# Patient Record
Sex: FEMALE | Race: WHITE | NOT HISPANIC OR LATINO | Employment: OTHER | URBAN - METROPOLITAN AREA
[De-identification: names, ages, dates, MRNs, and addresses within clinical notes are randomized per-mention and may not be internally consistent; named-entity substitution may affect disease eponyms.]

---

## 2017-01-18 ENCOUNTER — OFFICE VISIT (OUTPATIENT)
Dept: FAMILY MEDICINE | Facility: CLINIC | Age: 57
End: 2017-01-18
Payer: COMMERCIAL

## 2017-01-18 VITALS
WEIGHT: 158.6 LBS | HEIGHT: 64 IN | HEART RATE: 78 BPM | BODY MASS INDEX: 27.08 KG/M2 | TEMPERATURE: 97.9 F | DIASTOLIC BLOOD PRESSURE: 69 MMHG | SYSTOLIC BLOOD PRESSURE: 121 MMHG

## 2017-01-18 DIAGNOSIS — M65.4 RADIAL STYLOID TENOSYNOVITIS: Primary | ICD-10-CM

## 2017-01-18 PROCEDURE — 99213 OFFICE O/P EST LOW 20 MIN: CPT | Performed by: FAMILY MEDICINE

## 2017-01-18 ASSESSMENT — PAIN SCALES - GENERAL: PAINLEVEL: MILD PAIN (2)

## 2017-01-18 NOTE — PROGRESS NOTES
SUBJECTIVE:                                                    Lydia Pierson is a 56 year old female who presents to clinic today for the following health issues:      Problem:   Chief Complaint   Patient presents with     Wrist Pain     left wrist pain x2-3 months.  Also has noticed a cyst in the left wrist 1 year ago.       Location: left wrist  Quality: burning/aching pain with sharp pain with movement or lifiting  Severity: moderate  Duration: several months  Timing: intermitent  Context:  Modifying factors: worse with lifting or movement  Associated signs and symptoms: no Numbness or tingling.    ADDITIONAL HPI: 56 year old female here for above issue.     ROS: 10 point review of systems negative except as per HPI.    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     ALL at age 16,  no evid of recurrence     Malignant neoplasm of kidney, except pelvis 2001     left kidney removed 1/01        ACTIVE MEDICAL PROBLEMS:  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)     Malignant neoplasm of kidney excluding renal pelvis (H)     External hemorrhoids with other complication     Sensorineural hearing loss, asymmetrical     Ovarian cyst     Health Care Home     Hyperlipidemia LDL goal <160     Advanced directives, counseling/discussion        FAMILY HISTORY:  Family History   Problem Relation Age of Onset     C.A.D. Father      bypass     DIABETES Father      Lipids Father      Allergies Sister      allergic rhinitis     Neurologic Disorder Sister      seizure disorder     Respiratory Sister      asthma     Allergies Son      Breast Cancer No family hx of      Cancer - colorectal No family hx of        SOCIAL HISTORY:  Social History     Social History     Marital Status:      Spouse  "Name: N/A     Number of Children: N/A     Years of Education: N/A     Occupational History     Not on file.     Social History Main Topics     Smoking status: Never Smoker      Smokeless tobacco: Never Used     Alcohol Use: No     Drug Use: No     Sexual Activity:     Partners: Male     Birth Control/ Protection: Surgical      Comment: tubal     Other Topics Concern     Parent/Sibling W/ Cabg, Mi Or Angioplasty Before 65f 55m? No     Social History Narrative       MEDICATIONS:  Current Outpatient Prescriptions   Medication Sig Dispense Refill     diclofenac (VOLTAREN) 1 % GEL topical gel 2 grams to hands four times daily using enclosed dosing card. 100 g 3     acetaminophen (TYLENOL) 325 MG tablet Take 1 tablet by mouth every 6 hours as needed.       ibuprofen (ADVIL,MOTRIN) 600 MG tablet Take 1 tablet by mouth every 6 hours as needed for pain (For mild pain and temperature greater than 102F). 30 tablet 0     Acetaminophen-Aspirin Buffered (EXCEDRIN BACK & BODY) 250-250 MG TABS 1 tablet PRN for pain       LYSINE OR AS NEEDED       VITAMIN D 400 UNIT OR TABS 1 daily in winter       WOMENS DAILY MULTIVITAMIN OR TABS 1 TABLET DAILY       CALCIUM 600 600 MG OR TABS 1 daily       amoxicillin-clavulanate (AUGMENTIN) 875-125 MG per tablet Take 1 tablet by mouth 2 times daily 20 tablet 0     cyclobenzaprine (FLEXERIL) 10 MG tablet Take 1 tablet (10 mg) by mouth nightly as needed for muscle spasms 30 tablet 1     traZODone (DESYREL) 50 MG tablet Take 1-2 tablets ( mg) by mouth nightly as needed for sleep 60 tablet 1       ALLERGIES:     Allergies   Allergen Reactions     Seasonal Allergies        Problem list, Medication list, Allergies, and Medical/Social/Surgical histories reviewed in [facility] and updated as appropriate.    OBJECTIVE:                                                    VITALS: /69 mmHg  Pulse 78  Temp(Src) 97.9  F (36.6  C) (Tympanic)  Ht 5' 4\" (1.626 m)  Wt 158 lb 9.6 oz (71.94 kg)  BMI 27.21 " kg/m2  LMP 12/05/2010  Breastfeeding? No Body mass index is 27.21 kg/(m^2).  GENERAL: Pleasant, well appearing female.  MUSCULOSKELETAL:  Tender over radial wrist. Positive Finkelstein's. Otherwise unremarkable exam.    ASSESSMENT/PLAN:                                                    1. Radial styloid tenosynovitis  Placed in thumb spika splint. Try ice and voltaren gel. If not improving then consult ortho for further management.  - diclofenac (VOLTAREN) 1 % GEL topical gel; 2 grams to hands four times daily using enclosed dosing card.  Dispense: 100 g; Refill: 3  - ORTHO  REFERRAL

## 2017-01-18 NOTE — PATIENT INSTRUCTIONS
Thank you for choosing [facility].  You may be receiving a survey in the mail from [facility] regarding your visit today.  Please take a few minutes to complete and return the survey to let us know how we are doing.      Our Clinic hours are:  Mondays    7:20 am - 7 pm  Tues -  Fri  7:20 am - 5 pm    Clinic Phone: [phone]    The clinic lab opens at 7:30 am Mon - Fri and appointments are required.    [facility]. [phone]  Monday-Thursday 8 am - 7pm  Tues/Wed/Fri 8 am - 5:30 pm

## 2017-01-18 NOTE — MR AVS SNAPSHOT
After Visit Summary   1/18/2017    Lydia Pierson    MRN: [id number]           Patient Information     Date Of Birth          1960        Visit Information        Provider Department      1/18/2017 3:00 PM Benja Cordova MD [facility]        Today's Diagnoses     Radial styloid tenosynovitis    -  1       Care Instructions          Thank you for choosing [facility].  You may be receiving a survey in the mail from [facility] regarding your visit today.  Please take a few minutes to complete and return the survey to let us know how we are doing.      Our Clinic hours are:  Mondays    7:20 am - 7 pm  Tues -  Fri  7:20 am - 5 pm    Clinic Phone: [phone]    The clinic lab opens at 7:30 am Mon - Fri and appointments are required.    [facility]  Ph. [phone]  Monday-Thursday 8 am - 7pm  Tues/Wed/Fri 8 am - 5:30 pm               Follow-ups after your visit        Additional Services     ORTHO  REFERRAL       [facility] is referring you to the Orthopedic  Services at [facility].       The  Representative will assist you in the coordination of your Orthopedic and Musculoskeletal Care as prescribed by your physician.    The  Representative will call you within 1 business day to help schedule your appointment, or you may contact the  Representative at:    All areas ~ [phone]     Type of Referral : Non Surgical       Timeframe requested: Routine    Coverage of these services is subject to the terms and limitations of your health insurance plan.  Please call member services at your health plan with any benefit or coverage questions.      If X-rays, CT or MRI's have been performed, please contact the facility where they were done to arrange for , prior to your scheduled appointment.  Please bring this referral request to your appointment and  "present it to your specialist.                  Who to contact     If you have questions or need follow up information about today's clinic visit or your schedule please contact [facility] directly at [phone].  Normal or non-critical lab and imaging results will be communicated to you by MyChart, letter or phone within 4 business days after the clinic has received the results. If you do not hear from us within 7 days, please contact the clinic through MyChart or phone. If you have a critical or abnormal lab result, we will notify you by phone as soon as possible.  Submit refill requests through [employer] or call your pharmacy and they will forward the refill request to us. Please allow 3 business days for your refill to be completed.          Additional Information About Your Visit        charming [facility] Information     [employer] lets you send messages to your doctor, view your test results, renew your prescriptions, schedule appointments and more. To sign up, go to [email or web address] . Click on \"Log in\" on the left side of the screen, which will take you to the Welcome page. Then click on \"Sign up Now\" on the right side of the page.     You will be asked to enter the access code listed below, as well as some personal information. Please follow the directions to create your username and password.     Your access code is: [email or web address]  Expires: 2017  3:44 PM     Your access code will  in 90 days. If you need help or a new code, please call your [facility] clinic or [phone].        Care EveryWhere ID     This is your Care EveryWhere ID. This could be used by other organizations to access your [facility] medical records  [id number]        Your Vitals Were     Pulse Temperature Height BMI (Body Mass Index) Last Period Breastfeeding?    78 97.9  F (36.6  C) (Tympanic) 5' 4\" (1.626 m) 27.21 kg/m2 2010 No       Blood Pressure from Last 3 Encounters:   17 121/69   16 123/75 "   10/06/16 125/84    Weight from Last 3 Encounters:   01/18/17 158 lb 9.6 oz (71.94 kg)   12/13/16 162 lb (73.483 kg)   10/06/16 166 lb (75.297 kg)              We Performed the Following     ORTHO  REFERRAL          Today's Medication Changes          These changes are accurate as of: 1/18/17  3:44 PM.  If you have any questions, ask your nurse or doctor.               Start taking these medicines.        Dose/Directions    diclofenac 1 % Gel topical gel   Commonly known as:  VOLTAREN   Used for:  Radial styloid tenosynovitis   Started by:  Benja Cordova MD        2 grams to hands four times daily using enclosed dosing card.   Quantity:  100 g   Refills:  3            Where to get your medicines      These medications were sent to [facility] [address]  [id number] [place] [address]     Phone:  [phone]    - diclofenac 1 % Gel topical gel             Primary Care Provider Office Phone # Fax #    Benja Cordova [phone]       [facility] [address] [facility] [address]        Thank you!     Thank you for choosing [facility]  for your care. Our goal is always to provide you with excellent care. Hearing back from our patients is one way we can continue to improve our services. Please take a few minutes to complete the written survey that you may receive in the mail after your visit with us. Thank you!             Your Updated Medication List - Protect others around you: Learn how to safely use, store and throw away your medicines at www.disposemymeds.org.          This list is accurate as of: 1/18/17  3:44 PM.  Always use your most recent med list.                   Brand Name Dispense Instructions for use    amoxicillin-clavulanate 875-125 MG per tablet    AUGMENTIN    20 tablet    Take 1 tablet by mouth 2 times daily       calcium carbonate 600  MG tablet   Generic drug:  calcium carbonate      1 daily       cyclobenzaprine 10 MG tablet    FLEXERIL    30 tablet    Take 1 tablet (10 mg) by mouth nightly as needed for muscle spasms       diclofenac 1 % Gel topical gel    VOLTAREN    100 g    2 grams to hands four times daily using enclosed dosing card.       EXCEDRIN BACK & BODY 250-250 MG Tabs   Generic drug:  Acetaminophen-Aspirin Buffered      1 tablet PRN for pain       ibuprofen 600 MG tablet    ADVIL/MOTRIN    30 tablet    Take 1 tablet by mouth every 6 hours as needed for pain (For mild pain and temperature greater than 102F).       LYSINE PO      AS NEEDED       traZODone 50 MG tablet    DESYREL    60 tablet    Take 1-2 tablets ( mg) by mouth nightly as needed for sleep       TYLENOL 325 MG tablet   Generic drug:  acetaminophen      Take 1 tablet by mouth every 6 hours as needed.       vitamin D 400 UNITS tablet      1 daily in winter       WOMENS DAILY MULTIVITAMIN Tabs      1 TABLET DAILY

## 2017-01-18 NOTE — NURSING NOTE
"Chief Complaint   Patient presents with     Wrist Pain     left wrist pain x2-3 months.  Also has noticed a cyst in the left wrist 1 year ago.       Initial /69 mmHg  Pulse 78  Temp(Src) 97.9  F (36.6  C) (Tympanic)  Ht 5' 4\" (1.626 m)  Wt 158 lb 9.6 oz (71.94 kg)  BMI 27.21 kg/m2  LMP 12/05/2010  Breastfeeding? No Estimated body mass index is 27.21 kg/(m^2) as calculated from the following:    Height as of this encounter: 5' 4\" (1.626 m).    Weight as of this encounter: 158 lb 9.6 oz (71.94 kg).  BP completed using cuff size: regular    "

## 2017-01-27 ENCOUNTER — OFFICE VISIT (OUTPATIENT)
Dept: ORTHOPEDICS | Facility: CLINIC | Age: 57
End: 2017-01-27
Payer: COMMERCIAL

## 2017-01-27 VITALS
SYSTOLIC BLOOD PRESSURE: 126 MMHG | HEART RATE: 69 BPM | WEIGHT: 158 LBS | BODY MASS INDEX: 27.11 KG/M2 | DIASTOLIC BLOOD PRESSURE: 71 MMHG

## 2017-01-27 DIAGNOSIS — R22.32 MASS OF WRIST, LEFT: ICD-10-CM

## 2017-01-27 DIAGNOSIS — M25.532 LEFT WRIST PAIN: ICD-10-CM

## 2017-01-27 DIAGNOSIS — M65.4 RADIAL STYLOID TENOSYNOVITIS: Primary | ICD-10-CM

## 2017-01-27 PROCEDURE — 99213 OFFICE O/P EST LOW 20 MIN: CPT | Performed by: PHYSICIAN ASSISTANT

## 2017-01-27 NOTE — PROGRESS NOTES
Sports Medicine Clinic Visit     Prior Visit Date 3/31/16    PCP: Benja Cordova Rhonda Pierson is a 56 year old female who is seen in f/u up for left wrist pain. Since last visit on 3/31/16 with Dr. Banerjee patient has noted she had good relief from the injection at that time until November. She was referred back to the clinic by Dr. Cordova as her mass has enlarged and is now more painful than before.  She notices a mobile mass that is over her area of tenosynovitis.  She is unsure if the tenosynovitis is causing pain or if it is the mass.  She has noticed the mass wax and wane in size and pain.  She denies paresthesias or changes in vascular flow.        Social History:         Review of Systems  Musculoskeletal: as above  Remainder of review of systems is negative including constitutional, CV, pulmonary, GI, Skin and Neurologic except as noted in HPI or medical history.    Family history, medical history and surgical history have all been discussed with patient and appended to medical chart below.    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     ALL at age 16,  no evid of recurrence     Malignant neoplasm of kidney, except pelvis 2001     left kidney removed 1/01     Past Surgical History   Procedure Laterality Date     Surgical history of -   8/1/1995     Vein stripping     Tubal ligation  1994     Hemorrhoidectomy  2006     Tonsillectomy & adenoidectomy  1962     Surgical history of -   1975     wisdom teeth extraction      Nephrectomy rt/lt  2001     left partial nephrectomy- kidney ca     Salpingo oophorectomy,r/l/jus  2010     Rt salpingo oophorectomy      Arthroscopy knee with medial meniscectomy  7/7/2011     Procedure:ARTHROSCOPY KNEE WITH MEDIAL MENISCECTOMY; choice anes; Surgeon:MANUEL FLEMING; Location:[place] [facility]     Family History    Problem Relation Age of Onset     C.A.D. Father      bypass     DIABETES Father      Lipids Father      Allergies Sister      allergic rhinitis     Neurologic Disorder Sister      seizure disorder     Respiratory Sister      asthma     Allergies Son      Breast Cancer No family hx of      Cancer - colorectal No family hx of      Objective  /71 mmHg  Pulse 69  Wt 158 lb (71.668 kg)  LMP 12/05/2010  Constitutional:well-developed, well-nourished, and in no distress.   Cardiovascular: Intact distal pulses.    Neurological: alert. Gait normal  Skin: Skin is warm and dry.   Psychiatric: Mood and affect normal.   Respiratory: unlabored, speaks in full sentences  Hematologic/Lymphatic/Immunologic: neg lymphadenopathy, neg lymphedema    Exam:  Inspection:        mobile, transilluminating mass over radial syloid    Tender:       Radial styloid, 1st dorsal compartment    Non Tender:       Remainder of the Wrist and Hand     ROM:       Full and symmetric active and passive range of motion of the forearm, wrist and digits         Pain with radial deviation and wrist extension    Strength:       5/5 strength in the muscles of the hand, wrist and forearm     Special Tests:        positive (+) Finkelstein's maneuver left    Neurovascular:       2+ radial pulses bilaterally with brisk capillary refill and      normal sensation to light touch in the radial, median and ulnar nerve distributions      Assessment:  1. Radial styloid tenosynovitis    2. Mass of wrist, left    3. Left wrist pain        Plan:  Discussed the assessment with the patient.  I have recommended options including repeat injection.  She is hesitant to proceed with this at this time as she is now more concerned with the mass that has developed that is incredibly painful when she hits it or uses wrist.  I also recommended an evaluation with a hand surgeon as there is the possibility that this mass can be excised.  However, imaging will be determined by  surgeon's team along with treatment options at that visit.      Regina Salinas PA-C  [facility]  [facility]      Disclaimer: This note consists of symbols derived from keyboarding, dictation and/or voice recognition software. As a result, there may be errors in the script that have gone undetected. Please consider this when interpreting information found in this chart.

## 2017-01-27 NOTE — MR AVS SNAPSHOT
After Visit Summary   1/27/2017    Lydia Pierson    MRN: [id number]           Patient Information     Date Of Birth          1960        Visit Information        Provider Department      1/27/2017 11:40 AM Regina Salinas PA-C [facility]        Today's Diagnoses     Ganglion cyst of wrist    -  1       Care Instructions    [facility]  will contact you in the next 48 hours to help schedule the appointment with a hand surgeon. If you do not hear from them please call [phone] for [place] and [facility].          Follow-ups after your visit        Additional Services     ORTHO  REFERRAL       [facility] is referring you to the Orthopedic  Services at [facility].       The  Representative will assist you in the coordination of your Orthopedic and Musculoskeletal Care as prescribed by your physician.    The  Representative will call you within 1 business day to help schedule your appointment, or you may contact the  Representative at:    All areas ~ [phone]     Type of Referral : Surgical / Specialist       Timeframe requested: Routine    Coverage of these services is subject to the terms and limitations of your health insurance plan.  Please call member services at your health plan with any benefit or coverage questions.      If X-rays, CT or MRI's have been performed, please contact the facility where they were done to arrange for , prior to your scheduled appointment.  Please bring this referral request to your appointment and present it to your specialist.                  Who to contact     If you have questions or need follow up information about today's clinic visit or your schedule please contact [facility] directly at [phone].  Normal or non-critical lab and imaging results will be communicated  "to you by MyChart, letter or phone within 4 business days after the clinic has received the results. If you do not hear from us within 7 days, please contact the clinic through [employer] or phone. If you have a critical or abnormal lab result, we will notify you by phone as soon as possible.  Submit refill requests through [employer] or call your pharmacy and they will forward the refill request to us. Please allow 3 business days for your refill to be completed.          Additional Information About Your Visit        [employer] Information     [employer] lets you send messages to your doctor, view your test results, renew your prescriptions, schedule appointments and more. To sign up, go to [email or web address] . Click on \"Log in\" on the left side of the screen, which will take you to the Welcome page. Then click on \"Sign up Now\" on the right side of the page.     You will be asked to enter the access code listed below, as well as some personal information. Please follow the directions to create your username and password.     Your access code is: [email or web address]  Expires: 2017  3:44 PM     Your access code will  in 90 days. If you need help or a new code, please call your [facility] clinic or [phone].        Care EveryWhere ID     This is your Care EveryWhere ID. This could be used by other organizations to access your [facility] medical records  [id number]        Your Vitals Were     Pulse Last Period                2010           Blood Pressure from Last 3 Encounters:   17 126/71   17 121/69   16 123/75    Weight from Last 3 Encounters:   17 158 lb (71.668 kg)   17 158 lb 9.6 oz (71.94 kg)   16 162 lb (73.483 kg)              We Performed the Following     ORTHO  REFERRAL        Primary Care Provider Office Phone # Fax #    Benja Cordova [phone]       [facility] [address] [place] [address]   "      Thank you!     Thank you for choosing [facility]  for your care. Our goal is always to provide you with excellent care. Hearing back from our patients is one way we can continue to improve our services. Please take a few minutes to complete the written survey that you may receive in the mail after your visit with us. Thank you!             Your Updated Medication List - Protect others around you: Learn how to safely use, store and throw away your medicines at www.disposemymeds.org.          This list is accurate as of: 1/27/17 11:47 AM.  Always use your most recent med list.                   Brand Name Dispense Instructions for use    amoxicillin-clavulanate 875-125 MG per tablet    AUGMENTIN    20 tablet    Take 1 tablet by mouth 2 times daily       calcium carbonate 600 MG tablet   Generic drug:  calcium carbonate      1 daily       cyclobenzaprine 10 MG tablet    FLEXERIL    30 tablet    Take 1 tablet (10 mg) by mouth nightly as needed for muscle spasms       diclofenac 1 % Gel topical gel    VOLTAREN    100 g    2 grams to hands four times daily using enclosed dosing card.       EXCEDRIN BACK & BODY 250-250 MG Tabs   Generic drug:  Acetaminophen-Aspirin Buffered      1 tablet PRN for pain       ibuprofen 600 MG tablet    ADVIL/MOTRIN    30 tablet    Take 1 tablet by mouth every 6 hours as needed for pain (For mild pain and temperature greater than 102F).       LYSINE PO      AS NEEDED       traZODone 50 MG tablet    DESYREL    60 tablet    Take 1-2 tablets ( mg) by mouth nightly as needed for sleep       TYLENOL 325 MG tablet   Generic drug:  acetaminophen      Take 1 tablet by mouth every 6 hours as needed.       vitamin D 400 UNITS tablet      1 daily in winter       WOMENS DAILY MULTIVITAMIN Tabs      1 TABLET DAILY

## 2017-01-27 NOTE — PATIENT INSTRUCTIONS
Orthopedic  will contact you in the next 48 hours to help schedule the appointment with a hand surgeon. If you do not hear from them please call [phone] for [place] and [facility].

## 2017-03-02 ENCOUNTER — OFFICE VISIT (OUTPATIENT)
Dept: ORTHOPEDICS | Facility: CLINIC | Age: 57
End: 2017-03-02
Payer: COMMERCIAL

## 2017-03-02 VITALS
WEIGHT: 158 LBS | BODY MASS INDEX: 26.98 KG/M2 | SYSTOLIC BLOOD PRESSURE: 122 MMHG | HEIGHT: 64 IN | DIASTOLIC BLOOD PRESSURE: 78 MMHG

## 2017-03-02 DIAGNOSIS — M65.4 RADIAL STYLOID TENOSYNOVITIS: Primary | ICD-10-CM

## 2017-03-02 DIAGNOSIS — M25.532 LEFT WRIST PAIN: ICD-10-CM

## 2017-03-02 PROCEDURE — 76942 ECHO GUIDE FOR BIOPSY: CPT | Mod: LT | Performed by: FAMILY MEDICINE

## 2017-03-02 PROCEDURE — 20550 NJX 1 TENDON SHEATH/LIGAMENT: CPT | Mod: LT | Performed by: FAMILY MEDICINE

## 2017-03-02 RX ORDER — TRIAMCINOLONE ACETONIDE 40 MG/ML
40 INJECTION, SUSPENSION INTRA-ARTICULAR; INTRAMUSCULAR ONCE
Qty: 1 ML | Refills: 0 | OUTPATIENT
Start: 2017-03-02 | End: 2017-03-02

## 2017-03-02 NOTE — PROGRESS NOTES
Lydia Pierson  :  1960  DOS: 2017  MRN: [id number]    Sports Medicine Clinic Procedure    Procedure: DeQuervain's Tendon Sheath Injection    Clinical History: Lydia Pierson is a 56 year old female who is seen in f/u up for left wrist pain. Since last visit on 3/31/16 with Dr. Banerjee patient has noted she had good relief from the injection at that time until November. She was referred back to the clinic by Dr. Cordova as her mass has enlarged and is now more painful than before. She notices a mobile mass that is over her area of tenosynovitis. She is unsure if the tenosynovitis is causing pain or if it is the mass. She has noticed the mass wax and wane in size and pain. She denies paresthesias or changes in vascular flow.  She as consulted with hand surgeon - Dr Julien, recommendation hand therapy, custom splint, could repeat steroid injection as directed.    Diagnosis: (M65.4) Radial styloid tenosynovitis  (primary encounter diagnosis)  (M25.532) Left wrist pain  Referring Physician: Klaudia Julien MD @ [facility], Regina Salinas PA-C    Consent given, and signed on chart.  Sterile prepping.    Ultrasound identification of the 1st dorsal compartment tendons in both long and short axis.    The probe was placed in long axis view to the left side 1st dorsal compartment tendons.  A 1.5 inch 25 gauge needle was placed under ultrasound guidance between the extensor tendon sheath and the abductor pollicis longus/flexor pollicis brevis tendons.     A mixture of 1ml 1% lidocaine and 1 ml kenalog (40mg/ml) was injected without difficulty on the long axis view.    Good hemostasis and no complications.  Ultrasound documentation of needle placement and injection.    Impression:  Dequervain's tenosynovitis  Successful Left US guided fist dorsal compartment steroid injection into tendon sheath     Plan:  Follow up with Dr Julien as directed.  Expectations and goals of CSI reviewed  Often 2-3 days for steroid  effect, and can take up to two weeks for maximum effect  We discussed modified progressive pain-free activity as tolerated  Do not overuse in first two weeks if feeling better due to concern for vulnerability while steroid is working  Supportive care reviewed  All questions were answered today  Contact us with additional questions or concerns  Signs and sx of concern reviewed      Favian Banerjee DO, CAQ  Primary Care Sports Medicine  [facility]

## 2017-03-02 NOTE — MR AVS SNAPSHOT
"              After Visit Summary   3/2/2017    Lydia Pierson    MRN: [id number]           Patient Information     Date Of Birth          1960        Visit Information        Provider Department      3/2/2017 8:40 AM Favian Banerjee,  [facility]        Today's Diagnoses     Radial styloid tenosynovitis    -  1    Left wrist pain           Follow-ups after your visit        Future tests that were ordered for you today     Open Future Orders        Priority Expected Expires Ordered    US Injection of Tendon Sheath Routine  3/3/2018 3/2/2017            Who to contact     If you have questions or need follow up information about today's clinic visit or your schedule please contact [facility] directly at [phone].  Normal or non-critical lab and imaging results will be communicated to you by MyChart, letter or phone within 4 business days after the clinic has received the results. If you do not hear from us within 7 days, please contact the clinic through MyChart or phone. If you have a critical or abnormal lab result, we will notify you by phone as soon as possible.  Submit refill requests through [employer] or call your pharmacy and they will forward the refill request to us. Please allow 3 business days for your refill to be completed.          Additional Information About Your Visit        MyChart Information     [employer] lets you send messages to your doctor, view your test results, renew your prescriptions, schedule appointments and more. To sign up, go to [email or web address] . Click on \"Log in\" on the left side of the screen, which will take you to the Welcome page. Then click on \"Sign up Now\" on the right side of the page.     You will be asked to enter the access code listed below, as well as some personal information. Please follow the directions to create your username and password.     Your access code is: " "[email or web address]  Expires: 2017  3:44 PM     Your access code will  in 90 days. If you need help or a new code, please call your [place] clinic or [phone].        Care EveryWhere ID     This is your Care EveryWhere ID. This could be used by other organizations to access your [place] medical records  [id number]        Your Vitals Were     Height Last Period BMI (Body Mass Index)             5' 4\" (1.626 m) 2010 27.12 kg/m2          Blood Pressure from Last 3 Encounters:   17 122/78   17 126/71   17 121/69    Weight from Last 3 Encounters:   17 158 lb (71.7 kg)   17 158 lb (71.7 kg)   17 158 lb 9.6 oz (71.9 kg)              We Performed the Following     TRIAMCINOLONE ACET INJ NOS          Today's Medication Changes          These changes are accurate as of: 3/2/17  9:17 AM.  If you have any questions, ask your nurse or doctor.               Start taking these medicines.        Dose/Directions    triamcinolone acetonide 40 MG/ML injection   Commonly known as:  KENALOG-40   Used for:  Left wrist pain, Radial styloid tenosynovitis   Started by:  Favian Banerjee DO        Dose:  40 mg   1 mL (40 mg) by INTRA-ARTICULAR route once for 1 dose   Quantity:  1 mL   Refills:  0            Where to get your medicines      Some of these will need a paper prescription and others can be bought over the counter.  Ask your nurse if you have questions.     You don't need a prescription for these medications     triamcinolone acetonide 40 MG/ML injection                Primary Care Provider Office Phone # Fax #    Benja Cordova [phone]       [address] [address]        Thank you!     Thank you for choosing [facility]  for your care. Our goal is always to provide you with excellent care. Hearing back from our patients is one way we can continue to improve " our services. Please take a few minutes to complete the written survey that you may receive in the mail after your visit with us. Thank you!             Your Updated Medication List - Protect others around you: Learn how to safely use, store and throw away your medicines at www.disposemymeds.org.          This list is accurate as of: 3/2/17  9:17 AM.  Always use your most recent med list.                   Brand Name Dispense Instructions for use    amoxicillin-clavulanate 875-125 MG per tablet    AUGMENTIN    20 tablet    Take 1 tablet by mouth 2 times daily       calcium carbonate 600 MG tablet   Generic drug:  calcium carbonate      1 daily       cyclobenzaprine 10 MG tablet    FLEXERIL    30 tablet    Take 1 tablet (10 mg) by mouth nightly as needed for muscle spasms       diclofenac 1 % Gel topical gel    VOLTAREN    100 g    2 grams to hands four times daily using enclosed dosing card.       EXCEDRIN BACK & BODY 250-250 MG Tabs   Generic drug:  Acetaminophen-Aspirin Buffered      1 tablet PRN for pain       ibuprofen 600 MG tablet    ADVIL/MOTRIN    30 tablet    Take 1 tablet by mouth every 6 hours as needed for pain (For mild pain and temperature greater than 102F).       LYSINE PO      AS NEEDED       traZODone 50 MG tablet    DESYREL    60 tablet    Take 1-2 tablets ( mg) by mouth nightly as needed for sleep       triamcinolone acetonide 40 MG/ML injection    KENALOG-40    1 mL    1 mL (40 mg) by INTRA-ARTICULAR route once for 1 dose       TYLENOL 325 MG tablet   Generic drug:  acetaminophen      Take 1 tablet by mouth every 6 hours as needed.       vitamin D 400 UNITS tablet      1 daily in winter       WOMENS DAILY MULTIVITAMIN Tabs      1 TABLET DAILY

## 2017-03-02 NOTE — NURSING NOTE
"Chief Complaint   Patient presents with     Musculoskeletal Problem     f/u left wrist pain - US injection       Initial /78  Ht 5' 4\" (1.626 m)  Wt 158 lb (71.7 kg)  LMP 12/05/2010  BMI 27.12 kg/m2 Estimated body mass index is 27.12 kg/(m^2) as calculated from the following:    Height as of this encounter: 5' 4\" (1.626 m).    Weight as of this encounter: 158 lb (71.7 kg).  Medication Reconciliation: complete     Emery Richards, ATC  "

## 2017-03-13 ENCOUNTER — OFFICE VISIT (OUTPATIENT)
Dept: FAMILY MEDICINE | Facility: CLINIC | Age: 57
End: 2017-03-13
Payer: COMMERCIAL

## 2017-03-13 ENCOUNTER — HOSPITAL ENCOUNTER (OUTPATIENT)
Dept: ULTRASOUND IMAGING | Facility: CLINIC | Age: 57
Discharge: HOME | End: 2017-03-13
Attending: FAMILY MEDICINE | Admitting: FAMILY MEDICINE
Payer: COMMERCIAL

## 2017-03-13 VITALS
SYSTOLIC BLOOD PRESSURE: 132 MMHG | HEART RATE: 76 BPM | BODY MASS INDEX: 26.98 KG/M2 | WEIGHT: 158 LBS | DIASTOLIC BLOOD PRESSURE: 69 MMHG | HEIGHT: 64 IN | TEMPERATURE: 96.9 F

## 2017-03-13 DIAGNOSIS — N89.8 VAGINAL ODOR: ICD-10-CM

## 2017-03-13 DIAGNOSIS — R30.9 PAIN WITH URINATION: Primary | ICD-10-CM

## 2017-03-13 DIAGNOSIS — R10.32 ABDOMINAL PAIN, LEFT LOWER QUADRANT: ICD-10-CM

## 2017-03-13 LAB
ALBUMIN UR-MCNC: NEGATIVE MG/DL
APPEARANCE UR: CLEAR
BILIRUB UR QL STRIP: NEGATIVE
COLOR UR AUTO: YELLOW
GLUCOSE UR STRIP-MCNC: NEGATIVE MG/DL
HGB UR QL STRIP: NEGATIVE
KETONES UR STRIP-MCNC: NEGATIVE MG/DL
LEUKOCYTE ESTERASE UR QL STRIP: NEGATIVE
MICRO REPORT STATUS: NORMAL
NITRATE UR QL: NEGATIVE
PH UR STRIP: 6 PH (ref 5–7)
SP GR UR STRIP: 1.01 (ref 1–1.03)
SPECIMEN SOURCE: NORMAL
URN SPEC COLLECT METH UR: NORMAL
UROBILINOGEN UR STRIP-ACNC: 0.2 EU/DL (ref 0.2–1)
WET PREP SPEC: NORMAL

## 2017-03-13 PROCEDURE — 81003 URINALYSIS AUTO W/O SCOPE: CPT | Performed by: FAMILY MEDICINE

## 2017-03-13 PROCEDURE — 87210 SMEAR WET MOUNT SALINE/INK: CPT | Performed by: FAMILY MEDICINE

## 2017-03-13 PROCEDURE — 76830 TRANSVAGINAL US NON-OB: CPT

## 2017-03-13 PROCEDURE — 99214 OFFICE O/P EST MOD 30 MIN: CPT | Performed by: FAMILY MEDICINE

## 2017-03-13 NOTE — PATIENT INSTRUCTIONS
Thank you for choosing [facility].  You may be receiving a survey in the mail from Karla Daniels regarding your visit today.  Please take a few minutes to complete and return the survey to let us know how we are doing.      If you have questions or concerns, please contact us via [employer] or you can contact your care team at [phone].    Our Clinic hours are:  Monday 6:40 am  to 7:00 pm  Tuesday -Friday 6:40 am to 5:00 pm    The [place] outpatient lab hours are:  Monday - Friday 6:10 am to 4:45 pm  Saturdays 7:00 am to 11:00 am  Appointments are required, call [phone]    If you have clinical questions after hours or would like to schedule an appointment,  call the clinic at [phone].

## 2017-03-13 NOTE — MR AVS SNAPSHOT
After Visit Summary   3/13/2017    Lydia Pierson    MRN: [id number]           Patient Information     Date Of Birth          1960        Visit Information        Provider Department      3/13/2017 10:40 AM Alana Pimentel MD [facility]        Today's Diagnoses     Pain with urination    -  1    Vaginal odor        Abdominal pain, left lower quadrant          Care Instructions          Thank you for choosing [facility].  You may be receiving a survey in the mail from [facility] regarding your visit today.  Please take a few minutes to complete and return the survey to let us know how we are doing.      If you have questions or concerns, please contact us via [employer] or you can contact your care team at [phone].    Our Clinic hours are:  Monday 6:40 am  to 7:00 pm  Tuesday -Friday 6:40 am to 5:00 pm    The [place] outpatient lab hours are:  Monday - Friday 6:10 am to 4:45 pm  Saturdays 7:00 am to 11:00 am  Appointments are required, call [phone]    If you have clinical questions after hours or would like to schedule an appointment,  call the clinic at [phone].        Follow-ups after your visit        Future tests that were ordered for you today     Open Future Orders        Priority Expected Expires Ordered    US Pelvic Complete with Transvaginal Routine 6/11/2017 3/13/2018 3/13/2017            Who to contact     If you have questions or need follow up information about today's clinic visit or your schedule please contact [facility] directly at [phone].  Normal or non-critical lab and imaging results will be communicated to you by MyChart, letter or phone within 4 business days after the clinic has received the results. If you do not hear from us within 7 days, please contact the clinic through [employer] or phone. If you have a critical or abnormal lab result, we will notify you by phone as soon as possible.  Submit refill  "requests through [employer] or call your pharmacy and they will forward the refill request to us. Please allow 3 business days for your refill to be completed.          Additional Information About Your Visit        [facility] Information     [employer] lets you send messages to your doctor, view your test results, renew your prescriptions, schedule appointments and more. To sign up, go to [email or web address] . Click on \"Log in\" on the left side of the screen, which will take you to the Welcome page. Then click on \"Sign up Now\" on the right side of the page.     You will be asked to enter the access code listed below, as well as some personal information. Please follow the directions to create your username and password.     Your access code is: [email or web address]  Expires: 2017  4:44 PM     Your access code will  in 90 days. If you need help or a new code, please call your [place] clinic or [phone].        Care EveryWhere ID     This is your Care EveryWhere ID. This could be used by other organizations to access your [place] medical records  [id number]        Your Vitals Were     Pulse Temperature Height Last Period BMI (Body Mass Index)       76 96.9  F (36.1  C) (Tympanic) 5' 4\" (1.626 m) 2010 27.12 kg/m2        Blood Pressure from Last 3 Encounters:   17 132/69   17 122/78   17 126/71    Weight from Last 3 Encounters:   17 158 lb (71.7 kg)   17 158 lb (71.7 kg)   17 158 lb (71.7 kg)              We Performed the Following     UA reflex to Microscopic and Culture     Wet prep        Primary Care Provider Office Phone # Fax #    Benja Cordova [phone]       [facility] [place] [address] [facility] [address]        Thank you!     Thank you for choosing [facility]  for your care. Our goal is always to provide you with excellent care. Hearing back from our patients is one way we can continue to improve " our services. Please take a few minutes to complete the written survey that you may receive in the mail after your visit with us. Thank you!             Your Updated Medication List - Protect others around you: Learn how to safely use, store and throw away your medicines at www.disposemymeds.org.          This list is accurate as of: 3/13/17 11:24 AM.  Always use your most recent med list.                   Brand Name Dispense Instructions for use    calcium carbonate 600 MG tablet   Generic drug:  calcium carbonate      1 daily       cyclobenzaprine 10 MG tablet    FLEXERIL    30 tablet    Take 1 tablet (10 mg) by mouth nightly as needed for muscle spasms       diclofenac 1 % Gel topical gel    VOLTAREN    100 g    2 grams to hands four times daily using enclosed dosing card.       EXCEDRIN BACK & BODY 250-250 MG Tabs   Generic drug:  Acetaminophen-Aspirin Buffered      1 tablet PRN for pain       ibuprofen 600 MG tablet    ADVIL/MOTRIN    30 tablet    Take 1 tablet by mouth every 6 hours as needed for pain (For mild pain and temperature greater than 102F).       LYSINE PO      AS NEEDED       traZODone 50 MG tablet    DESYREL    60 tablet    Take 1-2 tablets ( mg) by mouth nightly as needed for sleep       TYLENOL 325 MG tablet   Generic drug:  acetaminophen      Take 1 tablet by mouth every 6 hours as needed.       vitamin D 400 UNITS tablet      1 daily in winter       WOMENS DAILY MULTIVITAMIN Tabs      1 TABLET DAILY

## 2017-03-13 NOTE — NURSING NOTE
"Chief Complaint   Patient presents with     UTI       Initial /69 (BP Location: Left arm, Cuff Size: Adult Regular)  Pulse 76  Temp 96.9  F (36.1  C) (Tympanic)  Ht 5' 4\" (1.626 m)  Wt 158 lb (71.7 kg)  LMP 12/05/2010  BMI 27.12 kg/m2 Estimated body mass index is 27.12 kg/(m^2) as calculated from the following:    Height as of this encounter: 5' 4\" (1.626 m).    Weight as of this encounter: 158 lb (71.7 kg).  Medication Reconciliation: complete  "

## 2017-03-13 NOTE — PROGRESS NOTES
SUBJECTIVE:                                                    Lydia Pierson is a 57 year old female who presents to clinic today for the following health issues:      URINARY TRACT SYMPTOMS     Onset: 3-11-17    Description:   Painful urination (Dysuria): no   Blood in urine (Hematuria): no   Delay in urine (Hesitency): YES- little    Intensity: moderate    Progression of Symptoms:  worsening    Accompanying Signs & Symptoms:  Fever/chills: YES  Flank pain YES  Nausea and vomiting: YES  Any vaginal symptoms: vaginal odor pressure   Abdominal/Pelvic Pain: YES   History:   History of frequent UTI's: no   History of kidney stones: no   Sexually Active: YES  Possibility of pregnancy: No    Precipitating factors:   None          Therapies Tried and outcome: none         History as above, Here for urinary symptoms ,started on Saturday . She reports more of pressure than burning. She reports some fevers and chills.She has had a left nephrectomy from renal carcinoma. She also reports that the ovary on the left was taken out years ago.    Problem list and histories reviewed & adjusted, as indicated.  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)     Malignant neoplasm of kidney excluding renal pelvis (H)     External hemorrhoids with other complication     Sensorineural hearing loss, asymmetrical     Ovarian cyst     Health Care Home     Hyperlipidemia LDL goal <160     Advanced directives, counseling/discussion     Past Surgical History   Procedure Laterality Date     Surgical history of -   8/1/1995     Vein stripping     Tubal ligation  1994     Hemorrhoidectomy  2006     Tonsillectomy & adenoidectomy  1962     Surgical history of -   1975     wisdom teeth extraction      Nephrectomy rt/lt  2001     left partial nephrectomy- kidney ca     Salpingo oophorectomy,r/l/jus  2010     Rt salpingo oophorectomy       Arthroscopy knee with medial meniscectomy  7/7/2011     Procedure:ARTHROSCOPY KNEE WITH MEDIAL MENISCECTOMY; choice anes; Surgeon:MANUEL FLEMING; Location:[place] OR       Social History   Substance Use Topics     Smoking status: Never Smoker     Smokeless tobacco: Never Used     Alcohol use No     Family History   Problem Relation Age of Onset     C.A.D. Father      bypass     DIABETES Father      Lipids Father      Allergies Sister      allergic rhinitis     Neurologic Disorder Sister      seizure disorder     Respiratory Sister      asthma     Allergies Son      Breast Cancer No family hx of      Cancer - colorectal No family hx of          Current Outpatient Prescriptions   Medication Sig Dispense Refill     VITAMIN D 400 UNIT OR TABS 1 daily in winter       CALCIUM 600 600 MG OR TABS 1 daily       diclofenac (VOLTAREN) 1 % GEL topical gel 2 grams to hands four times daily using enclosed dosing card. 100 g 3     cyclobenzaprine (FLEXERIL) 10 MG tablet Take 1 tablet (10 mg) by mouth nightly as needed for muscle spasms 30 tablet 1     traZODone (DESYREL) 50 MG tablet Take 1-2 tablets ( mg) by mouth nightly as needed for sleep 60 tablet 1     acetaminophen (TYLENOL) 325 MG tablet Take 1 tablet by mouth every 6 hours as needed.       ibuprofen (ADVIL,MOTRIN) 600 MG tablet Take 1 tablet by mouth every 6 hours as needed for pain (For mild pain and temperature greater than 102F). 30 tablet 0     Acetaminophen-Aspirin Buffered (EXCEDRIN BACK & BODY) 250-250 MG TABS 1 tablet PRN for pain       LYSINE OR AS NEEDED       WOMENS DAILY MULTIVITAMIN OR TABS 1 TABLET DAILY         Reviewed and updated as needed this visit by clinical staff  Tobacco  Allergies  Med Hx  Surg Hx  Fam Hx  Soc Hx      Reviewed and updated as needed this visit by Provider         ROS:  Constitutional, HEENT, cardiovascular, pulmonary, gi and gu systems are negative, except as otherwise noted.    OBJECTIVE:                                     "                /69 (BP Location: Left arm, Cuff Size: Adult Regular)  Pulse 76  Temp 96.9  F (36.1  C) (Tympanic)  Ht 5' 4\" (1.626 m)  Wt 158 lb (71.7 kg)  LMP 12/05/2010  BMI 27.12 kg/m2  Body mass index is 27.12 kg/(m^2).  GENERAL: healthy, alert and no distress  NECK: no adenopathy, no asymmetry, masses, or scars and thyroid normal to palpation  RESP: lungs clear to auscultation - no rales, rhonchi or wheezes  CV: regular rate and rhythm, normal S1 S2, no S3 or S4, no murmur, click or rub, no peripheral edema and peripheral pulses strong  - Cervix normal ,Left adnexae tenderness,no fullness.  ABDOMEN: soft, nontender, no hepatosplenomegaly, no masses and bowel sounds normal  MS: no gross musculoskeletal defects noted, no edema  BACK: no CVA tenderness, no paralumbar tenderness    Diagnostic Test Results:  Results for orders placed or performed in visit on 03/13/17   UA reflex to Microscopic and Culture   Result Value Ref Range    Color Urine Yellow     Appearance Urine Clear     Glucose Urine Negative NEG mg/dL    Bilirubin Urine Negative NEG    Ketones Urine Negative NEG mg/dL    Specific Gravity Urine 1.015 1.003 - 1.035    Blood Urine Negative NEG    pH Urine 6.0 5.0 - 7.0 pH    Protein Albumin Urine Negative NEG mg/dL    Urobilinogen Urine 0.2 0.2 - 1.0 EU/dL    Nitrite Urine Negative NEG    Leukocyte Esterase Urine Negative NEG    Source Midstream Urine    Wet prep   Result Value Ref Range    Specimen Description Vagina     Wet Prep       No yeast seen  No clue cells seen  No Trichomonas seen      Micro Report Status FINAL 03/13/2017         ASSESSMENT/PLAN:                                                        1. Pain with urination  UA does not indicate any infection.will order ultrasound to further investigate.  - UA reflex to Microscopic and Culture    2. Vaginal odor  Wet prep was negative  - Wet prep    3. Abdominal pain, left lower quadrant  .Patient will be notified of results  - US Pelvic " Complete with Transvaginal; Future    FUTURE APPOINTMENTS:       - Follow-up visit as needed.    Alana Pimentel MD  [facility]

## 2017-03-15 NOTE — PROGRESS NOTES
Please inform patient that ultrasound showed no abnormalities. it did mention that the veins in the left adnexa were a bit more prominent. If she is still having severe pain we could consider additional imaging .  Thank you.     Alana Pimentel M.D.

## 2017-03-16 ENCOUNTER — TELEPHONE (OUTPATIENT)
Dept: NURSING | Facility: CLINIC | Age: 57
End: 2017-03-16

## 2017-03-16 NOTE — TELEPHONE ENCOUNTER
Patient called today for ultrasound results. I read what the notes said from the MD. She has no questions at this time.  Velma Cunningham RN-[facility]

## 2017-04-28 ENCOUNTER — OFFICE VISIT (OUTPATIENT)
Dept: FAMILY MEDICINE | Facility: CLINIC | Age: 57
End: 2017-04-28
Payer: COMMERCIAL

## 2017-04-28 VITALS
TEMPERATURE: 98.5 F | OXYGEN SATURATION: 96 % | DIASTOLIC BLOOD PRESSURE: 83 MMHG | HEART RATE: 77 BPM | WEIGHT: 155.6 LBS | SYSTOLIC BLOOD PRESSURE: 138 MMHG | HEIGHT: 64 IN | BODY MASS INDEX: 26.56 KG/M2

## 2017-04-28 DIAGNOSIS — J06.9 UPPER RESPIRATORY TRACT INFECTION, UNSPECIFIED TYPE: ICD-10-CM

## 2017-04-28 DIAGNOSIS — C91.01 ACUTE LYMPHOCYTIC LEUKEMIA IN REMISSION (H): Primary | ICD-10-CM

## 2017-04-28 DIAGNOSIS — R19.09 LEFT GROIN MASS: ICD-10-CM

## 2017-04-28 LAB
BASOPHILS # BLD AUTO: 0 10E9/L (ref 0–0.2)
BASOPHILS NFR BLD AUTO: 0.5 %
DIFFERENTIAL METHOD BLD: NORMAL
EOSINOPHIL # BLD AUTO: 0.4 10E9/L (ref 0–0.7)
EOSINOPHIL NFR BLD AUTO: 5.4 %
ERYTHROCYTE [DISTWIDTH] IN BLOOD BY AUTOMATED COUNT: 11.9 % (ref 10–15)
HCT VFR BLD AUTO: 37.5 % (ref 35–47)
HGB BLD-MCNC: 12.9 G/DL (ref 11.7–15.7)
LYMPHOCYTES # BLD AUTO: 2.4 10E9/L (ref 0.8–5.3)
LYMPHOCYTES NFR BLD AUTO: 36.3 %
MCH RBC QN AUTO: 30.1 PG (ref 26.5–33)
MCHC RBC AUTO-ENTMCNC: 34.4 G/DL (ref 31.5–36.5)
MCV RBC AUTO: 88 FL (ref 78–100)
MONOCYTES # BLD AUTO: 0.6 10E9/L (ref 0–1.3)
MONOCYTES NFR BLD AUTO: 8.9 %
NEUTROPHILS # BLD AUTO: 3.2 10E9/L (ref 1.6–8.3)
NEUTROPHILS NFR BLD AUTO: 48.9 %
PLATELET # BLD AUTO: 207 10E9/L (ref 150–450)
RBC # BLD AUTO: 4.28 10E12/L (ref 3.8–5.2)
WBC # BLD AUTO: 6.6 10E9/L (ref 4–11)

## 2017-04-28 PROCEDURE — 99214 OFFICE O/P EST MOD 30 MIN: CPT | Performed by: FAMILY MEDICINE

## 2017-04-28 PROCEDURE — 36415 COLL VENOUS BLD VENIPUNCTURE: CPT | Performed by: FAMILY MEDICINE

## 2017-04-28 PROCEDURE — 85025 COMPLETE CBC W/AUTO DIFF WBC: CPT | Performed by: FAMILY MEDICINE

## 2017-04-28 ASSESSMENT — PAIN SCALES - GENERAL: PAINLEVEL: MODERATE PAIN (4)

## 2017-04-28 NOTE — PROGRESS NOTES
SUBJECTIVE:                                                    Lydia Pierson is a 57 year old female who presents to clinic today for the following health issues:    Chief Complaint   Patient presents with     Mass     lump in left side of groin area.     URI     cough x 5 days       ENT Symptoms             Symptoms: cc Present Absent Comment   Fever/Chills   x    Fatigue   x    Muscle Aches   x    Eye Irritation   x    Sneezing   x    Nasal Deny/Drg  x  Green nasal drainage   Sinus Pressure/Pain   x    Loss of smell   x    Dental pain   x    Sore Throat   x    Swollen Glands   x    Ear Pain/Fullness   x    Cough  x     Wheeze   x    Chest Pain   x    Shortness of breath  x     Rash   x    Other  x x      Symptom duration:  5 days   Symptom severity:     Treatments tried:  advil cold and sinus   Contacts: Co workers       ROS: 10 point review of systems negative except as per HPI.    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 Father      bypass     DIABETES Father      Lipids Father      Allergies Sister      allergic rhinitis     Neurologic Disorder Sister      seizure disorder     Respiratory Sister      asthma     Allergies Son      Breast  "Cancer No family hx of      Cancer - colorectal No family hx of        SOCIAL HISTORY:  Social History     Social History     Marital status:      Spouse name: N/A     Number of children: N/A     Years of education: N/A     Occupational History     Not on file.     Social History Main Topics     Smoking status: Never Smoker     Smokeless tobacco: Never Used     Alcohol use No     Drug use: No     Sexual activity: Yes     Partners: Male     Birth control/ protection: Surgical      Comment: tubal     Other Topics Concern     Parent/Sibling W/ Cabg, Mi Or Angioplasty Before 65f 55m? No     Social History Narrative       MEDICATIONS:  Current Outpatient Prescriptions   Medication Sig Dispense Refill     diclofenac (VOLTAREN) 1 % GEL topical gel 2 grams to hands four times daily using enclosed dosing card. 100 g 3     cyclobenzaprine (FLEXERIL) 10 MG tablet Take 1 tablet (10 mg) by mouth nightly as needed for muscle spasms 30 tablet 1     traZODone (DESYREL) 50 MG tablet Take 1-2 tablets ( mg) by mouth nightly as needed for sleep 60 tablet 1     acetaminophen (TYLENOL) 325 MG tablet Take 1 tablet by mouth every 6 hours as needed.       ibuprofen (ADVIL,MOTRIN) 600 MG tablet Take 1 tablet by mouth every 6 hours as needed for pain (For mild pain and temperature greater than 102F). 30 tablet 0     Acetaminophen-Aspirin Buffered (EXCEDRIN BACK & BODY) 250-250 MG TABS 1 tablet PRN for pain       LYSINE OR AS NEEDED       VITAMIN D 400 UNIT OR TABS 1 daily in winter       WOMENS DAILY MULTIVITAMIN OR TABS 1 TABLET DAILY       CALCIUM 600 600 MG OR TABS 1 daily         ALLERGIES:     Allergies   Allergen Reactions     Seasonal Allergies          OBJECTIVE:                                                    VITALS: /83 (BP Location: Right arm, Patient Position: Chair, Cuff Size: Adult Regular)  Pulse 77  Temp 98.5  F (36.9  C) (Tympanic)  Ht 5' 4\" (1.626 m)  Wt 155 lb 9.6 oz (70.6 kg)  LMP 12/05/2010  " SpO2 96%  BMI 26.71 kg/m2  GENERAL: Pleasant, well appearing female.  HEENT: PERRL, EOMI, oropharynx normal, TMs normal, Nares normal.  NECK: supple, no thyromegaly or thyroid masses, No anterior cervical lymphadenopathy.  CV: RRR, no murmurs, rubs or gallops.  LUNGS: Scattered rhonchi throughout, normal effort.  SKIN: warm and dry without obvious rashes.   EXTREMITIES: She has an approximately  1 cm palpable density left inguinal area. Rubbery, non-tender.    ASSESSMENT/PLAN:                                                    1. 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  - CBC with platelets differential    2. Left groin mass  Given history of leukemia will proceed with imaging. Further work-up and management as dictated by results.   - US Extremity Non Vascular Left; Future    3. Upper respiratory tract infection, unspecified type  Likely viral in etiology. Discussed OTC symptomatic cares.  Follow-up if not improving or if worsening.       Follow-up: If not improving or if worsening

## 2017-04-28 NOTE — PATIENT INSTRUCTIONS
Thank you for choosing [facility].  You may be receiving a survey in the mail from [facility] regarding your visit today.  Please take a few minutes to complete and return the survey to let us know how we are doing.      Our Clinic hours are:  Mondays    7:20 am - 7 pm  Tues -  Fri  7:20 am - 5 pm    Clinic Phone: [phone]    The clinic lab opens at 7:30 am Mon - Fri and appointments are required.    [facility]. [phone]  Monday-Thursday 8 am - 7pm  Tues/Wed/Fri 8 am - 5:30 pm

## 2017-04-28 NOTE — MR AVS SNAPSHOT
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  You may be receiving a survey in the mail from [facility] your visit today.  Please take a few minutes to complete and return the survey to let us know how we are doing.      Our Clinic hours are:  Mondays    7:20 am - 7 pm  Tues -  Fri  7:20 am - 5 pm    Clinic Phone: [phone]    The clinic lab opens at 7:30 am Mon - Fri and appointments are required.    [facility]  Ph. [phone]  Monday-Thursday 8 am - 7pm  Tues/Wed/Fri 8 am - 5:30 pm               Follow-ups after your visit        Future tests that were ordered for you today     Open Future Orders        Priority Expected Expires Ordered    US Extremity Non Vascular Left Routine  4/28/2018 4/28/2017            Who to contact     If you have questions or need follow up information about today's clinic visit or your schedule please contact [facility] directly at [phone].  Normal or non-critical lab and imaging results will be communicated to you by MyChart, letter or phone within 4 business days after the clinic has received the results. If you do not hear from us within 7 days, please contact the clinic through MyChart or phone. If you have a critical or abnormal lab result, we will notify you by phone as soon as possible.  Submit refill requests through [employer] or call your pharmacy and they will forward the refill request to us. Please allow 3 business days for your refill to be completed.          Additional Information About Your Visit       "  MyChart Information     [employer] lets you send messages to your doctor, view your test results, renew your prescriptions, schedule appointments and more. To sign up, go to [email or web address] . Click on \"Log in\" on the left side of the screen, which will take you to the Welcome page. Then click on \"Sign up Now\" on the right side of the page.     You will be asked to enter the access code listed below, as well as some personal information. Please follow the directions to create your username and password.     Your access code is: [email or web address]  Expires: 2017  3:52 PM     Your access code will  in 90 days. If you need help or a new code, please call your [place] clinic or [phone].        Care EveryWhere ID     This is your Care EveryWhere ID. This could be used by other organizations to access your [place] medical records  [id number]        Your Vitals Were     Pulse Temperature Height Last Period Pulse Oximetry BMI (Body Mass Index)    77 98.5  F (36.9  C) (Tympanic) 5' 4\" (1.626 m) 2010 96% 26.71 kg/m2       Blood Pressure from Last 3 Encounters:   17 138/83   17 132/69   17 122/78    Weight from Last 3 Encounters:   17 155 lb 9.6 oz (70.6 kg)   17 158 lb (71.7 kg)   17 158 lb (71.7 kg)              We Performed the Following     CBC with platelets differential        Primary Care Provider Office Phone # Fax #    Benja Cordova [phone]       [facility] [id number] [place] [address]        Thank you!     Thank you for choosing [facility]  for your care. Our goal is always to provide you with excellent care. Hearing back from our patients is one way we can continue to improve our services. Please take a few minutes to complete the written survey that you may receive in the mail after your visit with us. Thank you!             Your Updated Medication List - Protect others " around you: Learn how to safely use, store and throw away your medicines at www.disposemymeds.org.          This list is accurate as of: 4/28/17  3:52 PM.  Always use your most recent med list.                   Brand Name Dispense Instructions for use    calcium carbonate 600 MG tablet   Generic drug:  calcium carbonate      1 daily       cyclobenzaprine 10 MG tablet    FLEXERIL    30 tablet    Take 1 tablet (10 mg) by mouth nightly as needed for muscle spasms       diclofenac 1 % Gel topical gel    VOLTAREN    100 g    2 grams to hands four times daily using enclosed dosing card.       EXCEDRIN BACK & BODY 250-250 MG Tabs   Generic drug:  Acetaminophen-Aspirin Buffered      1 tablet PRN for pain       ibuprofen 600 MG tablet    ADVIL/MOTRIN    30 tablet    Take 1 tablet by mouth every 6 hours as needed for pain (For mild pain and temperature greater than 102F).       LYSINE PO      AS NEEDED       traZODone 50 MG tablet    DESYREL    60 tablet    Take 1-2 tablets ( mg) by mouth nightly as needed for sleep       TYLENOL 325 MG tablet   Generic drug:  acetaminophen      Take 1 tablet by mouth every 6 hours as needed.       vitamin D 400 UNITS tablet      1 daily in winter       WOMENS DAILY MULTIVITAMIN Tabs      1 TABLET DAILY

## 2017-04-28 NOTE — NURSING NOTE
"Chief Complaint   Patient presents with     Mass     lump in left side of groin area.     URI     cough x 5 days       Initial /83 (BP Location: Right arm, Patient Position: Chair, Cuff Size: Adult Regular)  Pulse 77  Temp 98.5  F (36.9  C) (Tympanic)  Ht 5' 4\" (1.626 m)  Wt 155 lb 9.6 oz (70.6 kg)  LMP 12/05/2010  SpO2 96%  BMI 26.71 kg/m2 Estimated body mass index is 26.71 kg/(m^2) as calculated from the following:    Height as of this encounter: 5' 4\" (1.626 m).    Weight as of this encounter: 155 lb 9.6 oz (70.6 kg).  Medication Reconciliation: complete    "

## 2017-05-05 ENCOUNTER — HOSPITAL ENCOUNTER (OUTPATIENT)
Dept: ULTRASOUND IMAGING | Facility: CLINIC | Age: 57
Discharge: HOME | End: 2017-05-05
Attending: FAMILY MEDICINE | Admitting: FAMILY MEDICINE
Payer: COMMERCIAL

## 2017-05-05 DIAGNOSIS — R19.09 LEFT GROIN MASS: ICD-10-CM

## 2017-05-05 PROCEDURE — 76882 US LMTD JT/FCL EVL NVASC XTR: CPT | Mod: LT

## 2017-05-11 RX ORDER — IOPAMIDOL 755 MG/ML
76 INJECTION, SOLUTION INTRAVASCULAR ONCE
Status: COMPLETED | OUTPATIENT
Start: 2017-05-12 | End: 2017-05-12

## 2017-05-12 ENCOUNTER — HOSPITAL ENCOUNTER (OUTPATIENT)
Dept: CT IMAGING | Facility: CLINIC | Age: 57
Discharge: HOME | End: 2017-05-12
Attending: FAMILY MEDICINE | Admitting: FAMILY MEDICINE
Payer: COMMERCIAL

## 2017-05-12 ENCOUNTER — TELEPHONE (OUTPATIENT)
Dept: FAMILY MEDICINE | Facility: CLINIC | Age: 57
End: 2017-05-12

## 2017-05-12 DIAGNOSIS — R19.09 LEFT GROIN MASS: ICD-10-CM

## 2017-05-12 LAB
CREAT BLD-MCNC: 0.5 MG/DL (ref 0.52–1.04)
GFR SERPL CREATININE-BSD FRML MDRD: >90 ML/MIN/1.7M2

## 2017-05-12 PROCEDURE — 25500064 ZZH RX 255 OP 636: Performed by: FAMILY MEDICINE

## 2017-05-12 PROCEDURE — 25000125 ZZHC RX 250: Performed by: FAMILY MEDICINE

## 2017-05-12 PROCEDURE — 74177 CT ABD & PELVIS W/CONTRAST: CPT

## 2017-05-12 PROCEDURE — 82565 ASSAY OF CREATININE: CPT

## 2017-05-12 RX ADMIN — SODIUM CHLORIDE 59 ML: 9 INJECTION, SOLUTION INTRAVENOUS at 08:12

## 2017-05-12 RX ADMIN — IOPAMIDOL 76 ML: 755 INJECTION, SOLUTION INTRAVENOUS at 08:12

## 2017-05-12 NOTE — TELEPHONE ENCOUNTER
Reason for Call:  Request for results:    Name of test or procedure: ct abd pelvis    Date of test of procedure: 05/12/17    Location of the test or procedure: [place]    OK to leave the result message on voice mail or with a family member? YES    Phone number Patient can be reached at:  Cell number on file:    Telephone Information:   Mobile [phone]         Additional comments: pt calling to see if the results of her ct were back. They are final, just not read by physician.    Call taken on 5/12/2017 at 3:17 PM by Cristina Caballero

## 2017-05-15 NOTE — TELEPHONE ENCOUNTER
CT results look stable and without any new or concerning findings when compared to previous scan.    IMPRESSION:  1. No adenopathy or evidence of recurrent neoplasm.  2. A modest fat-containing left groin hernia slightly more prominent  compared to the prior studies.    You may follow up with Dr. Cordova for further recommendations.  RYAN Pina

## 2017-05-15 NOTE — TELEPHONE ENCOUNTER
Pt calling for results again today - She does NOT want to wait until Dr. CUONG Cordova is back in the office on Weds.  Routed to Provider of the Day - Genie Hickman NP  Please call patient and advise.

## 2017-05-17 ENCOUNTER — OFFICE VISIT (OUTPATIENT)
Dept: FAMILY MEDICINE | Facility: CLINIC | Age: 57
End: 2017-05-17
Payer: COMMERCIAL

## 2017-05-17 VITALS
SYSTOLIC BLOOD PRESSURE: 135 MMHG | DIASTOLIC BLOOD PRESSURE: 83 MMHG | BODY MASS INDEX: 26.87 KG/M2 | HEART RATE: 71 BPM | TEMPERATURE: 98.6 F | WEIGHT: 157.4 LBS | HEIGHT: 64 IN

## 2017-05-17 DIAGNOSIS — K40.90 LEFT INGUINAL HERNIA: Primary | ICD-10-CM

## 2017-05-17 DIAGNOSIS — C64.2 MALIGNANT NEOPLASM OF KIDNEY EXCLUDING RENAL PELVIS, LEFT (H): ICD-10-CM

## 2017-05-17 PROCEDURE — 99214 OFFICE O/P EST MOD 30 MIN: CPT | Performed by: FAMILY MEDICINE

## 2017-05-17 NOTE — NURSING NOTE
"Chief Complaint   Patient presents with     Results     go over CT results from 5/12/17       Initial /83 (BP Location: Right arm, Cuff Size: Adult Regular)  Pulse 71  Temp 98.6  F (37  C) (Tympanic)  Ht 5' 4\" (1.626 m)  Wt 157 lb 6.4 oz (71.4 kg)  LMP 12/05/2010  Breastfeeding? No  BMI 27.02 kg/m2 Estimated body mass index is 27.02 kg/(m^2) as calculated from the following:    Height as of this encounter: 5' 4\" (1.626 m).    Weight as of this encounter: 157 lb 6.4 oz (71.4 kg).  Medication Reconciliation: complete    "

## 2017-05-17 NOTE — MR AVS SNAPSHOT
After Visit Summary   5/17/2017    Lydia Pierson    MRN: [id number]           Patient Information     Date Of Birth          1960        Visit Information        Provider Department      5/17/2017 3:20 PM Benja Cordova MD [facility]        Today's Diagnoses     Left inguinal hernia    -  1      Care Instructions          Thank you for choosing [facility].  You may be receiving a survey in the mail from [facility] regarding your visit today.  Please take a few minutes to complete and return the survey to let us know how we are doing.      Our Clinic hours are:  Mondays    7:20 am - 7 pm  Tues -  Fri  7:20 am - 5 pm    Clinic Phone: [phone]    The clinic lab opens at 7:30 am Mon - Fri and appointments are required.    [facility]  Ph. [phone]  Monday-Thursday 8 am - 7pm  Tues/Wed/Fri 8 am - 5:30 pm               Follow-ups after your visit        Additional Services     GENERAL SURG ADULT REFERRAL       Your provider has referred you to: [facility]: [facility] [phone]   [email or web address]    Please be aware that coverage of these services is subject to the terms and limitations of your health insurance plan.  Call member services at your health plan with any benefit or coverage questions.      Please bring the following with you to your appointment:    (1) Any X-Rays, CTs or MRIs which have been performed.  Contact the facility where they were done to arrange for  prior to your scheduled appointment.   (2) List of current medications   (3) This referral request   (4) Any documents/labs given to you for this referral                  Who to contact     If you have questions or need follow up information about today's clinic visit or your schedule please contact [facility] directly at [phone].  Normal or non-critical lab and imaging results will be  "communicated to you by MyChart, letter or phone within 4 business days after the clinic has received the results. If you do not hear from us within 7 days, please contact the clinic through [facility] or phone. If you have a critical or abnormal lab result, we will notify you by phone as soon as possible.  Submit refill requests through [employer] or call your pharmacy and they will forward the refill request to us. Please allow 3 business days for your refill to be completed.          Additional Information About Your Visit        [employer] Information     [employer] lets you send messages to your doctor, view your test results, renew your prescriptions, schedule appointments and more. To sign up, go to [email or web address] . Click on \"Log in\" on the left side of the screen, which will take you to the Welcome page. Then click on \"Sign up Now\" on the right side of the page.     You will be asked to enter the access code listed below, as well as some personal information. Please follow the directions to create your username and password.     Your access code is: [email or web address]  Expires: 2017  3:52 PM     Your access code will  in 90 days. If you need help or a new code, please call your [facility] clinic or [phone].        Care EveryWhere ID     This is your Care EveryWhere ID. This could be used by other organizations to access your [facility] medical records  [id number]        Your Vitals Were     Pulse Temperature Height Last Period Breastfeeding? BMI (Body Mass Index)    71 98.6  F (37  C) (Tympanic) 5' 4\" (1.626 m) 2010 No 27.02 kg/m2       Blood Pressure from Last 3 Encounters:   17 135/83   17 138/83   17 132/69    Weight from Last 3 Encounters:   17 157 lb 6.4 oz (71.4 kg)   17 155 lb 9.6 oz (70.6 kg)   17 158 lb (71.7 kg)              We Performed the Following     GENERAL SURG ADULT REFERRAL        Primary Care Provider Office Phone # Fax #    Benja Hill " MD Art [phone]       [facility] [address]  [facility] [address]        Thank you!     Thank you for choosing [facility]  for your care. Our goal is always to provide you with excellent care. Hearing back from our patients is one way we can continue to improve our services. Please take a few minutes to complete the written survey that you may receive in the mail after your visit with us. Thank you!             Your Updated Medication List - Protect others around you: Learn how to safely use, store and throw away your medicines at www.disposemymeds.org.          This list is accurate as of: 5/17/17  4:24 PM.  Always use your most recent med list.                   Brand Name Dispense Instructions for use    calcium carbonate 600 MG tablet   Generic drug:  calcium carbonate      1 daily       cyclobenzaprine 10 MG tablet    FLEXERIL    30 tablet    Take 1 tablet (10 mg) by mouth nightly as needed for muscle spasms       diclofenac 1 % Gel topical gel    VOLTAREN    100 g    2 grams to hands four times daily using enclosed dosing card.       EXCEDRIN BACK & BODY 250-250 MG Tabs   Generic drug:  Acetaminophen-Aspirin Buffered      1 tablet PRN for pain       ibuprofen 600 MG tablet    ADVIL/MOTRIN    30 tablet    Take 1 tablet by mouth every 6 hours as needed for pain (For mild pain and temperature greater than 102F).       LYSINE PO      AS NEEDED       traZODone 50 MG tablet    DESYREL    60 tablet    Take 1-2 tablets ( mg) by mouth nightly as needed for sleep       TYLENOL 325 MG tablet   Generic drug:  acetaminophen      Take 1 tablet by mouth every 6 hours as needed.       vitamin D 400 UNITS tablet      1 daily in winter       WOMENS DAILY MULTIVITAMIN Tabs      1 TABLET DAILY

## 2017-05-17 NOTE — PATIENT INSTRUCTIONS
Thank you for choosing [facility].  You may be receiving a survey in the mail from [facility] regarding your visit today.  Please take a few minutes to complete and return the survey to let us know how we are doing.      Our Clinic hours are:  Mondays    7:20 am - 7 pm  Tues -  Fri  7:20 am - 5 pm    Clinic Phone: [phone]    The clinic lab opens at 7:30 am Mon - Fri and appointments are required.    [facility]. [phone]  Monday-Thursday 8 am - 7pm  Tues/Wed/Fri 8 am - 5:30 pm

## 2017-05-17 NOTE — PROGRESS NOTES
1. NO - Do you have a history of heart attack, stroke, stent, bypass or surgery on an artery in the head, neck, heart or legs?  2. YES - Do you ever have any pain or discomfort in your chest? Past hx of chest pressue.  3. NO - Do you have a history of  Heart Failure?  4. NO - Are you troubled by shortness of breath when: walking on the level, up a slight hill or at night?  5. NO - Do you currently have a cold, bronchitis or other respiratory infection?  6. NO - Do you have a cough, shortness of breath or wheezing?  7. NO - Do you sometimes get pains in the calves of your legs when you walk?  8. YES - Do you or anyone in your family have previous history of blood clots? Father  9. NO - Do you or does anyone in your family have a serious bleeding problem such as prolonged bleeding following surgeries or cuts?  10. YES - Have you ever had problems with anemia or been told to take iron pills? With pregnancy  11. NO - Have you had any abnormal blood loss such as black, tarry or bloody stools, or abnormal vaginal bleeding?  12. YES - Have you ever had a blood transfusion? When she had leukemia 1976  13. NO - Have you or any of your relatives ever had problems with anesthesia?   14. NO - Do you have sleep apnea, excessive snoring or daytime drowsiness? Excessive snoring, no apnea spells.  15. NO - Do you have any prosthetic heart valves?  16. NO - Do you have prosthetic joints?  17. NO - Is there any chance that you may be pregnant?    [facility]  [address] [address] [address]  [phone]  Dept: [phone]    PRE-OP EVALUATION:  Today's date: 2017    Lydia Mohamudsimónflavio (: 1960) presents for pre-operative evaluation..  She requires evaluation and anesthesia risk assessment prior to undergoing surgery/procedure for treatment of left inguinal hernia .  Proposed procedure: hernia repiar    Date of Surgery/ Procedure: TBD  Time of Surgery/ Procedure: TBD  Hospital/Surgical  Facility: TBD  Fax number for surgical facility:   Primary Physician: Benja Cordova  Type of Anesthesia Anticipated: to be determined    Patient has a Health Care Directive or Living Will:  NO      HPI:                                                      Brief HPI related to upcoming procedure: She's here today to discuss her CT results. Has left lingual pain and mass. Has a history of leukemia left RCC so was worried about malignancy. She had a firm, palpable mass left groin so got imaging which ultimately showed a hernia. This has been painful for her. No erythema, warmth or fevers. Given pain and my inability to reduce the hernia discussed likely need for surgery. Therefore completed pre-op today as well so that this is done and she can proceed with surgery PRN.      See problem list for active medical problems.  Problems all longstanding and stable, except as noted/documented.  See ROS for pertinent symptoms related to these conditions.                                                                                                  .  HYPERLIPIDEMIA - Patient has a long history of significant Hyperlipidemia requiring medication for treatment with recent good control.                                                         .    MEDICAL HISTORY:                                                      Patient Active Problem List    Diagnosis Date Noted     Advanced directives, counseling/discussion 02/12/2016     Priority: Medium     Patient does not have an Advance/Health Care Directive (HCD), declines information/referral.    Gracie Seo  February 12, 2016         Hyperlipidemia LDL goal <160 10/01/2015     Priority: Medium     Health Care Home 09/02/2011     Priority: Medium     X  09/02/2011 Unable to Contact  Rosemarie Lorenzo RN CCM    DX V65.8 REPLACED WITH [id number] [facility] (04/08/2013)       Ovarian cyst 01/11/2010     Priority: Medium     Sensorineural hearing loss, asymmetrical  11/02/2007     Priority: Medium     External hemorrhoids with other complication 07/19/2006     Priority: Medium     Allergic rhinitis 02/21/2005     Priority: Medium     Rhinitis  Problem list name updated by automated process. 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) 02/21/2005     Priority: Medium     ALL at age 16,  no evid of recurrence           Malignant neoplasm of kidney excluding renal pelvis (H) 01/01/2001     Priority: Medium     left kidney removed 1/01 February 21, 2005 no evid of recurrence on CT chest and abdomen            Past Medical History:   Diagnosis Date     Acute lymphoid leukemia, without mention of having achieved remission(204.00) 1976    ALL at age 16,  no evid of recurrence     Allergic rhinitis, cause unspecified     Rhinitis     Malignant neoplasm of kidney, except pelvis 2001    left kidney removed 1/01     Past Surgical History:   Procedure Laterality Date     ARTHROSCOPY KNEE WITH MEDIAL MENISCECTOMY  7/7/2011    Procedure:ARTHROSCOPY KNEE WITH MEDIAL MENISCECTOMY; choice anes; Surgeon:MANUEL FLEMING; Location:[place] [facility]     HEMORRHOIDECTOMY  2006     NEPHRECTOMY RT/LT  2001    left partial nephrectomy- kidney ca     SALPINGO OOPHORECTOMY,R/L/KRISHNA  2010    Rt salpingo oophorectomy      SURGICAL HISTORY OF -   8/1/1995    Vein stripping     SURGICAL HISTORY OF -   1975    wisdom teeth extraction      TONSILLECTOMY & ADENOIDECTOMY  1962     TUBAL LIGATION  1994     Current Outpatient Prescriptions   Medication Sig Dispense Refill     diclofenac (VOLTAREN) 1 % GEL topical gel 2 grams to hands four times daily using enclosed dosing card. 100 g 3     cyclobenzaprine (FLEXERIL) 10 MG tablet Take 1 tablet (10 mg) by mouth nightly as needed for muscle spasms 30 tablet 1     acetaminophen (TYLENOL) 325 MG tablet Take 1 tablet by mouth every 6 hours as needed.       ibuprofen (ADVIL,MOTRIN) 600 MG tablet Take 1 tablet by mouth every 6 hours as needed for pain (For  "mild pain and temperature greater than 102F). 30 tablet 0     Acetaminophen-Aspirin Buffered (EXCEDRIN BACK & BODY) 250-250 MG TABS 1 tablet PRN for pain       CALCIUM 600 600 MG OR TABS 1 daily       traZODone (DESYREL) 50 MG tablet Take 1-2 tablets ( mg) by mouth nightly as needed for sleep 60 tablet 1     LYSINE OR AS NEEDED       VITAMIN D 400 UNIT OR TABS 1 daily in winter       WOMENS DAILY MULTIVITAMIN OR TABS 1 TABLET DAILY       OTC products: None, except as noted above    Allergies   Allergen Reactions     Seasonal Allergies       Latex Allergy: NO    Social History   Substance Use Topics     Smoking status: Never Smoker     Smokeless tobacco: Never Used     Alcohol use No     History   Drug Use No       REVIEW OF SYSTEMS:                                                    Constitutional, neuro, ENT, endocrine, pulmonary, cardiac, gastrointestinal, genitourinary, musculoskeletal, integument and psychiatric systems are negative, except as otherwise noted.    EXAM:                                                    /83 (BP Location: Right arm, Cuff Size: Adult Regular)  Pulse 71  Temp 98.6  F (37  C) (Tympanic)  Ht 5' 4\" (1.626 m)  Wt 157 lb 6.4 oz (71.4 kg)  LMP 12/05/2010  Breastfeeding? No  BMI 27.02 kg/m2    GENERAL APPEARANCE: healthy, alert and no distress     EYES: EOMI, PERRL     HENT: ear canals and TM's normal and nose and mouth without ulcers or lesions     NECK: no adenopathy, no asymmetry, masses, or scars and thyroid normal to palpation     RESP: lungs clear to auscultation - no rales, rhonchi or wheezes     CV: regular rates and rhythm, normal S1 S2, no S3 or S4 and no murmur, click or rub     ABDOMEN:  soft, nontender, no HSM or masses and bowel sounds normal. left inguinal mass. Fairly firm. Not reducible. Mildly tender no erythema or warmth     MS: extremities normal- no gross deformities noted, no evidence of inflammation in joints, FROM in all extremities.     SKIN: no " suspicious lesions or rashes     NEURO: Normal strength and tone, sensory exam grossly normal, mentation intact and speech normal     PSYCH: mentation appears normal. and affect normal/bright     LYMPHATICS: No axillary, cervical, or supraclavicular nodes    DIAGNOSTICS:                                                    EKG: Not indicated due to non-vascular surgery and low risk of event (age <65 and without cardiac risk factors)    Recent Labs   Lab Test  04/28/17   1531  02/12/16   0824  09/28/15   0545   HGB  12.9  13.5  13.9   PLT  207  214  203   NA   --   141  143   POTASSIUM   --   4.0  3.8   CR   --   0.59  0.58        IMPRESSION:                                                    Reason for surgery/procedure: inguinal hernia  Diagnosis/reason for consult: Preoperative H&P     The proposed surgical procedure is considered INTERMEDIATE risk.    REVISED CARDIAC RISK INDEX  The patient has the following serious cardiovascular risks for perioperative complications such as (MI, PE, VFib and 3  AV Block):  No serious cardiac risks  INTERPRETATION: 0 risks: Class I (very low risk - 0.4% complication rate)    The patient has the following additional risks for perioperative complications:  No identified additional risks      ICD-10-CM    1. Left inguinal hernia K40.90 GENERAL SURG ADULT REFERRAL     Given inability to reduce hernia in clinic and tenderness discussed the risks of entrapment and gangrene. Advised emergent follow-up if pain worsens, erythema or warmth at the site or fevers develop. Advised ASAP follow-up with surgery for evaluation. Pre-op completed today in anticipation of likely impending surgery.    RECOMMENDATIONS:                                                          --Patient is to take all scheduled medications on the day of surgery EXCEPT for modifications listed below.    APPROVAL GIVEN to proceed with proposed procedure, without further diagnostic evaluation       Signed Electronically by:  Benja Cordova MD    Copy of this evaluation report is provided to requesting physician.    [facility] Preop Guidelines

## 2017-06-01 ENCOUNTER — OFFICE VISIT (OUTPATIENT)
Dept: SURGERY | Facility: CLINIC | Age: 57
End: 2017-06-01
Payer: COMMERCIAL

## 2017-06-01 VITALS
TEMPERATURE: 97.9 F | DIASTOLIC BLOOD PRESSURE: 63 MMHG | HEIGHT: 64 IN | SYSTOLIC BLOOD PRESSURE: 122 MMHG | BODY MASS INDEX: 26.8 KG/M2 | WEIGHT: 157 LBS | HEART RATE: 80 BPM

## 2017-06-01 DIAGNOSIS — K40.90 LEFT INGUINAL HERNIA: Primary | ICD-10-CM

## 2017-06-01 PROCEDURE — 99204 OFFICE O/P NEW MOD 45 MIN: CPT | Performed by: SURGERY

## 2017-06-01 NOTE — PROGRESS NOTES
Consulted by Benja Cordova to see this patient with left groin pain and a mass.  She is not very symptomatic presently, but does have pain with lifting.  She is a  and lifts over 20 lbs consistently.  She denies bowel obstructive symptoms.    She has had a history of leukemia as a teenager and a renal cell cancer 15 years ago and was worried when she found the groin mass.  A CT scan showed this to be a left inguinal hernia.    Past Medical History:   Diagnosis Date     Acute lymphoid leukemia, without mention of having achieved remission(204.00) 1976    ALL at age 16,  no evid of recurrence     Allergic rhinitis, cause unspecified     Rhinitis     Malignant neoplasm of kidney, except pelvis 2001    left kidney removed 1/01     Past Surgical History:   Procedure Laterality Date     ARTHROSCOPY KNEE WITH MEDIAL MENISCECTOMY  7/7/2011    Procedure:ARTHROSCOPY KNEE WITH MEDIAL MENISCECTOMY; choice anes; Surgeon:MANUEL FLEMING; Location:[place] OR     HEMORRHOIDECTOMY  2006     NEPHRECTOMY RT/LT  2001    left partial nephrectomy- kidney ca     SALPINGO OOPHORECTOMY,R/L/KRISHNA  2010    Rt salpingo oophorectomy      SURGICAL HISTORY OF -   8/1/1995    Vein stripping     SURGICAL HISTORY OF -   1975    wisdom teeth extraction      TONSILLECTOMY & ADENOIDECTOMY  1962     TUBAL LIGATION  1994     Family History   Problem Relation Age of Onset     C.A.D. Father      bypass     DIABETES Father      Lipids Father      Allergies Sister      allergic rhinitis     Neurologic Disorder Sister      seizure disorder     Respiratory Sister      asthma     Allergies Son      Breast Cancer No family hx of      Cancer - colorectal No family hx of      Social History     Social History     Marital status:      Spouse name: N/A     Number of children: N/A     Years of education: N/A     Occupational History     Not on file.     Social History Main Topics     Smoking status: Never Smoker     Smokeless tobacco: Never  Used     Alcohol use No     Drug use: No     Sexual activity: Yes     Partners: Male     Birth control/ protection: Surgical      Comment: tubal     Other Topics Concern     Parent/Sibling W/ Cabg, Mi Or Angioplasty Before 65f 55m? No     Social History Narrative     Current Outpatient Prescriptions   Medication     diclofenac (VOLTAREN) 1 % GEL topical gel     cyclobenzaprine (FLEXERIL) 10 MG tablet     traZODone (DESYREL) 50 MG tablet     acetaminophen (TYLENOL) 325 MG tablet     ibuprofen (ADVIL,MOTRIN) 600 MG tablet     Acetaminophen-Aspirin Buffered (EXCEDRIN BACK & BODY) 250-250 MG TABS     LYSINE OR     VITAMIN D 400 UNIT OR TABS     WOMENS DAILY MULTIVITAMIN OR TABS     CALCIUM 600 600 MG OR TABS     No current facility-administered medications for this visit.       ROS: 10 point ROS neg other than the symptoms noted above in the HPI.    Physical Exam   Constitutional: She is oriented to person, place, and time and well-developed, well-nourished, and in no distress. No distress.   HENT:   Head: Normocephalic and atraumatic.   Eyes: EOM are normal.   Neck: Normal range of motion. No tracheal deviation present.   Cardiovascular: Normal rate, regular rhythm and normal heart sounds.    Pulmonary/Chest: Effort normal and breath sounds normal. No respiratory distress.   Abdominal: Soft.   Left inguinal hernia palpable, right side normal.   Musculoskeletal: Normal range of motion.   Neurological: She is alert and oriented to person, place, and time. Gait normal.   Skin: Skin is warm and dry. She is not diaphoretic.   Psychiatric: Mood, memory, affect and judgment normal.       CT scan was reviewed by me.  The left inguinal hernia was seen.    A/P:  Symptomatic left inguinal hernia.  I discussed repairing this with her under anesthesia.  Risks and benefits were explained, including but not limited to bleeding, infection, and anesthesia. She seems to understand and consented to proceed with the procedure. Surgery  will be scheduled in August which is her preference.    Augusto Sorensen MD, FACS

## 2017-06-01 NOTE — NURSING NOTE
"Initial /63 (BP Location: Right arm, Patient Position: Chair, Cuff Size: Adult Regular)  Pulse 80  Temp 97.9  F (36.6  C) (Oral)  Ht 1.626 m (5' 4\")  Wt 71.2 kg (157 lb)  LMP 12/05/2010  BMI 26.95 kg/m2 Estimated body mass index is 26.95 kg/(m^2) as calculated from the following:    Height as of this encounter: 1.626 m (5' 4\").    Weight as of this encounter: 71.2 kg (157 lb). .      Kaity Benson MA    "

## 2017-06-01 NOTE — MR AVS SNAPSHOT
"              After Visit Summary   6/1/2017    Lydia Pierson    MRN: [id number]           Patient Information     Date Of Birth          1960        Visit Information        Provider Department      6/1/2017 3:30 PM Mehdi Sorensen MD [facility]        Today's Diagnoses     Left inguinal hernia    -  1      Care Instructions    Per Dr. Sorensen's instructions          Follow-ups after your visit        Your next 10 appointments already scheduled     Aug 17, 2017   Procedure with Mehdi Sorensen [facility]   [facility]    [address] [address] [phone]           The medical center is located at [address]. (between [address] in [place], [place] north of [place]).              Who to contact     If you have questions or need follow up information about today's clinic visit or your schedule please contact [facility] directly at [phone].  Normal or non-critical lab and imaging results will be communicated to you by SOLOh[employer], letter or phone within 4 business days after the clinic has received the results. If you do not hear from us within 7 days, please contact the clinic through Photodigmt or phone. If you have a critical or abnormal lab result, we will notify you by phone as soon as possible.  Submit refill requests through TinyBytes or call your pharmacy and they will forward the refill request to us. Please allow 3 business days for your refill to be completed.          Additional Information About Your Visit        [employer] Information     TinyBytes lets you send messages to your doctor, view your test results, renew your prescriptions, schedule appointments and more. To sign up, go to [email or web address] . Click on \"Log in\" on the left side of the screen, which will take you to the Welcome page. Then click on \"Sign up Now\" on the right side of the page.     You will be asked to enter the access " "code listed below, as well as some personal information. Please follow the directions to create your username and password.     Your access code is: [email or web address]  Expires: 2017  3:52 PM     Your access code will  in 90 days. If you need help or a new code, please call your [facility] clinic or [phone].        Care EveryWhere ID     This is your Care EveryWhere ID. This could be used by other organizations to access your [facility] medical records  [id number]        Your Vitals Were     Pulse Temperature Height Last Period BMI (Body Mass Index)       80 97.9  F (36.6  C) (Oral) 1.626 m (5' 4\") 2010 26.95 kg/m2        Blood Pressure from Last 3 Encounters:   17 122/63   17 135/83   17 138/83    Weight from Last 3 Encounters:   17 71.2 kg (157 lb)   17 71.4 kg (157 lb 6.4 oz)   17 70.6 kg (155 lb 9.6 oz)              Today, you had the following     No orders found for display       Primary Care Provider Office Phone # Fax #    Jose [place] Liz Cordova [phone]       [facility] [id number] [place] [address]        Thank you!     Thank you for choosing [facility]  for your care. Our goal is always to provide you with excellent care. Hearing back from our patients is one way we can continue to improve our services. Please take a few minutes to complete the written survey that you may receive in the mail after your visit with us. Thank you!             Your Updated Medication List - Protect others around you: Learn how to safely use, store and throw away your medicines at www.disposemymeds.org.          This list is accurate as of: 17  4:19 PM.  Always use your most recent med list.                   Brand Name Dispense Instructions for use    calcium carbonate 600 MG tablet   Generic drug:  calcium carbonate      1 daily       cyclobenzaprine 10 MG tablet    FLEXERIL    30 tablet    Take 1 tablet " (10 mg) by mouth nightly as needed for muscle spasms       diclofenac 1 % Gel topical gel    VOLTAREN    100 g    2 grams to hands four times daily using enclosed dosing card.       EXCEDRIN BACK & BODY 250-250 MG Tabs   Generic drug:  Acetaminophen-Aspirin Buffered      1 tablet PRN for pain       ibuprofen 600 MG tablet    ADVIL/MOTRIN    30 tablet    Take 1 tablet by mouth every 6 hours as needed for pain (For mild pain and temperature greater than 102F).       LYSINE PO      AS NEEDED       traZODone 50 MG tablet    DESYREL    60 tablet    Take 1-2 tablets ( mg) by mouth nightly as needed for sleep       TYLENOL 325 MG tablet   Generic drug:  acetaminophen      Take 1 tablet by mouth every 6 hours as needed.       vitamin D 400 UNITS tablet      1 daily in winter       WOMENS DAILY MULTIVITAMIN Tabs      1 TABLET DAILY

## 2017-06-01 NOTE — LETTER
SURGERYPLANNING/SCHEDULING WORKSHEET                              [facility]  [address] [address] [phone]                          Lydia Pierson                :  1960  MRN:  [id number]  Phone: [phone] (home)     Same Day Surgery   Surgeon: Mehdi Sorensen MD  Diagnosis:   Left inguinal hernia  Allergies:  Seasonal allergies   A preoperative evaluation by the primary care provider has been requested to summarize and modify, where possible, medical risks to the proposed surgery.  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  Date of Procedure:___17_____    Time: ___7:30am__________       Special Equipment: None  Informed Consent Obtained and Signed:  NO  ====================================================  Instructions to Same Day Surgery Staff  AZAR Stockings Knee High  Preop Antibiotic:  Ancef 2 gm IV  pre-op within one hour prior to incision For > 80 kg  Preop Pain Meds:  None  Preop Orders:  Routine Standing Orders.  ====================================================  Instructions to the patient:  Preop physical exam scheduled (within 30 days or 7 days prior) with:  Dr. __she will schedule_________  Clinic:  ____________________                                          Date______________Time_________________________  Come to the hospital at: ___[facility] will call with arrival time______________  HOME PREPARATION:   Shower with Hibiclens the night before or the morning of surgery, gently cleaning skin from neck to feet  Bathe and brush teeth the morning of surgery.  Take medications with a sip of water the morning of surgery:   Check with  if taking insulin.  May have  a light meal, toast and clear liquids, up to 8 hrs before surgery  May have clear liquids (liquids one can read through) up to 4 hrs before surgery  NOTHING after 4 hrs before surgery  Stop aspirin 7-10 days before surgery  Stop NSAIDS (Ibuproven, Naproxen, etc) 5 days before surgery  Stop Plavix 7-10 days before surgery  Need to arrange for jose j Sorensen MD    6/1/2017  This form was electronically signed at chart closure                                                                        Chart Copy

## 2017-07-24 ENCOUNTER — OFFICE VISIT (OUTPATIENT)
Dept: FAMILY MEDICINE | Facility: CLINIC | Age: 57
End: 2017-07-24
Payer: COMMERCIAL

## 2017-07-24 VITALS
DIASTOLIC BLOOD PRESSURE: 60 MMHG | WEIGHT: 158.8 LBS | HEIGHT: 64 IN | BODY MASS INDEX: 27.11 KG/M2 | HEART RATE: 68 BPM | SYSTOLIC BLOOD PRESSURE: 116 MMHG

## 2017-07-24 DIAGNOSIS — J30.9 CHRONIC ALLERGIC RHINITIS, UNSPECIFIED SEASONALITY, UNSPECIFIED TRIGGER: ICD-10-CM

## 2017-07-24 DIAGNOSIS — Z01.818 PREOP GENERAL PHYSICAL EXAM: Primary | ICD-10-CM

## 2017-07-24 DIAGNOSIS — K40.90 LEFT INGUINAL HERNIA: ICD-10-CM

## 2017-07-24 PROCEDURE — 99214 OFFICE O/P EST MOD 30 MIN: CPT | Performed by: FAMILY MEDICINE

## 2017-07-24 NOTE — NURSING NOTE
"Chief Complaint   Patient presents with     Pre-Op Exam     surgery 8/17/2017 with Dr. West left inguinal hernia       Initial /60 (BP Location: Right arm, Patient Position: Chair, Cuff Size: Adult Regular)  Pulse 68  Ht 5' 4\" (1.626 m)  Wt 158 lb 12.8 oz (72 kg)  LMP 12/05/2010  BMI 27.26 kg/m2 Estimated body mass index is 27.26 kg/(m^2) as calculated from the following:    Height as of this encounter: 5' 4\" (1.626 m).    Weight as of this encounter: 158 lb 12.8 oz (72 kg).  Medication Reconciliation: complete         "

## 2017-07-24 NOTE — MR AVS SNAPSHOT
After Visit Summary   7/24/2017    Lydia Pierson    MRN: [id number]           Patient Information     Date Of Birth          1960        Visit Information        Provider Department      7/24/2017 1:40 PM Nikko, KINA Garduno MD Formerly named [facility]        Today's Diagnoses     Preop general physical exam    -  1      Care Instructions      Before Your Surgery      Call your surgeon if there is any change in your health. This includes signs of a cold or flu (such as a sore throat, runny nose, cough, rash or fever).    Do not smoke, drink alcohol or take over the counter medicine (unless your surgeon or primary care doctor tells you to) for the 24 hours before and after surgery.    If you take prescribed drugs: Follow your doctor s orders about which medicines to take and which to stop until after surgery.    Eating and drinking prior to surgery: follow the instructions from your surgeon    Take a shower or bath the night before surgery. Use the soap your surgeon gave you to gently clean your skin. If you do not have soap from your surgeon, use your regular soap. Do not shave or scrub the surgery site.  Wear clean pajamas and have clean sheets on your bed.           Follow-ups after your visit        Your next 10 appointments already scheduled     Aug 17, 2017   Procedure with Mehdi Sorensen MD   [facility] (--)    [address] [address] [phone]           The medical center is located at [address]. (between [address] in [place], [place] north of [place]).              Who to contact     If you have questions or need follow up information about today's clinic visit or your schedule please contact [facility] directly at [phone].  Normal or non-critical lab and imaging results will be communicated to you by MyChart, letter or phone within 4 business days after the clinic has received the  "results. If you do not hear from us within 7 days, please contact the clinic through [employer] or phone. If you have a critical or abnormal lab result, we will notify you by phone as soon as possible.  Submit refill requests through [employer] or call your pharmacy and they will forward the refill request to us. Please allow 3 business days for your refill to be completed.          Additional Information About Your Visit        Eventaphar[employer]rogen Gene Test Information     [employer] lets you send messages to your doctor, view your test results, renew your prescriptions, schedule appointments and more. To sign up, go to [email or web address] . Click on \"Log in\" on the left side of the screen, which will take you to the Welcome page. Then click on \"Sign up Now\" on the right side of the page.     You will be asked to enter the access code listed below, as well as some personal information. Please follow the directions to create your username and password.     Your access code is: [email or web address]  Expires: 2017  3:52 PM     Your access code will  in 90 days. If you need help or a new code, please call your [facility] clinic or [phone].        Care EveryWhere ID     This is your Care EveryWhere ID. This could be used by other organizations to access your [facility] medical records  [id number]        Your Vitals Were     Pulse Height Last Period BMI (Body Mass Index)          68 5' 4\" (1.626 m) 2010 27.26 kg/m2         Blood Pressure from Last 3 Encounters:   17 116/60   17 122/63   17 135/83    Weight from Last 3 Encounters:   17 158 lb 12.8 oz (72 kg)   17 157 lb (71.2 kg)   17 157 lb 6.4 oz (71.4 kg)              Today, you had the following     No orders found for display       Primary Care Provider Office Phone # Fax #    Benja Cordova [phone]       [facility] [address] [address] [facility] [address]        Equal Access to Services     " [facility]: Hadii aad ku olayinka Sotigistali, waaxda luqadaha, qaybta kaalmada adejason, alek jeanettein hayaarika northkinjal nixon roseanne . So [facility] [phone].    ATENCIÓN: Si beatrice hartman, tiene a olmedo disposición servicios gratuitos de asistencia lingüística. Llame al [phone].    We comply with applicable federal civil rights laws and [place] laws. We do not discriminate on the basis of race, color, national origin, age, disability sex, sexual orientation or gender identity.            Thank you!     Thank you for choosing [facility]  for your care. Our goal is always to provide you with excellent care. Hearing back from our patients is one way we can continue to improve our services. Please take a few minutes to complete the written survey that you may receive in the mail after your visit with us. Thank you!             Your Updated Medication List - Protect others around you: Learn how to safely use, store and throw away your medicines at www.disposemymeds.org.          This list is accurate as of: 7/24/17  2:05 PM.  Always use your most recent med list.                   Brand Name Dispense Instructions for use Diagnosis    calcium carbonate 600 MG tablet   Generic drug:  calcium carbonate      1 daily    OTC -PATIENT CHOICE       cyclobenzaprine 10 MG tablet    FLEXERIL    30 tablet    Take 1 tablet (10 mg) by mouth nightly as needed for muscle spasms    SI (sacroiliac) joint dysfunction       diclofenac 1 % Gel topical gel    VOLTAREN    100 g    2 grams to hands four times daily using enclosed dosing card.    Radial styloid tenosynovitis       EXCEDRIN BACK & BODY 250-250 MG Tabs   Generic drug:  Acetaminophen-Aspirin Buffered      1 tablet PRN for pain        ibuprofen 600 MG tablet    ADVIL/MOTRIN    30 tablet    Take 1 tablet by mouth every 6 hours as needed for pain (For mild pain and temperature greater than 102F).    Tear of meniscus of left knee       LYSINE PO      AS NEEDED         traZODone 50 MG tablet    DESYREL    60 tablet    Take 1-2 tablets ( mg) by mouth nightly as needed for sleep    Insomnia, unspecified type       TYLENOL 325 MG tablet   Generic drug:  acetaminophen      Take 1 tablet by mouth every 6 hours as needed.        vitamin D 400 UNITS tablet      1 daily in winter        WOMENS DAILY MULTIVITAMIN Tabs      1 TABLET DAILY    OTC -PATIENT CHOICE

## 2017-07-24 NOTE — LETTER
[facility]  [address]  [facility] [address]  [phone]        July 24, 2017    Regarding:  Lydia Pierson  [address] [facility] [address] [facility] [address]              To Whom It May Concern;  Lydia is scheduled for surgery 8/17/17. She should not lift over 25 pounds until after the surgery, and then the surgeon will clarify lifting restrictions.          Sincerely,        KINA El MD

## 2017-08-15 ENCOUNTER — APPOINTMENT (OUTPATIENT)
Dept: GENERAL RADIOLOGY | Facility: CLINIC | Age: 57
End: 2017-08-15
Attending: PHYSICIAN ASSISTANT
Payer: COMMERCIAL

## 2017-08-15 ENCOUNTER — HOSPITAL ENCOUNTER (EMERGENCY)
Facility: CLINIC | Age: 57
Discharge: HOME | End: 2017-08-15
Attending: PHYSICIAN ASSISTANT | Admitting: PHYSICIAN ASSISTANT
Payer: COMMERCIAL

## 2017-08-15 VITALS
DIASTOLIC BLOOD PRESSURE: 89 MMHG | RESPIRATION RATE: 16 BRPM | SYSTOLIC BLOOD PRESSURE: 147 MMHG | TEMPERATURE: 98 F | OXYGEN SATURATION: 99 %

## 2017-08-15 DIAGNOSIS — S69.92XA HAND INJURY, LEFT, INITIAL ENCOUNTER: ICD-10-CM

## 2017-08-15 DIAGNOSIS — S62.92XA FRACTURE OF LEFT HAND, CLOSED, INITIAL ENCOUNTER: ICD-10-CM

## 2017-08-15 DIAGNOSIS — M79.642 PAIN OF LEFT HAND: ICD-10-CM

## 2017-08-15 DIAGNOSIS — S62.337A CLOSED DISPLACED FRACTURE OF NECK OF FIFTH METACARPAL BONE OF LEFT HAND, INITIAL ENCOUNTER: ICD-10-CM
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
## 2017-08-15 PROCEDURE — 99214 OFFICE O/P EST MOD 30 MIN: CPT | Mod: 25 | Performed by: PHYSICIAN ASSISTANT

## 2017-08-15 PROCEDURE — 73130 X-RAY EXAM OF HAND: CPT | Mod: LT

## 2017-08-15 PROCEDURE — 26600 TREAT METACARPAL FRACTURE: CPT | Performed by: PHYSICIAN ASSISTANT

## 2017-08-15 PROCEDURE — 26600 TREAT METACARPAL FRACTURE: CPT | Mod: Z6 | Performed by: PHYSICIAN ASSISTANT

## 2017-08-15 NOTE — ED AVS SNAPSHOT
[facility]    [address] [address]    Phone:  [phone]    Fax:  [phone]                                       Lydia Pierson   MRN: [id number]    Department:  [facility]   Date of Visit:  8/15/2017           After Visit Summary Signature Page     I have received my discharge instructions, and my questions have been answered. I have discussed any challenges I see with this plan with the nurse or doctor.    ..........................................................................................................................................  Patient/Patient Representative Signature      ..........................................................................................................................................  Patient Representative Print Name and Relationship to Patient    ..................................................               ................................................  Date                                            Time    ..........................................................................................................................................  Reviewed by Signature/Title    ...................................................              ..............................................  Date                                                            Time

## 2017-08-15 NOTE — DISCHARGE INSTRUCTIONS
Please call [facility] and make an appointment to see a hand specialist in [place] for later this week or early next week.  There number is [phone]  Closed Hand Fracture (Adult)  You have a fracture, or broken bone, in your hand. This may be a small crack or chip in the bone. Or it may be a major break with the broken parts pushed out of place. A closed fracture means that the broken bone has not gone through the skin. A hand fracture is treated with a splint or cast. It usually takes 4 to 6 weeks to heal. Severe injuries may require surgery.    Home care    Keep your arm elevated to reduce pain and swelling. When sitting or lying down, elevate your arm above the level of your heart. You can do this by placing your arm on a pillow that rests on your chest or on a pillow at your side. This is most important during the first 48 hours after injury.    Apply an ice pack over the injured area for no more than 15 to 20 minutes. Do this every 1 to 2 hours for the first 24 to 48 hours. Continue with ice packs as needed to ease pain and swelling. To make an ice pack, put ice cubes in a plastic bag that seals at the top. Wrap the bag in a clean, thin towel or cloth. Never put ice or an ice pack directly on the skin. You can place the ice pack inside the sling and directly over the cast or splint. As the ice melts, be careful that the cast or splint doesn t get wet.    Keep the cast or splint completely dry at all times. Bathe with your cast or splint out of the water, protected with 2 large plastic bags. Place 1 bag outside the other. Tape each bag with duct tape at the top end. If a fiberglass cast or splint gets wet, dry it with a hair dryer on a cool setting.    You may use over-the-counter pain medicine to control pain, unless another pain medicine was prescribed. If you have chronic liver or kidney disease or ever had a stomach ulcer or GI bleeding, talk with your provider beforeusing these  medicines.  Follow-up care  Follow up with your healthcare provider within 1 week, or as advised. This is to be sure the bone is healing properly. If you were given a splint, it may be changed to a cast at your follow-up visit.  If X-rays were taken, you will be told of any new findings that may affect your care.  When to seek medical advice  Call your healthcare provider right away if any of these occur:    The plaster cast or splint becomes wet or soft    The fiberglass cast or splint stays wet for more than 24 hours    The cast has a bad smell    The plaster cast or splint becomes loose    There is increased tightness or pain under the cast or splint    The fingers on your injured hand become swollen, cold, blue, numb, or tingly  Date Last Reviewed: 12/3/2015    3897-9000 [employer]. [address] [place] [address]. All rights reserved. This information is not intended as a substitute for professional medical care. Always follow your healthcare professional's instructions.

## 2017-08-15 NOTE — ED AVS SNAPSHOT
[facility]    [address] [facility] [facility] [address]    Phone:  [phone]    Fax:  [phone]                                       Lydia Pierson   MRN: [id number]    Department:  [facility]   Date of Visit:  8/15/2017           Patient Information     Date Of Birth          1960        Your diagnoses for this visit were:     Hand injury, left, initial encounter     Pain of left hand     Fracture of left hand, closed, initial encounter        You were seen by Chato Cruz PA-C.        Discharge Instructions           Please call [facility] and make an appointment to see a hand specialist in [place] for later this week or early next week.  There number is [phone]  Closed Hand Fracture (Adult)  You have a fracture, or broken bone, in your hand. This may be a small crack or chip in the bone. Or it may be a major break with the broken parts pushed out of place. A closed fracture means that the broken bone has not gone through the skin. A hand fracture is treated with a splint or cast. It usually takes 4 to 6 weeks to heal. Severe injuries may require surgery.    Home care    Keep your arm elevated to reduce pain and swelling. When sitting or lying down, elevate your arm above the level of your heart. You can do this by placing your arm on a pillow that rests on your chest or on a pillow at your side. This is most important during the first 48 hours after injury.    Apply an ice pack over the injured area for no more than 15 to 20 minutes. Do this every 1 to 2 hours for the first 24 to 48 hours. Continue with ice packs as needed to ease pain and swelling. To make an ice pack, put ice cubes in a plastic bag that seals at the top. Wrap the bag in a clean, thin towel or cloth. Never put ice or an ice pack directly on the skin. You can place the ice pack inside the sling and directly over the cast or splint. As the ice melts, be careful  that the cast or splint doesn t get wet.    Keep the cast or splint completely dry at all times. Bathe with your cast or splint out of the water, protected with 2 large plastic bags. Place 1 bag outside the other. Tape each bag with duct tape at the top end. If a fiberglass cast or splint gets wet, dry it with a hair dryer on a cool setting.    You may use over-the-counter pain medicine to control pain, unless another pain medicine was prescribed. If you have chronic liver or kidney disease or ever had a stomach ulcer or GI bleeding, talk with your provider beforeusing these medicines.  Follow-up care  Follow up with your healthcare provider within 1 week, or as advised. This is to be sure the bone is healing properly. If you were given a splint, it may be changed to a cast at your follow-up visit.  If X-rays were taken, you will be told of any new findings that may affect your care.  When to seek medical advice  Call your healthcare provider right away if any of these occur:    The plaster cast or splint becomes wet or soft    The fiberglass cast or splint stays wet for more than 24 hours    The cast has a bad smell    The plaster cast or splint becomes loose    There is increased tightness or pain under the cast or splint    The fingers on your injured hand become swollen, cold, blue, numb, or tingly  Date Last Reviewed: 12/3/2015    4814-5980 [employer]. [address] [place] [address]. All rights reserved. This information is not intended as a substitute for professional medical care. Always follow your healthcare professional's instructions.          24 Hour Appointment Hotline       To make an appointment at any [facility] clinic, call [phone] ([phone]). If you don't have a family doctor or clinic, we will help you find one. [facility] clinics are conveniently located to serve the needs of you and your family.          ED Discharge Orders     ORTHO  REFERRAL        [facility] is referring you to the Orthopedic  Services at [facility].       The  Representative will assist you in the coordination of your Orthopedic and Musculoskeletal Care as prescribed by your physician.    The  Representative will call you within 1 business day to help schedule your appointment, or you may contact the  Representative at:    All areas ~ [phone]     Type of Referral : Non Surgical       Timeframe requested: 3 - 5 days    Coverage of these services is subject to the terms and limitations of your health insurance plan.  Please call member services at your health plan with any benefit or coverage questions.      If X-rays, CT or MRI's have been performed, please contact the facility where they were done to arrange for , prior to your scheduled appointment.  Please bring this referral request to your appointment and present it to your specialist.                     Review of your medicines      Our records show that you are taking the medicines listed below. If these are incorrect, please call your family doctor or clinic.        Dose / Directions Last dose taken    calcium carbonate 600 MG tablet   Generic drug:  calcium carbonate        1 daily   Refills:  0        ibuprofen 600 MG tablet   Commonly known as:  ADVIL/MOTRIN   Dose:  600 mg   Quantity:  30 tablet        Take 1 tablet by mouth every 6 hours as needed for pain (For mild pain and temperature greater than 102F).   Refills:  0        LYSINE PO        AS NEEDED   Refills:  0        TYLENOL 325 MG tablet   Dose:  1 tablet   Generic drug:  acetaminophen        Take 1 tablet by mouth every 6 hours as needed.   Refills:  0        vitamin D 400 UNITS tablet        1 daily in winter   Refills:  0        WOMENS DAILY MULTIVITAMIN Tabs        1 TABLET DAILY   Refills:  0                Procedures and tests performed during your visit     Hand XR, G/E 3  "views, left      Orders Needing Specimen Collection     None      Pending Results     No orders found from 8/13/2017 to 8/16/2017.            Pending Culture Results     No orders found from 8/13/2017 to 8/16/2017.            Pending Results Instructions     If you had any lab results that were not finalized at the time of your Discharge, you can call the ED Lab Result RN at [phone]. You will be contacted by this team for any positive Lab results or changes in treatment. The nurses are available 7 days a week from 10A to 6:30P.  You can leave a message 24 hours per day and they will return your call.        Test Results From Your Hospital Stay        8/15/2017  3:53 PM      Narrative     XR HAND LT G/E 3 VW  8/15/2017 3:39 PM    HISTORY:  Little finger metacarpal pain after fall 2 hours ago.,  Unspecified injury of left wrist, hand and finger(s), initial  encounter, Pain in left hand    COMPARISON:  None.        Impression     IMPRESSION:  There is a transverse fracture through the neck of the  fifth metacarpal with mild dorsal and ulnar angulation of the distal  fracture fragment. Joint space narrowing and mild degenerative changes  at several IP joints.     LONI NICHOLE MD                Thank you for choosing [facility]       Thank you for choosing [facility] for your care. Our goal is always to provide you with excellent care. Hearing back from our patients is one way we can continue to improve our services. Please take a few minutes to complete the written survey that you may receive in the mail after you visit with us. Thank you!        [employer] Information     [employer] lets you send messages to your doctor, view your test results, renew your prescriptions, schedule appointments and more. To sign up, go to [email or web address] . Click on \"Log in\" on the left side of the screen, which will take you to the Welcome page. Then click on \"Sign up Now\" on the right side of the page.     You will be asked to " enter the access code listed below, as well as some personal information. Please follow the directions to create your username and password.     Your access code is: [email or web address]  Expires: 2017  4:33 PM     Your access code will  in 90 days. If you need help or a new code, please call your [facility] clinic or [phone].        Care EveryWhere ID     This is your Care EveryWhere ID. This could be used by other organizations to access your [facility] medical records  [id number]        Equal Access to Services     [facility]: Hadfred Reveles, adarsh carrasco, manuel hermanalhiram holloway, alek cronin . So [facility] [phone].    ATENCIÓN: Si habla español, tiene a olmedo disposición servicios gratuitos de asistencia lingüística. Llame al [phone].    We comply with applicable federal civil rights laws and [place] laws. We do not discriminate on the basis of race, color, national origin, age, disability sex, sexual orientation or gender identity.            After Visit Summary       This is your record. Keep this with you and show to your community pharmacist(s) and doctor(s) at your next visit.

## 2017-08-15 NOTE — ED PROVIDER NOTES
SUBJECTIVE:  Lydia Pierson is a 57 year old female who sustained a left hand injury 2 hours ago. Mechanism of injury: Patient fell and landed on her L hand while walking her dog. Immediate symptoms: immediate pain, immediate swelling. Symptoms have been unchanged since that time. Prior history of related problems: no prior problems with this area in the past.  No numbness or tingling in the L hand.     OBJECTIVE:     Vital signs were reviewed and noted by me.  /89  Temp 98  F (36.7  C)  Resp 16  LMP 12/05/2010  SpO2 99%  Appearance: in no apparent distress, well developed and well nourished, non-toxic and cooperative.  Hand and wrist exam: soft tissue tenderness and swelling at the MCP joint of the pinky finger as well as the mtacarpal area, reduced range of motion of pinky finger.  Fingers are neurologically intact.  Cap refill less than 2 seconds.  X-ray per radiology read: There is a transverse fracture through the neck of the fifth metacarpal with mild dorsal and ulnar angulation of the distal  fracture fragment. Joint space narrowing and mild degenerative changes at several IP joints.       ASSESSMENT:     (S69.92XA) Hand injury, left, initial encounter  Plan: Hand XR, G/E 3 views, left    (M79.642) Pain of left hand  Plan: Hand XR, G/E 3 views, left      PLAN:     Patient placed in ulnar gutter splint made from orthoglass per [facility].  Digits were neurologically intact post splint placement.  Cap refill less than 2 seconds.  Patient placed in sling provided here in clinic for comfort.  I did speak with [facility], and they feel that she can be splinted and follow up with hand specialist for this in their clinic.  Patient advised to elevate and ice the hand.  ibuprofen or tylenol for pain.  Patient verbalized understanding and agreed with this plan.     Chato Cruz  8/15/2017, 4:46 PM           Chato Cruz PA-C  08/15/17 0252

## 2017-08-16 ENCOUNTER — TELEPHONE (OUTPATIENT)
Dept: ORTHOPEDICS | Facility: CLINIC | Age: 57
End: 2017-08-16

## 2017-08-16 ENCOUNTER — ANESTHESIA EVENT (OUTPATIENT)
Dept: SURGERY | Facility: CLINIC | Age: 57
End: 2017-08-16
Payer: COMMERCIAL

## 2017-08-16 NOTE — TELEPHONE ENCOUNTER
Patient was seen in the ER for hand fracture and per chart review, [facility] ([facility]) physicians were consulted.  Note states patient is to follow up with hand specialist at [facility], but is schedule with Dr. Jaime on 8/19/17 due to a non surgical referral placed.    LVM with patient to have her call back.  Want to make sure she is aware she is not scheduled with a surgeon.  If she would like to see a surgeon, she would need to schedule with [facility].  Pallavi Munoz MS ATC

## 2017-08-16 NOTE — ANESTHESIA PREPROCEDURE EVALUATION
Anesthesia Evaluation     . Pt has had prior anesthetic. Type: MAC and General           ROS/MED HX    ENT/Pulmonary:     (+)allergic rhinitis, , . .    Neurologic:       Cardiovascular:     (+) Dyslipidemia, ----. : . . . :. . Previous cardiac testing date:results:date: results:ECG reviewed date:9/28/15 results:Sinus Rhythm   -RSR(V1) -nondiagnostic.     PROBABLY NORMAL   date: results:          METS/Exercise Tolerance:     Hematologic:         Musculoskeletal:   (+) fracture upper extremity, , -       GI/Hepatic:         Renal/Genitourinary:     (+) chronic renal disease,       Endo:  - neg endo ROS       Psychiatric:  - neg psychiatric ROS       Infectious Disease:  - neg infectious disease ROS       Malignancy:   (+) Malignancy History of Lymphoma/Leukemia  Lymph CA Remission status post,         Other:                     Physical Exam  Normal systems: cardiovascular and pulmonary    Airway   Mallampati: II  TM distance: >3 FB  Neck ROM: full    Dental     Cardiovascular       Pulmonary                     Anesthesia Plan      History & Physical Review  History and physical reviewed and following examination; no interval change.    ASA Status:  2 .    NPO Status:  > 8 hours    Plan for MAC with Intravenous and Propofol induction. Maintenance will be Balanced.  Reason for MAC:  Deep or markedly invasive procedure (G8)  PONV prophylaxis:  Ondansetron (or other 5HT-3) and Dexamethasone or Solumedrol       Postoperative Care  Postoperative pain management:  IV analgesics and Oral pain medications.      Consents  Anesthetic plan, risks, benefits and alternatives discussed with:  Patient..                          .

## 2017-08-17 ENCOUNTER — ANESTHESIA (OUTPATIENT)
Dept: SURGERY | Facility: CLINIC | Age: 57
End: 2017-08-17
Payer: COMMERCIAL

## 2017-08-17 ENCOUNTER — HOSPITAL ENCOUNTER (OUTPATIENT)
Facility: CLINIC | Age: 57
Discharge: HOME | End: 2017-08-17
Attending: SURGERY | Admitting: SURGERY
Payer: COMMERCIAL

## 2017-08-17 VITALS
OXYGEN SATURATION: 98 % | DIASTOLIC BLOOD PRESSURE: 82 MMHG | BODY MASS INDEX: 26.8 KG/M2 | TEMPERATURE: 98 F | HEIGHT: 64 IN | HEART RATE: 70 BPM | SYSTOLIC BLOOD PRESSURE: 125 MMHG | WEIGHT: 157 LBS | RESPIRATION RATE: 16 BRPM

## 2017-08-17 DIAGNOSIS — G89.18 POST-OP PAIN: Primary | ICD-10-CM

## 2017-08-17 PROCEDURE — 25000132 ZZH RX MED GY IP 250 OP 250 PS 637: Performed by: SURGERY

## 2017-08-17 PROCEDURE — 36000050 ZZH SURGERY LEVEL 2 1ST 30 MIN: Performed by: SURGERY

## 2017-08-17 PROCEDURE — 36000052 ZZH SURGERY LEVEL 2 EA 15 ADDTL MIN: Performed by: SURGERY

## 2017-08-17 PROCEDURE — 25000125 ZZHC RX 250: Performed by: NURSE ANESTHETIST, CERTIFIED REGISTERED

## 2017-08-17 PROCEDURE — 49505 PRP I/HERN INIT REDUC >5 YR: CPT | Mod: RT | Performed by: SURGERY

## 2017-08-17 PROCEDURE — 40000305 ZZH STATISTIC PRE PROC ASSESS I: Performed by: SURGERY

## 2017-08-17 PROCEDURE — 27210794 ZZH OR GENERAL SUPPLY STERILE: Performed by: SURGERY

## 2017-08-17 PROCEDURE — 27110028 ZZH OR GENERAL SUPPLY NON-STERILE: Performed by: SURGERY

## 2017-08-17 PROCEDURE — 25000128 H RX IP 250 OP 636: Performed by: SURGERY

## 2017-08-17 PROCEDURE — 37000009 ZZH ANESTHESIA TECHNICAL FEE, EACH ADDTL 15 MIN: Performed by: SURGERY

## 2017-08-17 PROCEDURE — 25000128 H RX IP 250 OP 636: Performed by: NURSE ANESTHETIST, CERTIFIED REGISTERED

## 2017-08-17 PROCEDURE — 25000125 ZZHC RX 250: Performed by: SURGERY

## 2017-08-17 PROCEDURE — 71000027 ZZH RECOVERY PHASE 2 EACH 15 MINS: Performed by: SURGERY

## 2017-08-17 PROCEDURE — 37000008 ZZH ANESTHESIA TECHNICAL FEE, 1ST 30 MIN: Performed by: SURGERY

## 2017-08-17 RX ORDER — LIDOCAINE HYDROCHLORIDE 10 MG/ML
INJECTION, SOLUTION INFILTRATION; PERINEURAL PRN
Status: DISCONTINUED | OUTPATIENT
Start: 2017-08-17 | End: 2017-08-17

## 2017-08-17 RX ORDER — CEFAZOLIN SODIUM 1 G/3ML
1 INJECTION, POWDER, FOR SOLUTION INTRAMUSCULAR; INTRAVENOUS SEE ADMIN INSTRUCTIONS
Status: DISCONTINUED | OUTPATIENT
Start: 2017-08-17 | End: 2017-08-17 | Stop reason: HOSPADM

## 2017-08-17 RX ORDER — ONDANSETRON 2 MG/ML
INJECTION INTRAMUSCULAR; INTRAVENOUS PRN
Status: DISCONTINUED | OUTPATIENT
Start: 2017-08-17 | End: 2017-08-17

## 2017-08-17 RX ORDER — BUPIVACAINE HYDROCHLORIDE AND EPINEPHRINE 2.5; 5 MG/ML; UG/ML
INJECTION, SOLUTION INFILTRATION; PERINEURAL PRN
Status: DISCONTINUED | OUTPATIENT
Start: 2017-08-17 | End: 2017-08-17 | Stop reason: HOSPADM

## 2017-08-17 RX ORDER — SODIUM CHLORIDE, SODIUM LACTATE, POTASSIUM CHLORIDE, CALCIUM CHLORIDE 600; 310; 30; 20 MG/100ML; MG/100ML; MG/100ML; MG/100ML
INJECTION, SOLUTION INTRAVENOUS CONTINUOUS
Status: DISCONTINUED | OUTPATIENT
Start: 2017-08-17 | End: 2017-08-17 | Stop reason: HOSPADM

## 2017-08-17 RX ORDER — NALOXONE HYDROCHLORIDE 0.4 MG/ML
.1-.4 INJECTION, SOLUTION INTRAMUSCULAR; INTRAVENOUS; SUBCUTANEOUS
Status: DISCONTINUED | OUTPATIENT
Start: 2017-08-17 | End: 2017-08-17 | Stop reason: HOSPADM

## 2017-08-17 RX ORDER — FENTANYL CITRATE 50 UG/ML
25-50 INJECTION, SOLUTION INTRAMUSCULAR; INTRAVENOUS
Status: CANCELLED | OUTPATIENT
Start: 2017-08-17

## 2017-08-17 RX ORDER — ONDANSETRON 2 MG/ML
4 INJECTION INTRAMUSCULAR; INTRAVENOUS EVERY 30 MIN PRN
Status: DISCONTINUED | OUTPATIENT
Start: 2017-08-17 | End: 2017-08-17 | Stop reason: HOSPADM

## 2017-08-17 RX ORDER — PROPOFOL 10 MG/ML
INJECTION, EMULSION INTRAVENOUS CONTINUOUS PRN
Status: DISCONTINUED | OUTPATIENT
Start: 2017-08-17 | End: 2017-08-17

## 2017-08-17 RX ORDER — HYDROCODONE BITARTRATE AND ACETAMINOPHEN 5; 325 MG/1; MG/1
1-2 TABLET ORAL EVERY 4 HOURS PRN
Qty: 30 TABLET | Refills: 0 | Status: SHIPPED | OUTPATIENT
Start: 2017-08-17 | End: 2017-12-16

## 2017-08-17 RX ORDER — FENTANYL CITRATE 50 UG/ML
25-50 INJECTION, SOLUTION INTRAMUSCULAR; INTRAVENOUS
Status: DISCONTINUED | OUTPATIENT
Start: 2017-08-17 | End: 2017-08-17 | Stop reason: HOSPADM

## 2017-08-17 RX ORDER — KETOROLAC TROMETHAMINE 30 MG/ML
INJECTION, SOLUTION INTRAMUSCULAR; INTRAVENOUS PRN
Status: DISCONTINUED | OUTPATIENT
Start: 2017-08-17 | End: 2017-08-17

## 2017-08-17 RX ORDER — ONDANSETRON 4 MG/1
4 TABLET, ORALLY DISINTEGRATING ORAL EVERY 30 MIN PRN
Status: DISCONTINUED | OUTPATIENT
Start: 2017-08-17 | End: 2017-08-17 | Stop reason: HOSPADM

## 2017-08-17 RX ORDER — ALBUTEROL SULFATE 0.83 MG/ML
2.5 SOLUTION RESPIRATORY (INHALATION) EVERY 4 HOURS PRN
Status: CANCELLED | OUTPATIENT
Start: 2017-08-17

## 2017-08-17 RX ORDER — HYDROMORPHONE HYDROCHLORIDE 1 MG/ML
.3-.5 INJECTION, SOLUTION INTRAMUSCULAR; INTRAVENOUS; SUBCUTANEOUS EVERY 10 MIN PRN
Status: DISCONTINUED | OUTPATIENT
Start: 2017-08-17 | End: 2017-08-17 | Stop reason: HOSPADM

## 2017-08-17 RX ORDER — MEPERIDINE HYDROCHLORIDE 25 MG/ML
12.5 INJECTION INTRAMUSCULAR; INTRAVENOUS; SUBCUTANEOUS
Status: DISCONTINUED | OUTPATIENT
Start: 2017-08-17 | End: 2017-08-17 | Stop reason: HOSPADM

## 2017-08-17 RX ORDER — LIDOCAINE 40 MG/G
CREAM TOPICAL
Status: DISCONTINUED | OUTPATIENT
Start: 2017-08-17 | End: 2017-08-17 | Stop reason: HOSPADM

## 2017-08-17 RX ORDER — KETOROLAC TROMETHAMINE 30 MG/ML
30 INJECTION, SOLUTION INTRAMUSCULAR; INTRAVENOUS EVERY 6 HOURS PRN
Status: DISCONTINUED | OUTPATIENT
Start: 2017-08-17 | End: 2017-08-17 | Stop reason: HOSPADM

## 2017-08-17 RX ORDER — CEFAZOLIN SODIUM 2 G/100ML
2 INJECTION, SOLUTION INTRAVENOUS
Status: COMPLETED | OUTPATIENT
Start: 2017-08-17 | End: 2017-08-17

## 2017-08-17 RX ORDER — HYDRALAZINE HYDROCHLORIDE 20 MG/ML
2.5-5 INJECTION INTRAMUSCULAR; INTRAVENOUS EVERY 10 MIN PRN
Status: CANCELLED | OUTPATIENT
Start: 2017-08-17

## 2017-08-17 RX ORDER — DEXAMETHASONE SODIUM PHOSPHATE 4 MG/ML
4 INJECTION, SOLUTION INTRA-ARTICULAR; INTRALESIONAL; INTRAMUSCULAR; INTRAVENOUS; SOFT TISSUE EVERY 10 MIN PRN
Status: DISCONTINUED | OUTPATIENT
Start: 2017-08-17 | End: 2017-08-17 | Stop reason: HOSPADM

## 2017-08-17 RX ORDER — FENTANYL CITRATE 50 UG/ML
INJECTION, SOLUTION INTRAMUSCULAR; INTRAVENOUS PRN
Status: DISCONTINUED | OUTPATIENT
Start: 2017-08-17 | End: 2017-08-17

## 2017-08-17 RX ORDER — HYDROCODONE BITARTRATE AND ACETAMINOPHEN 5; 325 MG/1; MG/1
1-2 TABLET ORAL
Status: COMPLETED | OUTPATIENT
Start: 2017-08-17 | End: 2017-08-17

## 2017-08-17 RX ADMIN — ONDANSETRON 4 MG: 2 INJECTION INTRAMUSCULAR; INTRAVENOUS at 07:35

## 2017-08-17 RX ADMIN — CEFAZOLIN SODIUM 2 G: 2 INJECTION, SOLUTION INTRAVENOUS at 07:30

## 2017-08-17 RX ADMIN — SODIUM CHLORIDE, POTASSIUM CHLORIDE, SODIUM LACTATE AND CALCIUM CHLORIDE: 600; 310; 30; 20 INJECTION, SOLUTION INTRAVENOUS at 07:11

## 2017-08-17 RX ADMIN — MIDAZOLAM HYDROCHLORIDE 2 MG: 1 INJECTION, SOLUTION INTRAMUSCULAR; INTRAVENOUS at 07:32

## 2017-08-17 RX ADMIN — MIDAZOLAM HYDROCHLORIDE 2 MG: 1 INJECTION, SOLUTION INTRAMUSCULAR; INTRAVENOUS at 07:30

## 2017-08-17 RX ADMIN — FENTANYL CITRATE 50 MCG: 50 INJECTION, SOLUTION INTRAMUSCULAR; INTRAVENOUS at 07:45

## 2017-08-17 RX ADMIN — MIDAZOLAM HYDROCHLORIDE 1 MG: 1 INJECTION, SOLUTION INTRAMUSCULAR; INTRAVENOUS at 07:35

## 2017-08-17 RX ADMIN — LIDOCAINE HYDROCHLORIDE 1 ML: 10 INJECTION, SOLUTION EPIDURAL; INFILTRATION; INTRACAUDAL; PERINEURAL at 07:11

## 2017-08-17 RX ADMIN — PROPOFOL 100 MCG/KG/MIN: 10 INJECTION, EMULSION INTRAVENOUS at 07:35

## 2017-08-17 RX ADMIN — LIDOCAINE HYDROCHLORIDE 50 MG: 10 INJECTION, SOLUTION INFILTRATION; PERINEURAL at 07:35

## 2017-08-17 RX ADMIN — KETOROLAC TROMETHAMINE 30 MG: 30 INJECTION, SOLUTION INTRAMUSCULAR at 07:43

## 2017-08-17 RX ADMIN — HYDROCODONE BITARTRATE AND ACETAMINOPHEN 1 TABLET: 5; 325 TABLET ORAL at 09:31

## 2017-08-17 RX ADMIN — FENTANYL CITRATE 50 MCG: 50 INJECTION, SOLUTION INTRAMUSCULAR; INTRAVENOUS at 07:49

## 2017-08-17 NOTE — IP AVS SNAPSHOT
[facility] PreOP/Phase II    [address] [address]    Phone:  [phone]    Fax:  [phone]                                       After Visit Summary   8/17/2017    Lydia Pierson    MRN: [id number]           After Visit Summary Signature Page     I have received my discharge instructions, and my questions have been answered. I have discussed any challenges I see with this plan with the nurse or doctor.    ..........................................................................................................................................  Patient/Patient Representative Signature      ..........................................................................................................................................  Patient Representative Print Name and Relationship to Patient    ..................................................               ................................................  Date                                            Time    ..........................................................................................................................................  Reviewed by Signature/Title    ...................................................              ..............................................  Date                                                            Time

## 2017-08-17 NOTE — DISCHARGE INSTRUCTIONS
Same Day Surgery Discharge Instructions  Special Precautions After Surgery - Adult    1. It is not unusual to feel lightheaded or faint, up to 24 hours after surgery or while taking pain medication.  If you have these symptoms; sit for a few minutes before standing and have someone assist you when getting up.  2. You should rest and relax for the next 24 hours and must have someone stay with you for at least 24 hours after your discharge.  3. DO NOT DRIVE any vehicle or operate mechanical equipment for 24 hours following the end of your surgery.  DO NOT DRIVE while taking narcotic pain medications that have been prescribed by your physician.  If you had a limb operated on, you must be able to use it fully to drive.  4. DO NOT drink alcoholic beverages for 24 hours following surgery or while taking prescription pain medication.  5. Drink clear liquids (apple juice, ginger ale, broth, 7-Up, etc.).  Progress to your regular diet as you feel able.  6. Any questions call your physician and do not make important decisions for 24 hours.  7.     ACTIVITY  ? Increase activity as tolerated. If you are tired please rest.       INCISIONAL CARE  Follow physicians instructions.       Call for an appointment to return to the clinic       Medications:  ? Follow the instructions on the bottle. Your last pain medication was at ____________.       Additional discharge instructions: Watch for signs of infection, any redness that is spreading, any foul drainage, any fever greater than 101.0.        __________________________________________________________________________________________________________________________________  IMPORTANT NUMBERS:    [facility] [place]  [phone], [phone]  Pharmacy:  [phone]  Same Day Surgery:  [phone], Monday - Friday until 8:30 p.m.  Urgent Care:  [phone]  Emergency Room:  [phone]      [facility]:  [phone]                                                                              [facility]:  [phone] option 1  [facility]:  [phone]     [facility]:  [phone]   [facility]:  [phone]   Home Medical Equipment: [phone]  [facility]:  [phone]    Breakthrough Bleeding    How/Why does it occur?   There are many causes of breakthrough bleeding onto your dressing. The two most common causes are increased activity and increased NSAID use.     How is it treated?   The treatment for breakthrough dressing bleeding depends on the cause. For simple problems such as a saturated dressing, you may need to reinforce the dressing with more gauze and tape and put slight pressure on the site.  Call your healthcare provider if something else is causing bleeding.        Nausea and Vomiting  What are nausea and vomiting?   Nausea is the queasy feeling you usually have before you vomit. Vomiting is the forceful emptying (throwing up) of the stomach's contents through the mouth.   What causes nausea and vomiting?   Nausea and vomiting are symptoms that may occur with many conditions, such as:   Anesthesia medications   side effect of narcotic medicines  exposure to unpleasant odors or sights   stress and anxiety     How is it treated?   At first you should rest your stomach for a few hours by eating nothing solid and sipping only clear liquids. A little later you can eat soft bland foods that are easy to digest.   It is important to drink small amounts (1 to 4 ounces) often so that you do not become dehydrated. Gradually drink larger amounts of the clear fluids. If you vomit, wait an hour, then start over with a smaller amount of fluid.   Eat slowly and avoid foods that are acidic, spicy, fatty, or fibrous (such as meats, coarse grains, and raw vegetables). Also avoid extremely hot or cold food. In addition, avoid dairy products if you have diarrhea. You may start eating your normal diet again  in 3 days or so, when all signs of illness have passed.   Rest as much as possible. Sit or lie down with your head propped up. Do not lie flat for at least 2 hours after eating. Nausea and vomiting usually last only a short period of time. If you have cramping or pain in your belly you can try putting a heating pad set at low or a covered hot water bottle on your belly. Never set a heating pad on high because you could get burned.   If you have been vomiting for more than a day or have had diarrhea for over 3 days, you may need to have an exam by your provider, including a check for dehydration. If you are very dehydrated, you may need to be given fluids intravenously (IV). In children and older adults dehydration can quickly become life threatening.   When should I call my healthcare provider?   Talk with your provider if you are unable to keep fluids down for more than 12 hours or if you have any of the following symptoms with nausea and vomiting:   severe headache or neck ache, or stiff neck   severe abdominal pain   diarrhea and vomiting that last more than 24 hours   blood in the vomited material that may look red, brown, or black, or like coffee grounds   bloody diarrhea   very forceful vomiting   signs of dehydration such as dry mouth, excessive thirst, little or no urination, severe weakness, dizziness, or lightheadedness.   If you have nausea and pain in the jaw, arm, shoulder, chest, or back; sweating; shortness of breath; or lightheadedness; call 911 for emergency care.     Post-operative Infection  What is a wound infection?    watch for signs of infection. Signs that the wound/incision is infected include:   Pus or cloudy fluid draining from the incision.   A pimple or yellow crust forming on the incision   The scab is increasing in size.   Increasing redness occurs around the incisions  A red streak is spreading from the wound toward the heart.   The incision has become extremely tender and/or hot    The lymph node draining that area of skin may become large and tender.   You may develop a fever over 100?F (37.8?C).     What is the cause?   Most skin infections follow breaks in the skin (for example, surgery,  from cuts, puncture wounds, animal bites, splinters, thorns, or burns). Bacteria (especially staphylococcus or streptococcus) then invade the wound and cause the infection.    Deeper wounds (like surgical incisions) are much more likely to become infected than superficial wounds (for example, scrapes).     What is the treatment?   Call your doctor's clinic if you feel you have the beginnings of an infection   Antibiotics You will probably need antibiotics prescribed by your healthcare provider. This medicine will kill the germs that are causing the wound infection. Try not to forget any of the doses.  Even if you feel better in a few days, take the antibiotic until it is completely gone to keep the infection from flaring up again.    Fever and pain relief Take acetaminophen or ibuprofen if you develop a fever over 102?F (39?C)    How can I help prevent infections?   Wash around all new incisions vigorously with soap and water for 5 to 10 minutes to remove dirt and bacteria.      When should I call my child's healthcare provider?   Call IMMEDIATELY if:   The redness keeps spreading.   The wound becomes extremely painful.   Call during office hours if:   The fever is not gone 48 hours after you start taking an antibiotic.   The wound infection does not look better 3 days after your start taking an antibiotic.   The wound isn't completely healed within 10 days.   You have other questions or concerns.     Post-operative Infection  What is a wound infection?    watch for signs of infection. Signs that the wound/incision is infected include:   Pus or cloudy fluid draining from the incision.   A pimple or yellow crust forming on the incision   The scab is increasing in size.   Increasing redness occurs around  the incisions  A red streak is spreading from the wound toward the heart.   The incision has become extremely tender and/or hot   The lymph node draining that area of skin may become large and tender.   You may develop a fever over 100?F (37.8?C).     What is the cause?   Most skin infections follow breaks in the skin (for example, surgery,  from cuts, puncture wounds, animal bites, splinters, thorns, or burns). Bacteria (especially staphylococcus or streptococcus) then invade the wound and cause the infection.    Deeper wounds (like surgical incisions) are much more likely to become infected than superficial wounds (for example, scrapes).     What is the treatment?   Call your doctor's clinic if you feel you have the beginnings of an infection   Antibiotics You will probably need antibiotics prescribed by your healthcare provider. This medicine will kill the germs that are causing the wound infection. Try not to forget any of the doses.  Even if you feel better in a few days, take the antibiotic until it is completely gone to keep the infection from flaring up again.    Fever and pain relief Take acetaminophen or ibuprofen if you develop a fever over 102?F (39?C)    How can I help prevent infections?   Wash around all new incisions vigorously with soap and water for 5 to 10 minutes to remove dirt and bacteria.      When should I call my child's healthcare provider?   Call IMMEDIATELY if:   The redness keeps spreading.   The wound becomes extremely painful.   Call during office hours if:   The fever is not gone 48 hours after you start taking an antibiotic.   The wound infection does not look better 3 days after your start taking an antibiotic.   The wound isn't completely healed within 10 days.   You have other questions or concerns.                         Anesthesia   Same Day Surgery Discharge Instructions  Special Precautions After Surgery - Adult    8. It is not unusual to feel lightheaded or faint, up to 24  hours after surgery or while taking pain medication.  If you have these symptoms; sit for a few minutes before standing and have someone assist you when getting up.  9. You should rest and relax for the next 24 hours and must have someone stay with you for at least 24 hours after your discharge.  10. DO NOT DRIVE any vehicle or operate mechanical equipment for 24 hours following the end of your surgery.  DO NOT DRIVE while taking narcotic pain medications that have been prescribed by your physician.  If you had a limb operated on, you must be able to use it fully to drive.  11. DO NOT drink alcoholic beverages for 24 hours following surgery or while taking prescription pain medication.  12. Drink clear liquids (apple juice, ginger ale, broth, 7-Up, etc.).  Progress to your regular diet as you feel able.  13. Any questions call your physician and do not make important decisions for 24 hours.  If you feel you have an anesthesia emergency call [phone] and ask to speak to the anesthetist on call     __________________________________________________________________________________________________________________________________  IMPORTANT NUMBERS:    [facility] [place]:  [phone], [phone]  Pharmacy:  [phone]  Same Day Surgery:  [phone], Monday - Friday until 8:30 p.m.  Urgent Care:  [phone]  [facility]:  [phone]                                                                             [facility]:  [phone] option 1  [facility]:  [phone]         [facility]:  [phone] call with any questions/concerns and for follow up care   Emergency Room:  [phone]  Call with AFTER HOURS with any questions/concerns

## 2017-08-17 NOTE — LETTER
[facility] PREOP/PHASE II  [address] [place] [address]  Phone: [phone]  Fax: [phone]      August 17, 2017      RE: Lydia Pierson  [address] [address]        To whom it may concern:    Lydia Pisanoashuflavio is under my professional care. The employee is UNABLE to return to work until September 18 th.  She had surgery today and will need to be off to recover.        Sincerely,    Augusto Sorensen MD

## 2017-08-17 NOTE — IP AVS SNAPSHOT
MRN:[id number]                      After Visit Summary   8/17/2017    Lydia Pierson    MRN: [id number]           Thank you!     Thank you for choosing [facility] for your care. Our goal is always to provide you with excellent care. Hearing back from our patients is one way we can continue to improve our services. Please take a few minutes to complete the written survey that you may receive in the mail after you visit with us. Thank you!        Patient Information     Date Of Birth          1960        About your hospital stay     You were admitted on:  August 17, 2017 You last received care in the:  [facility] PreOP/Phase II    You were discharged on:  August 17, 2017       Who to Call     For medical emergencies, please call 911.  For non-urgent questions about your medical care, please call your primary care provider or clinic, [phone]  For questions related to your surgery, please call your surgery clinic        Attending Provider     Provider Specialty    Mehdi Sorensen MD General Surgery       Primary Care Provider Office Phone # Fax #    Benja Liz Cordova [phone]      After Care Instructions     Diet Instructions       Resume pre-procedure diet            Discharge Instructions       Patient to follow up with appointment in 1-2 weeks            No Alcohol       For 24 hours post procedure            No driving or operating machinery        until the day after procedure            No lifting        No lifting over 20 lbs and no strenuous physical activity for 4 weeks            Shower       No shower for 24 hours post procedure. May shower Postoperative Day (POD)  1            Weight bearing status - As tolerated                 Your next 10 appointments already scheduled     Aug 19, 2017  9:40 AM CDT   New Visit with Favian Jaime DO   [facility] ([facility])    [address]  [facility]  [place] [id number] [phone]              Further instructions from your care team                           Same Day Surgery Discharge Instructions  Special Precautions After Surgery - Adult    1. It is not unusual to feel lightheaded or faint, up to 24 hours after surgery or while taking pain medication.  If you have these symptoms; sit for a few minutes before standing and have someone assist you when getting up.  2. You should rest and relax for the next 24 hours and must have someone stay with you for at least 24 hours after your discharge.  3. DO NOT DRIVE any vehicle or operate mechanical equipment for 24 hours following the end of your surgery.  DO NOT DRIVE while taking narcotic pain medications that have been prescribed by your physician.  If you had a limb operated on, you must be able to use it fully to drive.  4. DO NOT drink alcoholic beverages for 24 hours following surgery or while taking prescription pain medication.  5. Drink clear liquids (apple juice, ginger ale, broth, 7-Up, etc.).  Progress to your regular diet as you feel able.  6. Any questions call your physician and do not make important decisions for 24 hours.  7.     ACTIVITY  ? Increase activity as tolerated. If you are tired please rest.       INCISIONAL CARE  Follow physicians instructions.       Call for an appointment to return to the clinic       Medications:  ? Follow the instructions on the bottle. Your last pain medication was at ____________.       Additional discharge instructions: Watch for signs of infection, any redness that is spreading, any foul drainage, any fever greater than 101.0.        __________________________________________________________________________________________________________________________________  IMPORTANT NUMBERS:    [facility] Main Number:  [phone], [phone]  Pharmacy:  [phone]  Same Day Surgery:  [phone], Monday - Friday until 8:30 p.m.  Urgent Care:   [phone]  Emergency Room:  [phone]      [facility]:  [phone]                                                                             [facility]:  [phone] option 1  [facility]:  [phone]     [facility]:  [phone]   [facility] Specialty Clinic:  [phone]   Home Medical Equipment: [phone]  [facility]:  [phone]    Breakthrough Bleeding    How/Why does it occur?   There are many causes of breakthrough bleeding onto your dressing. The two most common causes are increased activity and increased NSAID use.     How is it treated?   The treatment for breakthrough dressing bleeding depends on the cause. For simple problems such as a saturated dressing, you may need to reinforce the dressing with more gauze and tape and put slight pressure on the site.  Call your healthcare provider if something else is causing bleeding.        Nausea and Vomiting  What are nausea and vomiting?   Nausea is the queasy feeling you usually have before you vomit. Vomiting is the forceful emptying (throwing up) of the stomach's contents through the mouth.   What causes nausea and vomiting?   Nausea and vomiting are symptoms that may occur with many conditions, such as:   Anesthesia medications   side effect of narcotic medicines  exposure to unpleasant odors or sights   stress and anxiety     How is it treated?   At first you should rest your stomach for a few hours by eating nothing solid and sipping only clear liquids. A little later you can eat soft bland foods that are easy to digest.   It is important to drink small amounts (1 to 4 ounces) often so that you do not become dehydrated. Gradually drink larger amounts of the clear fluids. If you vomit, wait an hour, then start over with a smaller amount of fluid.   Eat slowly and avoid foods that are acidic, spicy, fatty, or fibrous (such as meats, coarse grains, and raw vegetables). Also avoid  extremely hot or cold food. In addition, avoid dairy products if you have diarrhea. You may start eating your normal diet again in 3 days or so, when all signs of illness have passed.   Rest as much as possible. Sit or lie down with your head propped up. Do not lie flat for at least 2 hours after eating. Nausea and vomiting usually last only a short period of time. If you have cramping or pain in your belly you can try putting a heating pad set at low or a covered hot water bottle on your belly. Never set a heating pad on high because you could get burned.   If you have been vomiting for more than a day or have had diarrhea for over 3 days, you may need to have an exam by your provider, including a check for dehydration. If you are very dehydrated, you may need to be given fluids intravenously (IV). In children and older adults dehydration can quickly become life threatening.   When should I call my healthcare provider?   Talk with your provider if you are unable to keep fluids down for more than 12 hours or if you have any of the following symptoms with nausea and vomiting:   severe headache or neck ache, or stiff neck   severe abdominal pain   diarrhea and vomiting that last more than 24 hours   blood in the vomited material that may look red, brown, or black, or like coffee grounds   bloody diarrhea   very forceful vomiting   signs of dehydration such as dry mouth, excessive thirst, little or no urination, severe weakness, dizziness, or lightheadedness.   If you have nausea and pain in the jaw, arm, shoulder, chest, or back; sweating; shortness of breath; or lightheadedness; call 911 for emergency care.     Post-operative Infection  What is a wound infection?    watch for signs of infection. Signs that the wound/incision is infected include:   Pus or cloudy fluid draining from the incision.   A pimple or yellow crust forming on the incision   The scab is increasing in size.   Increasing redness occurs around the  incisions  A red streak is spreading from the wound toward the heart.   The incision has become extremely tender and/or hot   The lymph node draining that area of skin may become large and tender.   You may develop a fever over 100?F (37.8?C).     What is the cause?   Most skin infections follow breaks in the skin (for example, surgery,  from cuts, puncture wounds, animal bites, splinters, thorns, or burns). Bacteria (especially staphylococcus or streptococcus) then invade the wound and cause the infection.    Deeper wounds (like surgical incisions) are much more likely to become infected than superficial wounds (for example, scrapes).     What is the treatment?   Call your doctor's clinic if you feel you have the beginnings of an infection   Antibiotics You will probably need antibiotics prescribed by your healthcare provider. This medicine will kill the germs that are causing the wound infection. Try not to forget any of the doses.  Even if you feel better in a few days, take the antibiotic until it is completely gone to keep the infection from flaring up again.    Fever and pain relief Take acetaminophen or ibuprofen if you develop a fever over 102?F (39?C)    How can I help prevent infections?   Wash around all new incisions vigorously with soap and water for 5 to 10 minutes to remove dirt and bacteria.      When should I call my child's healthcare provider?   Call IMMEDIATELY if:   The redness keeps spreading.   The wound becomes extremely painful.   Call during office hours if:   The fever is not gone 48 hours after you start taking an antibiotic.   The wound infection does not look better 3 days after your start taking an antibiotic.   The wound isn't completely healed within 10 days.   You have other questions or concerns.     Post-operative Infection  What is a wound infection?    watch for signs of infection. Signs that the wound/incision is infected include:   Pus or cloudy fluid draining from the  incision.   A pimple or yellow crust forming on the incision   The scab is increasing in size.   Increasing redness occurs around the incisions  A red streak is spreading from the wound toward the heart.   The incision has become extremely tender and/or hot   The lymph node draining that area of skin may become large and tender.   You may develop a fever over 100?F (37.8?C).     What is the cause?   Most skin infections follow breaks in the skin (for example, surgery,  from cuts, puncture wounds, animal bites, splinters, thorns, or burns). Bacteria (especially staphylococcus or streptococcus) then invade the wound and cause the infection.    Deeper wounds (like surgical incisions) are much more likely to become infected than superficial wounds (for example, scrapes).     What is the treatment?   Call your doctor's clinic if you feel you have the beginnings of an infection   Antibiotics You will probably need antibiotics prescribed by your healthcare provider. This medicine will kill the germs that are causing the wound infection. Try not to forget any of the doses.  Even if you feel better in a few days, take the antibiotic until it is completely gone to keep the infection from flaring up again.    Fever and pain relief Take acetaminophen or ibuprofen if you develop a fever over 102?F (39?C)    How can I help prevent infections?   Wash around all new incisions vigorously with soap and water for 5 to 10 minutes to remove dirt and bacteria.      When should I call my child's healthcare provider?   Call IMMEDIATELY if:   The redness keeps spreading.   The wound becomes extremely painful.   Call during office hours if:   The fever is not gone 48 hours after you start taking an antibiotic.   The wound infection does not look better 3 days after your start taking an antibiotic.   The wound isn't completely healed within 10 days.   You have other questions or concerns.                         Anesthesia   Same Day Surgery  Discharge Instructions  Special Precautions After Surgery - Adult    8. It is not unusual to feel lightheaded or faint, up to 24 hours after surgery or while taking pain medication.  If you have these symptoms; sit for a few minutes before standing and have someone assist you when getting up.  9. You should rest and relax for the next 24 hours and must have someone stay with you for at least 24 hours after your discharge.  10. DO NOT DRIVE any vehicle or operate mechanical equipment for 24 hours following the end of your surgery.  DO NOT DRIVE while taking narcotic pain medications that have been prescribed by your physician.  If you had a limb operated on, you must be able to use it fully to drive.  11. DO NOT drink alcoholic beverages for 24 hours following surgery or while taking prescription pain medication.  12. Drink clear liquids (apple juice, ginger ale, broth, 7-Up, etc.).  Progress to your regular diet as you feel able.  13. Any questions call your physician and do not make important decisions for 24 hours.  If you feel you have an anesthesia emergency call [phone] and ask to speak to the anesthetist on call     __________________________________________________________________________________________________________________________________  IMPORTANT NUMBERS:    [facility] Main Number:  [phone], [phone]  Pharmacy:  [phone]  Same Day Surgery:  [phone], Monday - Friday until 8:30 p.m.  Urgent Care:  [phone]  [facility]:  [phone]                                                                             [facility]:  [phone] option 1  [facility]:  [phone]         Surgery Specialty Clinic:  [phone] call with any questions/concerns and for follow up care   Emergency Room:  [phone]  Call with AFTER HOURS with any questions/concerns        Pending Results     No orders found from 8/15/2017 to 8/18/2017.           "  Admission Information     Date & Time Provider Department Dept. Phone    2017 Mehdi Sorensen MD [facility] PreOP/Phase -[phone]      Your Vitals Were     Blood Pressure Pulse Temperature Respirations Height Weight    122/67 66 98  F (36.7  C) (Oral) 16 1.626 m (5' 4\") 71.2 kg (157 lb)    Last Period Pulse Oximetry BMI (Body Mass Index)             2010 100% 26.95 kg/m2         [facility] Information     [facility] lets you send messages to your doctor, view your test results, renew your prescriptions, schedule appointments and more. To sign up, go to [email or web address] . Click on \"Log in\" on the left side of the screen, which will take you to the Welcome page. Then click on \"Sign up Now\" on the right side of the page.     You will be asked to enter the access code listed below, as well as some personal information. Please follow the directions to create your username and password.     Your access code is: [email or web address]  Expires: 2017  4:33 PM     Your access code will  in 90 days. If you need help or a new code, please call your [place] clinic or [phone].        Care EveryWhere ID     This is your Care EveryWhere ID. This could be used by other organizations to access your [place] medical records  [id number]        Equal Access to Services     [facility] : Hadii juan luis flanagan hadasho Sotigistali, waaxda luqadaha, qaybta kaalmada adeegyada, alek cronin . So [facility] [phone].    ATENCIÓN: Si habla español, tiene a olmedo disposición servicios gratuitos de asistencia lingüística. Llame al [phone].    We comply with applicable federal civil rights laws and [place] laws. We do not discriminate on the basis of race, color, national origin, age, disability sex, sexual orientation or gender identity.               Review of your medicines      START taking        Dose / Directions    HYDROcodone-acetaminophen 5-325 MG per tablet   Commonly known " as:  NORCO   Used for:  Post-op pain        Dose:  1-2 tablet   Take 1-2 tablets by mouth every 4 hours as needed for other (Moderate to Severe Pain)   Quantity:  30 tablet   Refills:  0         CONTINUE these medicines which have NOT CHANGED        Dose / Directions    calcium carbonate 600 MG tablet   Used for:  OTC -PATIENT CHOICE   Generic drug:  calcium carbonate        1 daily   Refills:  0       ibuprofen 600 MG tablet   Commonly known as:  ADVIL/MOTRIN   Used for:  Tear of meniscus of left knee        Dose:  600 mg   Take 1 tablet by mouth every 6 hours as needed for pain (For mild pain and temperature greater than 102F).   Quantity:  30 tablet   Refills:  0       LYSINE PO        AS NEEDED   Refills:  0       TYLENOL 325 MG tablet   Generic drug:  acetaminophen        Dose:  1 tablet   Take 1 tablet by mouth every 6 hours as needed.   Refills:  0       vitamin D 400 UNITS tablet        1 daily in winter   Refills:  0       WOMENS DAILY MULTIVITAMIN Tabs   Used for:  OTC -PATIENT CHOICE        1 TABLET DAILY   Refills:  0            Where to get your medicines      Some of these will need a paper prescription and others can be bought over the counter. Ask your nurse if you have questions.     Bring a paper prescription for each of these medications     HYDROcodone-acetaminophen 5-325 MG per tablet                Protect others around you: Learn how to safely use, store and throw away your medicines at www.disposemymeds.org.             Medication List: This is a list of all your medications and when to take them. Check marks below indicate your daily home schedule. Keep this list as a reference.      Medications           Morning Afternoon Evening Bedtime As Needed    calcium carbonate 600 MG tablet   1 daily   Generic drug:  calcium carbonate                                HYDROcodone-acetaminophen 5-325 MG per tablet   Commonly known as:  NORCO   Take 1-2 tablets by mouth every 4 hours as needed for other  (Moderate to Severe Pain)                                ibuprofen 600 MG tablet   Commonly known as:  ADVIL/MOTRIN   Take 1 tablet by mouth every 6 hours as needed for pain (For mild pain and temperature greater than 102F).                                LYSINE PO   AS NEEDED                                TYLENOL 325 MG tablet   Take 1 tablet by mouth every 6 hours as needed.   Generic drug:  acetaminophen                                vitamin D 400 UNITS tablet   1 daily in winter                                WOMENS DAILY MULTIVITAMIN Tabs   1 TABLET DAILY

## 2017-08-17 NOTE — ANESTHESIA CARE TRANSFER NOTE
Patient: Lydia Pierson    Procedure(s):  Left Inguinal Hernia Repair - Wound Class: I-Clean    Diagnosis: left inguinal hernia  Diagnosis Additional Information: No value filed.    Anesthesia Type:   MAC     Note:  Airway :Room Air  Patient transferred to:Phase II        Vitals: (Last set prior to Anesthesia Care Transfer)    CRNA VITALS  8/17/2017 0747 - 8/17/2017 0819      8/17/2017             Pulse: 84    SpO2: 98 %                Electronically Signed By: JOAQUIN Jose CRNA  August 17, 2017  8:19 AM

## 2017-08-17 NOTE — ANESTHESIA POSTPROCEDURE EVALUATION
Patient: Lydia Pierson    Procedure(s):  Left Inguinal Hernia Repair - Wound Class: I-Clean    Diagnosis:left inguinal hernia  Diagnosis Additional Information: No value filed.    Anesthesia Type:  MAC    Note:  Anesthesia Post Evaluation    Patient location during evaluation: Bedside  Patient participation: Able to fully participate in evaluation  Level of consciousness: awake  Pain management: adequate  Airway patency: patent  Cardiovascular status: acceptable  Respiratory status: acceptable  Hydration status: stable  PONV: none     Anesthetic complications: None          Last vitals:  Vitals:    08/17/17 0636 08/17/17 0821 08/17/17 0847   BP: 135/80 106/62 122/67   Pulse:  60 66   Resp: 18 16 16   Temp: 36.7  C (98  F)     SpO2: 98% 96% 100%         Electronically Signed By: JOAQUIN Jose CRNA  August 17, 2017  9:02 AM

## 2017-08-17 NOTE — H&P (VIEW-ONLY)
[facility]  [address] [facility] [address]  [phone]  Dept: [phone]    PRE-OP EVALUATION:  Today's date: 2017    Lydia Pierson (: 1960) presents for pre-operative evaluation assessment as requested by Dr. Sorensen.  She requires evaluation and anesthesia risk assessment prior to undergoing surgery/procedure for treatment of inguinal hernia .  Proposed procedure:        Left Inguinal Hernia Repair           Date of Surgery/ Procedure: 2017  Time of Surgery/ Procedure: 7:30 am  Hospital/Surgical Facility: [facility]  Fax number for surgical facility: [phone]  Primary Physician: Benja Cordova  Type of Anesthesia Anticipated: general    Patient has a Health Care Directive or Living Will:  NO    1. NO - Do you have a history of heart attack, stroke, stent, bypass or surgery on an artery in the head, neck, heart or legs?  2. NO - Do you ever have any pain or discomfort in your chest?  3. NO - Do you have a history of  Heart Failure?  4. NO - Are you troubled by shortness of breath when: walking on the level, up a slight hill or at night?  5. NO - Do you currently have a cold, bronchitis or other respiratory infection?  6. NO - Do you have a cough, shortness of breath or wheezing?  7. NO - Do you sometimes get pains in the calves of your legs when you walk?  8. NO - Do you or anyone in your family have previous history of blood clots?  9. NO - Do you or does anyone in your family have a serious bleeding problem such as prolonged bleeding following surgeries or cuts?  10.YES - Have you ever had problems with anemia or been told to take iron pills? With leukemia treatment  11. NO - Have you had any abnormal blood loss such as black, tarry or bloody stools, or abnormal vaginal bleeding?  12. YES - Have you ever had a blood transfusion? With the treatment  13. NO - Have you or any of your relatives ever had problems with anesthesia?  14. YES - Do you have  sleep apnea, excessive snoring or daytime drowsiness? Snoring and daytime drowsiness  15. NO - Do you have any prosthetic heart valves?  16. NO - Do you have prosthetic joints?  17. NO - Is there any chance that you may be pregnant?        HPI:                                                      Brief HPI related to upcoming procedure: Several weeks ago she developed some swelling associated with pain in the left lower abdomen and groin region. After evaluation it was determined this was an inguinal hernia. She has been seen by general surgery who recommends the above procedure      See problem list for active medical problems.  Problems all longstanding and stable, except as noted/documented.  See ROS for pertinent symptoms related to these conditions.                                                                                                  .    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     left kidney removed 1/01 February 21, 2005 no evid of recurrence on CT chest and abdomen           Advanced directives, counseling/discussion 02/12/2016     Priority: Medium     Patient does not have an Advance/Health Care Directive (HCD), declines information/referral.    Gracie Seo  February 12, 2016         Hyperlipidemia LDL goal <160 10/01/2015     Priority: Medium     Health Care Home 09/02/2011     Priority: Medium     X  09/02/2011 Unable to Contact  Rosemarie Lorenzo RN [facility]    DX V65.8 REPLACED WITH [id number] HEALTH CARE HOME (04/08/2013)       Ovarian cyst 01/11/2010     Priority: Medium     Sensorineural hearing loss, asymmetrical 11/02/2007      Priority: Medium     External hemorrhoids with other complication 07/19/2006     Priority: Medium     Allergic rhinitis 02/21/2005     Priority: Medium     Rhinitis          Past Medical History:   Diagnosis Date     Ac lym leuk wo achv rmsn 1976    ALL at age 16,  no evid of recurrence     Allergic rhinitis, cause unspecified     Rhinitis     Malignant neoplasm of kidney, except pelvis 2001    left kidney removed 1/01     Past Surgical History:   Procedure Laterality Date     ARTHROSCOPY KNEE WITH MEDIAL MENISCECTOMY  7/7/2011    Procedure:ARTHROSCOPY KNEE WITH MEDIAL MENISCECTOMY; choice anes; Surgeon:MANUEL FLEMING; Location:[place] OR     HEMORRHOIDECTOMY  2006     NEPHRECTOMY RT/LT  2001    left partial nephrectomy- kidney ca     SALPINGO OOPHORECTOMY,R/L/KRISHNA  2010    Rt salpingo oophorectomy      SURGICAL HISTORY OF -   8/1/1995    Vein stripping     SURGICAL HISTORY OF -   1975    wisdom teeth extraction      TONSILLECTOMY & ADENOIDECTOMY  1962     TUBAL LIGATION  1994     Current Outpatient Prescriptions   Medication Sig Dispense Refill     acetaminophen (TYLENOL) 325 MG tablet Take 1 tablet by mouth every 6 hours as needed.       ibuprofen (ADVIL,MOTRIN) 600 MG tablet Take 1 tablet by mouth every 6 hours as needed for pain (For mild pain and temperature greater than 102F). 30 tablet 0     LYSINE OR AS NEEDED       VITAMIN D 400 UNIT OR TABS 1 daily in winter       WOMENS DAILY MULTIVITAMIN OR TABS 1 TABLET DAILY       CALCIUM 600 600 MG OR TABS 1 daily       OTC products: None, except as noted above    Allergies   Allergen Reactions     Seasonal Allergies       Latex Allergy: NO    Social History   Substance Use Topics     Smoking status: Never Smoker     Smokeless tobacco: Never Used     Alcohol use No     History   Drug Use No       REVIEW OF SYSTEMS:                                                    C: NEGATIVE for fever, chills, change in weight  EYES: NEGATIVE for vision changes or irritation  E/M:  "NEGATIVE for ear, mouth and throat problems  R: NEGATIVE for significant cough or SOB  CV: NEGATIVE for chest pain, palpitations or peripheral edema  GI: NEGATIVE for nausea, heartburn, or change in bowel habits; positive for some discomfort in the region of the hernia  : negative for, dysuria, frequency, and hematuria    EXAM:                                                    /60 (BP Location: Right arm, Patient Position: Chair, Cuff Size: Adult Regular)  Pulse 68  Ht 5' 4\" (1.626 m)  Wt 158 lb 12.8 oz (72 kg)  LMP 12/05/2010  BMI 27.26 kg/m2    GENERAL APPEARANCE: healthy, alert and no distress     EYES: EOMI, PERRL     HENT: ear canals and TM's normal and nose and mouth without ulcers or lesions     NECK: no adenopathy, no asymmetry, masses, or scars and thyroid normal to palpation     RESP: lungs clear to auscultation - no rales, rhonchi or wheezes     CV: regular rates and rhythm, normal S1 S2, no S3 or S4 and no murmur, click or rub     ABDOMEN: no organomegaly and tender left lower quadrant and inguinal area, no guarding rigidity or rebound and no mass     MS: extremities normal- no gross deformities noted, no evidence of inflammation in joints, FROM in all extremities.     SKIN: no suspicious lesions or rashes     NEURO: Normal strength and tone, sensory exam grossly normal, mentation intact and speech normal     PSYCH: mentation appears normal. and affect normal/bright    DIAGNOSTICS:                                                    EKG: Not indicated due to non-vascular surgery and low risk of event (age <65 and without cardiac risk factors). She had an EKG in 9/15 in the ED which was unremarkable    Recent Labs   Lab Test  04/28/17   1531  02/12/16   0824  09/28/15   0545   HGB  12.9  13.5  13.9   PLT  207  214  203   NA   --   141  143   POTASSIUM   --   4.0  3.8   CR   --   0.59  0.58        IMPRESSION:                                                    Reason for surgery/procedure: " Symptomatic left inguinal hernia  Diagnosis/reason for consult: Evaluate for anesthesia risk     The proposed surgical procedure is considered LOW risk.    REVISED CARDIAC RISK INDEX  The patient has the following serious cardiovascular risks for perioperative complications such as (MI, PE, VFib and 3  AV Block):  No serious cardiac risks  INTERPRETATION: 0 risks: Class I (very low risk - 0.4% complication rate)    The patient has the following additional risks for perioperative complications:  No identified additional risks      ICD-10-CM    1. Preop general physical exam Z01.818        RECOMMENDATIONS:                                                          --Patient is to take all scheduled medications on the day of surgery EXCEPT for modifications listed below.    Anticoagulant or Antiplatelet Medication Use  NSAIDS: Ibuprofen (Motrin):         Stop 4 days prior to surgery          APPROVAL GIVEN to proceed with proposed procedure, without further diagnostic evaluation       Signed Electronically by: KINA El MD    Copy of this evaluation report is provided to requesting physician.    [facility] Preop Guidelines

## 2017-08-21 ENCOUNTER — RADIANT APPOINTMENT (OUTPATIENT)
Dept: GENERAL RADIOLOGY | Facility: CLINIC | Age: 57
End: 2017-08-21
Attending: ORTHOPAEDIC SURGERY
Payer: COMMERCIAL

## 2017-08-21 ENCOUNTER — OFFICE VISIT (OUTPATIENT)
Dept: ORTHOPEDICS | Facility: CLINIC | Age: 57
End: 2017-08-21
Payer: COMMERCIAL

## 2017-08-21 VITALS — RESPIRATION RATE: 12 BRPM | HEIGHT: 64 IN | WEIGHT: 156 LBS | BODY MASS INDEX: 26.63 KG/M2

## 2017-08-21 DIAGNOSIS — S62.367A CLOSED NONDISPLACED FRACTURE OF NECK OF FIFTH METACARPAL BONE OF LEFT HAND, INITIAL ENCOUNTER: Primary | ICD-10-CM

## 2017-08-21 DIAGNOSIS — S62.307A CLOSED DISPLACED FRACTURE OF FIFTH METACARPAL BONE OF LEFT HAND, UNSPECIFIED PORTION OF METACARPAL, INITIAL ENCOUNTER: ICD-10-CM

## 2017-08-21 PROCEDURE — 73130 X-RAY EXAM OF HAND: CPT | Mod: LT

## 2017-08-21 PROCEDURE — 26600 TREAT METACARPAL FRACTURE: CPT | Mod: F4 | Performed by: ORTHOPAEDIC SURGERY

## 2017-08-21 NOTE — NURSING NOTE
"Chief Complaint   Patient presents with     Consult     Referred by [facility] in [place] for a left 5th metacarpal fracture. Date of injury 8/15/17.        Initial Resp 12  Ht 1.626 m (5' 4\")  Wt 70.8 kg (156 lb)  LMP 12/05/2010  BMI 26.78 kg/m2 Estimated body mass index is 26.78 kg/(m^2) as calculated from the following:    Height as of this encounter: 1.626 m (5' 4\").    Weight as of this encounter: 70.8 kg (156 lb).  Medication Reconciliation: complete     Heath Marquez PA-C  Supervising physician: Carlin Shipley MD  Dept. of Orthopedics  [facility]          "

## 2017-08-21 NOTE — MR AVS SNAPSHOT
After Visit Summary   2017    Lydia Pierson    MRN: [id number]           Patient Information     Date Of Birth          1960        Visit Information        Provider Department      2017 9:00 AM Carlin Shipley MD [facility]        Today's Diagnoses     Closed displaced fracture of fifth metacarpal bone of left hand, unspecified portion of metacarpal, initial encounter    -  1      Care Instructions    Cast care instructions given to patient today includin.  Keep cast clean and dry:  When showering/bathing use plastic bag or other impervious barrier to keep cast dry.  Moisture from sweat is normal.  The more clean and dry you keep your cast the less it will itch and the less it will smell.  If the cast does get soaked, you can call to request a cast change, but realize it may be 1-2 days before the doctor's office can do a cast change.  You can also use a hair dryer on the low setting, but this will take several hours to dry completely.  If itching occurs, do not stick anything down the cast, as this may result in skin breakdown and infection.  You may use a can of compressed air (with no additives) to relieve itching.    2.  Elevate extremity / affected area to reduce swelling:  The cast will not expand and contract the same way the splint did, therefore it will be especially important to elevate the injured area above heart level to reduce swelling, especially during the next 24-48 hours.  If swelling continues and produces tingling and numbness that is not relieved by elevation, schedule an appointment with Dr. Carlin Shipley's office, ER, or Urgent care to get cast adjusted.    3.  Make it comfortable:  Since the fiberglass on your cast may fray during application, feel free to make minor modifications to your cast to reduce the amount of irritation to your skin.  This is especially important around the thumb area of the cast.  It may be helpful to  "use a nail file or small shop file to smooth sharp areas on the cast.  You may also find it helpful to pad the base of the thumb with a band-aid or piece of tape.  You may not cut large sections off of your cast.   ** For additional questions call [phone]  **  ** PATIENT VERBALIZED UNDERSTANDING OF ABOVE INSTRUCTIONS  **    Return to clinic 3-5 weeks.          Follow-ups after your visit        Who to contact     If you have questions or need follow up information about today's clinic visit or your schedule please contact [facility] directly at [phone].  Normal or non-critical lab and imaging results will be communicated to you by [facility] EDIhart, letter or phone within 4 business days after the clinic has received the results. If you do not hear from us within 7 days, please contact the clinic through PacketFrontt or phone. If you have a critical or abnormal lab result, we will notify you by phone as soon as possible.  Submit refill requests through [facility] or call your pharmacy and they will forward the refill request to us. Please allow 3 business days for your refill to be completed.          Additional Information About Your Visit        [facility] EDIhar[facility]ktalog[facility] Information     [facility]er[facility] lets you send messages to your doctor, view your test results, renew your prescriptions, schedule appointments and more. To sign up, go to [email or web address] . Click on \"Log in\" on the left side of the screen, which will take you to the Welcome page. Then click on \"Sign up Now\" on the right side of the page.     You will be asked to enter the access code listed below, as well as some personal information. Please follow the directions to create your username and password.     Your access code is: [email or web address]  Expires: 2017  4:33 PM     Your access code will  in 90 days. If you need help or a new code, please call your [facility] or [phone].        Care EveryWhere ID     This is your Care EveryWhere ID. This " "could be used by other organizations to access your [place] medical records  [id number]        Your Vitals Were     Respirations Height Last Period BMI (Body Mass Index)          12 1.626 m (5' 4\") 12/05/2010 26.78 kg/m2         Blood Pressure from Last 3 Encounters:   08/17/17 (P) 133/73   08/15/17 147/89   07/24/17 116/60    Weight from Last 3 Encounters:   08/21/17 70.8 kg (156 lb)   08/17/17 71.2 kg (157 lb)   07/24/17 72 kg (158 lb 12.8 oz)               Primary Care Provider Office Phone # Fax #    Benja Liz Cordova [phone] [address] [facility] [address]        Equal Access to Services     BREANNE MANCIA : Hadii juan luis flanagan hadasho Soomaali, waaxda luqadaha, qaybta kaalmada adeegyada, alek cronin . So [facility] [phone].    ATENCIÓN: Si habla español, tiene a olmedo disposición servicios gratuitos de asistencia lingüística. Josef al [phone].    We comply with applicable federal civil rights laws and [place] laws. We do not discriminate on the basis of race, color, national origin, age, disability sex, sexual orientation or gender identity.            Thank you!     Thank you for choosing [facility] [place]  for your care. Our goal is always to provide you with excellent care. Hearing back from our patients is one way we can continue to improve our services. Please take a few minutes to complete the written survey that you may receive in the mail after your visit with us. Thank you!             Your Updated Medication List - Protect others around you: Learn how to safely use, store and throw away your medicines at www.disposemymeds.org.          This list is accurate as of: 8/21/17 10:32 AM.  Always use your most recent med list.                   Brand Name Dispense Instructions for use Diagnosis    calcium carbonate 600 MG tablet   Generic drug:  calcium carbonate      1 daily    OTC -PATIENT CHOICE       HYDROcodone-acetaminophen " 5-325 MG per tablet    NORCO    30 tablet    Take 1-2 tablets by mouth every 4 hours as needed for other (Moderate to Severe Pain)    Post-op pain       ibuprofen 600 MG tablet    ADVIL/MOTRIN    30 tablet    Take 1 tablet by mouth every 6 hours as needed for pain (For mild pain and temperature greater than 102F).    Tear of meniscus of left knee       LYSINE PO      AS NEEDED        TYLENOL 325 MG tablet   Generic drug:  acetaminophen      Take 1 tablet by mouth every 6 hours as needed.        vitamin D 400 UNITS tablet      1 daily in winter        WOMENS DAILY MULTIVITAMIN Tabs      1 TABLET DAILY    OTC -PATIENT CHOICE

## 2017-08-21 NOTE — LETTER
WORKABILITY    [facility], Dr. Carlin Shipley M.D.  [facility]        8/21/2017      RE: Lydia Pierson    [address] [address]        To whom it may concern:     Lydia Pierson is under my care for   1. Closed displaced fracture of fifth metacarpal bone of left hand, unspecified portion of metacarpal, initial encounter        Work related injury: Yes Date of injury: 8/15/17      Return to work date: 9/22/17     [employer] (Date): unknown    Next appointment: 1 month          Carlin Shipley M.D.

## 2017-08-21 NOTE — LETTER
WORKABILITY    [facility], Dr. Carlin Shipley M.D.  [facility]        8/21/2017      RE: Lydia Pierson    [address] [place] [address]        To whom it may concern:     Lydia Pierson is under my care for   1. Closed displaced fracture of fifth metacarpal bone of left hand, unspecified portion of metacarpal, initial encounter      Date of injury: 8/15/17     Return to work date: 9/22/17     [employer] (Date): unknown    Next appointment: 1 month          Carlin Shipley M.D.              F

## 2017-08-21 NOTE — PATIENT INSTRUCTIONS
Cast care instructions given to patient today includin.  Keep cast clean and dry:  When showering/bathing use plastic bag or other impervious barrier to keep cast dry.  Moisture from sweat is normal.  The more clean and dry you keep your cast the less it will itch and the less it will smell.  If the cast does get soaked, you can call to request a cast change, but realize it may be 1-2 days before the doctor's office can do a cast change.  You can also use a hair dryer on the low setting, but this will take several hours to dry completely.  If itching occurs, do not stick anything down the cast, as this may result in skin breakdown and infection.  You may use a can of compressed air (with no additives) to relieve itching.    2.  Elevate extremity / affected area to reduce swelling:  The cast will not expand and contract the same way the splint did, therefore it will be especially important to elevate the injured area above heart level to reduce swelling, especially during the next 24-48 hours.  If swelling continues and produces tingling and numbness that is not relieved by elevation, schedule an appointment with Dr. Carlin Shipley's office, ER, or Urgent care to get cast adjusted.    3.  Make it comfortable:  Since the fiberglass on your cast may fray during application, feel free to make minor modifications to your cast to reduce the amount of irritation to your skin.  This is especially important around the thumb area of the cast.  It may be helpful to use a nail file or small shop file to smooth sharp areas on the cast.  You may also find it helpful to pad the base of the thumb with a band-aid or piece of tape.  You may not cut large sections off of your cast.   ** For additional questions call [phone]  **  ** PATIENT VERBALIZED UNDERSTANDING OF ABOVE INSTRUCTIONS  **    Return to clinic 3-5 weeks.

## 2017-08-21 NOTE — PROGRESS NOTES
SUBJECTIVE:  Lydia Pierson is a 57 year old female who sustained a left hand injury 8/15/17 . Mechanism of injury: She fell on the sidewalk. Immediate symptoms: immediate pain, immediate swelling. She was seen in emergency room with a ulnar gutter splint placed.  Symptoms have been improving since that time. Prior history of related problems: no prior problems with this area in the past.  She has a daughter's wedding coming up this weekend.    Past Medical History:   Diagnosis Date     Ac lym leuk wo achv rmsn 1976    ALL at age 16,  no evid of recurrence     Allergic rhinitis, cause unspecified     Rhinitis     Malignant neoplasm of kidney, except pelvis 2001    left kidney removed 1/01     Motion sickness        Past Surgical History:   Procedure Laterality Date     ARTHROSCOPY KNEE WITH MEDIAL MENISCECTOMY  7/7/2011    Procedure:ARTHROSCOPY KNEE WITH MEDIAL MENISCECTOMY; choice anes; Surgeon:MANUEL FLEMING; Location:[place]     HEMORRHOIDECTOMY  2006     HERNIORRHAPHY INGUINAL Left 8/17/2017    Procedure: HERNIORRHAPHY INGUINAL;  Left Inguinal Hernia Repair;  Surgeon: Mehdi Sorensen MD;  Location: [facility]     NEPHRECTOMY RT/LT  2001    left partial nephrectomy- kidney ca     SALPINGO OOPHORECTOMY,R/L/KRISHNA  2010    Rt salpingo oophorectomy      SURGICAL HISTORY OF -   8/1/1995    Vein stripping     SURGICAL HISTORY OF -   1975    wisdom teeth extraction      TONSILLECTOMY & ADENOIDECTOMY  1962     TUBAL LIGATION  1994       Family History   Problem Relation Age of Onset     C.A.D. Father      bypass     DIABETES Father      Lipids Father      Allergies Sister      allergic rhinitis     Neurologic Disorder Sister      seizure disorder     Respiratory Sister      asthma     Allergies Son      Breast Cancer No family hx of      Cancer - colorectal No family hx of        Social History     Social History     Marital status:      Spouse name: N/A     Number of children: N/A     Years of education:  N/A     Occupational History     Not on file.     Social History Main Topics     Smoking status: Never Smoker     Smokeless tobacco: Never Used     Alcohol use No     Drug use: No     Sexual activity: Yes     Partners: Male     Birth control/ protection: Surgical      Comment: tubal     Other Topics Concern     Parent/Sibling W/ Cabg, Mi Or Angioplasty Before 65f 55m? No     Social History Narrative       Current Outpatient Prescriptions   Medication Sig Dispense Refill     HYDROcodone-acetaminophen (NORCO) 5-325 MG per tablet Take 1-2 tablets by mouth every 4 hours as needed for other (Moderate to Severe Pain) 30 tablet 0     acetaminophen (TYLENOL) 325 MG tablet Take 1 tablet by mouth every 6 hours as needed.       ibuprofen (ADVIL,MOTRIN) 600 MG tablet Take 1 tablet by mouth every 6 hours as needed for pain (For mild pain and temperature greater than 102F). 30 tablet 0     LYSINE OR AS NEEDED       VITAMIN D 400 UNIT OR TABS 1 daily in winter       WOMENS DAILY MULTIVITAMIN OR TABS 1 TABLET DAILY       CALCIUM 600 600 MG OR TABS 1 daily         Allergies   Allergen Reactions     Seasonal Allergies        REVIEW OF SYSTEMS:  CONSTITUTIONAL:  NEGATIVE for fever, chills, change in weight, not feeling tired  SKIN:  NEGATIVE for worrisome rashes, no skin lumps, no skin ulcers and no non-healing wounds  EYES:  NEGATIVE for vision changes or irritation.  ENT/MOUTH:  NEGATIVE.  No hearing loss, no hoarseness, no difficulty swallowing.  RESP:  NEGATIVE. No cough or shortness of breath.  BREAST:  NEGATIVE for masses, tenderness or discharge  CV:  NEGATIVE for chest pain, palpitations or peripheral edema  GI:  NEGATIVE for nausea, abdominal pain, heartburn, or change in bowel habits  :  Negative. No dysuria, no hematuria  MUSCULOSKELETAL:  See HPI above  NEURO:  NEGATIVE . No headaches, no dizziness,  no numbness  ENDOCRINE:  NEGATIVE for temperature intolerance, skin/hair changes  HEME/ALLERGY/IMMUNE:  NEGATIVE for  "bleeding problems  PSYCHIATRIC:  NEGATIVE. no anxiety, no depression.      Exam:  Vitals: Resp 12  Ht 1.626 m (5' 4\")  Wt 70.8 kg (156 lb)  LMP 12/05/2010  BMI 26.78 kg/m2  BMI= Body mass index is 26.78 kg/(m^2).  Constitutional:  healthy, alert and no distress  Neuro: Alert and Oriented x 3, Gait normal. Sensation grossly WNL.  Hand exam: soft tissue tenderness and swelling at the left fifth metacarpal neck, reduced range of motion of left fourth and fifth fingers.,  No deformity of fifth metacarpal.    X-ray: fracture of left fifth metacarpal with slight dorsal impaction.    ASSESSMENT:  hand fracture left fifth metacarpal in good position    PLAN:  Short arm cast placed in neutral position with fourth and fifth fingers incorporated.  The clinic 3-5 weeks with cast off and x-ray of the left hand      Carlin Shipley M.D.  Department of Orthopaedic Surgery  [facility]  "

## 2017-08-21 NOTE — LETTER
8/21/2017         RE: Lydia Pierson  [address] [facility] [address] [facility] [address]        Dear Colleague,    Thank you for referring your patient, Lydia Pierson, to the [facility]. Please see a copy of my visit note below.    SUBJECTIVE:  Lydia Pierson is a 57 year old female who sustained a left hand injury 8/15/17 . Mechanism of injury: She fell on the sidewalk. Immediate symptoms: immediate pain, immediate swelling. She was seen in emergency room with a ulnar gutter splint placed.  Symptoms have been improving since that time. Prior history of related problems: no prior problems with this area in the past.  She has a daughter's wedding coming up this weekend.    Past Medical History:   Diagnosis Date     Ac lym leuk wo achv rmsn 1976    ALL at age 16,  no evid of recurrence     Allergic rhinitis, cause unspecified     Rhinitis     Malignant neoplasm of kidney, except pelvis 2001    left kidney removed 1/01     Motion sickness        Past Surgical History:   Procedure Laterality Date     ARTHROSCOPY KNEE WITH MEDIAL MENISCECTOMY  7/7/2011    Procedure:ARTHROSCOPY KNEE WITH MEDIAL MENISCECTOMY; choice anes; Surgeon:MANUEL FLEMING; Location:[place]     HEMORRHOIDECTOMY  2006     HERNIORRHAPHY INGUINAL Left 8/17/2017    Procedure: HERNIORRHAPHY INGUINAL;  Left Inguinal Hernia Repair;  Surgeon: Mehdi Sorensen MD;  Location: [facility] [place]     NEPHRECTOMY RT/LT  2001    left partial nephrectomy- kidney ca     SALPINGO OOPHORECTOMY,R/L/KRISHNA  2010    Rt salpingo oophorectomy      SURGICAL HISTORY OF -   8/1/1995    Vein stripping     SURGICAL HISTORY OF -   1975    wisdom teeth extraction      TONSILLECTOMY & ADENOIDECTOMY  1962     TUBAL LIGATION  1994       Family History   Problem Relation Age of Onset     C.A.D. Father      bypass     DIABETES Father      Lipids Father      Allergies Sister      allergic rhinitis     Neurologic Disorder Sister      seizure disorder     Respiratory  Sister      asthma     Allergies Son      Breast Cancer No family hx of      Cancer - colorectal No family hx of        Social History     Social History     Marital status:      Spouse name: N/A     Number of children: N/A     Years of education: N/A     Occupational History     Not on file.     Social History Main Topics     Smoking status: Never Smoker     Smokeless tobacco: Never Used     Alcohol use No     Drug use: No     Sexual activity: Yes     Partners: Male     Birth control/ protection: Surgical      Comment: tubal     Other Topics Concern     Parent/Sibling W/ Cabg, Mi Or Angioplasty Before 65f 55m? No     Social History Narrative       Current Outpatient Prescriptions   Medication Sig Dispense Refill     HYDROcodone-acetaminophen (NORCO) 5-325 MG per tablet Take 1-2 tablets by mouth every 4 hours as needed for other (Moderate to Severe Pain) 30 tablet 0     acetaminophen (TYLENOL) 325 MG tablet Take 1 tablet by mouth every 6 hours as needed.       ibuprofen (ADVIL,MOTRIN) 600 MG tablet Take 1 tablet by mouth every 6 hours as needed for pain (For mild pain and temperature greater than 102F). 30 tablet 0     LYSINE OR AS NEEDED       VITAMIN D 400 UNIT OR TABS 1 daily in winter       WOMENS DAILY MULTIVITAMIN OR TABS 1 TABLET DAILY       CALCIUM 600 600 MG OR TABS 1 daily         Allergies   Allergen Reactions     Seasonal Allergies        REVIEW OF SYSTEMS:  CONSTITUTIONAL:  NEGATIVE for fever, chills, change in weight, not feeling tired  SKIN:  NEGATIVE for worrisome rashes, no skin lumps, no skin ulcers and no non-healing wounds  EYES:  NEGATIVE for vision changes or irritation.  ENT/MOUTH:  NEGATIVE.  No hearing loss, no hoarseness, no difficulty swallowing.  RESP:  NEGATIVE. No cough or shortness of breath.  BREAST:  NEGATIVE for masses, tenderness or discharge  CV:  NEGATIVE for chest pain, palpitations or peripheral edema  GI:  NEGATIVE for nausea, abdominal pain, heartburn, or change in  "bowel habits  :  Negative. No dysuria, no hematuria  MUSCULOSKELETAL:  See HPI above  NEURO:  NEGATIVE . No headaches, no dizziness,  no numbness  ENDOCRINE:  NEGATIVE for temperature intolerance, skin/hair changes  HEME/ALLERGY/IMMUNE:  NEGATIVE for bleeding problems  PSYCHIATRIC:  NEGATIVE. no anxiety, no depression.      Exam:  Vitals: Resp 12  Ht 1.626 m (5' 4\")  Wt 70.8 kg (156 lb)  LMP 12/05/2010  BMI 26.78 kg/m2  BMI= Body mass index is 26.78 kg/(m^2).  Constitutional:  healthy, alert and no distress  Neuro: Alert and Oriented x 3, Gait normal. Sensation grossly WNL.  Hand exam: soft tissue tenderness and swelling at the left fifth metacarpal neck, reduced range of motion of left fourth and fifth fingers.,  No deformity of fifth metacarpal.    X-ray: fracture of left fifth metacarpal with slight dorsal impaction.    ASSESSMENT:  hand fracture left fifth metacarpal in good position    PLAN:  Short arm cast placed in neutral position with fourth and fifth fingers incorporated.  The clinic 3-5 weeks with cast off and x-ray of the left hand      Carlin Shipley M.D.  Department of Orthopaedic Surgery  [facility]    Again, thank you for allowing me to participate in the care of your patient.        Sincerely,        Carlin Shipley MD    "

## 2017-08-24 NOTE — OP NOTE
DATE OF PROCEDURE:  2017      PREOPERATIVE DIAGNOSIS:  Right inguinal hernia.      POSTOPERATIVE DIAGNOSIS:  Right inguinal hernia.      PROCEDURE:  Right inguinal hernia repair.      ANESTHESIA:  General.      SURGEON:  Mehdi Fall MD      INDICATIONS FOR SURGERY:  Lydia Pierson is a 57-year-old woman who has a symptomatic right inguinal hernia.  I discussed repairing this with her in conjunction with her OB/GYN procedure, risks and benefits were explained.  She seemed to understand and was agreeable to proceed.        DESCRIPTION OF PROCEDURE:  The patient was under general anesthesia and after her Gynecology procedure, the patient was already prepped and draped.  Marcaine 0.25% was used to infiltrate over the right inguinal area and right ilioinguinal nerve block was performed.  An incision was made in the groin and carried down to the external oblique fascia.  The external oblique fascia was opened.  The hernia sac and round ligament was removed from the pubic bone and mobilized free from the surrounding tissue and reduced intraabdominally through the internal ring.  The floor was closed using pop-off #1 Ethibond stitch approximating the inguinal ligament laterally and transversalis muscle and fascia medially.  The external oblique was then closed with a running 2-0 Vicryl stitch.  The subcutaneous tissue was infiltrated 0.25% Marcaine with epinephrine and closed with interrupted 3-0 Vicryl stitch, and the skin was closed with 4-0 subcuticular Monocryl stitch.  The patient tolerated the procedure well and she went to PACU in stable condition.  Estimated blood loss was minimal.         MEHDI FLAL MD             D: 2017 12:36   T: 2017 14:55   MT: #[id number]      Name:     LYDIA PIERSON   MRN:      [id number]        Account:        [id number]   :      1960           Procedure Date: 2017      Document: [id number]

## 2017-08-29 NOTE — OP NOTE
DATE OF PROCEDURE:  08/17/2017.      PREOPERATIVE DIAGNOSIS:  Left inguinal hernia.        POSTOPERATIVE DIAGNOSIS:  Left inguinal hernia.      PROCEDURE:  Left inguinal hernia repair.      ANESTHESIA:  Local, IV sedation.      SURGEON:  Mehdi Fall MD      INDICATIONS FOR SURGERY:  Lydia Pierson is a 57-year-old woman with a symptomatic left inguinal hernia.  I discussed repairing this with her under anesthesia.  Risks and benefits were explained.  She seemed to understand and was agreeable to proceed.      DESCRIPTION OF PROCEDURE:  Under local IV sedation, the patient's left groin was prepped and draped in the usual manner.  Marcaine 0.25% with epinephrine was used to infiltrate over the incision site and an ilioinguinal nerve block was performed.  An incision was made in the groin and carried down to the external oblique fascia with electrocautery.  The external oblique fascia was infiltrated with 0.25% Marcaine with epinephrine and opened with Metzenbaums.  Upon opening this fascia, she had fatty content in her floor.  This fatty content was mobilized free from the pubic bone and mobilized free circumferentially on the floor and was reduced intra-abdominally through the internal ring.  The floor was then reapproximated using #1 pop-off Ethibond stitch, stitching the transversalis muscle and fascia medially to the inguinal ligament laterally.  This was closed completely down to the internal ring.  The external oblique was then closed with a running 2-0 Vicryl stitch.  The subcutaneous tissue was infiltrated with 0.25% Marcaine and closed with interrupted 3-0 Vicryl stitch and the skin was closed with 4-0 subcuticular Monocryl stitch.  The patient tolerated the procedure well.  She went to PACU in stable condition.  Estimated blood loss was minimal.         MEHDI FALL MD             D: 08/29/2017 08:38   T: 08/29/2017 09:07   MT: TS      Name:     LYDIA PIERSON   MRN:      [id number]         Account:        [id number]   :      1960           Procedure Date: 2017      Document: [id number]       cc: Mehdi Sorensen MD

## 2017-09-07 ENCOUNTER — OFFICE VISIT (OUTPATIENT)
Dept: SURGERY | Facility: CLINIC | Age: 57
End: 2017-09-07
Payer: COMMERCIAL

## 2017-09-07 VITALS
HEIGHT: 64 IN | DIASTOLIC BLOOD PRESSURE: 68 MMHG | TEMPERATURE: 98.1 F | WEIGHT: 156 LBS | HEART RATE: 82 BPM | BODY MASS INDEX: 26.63 KG/M2 | SYSTOLIC BLOOD PRESSURE: 120 MMHG

## 2017-09-07 DIAGNOSIS — Z09 SURGERY FOLLOW-UP EXAMINATION: Primary | ICD-10-CM

## 2017-09-07 PROCEDURE — 99024 POSTOP FOLLOW-UP VISIT: CPT | Performed by: SURGERY

## 2017-09-07 NOTE — MR AVS SNAPSHOT
"              After Visit Summary   9/7/2017    Lydia Pierson    MRN: [id number]           Patient Information     Date Of Birth          1960        Visit Information        Provider Department      9/7/2017 2:15 PM Mehdi Sorensen MD [facility]        Today's Diagnoses     Surgery follow-up examination    -  1      Care Instructions    Per Dr. Sorensen's instructions          Follow-ups after your visit        Your next 10 appointments already scheduled     Sep 21, 2017  4:00 PM CDT   Return Visit with Carlin Shipley MD   [facility]    [address] [place] [id number] [phone]              Who to contact     If you have questions or need follow up information about today's clinic visit or your schedule please contact [facility] directly at [phone].  Normal or non-critical lab and imaging results will be communicated to you by MyChart, letter or phone within 4 business days after the clinic has received the results. If you do not hear from us within 7 days, please contact the clinic through MyChart or phone. If you have a critical or abnormal lab result, we will notify you by phone as soon as possible.  Submit refill requests through [employer] or call your pharmacy and they will forward the refill request to us. Please allow 3 business days for your refill to be completed.          Additional Information About Your Visit        MyChart Information     [employer] lets you send messages to your doctor, view your test results, renew your prescriptions, schedule appointments and more. To sign up, go to [email or web address] . Click on \"Log in\" on the left side of the screen, which will take you to the Welcome page. Then click on \"Sign up Now\" on the right side of the page.     You will be asked to enter the access code listed below, as well as some personal information. Please follow the directions to create " "your username and password.     Your access code is: [email or web address]  Expires: 2017  4:33 PM     Your access code will  in 90 days. If you need help or a new code, please call your [place] clinic or [phone].        Care EveryWhere ID     This is your Care EveryWhere ID. This could be used by other organizations to access your [place] medical records  [id number]        Your Vitals Were     Pulse Temperature Height Last Period BMI (Body Mass Index)       82 98.1  F (36.7  C) (Oral) 1.626 m (5' 4\") 2010 26.78 kg/m2        Blood Pressure from Last 3 Encounters:   17 120/68   17 (P) 133/73   08/15/17 147/89    Weight from Last 3 Encounters:   17 70.8 kg (156 lb)   17 70.8 kg (156 lb)   17 71.2 kg (157 lb)              Today, you had the following     No orders found for display       Primary Care Provider Office Phone # Fax #    Benja Liz [place] [phone] [address] [facility] [address]        Equal Access to Services     BREANNE MANCIA AH: Hadii aad ku hadasho Soomaali, waaxda luqadaha, qaybta kaalmada adeegyada, waxay jeanettein hayjyothi valencia. So [facility] [phone].    ATENCIÓN: Si habla español, tiene a olmedo disposición servicios gratuitos de asistencia lingüística. Lldarlene al [phone].    We comply with applicable federal civil rights laws and [place] laws. We do not discriminate on the basis of race, color, national origin, age, disability sex, sexual orientation or gender identity.            Thank you!     Thank you for choosing [facility]  for your care. Our goal is always to provide you with excellent care. Hearing back from our patients is one way we can continue to improve our services. Please take a few minutes to complete the written survey that you may receive in the mail after your visit with us. Thank you!             Your Updated Medication List - Protect others around you: Learn how to " safely use, store and throw away your medicines at www.disposemymeds.org.          This list is accurate as of: 9/7/17  4:42 PM.  Always use your most recent med list.                   Brand Name Dispense Instructions for use Diagnosis    calcium carbonate 600 MG tablet   Generic drug:  calcium carbonate      1 daily    OTC -PATIENT CHOICE       HYDROcodone-acetaminophen 5-325 MG per tablet    NORCO    30 tablet    Take 1-2 tablets by mouth every 4 hours as needed for other (Moderate to Severe Pain)    Post-op pain       ibuprofen 600 MG tablet    ADVIL/MOTRIN    30 tablet    Take 1 tablet by mouth every 6 hours as needed for pain (For mild pain and temperature greater than 102F).    Tear of meniscus of left knee       LYSINE PO      AS NEEDED        TYLENOL 325 MG tablet   Generic drug:  acetaminophen      Take 1 tablet by mouth every 6 hours as needed.        vitamin D 400 UNITS tablet      1 daily in winter        WOMENS DAILY MULTIVITAMIN Tabs      1 TABLET DAILY    OTC -PATIENT CHOICE

## 2017-09-07 NOTE — LETTER
[facility]  [address] [address]  Phone: [phone]      September 7, 2017      RE: Lydia Pierson  [address] [facility] [address]        To whom it may concern:    Lydia Pierson is under my professional care. The employee is UNABLE to return to work until September 25 th.    When the patient returns to work, the following restrictions apply until October 23 rd:  A) Bend: Occasionally (1-3 hours)  B) Squat: Occasionally (1-3 hours)  C) Walk/Stand: Occasionally (1-3 hours)  D) Reach Above Shoulders: Occasionally (1-3 hours)  E) Lift, carry, push, and pull no more than:  11-20 lbs.No working or lifting restrictions on or about October 23 rd.      Sincerely,        Augusto Sorensen MD

## 2017-09-07 NOTE — NURSING NOTE
"Initial /68 (BP Location: Right arm, Patient Position: Chair, Cuff Size: Adult Regular)  Pulse 82  Temp 98.1  F (36.7  C) (Oral)  Ht 1.626 m (5' 4\")  Wt 70.8 kg (156 lb)  LMP 12/05/2010  BMI 26.78 kg/m2 Estimated body mass index is 26.78 kg/(m^2) as calculated from the following:    Height as of this encounter: 1.626 m (5' 4\").    Weight as of this encounter: 70.8 kg (156 lb). .    Kaity Benson MA    "

## 2017-09-07 NOTE — PROGRESS NOTES
Pt returns to see me for a post op visit.  She had a left inguinal hernia repaired.  She is doing well.  She has some incisional pain but is only taking over the counter medications.  She is eating and having BM's.    On examination:  Her incision is healing nicely, she has resolving swelling.  There is no evidence of recurrence.    A/P:  Well post op visit.  I gave her a release to return to work at 5 weeks from surgery with light duty for another 4 weeks after that to return to heavy lifting.  She is to return on a PRN basis.    Augusto Sorensen MD, FACS

## 2017-09-21 ENCOUNTER — RADIANT APPOINTMENT (OUTPATIENT)
Dept: GENERAL RADIOLOGY | Facility: CLINIC | Age: 57
End: 2017-09-21
Attending: ORTHOPAEDIC SURGERY
Payer: COMMERCIAL

## 2017-09-21 ENCOUNTER — OFFICE VISIT (OUTPATIENT)
Dept: ORTHOPEDICS | Facility: CLINIC | Age: 57
End: 2017-09-21
Payer: COMMERCIAL

## 2017-09-21 VITALS — WEIGHT: 155 LBS | TEMPERATURE: 98 F | RESPIRATION RATE: 18 BRPM | HEIGHT: 64 IN | BODY MASS INDEX: 26.46 KG/M2

## 2017-09-21 DIAGNOSIS — S62.367D CLOSED NONDISPLACED FRACTURE OF NECK OF FIFTH METACARPAL BONE OF LEFT HAND WITH ROUTINE HEALING, SUBSEQUENT ENCOUNTER: Primary | ICD-10-CM

## 2017-09-21 DIAGNOSIS — S62.367D CLOSED NONDISPLACED FRACTURE OF NECK OF FIFTH METACARPAL BONE OF LEFT HAND WITH ROUTINE HEALING, SUBSEQUENT ENCOUNTER: ICD-10-CM

## 2017-09-21 PROCEDURE — 73130 X-RAY EXAM OF HAND: CPT | Mod: LT

## 2017-09-21 PROCEDURE — 99207 ZZC FRACTURE CARE IN GLOBAL PERIOD: CPT | Performed by: ORTHOPAEDIC SURGERY

## 2017-09-21 NOTE — NURSING NOTE
"Chief Complaint   Patient presents with     RECHECK     Left 5th  fx on 8/15/17.       Initial Temp 98  F (36.7  C)  Resp 18  Ht 1.626 m (5' 4\")  Wt 70.3 kg (155 lb)  LMP 12/05/2010  BMI 26.61 kg/m2 Estimated body mass index is 26.61 kg/(m^2) as calculated from the following:    Height as of this encounter: 1.626 m (5' 4\").    Weight as of this encounter: 70.3 kg (155 lb).  Medication Reconciliation: complete   Kate Chance MA      "

## 2017-09-21 NOTE — MR AVS SNAPSHOT
"              After Visit Summary   9/21/2017    Lydia Pierson    MRN: [id number]           Patient Information     Date Of Birth          1960        Visit Information        Provider Department      9/21/2017 4:00 PM Carlin Shipley MD [facility]        Today's Diagnoses     Closed nondisplaced fracture of neck of fifth metacarpal bone of left hand with routine healing, subsequent encounter    -  1      Care Instructions    Work on range of motion to fingers, hand.  Return to clinic 4 weeks.  Return to work as tolerated - likely ready on Monday.          Follow-ups after your visit        Who to contact     If you have questions or need follow up information about today's clinic visit or your schedule please contact [facility] directly at [phone].  Normal or non-critical lab and imaging results will be communicated to you by MyChart, letter or phone within 4 business days after the clinic has received the results. If you do not hear from us within 7 days, please contact the clinic through MyChart or phone. If you have a critical or abnormal lab result, we will notify you by phone as soon as possible.  Submit refill requests through [employer] or call your pharmacy and they will forward the refill request to us. Please allow 3 business days for your refill to be completed.          Additional Information About Your Visit        MyChart Information     [employer] lets you send messages to your doctor, view your test results, renew your prescriptions, schedule appointments and more. To sign up, go to [email or web address] . Click on \"Log in\" on the left side of the screen, which will take you to the Welcome page. Then click on \"Sign up Now\" on the right side of the page.     You will be asked to enter the access code listed below, as well as some personal information. Please follow the directions to create your username and password.     Your access code is: " "[email or web address]  Expires: 2017  4:33 PM     Your access code will  in 90 days. If you need help or a new code, please call your [place] clinic or [phone].        Care EveryWhere ID     This is your Care EveryWhere ID. This could be used by other organizations to access your [facility] medical records  [id number]        Your Vitals Were     Temperature Respirations Height Last Period BMI (Body Mass Index)       98  F (36.7  C) 18 1.626 m (5' 4\") 2010 26.61 kg/m2        Blood Pressure from Last 3 Encounters:   17 120/68   17 (P) 133/73   08/15/17 147/89    Weight from Last 3 Encounters:   17 70.3 kg (155 lb)   17 70.8 kg (156 lb)   17 70.8 kg (156 lb)               Primary Care Provider Office Phone # Fax #    Benja Liz Cordova [phone] [address] [facility] [address]        Equal Access to Services     [facility]: Hadii aad ku hadasho Soomaali, waaxda luqadaha, qaybta kaalmada adeegyada, alek cronin . So [facility] [phone].    ATENCIÓN: Si habla español, tiene a olmedo disposición servicios gratuitos de asistencia lingüística. Llame al [phone].    We comply with applicable federal civil rights laws and [place] laws. We do not discriminate on the basis of race, color, national origin, age, disability sex, sexual orientation or gender identity.            Thank you!     Thank you for choosing [facility]  for your care. Our goal is always to provide you with excellent care. Hearing back from our patients is one way we can continue to improve our services. Please take a few minutes to complete the written survey that you may receive in the mail after your visit with us. Thank you!             Your Updated Medication List - Protect others around you: Learn how to safely use, store and throw away your medicines at www.disposemymeds.org.          This list is accurate as of: 17  " 4:39 PM.  Always use your most recent med list.                   Brand Name Dispense Instructions for use Diagnosis    calcium carbonate 600 MG tablet   Generic drug:  calcium carbonate      1 daily    OTC -PATIENT CHOICE       HYDROcodone-acetaminophen 5-325 MG per tablet    NORCO    30 tablet    Take 1-2 tablets by mouth every 4 hours as needed for other (Moderate to Severe Pain)    Post-op pain       ibuprofen 600 MG tablet    ADVIL/MOTRIN    30 tablet    Take 1 tablet by mouth every 6 hours as needed for pain (For mild pain and temperature greater than 102F).    Tear of meniscus of left knee       LYSINE PO      AS NEEDED        TYLENOL 325 MG tablet   Generic drug:  acetaminophen      Take 1 tablet by mouth every 6 hours as needed.        vitamin D 400 UNITS tablet      1 daily in winter        WOMENS DAILY MULTIVITAMIN Tabs      1 TABLET DAILY    OTC -PATIENT CHOICE

## 2017-09-21 NOTE — LETTER
9/21/2017         RE: Lydia Pierson  [address] [facility] [address]        Dear Colleague,    Thank you for referring your patient, Lydia Pierson, to the [facility]. Please see a copy of my visit note below.    Patient arrived with an ulnar gutter cast on left arm. Cast was removed with cast saw and skin cleaned with rubbing alcohol.    OBEY McmanusC  Supervising physician: Carlin Shipley MD  Dept. of Orthopedics  [facility]            Follow up left 5th metacarpal fracture from 8/15/17.  Cast removed today.  No tenderness at fracture.  Hand is stiff.    Xray shows good position of fracture.  Good healing.    Assessment: left 5th metacarpal fracture healing well.  Plan:  Work on range of motion.  Resume work Monday as tolerated if she has adequate range of motion..  Return to clinic 4 weeks for check of range of motion.    Again, thank you for allowing me to participate in the care of your patient.        Sincerely,        Carlin Shipley MD

## 2017-09-21 NOTE — PROGRESS NOTES
Patient arrived with an ulnar gutter cast on left arm. Cast was removed with cast saw and skin cleaned with rubbing alcohol.    OBEY McmanusC  Supervising physician: Carlin Shipley MD  Dept. of Orthopedics  [facility]

## 2017-09-21 NOTE — PATIENT INSTRUCTIONS
Work on range of motion to fingers, hand.  Return to clinic 4 weeks.  Return to work as tolerated - likely ready on Monday.

## 2017-09-21 NOTE — PROGRESS NOTES
Follow up left 5th metacarpal fracture from 8/15/17.  Cast removed today.  No tenderness at fracture.  Hand is stiff.    Xray shows good position of fracture.  Good healing.    Assessment: left 5th metacarpal fracture healing well.  Plan:  Work on range of motion.  Resume work Monday as tolerated if she has adequate range of motion..  Return to clinic 4 weeks for check of range of motion.

## 2017-12-16 ENCOUNTER — HOSPITAL ENCOUNTER (EMERGENCY)
Facility: CLINIC | Age: 57
Discharge: HOME | End: 2017-12-16
Attending: EMERGENCY MEDICINE | Admitting: EMERGENCY MEDICINE
Payer: COMMERCIAL

## 2017-12-16 ENCOUNTER — APPOINTMENT (OUTPATIENT)
Dept: CT IMAGING | Facility: CLINIC | Age: 57
End: 2017-12-16
Attending: EMERGENCY MEDICINE
Payer: COMMERCIAL

## 2017-12-16 VITALS
RESPIRATION RATE: 16 BRPM | BODY MASS INDEX: 27.46 KG/M2 | OXYGEN SATURATION: 94 % | SYSTOLIC BLOOD PRESSURE: 133 MMHG | DIASTOLIC BLOOD PRESSURE: 81 MMHG | HEART RATE: 68 BPM | WEIGHT: 160 LBS | TEMPERATURE: 97.6 F

## 2017-12-16 DIAGNOSIS — K40.91 UNILATERAL RECURRENT INGUINAL HERNIA WITHOUT OBSTRUCTION OR GANGRENE: ICD-10-CM

## 2017-12-16 LAB
ALBUMIN SERPL-MCNC: 3.9 G/DL (ref 3.4–5)
ALBUMIN UR-MCNC: NEGATIVE MG/DL
ALP SERPL-CCNC: 69 U/L (ref 40–150)
ALT SERPL W P-5'-P-CCNC: 21 U/L (ref 0–50)
ANION GAP SERPL CALCULATED.3IONS-SCNC: 10 MMOL/L (ref 3–14)
APPEARANCE UR: CLEAR
AST SERPL W P-5'-P-CCNC: 15 U/L (ref 0–45)
BASOPHILS # BLD AUTO: 0 10E9/L (ref 0–0.2)
BASOPHILS NFR BLD AUTO: 0.5 %
BILIRUB SERPL-MCNC: 0.6 MG/DL (ref 0.2–1.3)
BILIRUB UR QL STRIP: NEGATIVE
BUN SERPL-MCNC: 15 MG/DL (ref 7–30)
CALCIUM SERPL-MCNC: 8.3 MG/DL (ref 8.5–10.1)
CHLORIDE SERPL-SCNC: 107 MMOL/L (ref 94–109)
CO2 SERPL-SCNC: 25 MMOL/L (ref 20–32)
COLOR UR AUTO: YELLOW
CREAT SERPL-MCNC: 0.52 MG/DL (ref 0.52–1.04)
DIFFERENTIAL METHOD BLD: NORMAL
EOSINOPHIL # BLD AUTO: 0.3 10E9/L (ref 0–0.7)
EOSINOPHIL NFR BLD AUTO: 4.8 %
ERYTHROCYTE [DISTWIDTH] IN BLOOD BY AUTOMATED COUNT: 11.4 % (ref 10–15)
GFR SERPL CREATININE-BSD FRML MDRD: >90 ML/MIN/1.7M2
GLUCOSE SERPL-MCNC: 68 MG/DL (ref 70–99)
GLUCOSE UR STRIP-MCNC: NEGATIVE MG/DL
HCT VFR BLD AUTO: 42 % (ref 35–47)
HGB BLD-MCNC: 14.2 G/DL (ref 11.7–15.7)
HGB UR QL STRIP: NEGATIVE
IMM GRANULOCYTES # BLD: 0 10E9/L (ref 0–0.4)
IMM GRANULOCYTES NFR BLD: 0.2 %
KETONES UR STRIP-MCNC: NEGATIVE MG/DL
LEUKOCYTE ESTERASE UR QL STRIP: NEGATIVE
LIPASE SERPL-CCNC: 169 U/L (ref 73–393)
LYMPHOCYTES # BLD AUTO: 1.9 10E9/L (ref 0.8–5.3)
LYMPHOCYTES NFR BLD AUTO: 30.3 %
MCH RBC QN AUTO: 29.6 PG (ref 26.5–33)
MCHC RBC AUTO-ENTMCNC: 33.8 G/DL (ref 31.5–36.5)
MCV RBC AUTO: 88 FL (ref 78–100)
MONOCYTES # BLD AUTO: 0.6 10E9/L (ref 0–1.3)
MONOCYTES NFR BLD AUTO: 9.1 %
NEUTROPHILS # BLD AUTO: 3.4 10E9/L (ref 1.6–8.3)
NEUTROPHILS NFR BLD AUTO: 55.1 %
NITRATE UR QL: NEGATIVE
PH UR STRIP: 6.5 PH (ref 5–7)
PLATELET # BLD AUTO: 209 10E9/L (ref 150–450)
POTASSIUM SERPL-SCNC: 3.8 MMOL/L (ref 3.4–5.3)
PROT SERPL-MCNC: 7.1 G/DL (ref 6.8–8.8)
RBC # BLD AUTO: 4.8 10E12/L (ref 3.8–5.2)
SODIUM SERPL-SCNC: 142 MMOL/L (ref 133–144)
SOURCE: NORMAL
SP GR UR STRIP: 1.01 (ref 1–1.03)
UROBILINOGEN UR STRIP-MCNC: NORMAL MG/DL (ref 0–2)
WBC # BLD AUTO: 6.2 10E9/L (ref 4–11)

## 2017-12-16 PROCEDURE — 74176 CT ABD & PELVIS W/O CONTRAST: CPT

## 2017-12-16 PROCEDURE — 99284 EMERGENCY DEPT VISIT MOD MDM: CPT | Mod: 25 | Performed by: EMERGENCY MEDICINE

## 2017-12-16 PROCEDURE — 83690 ASSAY OF LIPASE: CPT | Performed by: EMERGENCY MEDICINE

## 2017-12-16 PROCEDURE — 81003 URINALYSIS AUTO W/O SCOPE: CPT | Performed by: EMERGENCY MEDICINE

## 2017-12-16 PROCEDURE — 80053 COMPREHEN METABOLIC PANEL: CPT | Performed by: EMERGENCY MEDICINE

## 2017-12-16 PROCEDURE — 99284 EMERGENCY DEPT VISIT MOD MDM: CPT | Mod: Z6 | Performed by: EMERGENCY MEDICINE

## 2017-12-16 PROCEDURE — 85025 COMPLETE CBC W/AUTO DIFF WBC: CPT | Performed by: EMERGENCY MEDICINE

## 2017-12-16 RX ORDER — CALCIUM CARBONATE 500(1250)
1 TABLET ORAL DAILY
COMMUNITY
End: 2018-01-08

## 2017-12-16 NOTE — ED PROVIDER NOTES
History     Chief Complaint   Patient presents with     Abdominal Pain     L side and pain, uti 2 days, hx of hernia and states this pain is also similar to that in addition to UTI     HPI  Lydia Pierson is a 57 year old female with a history of hyperlipidemia, external hemorrhoids, ovarian cyst, and malignant neoplasm of kidney excluding renal pelvis who presents to the emergency department for evaluation of abdominal pain. The patient reports that she has been experiencing left sided abdominal pain for the past two days. She describes it as a burning and stabbing pain that originates in her left lower quadrant and radiates superiorly. The pain waxes and wanes. Pushing on her left lower quadrant increases her pain symptoms. She was diagnosed with a hernia in May 2017, and she states that this pain is similar. She does not have dysuria, but she does have a constant burning sensation that reminds her of a UTI. She notes an increase in frequency. She denies hematuria. She denies a change in appetite, fever, nausea, vomiting, or bowel problems. She does not have a history of kidney stones.       Problem List:    Patient Active Problem List    Diagnosis Date Noted     Closed nondisplaced fracture of neck of fifth metacarpal bone of left hand 08/21/2017     Priority: Medium     Advanced directives, counseling/discussion 02/12/2016     Priority: Medium     Patient does not have an Advance/Health Care Directive (HCD), declines information/referral.    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     left kidney removed 1/01 February 21, 2005 no evid of recurrence on CT chest and abdomen              Past Medical History:    Past Medical History:   Diagnosis Date     Ac lym leuk wo achv rmsn 1976     Allergic rhinitis, cause unspecified      Malignant neoplasm of kidney, except pelvis 2001     Motion sickness        Past Surgical History:    Past Surgical History:   Procedure Laterality Date     ARTHROSCOPY KNEE WITH MEDIAL MENISCECTOMY  7/7/2011    Procedure:ARTHROSCOPY KNEE WITH MEDIAL MENISCECTOMY; choice anes; Surgeon:MANUEL FLEMING; Location:[facility] OR     HEMORRHOIDECTOMY  2006     HERNIORRHAPHY INGUINAL Left 8/17/2017    Procedure: HERNIORRHAPHY INGUINAL;  Left Inguinal Hernia Repair;  Surgeon: Mehdi Sorensen MD;  Location: [facility] OR     NEPHRECTOMY RT/LT  2001    left partial nephrectomy- kidney ca     SALPINGO OOPHORECTOMY,R/L/KRISHNA  2010    Rt salpingo oophorectomy      SURGICAL HISTORY OF -   8/1/1995    Vein stripping     SURGICAL HISTORY OF -   1975    wisdom teeth extraction      TONSILLECTOMY & ADENOIDECTOMY  1962     TUBAL LIGATION  1994       Family History:    Family History   Problem Relation Age of Onset     C.A.D. Father      bypass     DIABETES Father      Lipids Father      Allergies Sister      allergic rhinitis     Neurologic Disorder Sister      seizure disorder     Respiratory Sister      asthma     Allergies Son      Breast Cancer No family hx of      Cancer - colorectal No family hx of        Social History:  Marital Status:   [2]  Social History   Substance Use Topics     Smoking status:  Never Smoker     Smokeless tobacco: Never Used     Alcohol use No        Medications:      Multiple Vitamins-Minerals (MULTIVITAMIN WOMEN PO)   VITAMIN D, CHOLECALCIFEROL, PO   calcium carbonate (OS-HOLLY 500 MG [place]. [place]) 1250 MG tablet   acetaminophen (TYLENOL) 325 MG tablet   ibuprofen (ADVIL,MOTRIN) 600 MG tablet         Review of Systems  All other systems are reviewed and are negative.    Physical Exam   BP: (!) 149/94  Pulse: 68  Temp: 97.6  F (36.4  C)  Resp: 16  Weight: 72.6 kg (160 lb)  SpO2: 100 %      Physical Exam  Nontoxic appearing no respiratory distress alert and oriented ×3  Head atraumatic normocephalic  TMs/EACs unremarkable, conjunctiva noninjected, oropharynx moist without lesions or erythema  No cervical adenopathy neck supple full active painless range of motion  Lungs clear to auscultation  Heart regular no murmur  Abdomen soft nontender bowel sounds positive no masses or HSM  Left inguinal region shows 6 x 4 cm tender raised area location of prior hernia  Strength and sensation grossly intact throughout the extremities, gait and station normal  Speech is fluent, good eye contact, thought processes are rational    6x4 centimeter left inguinal hernia.     ED Course     ED Course     Procedures               Critical Care time:  none  Results for orders placed or performed during the hospital encounter of 12/16/17   CT Abdomen Pelvis without Contrast (stone protocol)    Narrative    CT ABDOMEN AND PELVIS WITHOUT CONTRAST 12/16/2017 1:04 PM    TECHNIQUE: Images from diaphragm to pubic symphysis without oral or IV  contrast. Radiation dose for this scan was reduced using automated  exposure control, adjustment of the mA and/or kV according to patient  size, or iterative reconstruction technique.    HISTORY: Left flank and abdominal pain.    COMPARISON: 5/12/2017 CT abdomen and pelvis.    FINDINGS:  Focal defect in the lateral mid left kidney is unchanged  consistent with partial nephrectomy. No  hydronephrosis or urinary  tract calculi bilaterally. Bladder unremarkable. A subtle hypodensity  in the lateral upper right kidney was identified on a cyst on the  previous enhanced abdomen and pelvic CT.    There is left inguinal hernia which contains fat and a few adjacent  nodes. There is now increased stranding within the left hernia  suggesting either incarceration, inflammation or possibly fat  necrosis. The hernia is approximately 5.7 cm AP, 4 cm transverse and  5.5 cm craniocaudal dimension and appears slightly larger than on the  prior exam.    Within the limits of a noncontrast exam, the tiny cyst inferior right  lobe of the liver is redemonstrated. Liver otherwise unremarkable. No  worrisome splenic, pancreas, or adrenal lesions. Normal-appearing  gallbladder. No periaortic or pelvic adenopathy. No free fluid or  acute bowel abnormality. No aggressive bone lesions.      Impression    IMPRESSION:   1. Fat-containing left inguinal hernia now has increased stranding and  trace amount of fluid and could represent incarcerated hernia,  inflammation or fat necrosis within the hernia. This is slightly  larger than on the prior exam.  2. No urinary tract calculi or hydronephrosis and no acute-appearing  bowel abnormality.    NOAH JOHNSON MD   UA reflex to Microscopic   Result Value Ref Range    Color Urine Yellow     Appearance Urine Clear     Glucose Urine Negative NEG^Negative mg/dL    Bilirubin Urine Negative NEG^Negative    Ketones Urine Negative NEG^Negative mg/dL    Specific Gravity Urine 1.015 1.003 - 1.035    Blood Urine Negative NEG^Negative    pH Urine 6.5 5.0 - 7.0 pH    Protein Albumin Urine Negative NEG^Negative mg/dL    Urobilinogen mg/dL Normal 0.0 - 2.0 mg/dL    Nitrite Urine Negative NEG^Negative    Leukocyte Esterase Urine Negative NEG^Negative    Source Midstream Urine    CBC with platelets differential   Result Value Ref Range    WBC 6.2 4.0 - 11.0 10e9/L    RBC Count 4.80 3.8 - 5.2  10e12/L    Hemoglobin 14.2 11.7 - 15.7 g/dL    Hematocrit 42.0 35.0 - 47.0 %    MCV 88 78 - 100 fl    MCH 29.6 26.5 - 33.0 pg    MCHC 33.8 31.5 - 36.5 g/dL    RDW 11.4 10.0 - 15.0 %    Platelet Count 209 150 - 450 10e9/L    Diff Method Automated Method     % Neutrophils 55.1 %    % Lymphocytes 30.3 %    % Monocytes 9.1 %    % Eosinophils 4.8 %    % Basophils 0.5 %    % Immature Granulocytes 0.2 %    Absolute Neutrophil 3.4 1.6 - 8.3 10e9/L    Absolute Lymphocytes 1.9 0.8 - 5.3 10e9/L    Absolute Monocytes 0.6 0.0 - 1.3 10e9/L    Absolute Eosinophils 0.3 0.0 - 0.7 10e9/L    Absolute Basophils 0.0 0.0 - 0.2 10e9/L    Abs Immature Granulocytes 0.0 0 - 0.4 10e9/L   Comprehensive metabolic panel   Result Value Ref Range    Sodium 142 133 - 144 mmol/L    Potassium 3.8 3.4 - 5.3 mmol/L    Chloride 107 94 - 109 mmol/L    Carbon Dioxide 25 20 - 32 mmol/L    Anion Gap 10 3 - 14 mmol/L    Glucose 68 (L) 70 - 99 mg/dL    Urea Nitrogen 15 7 - 30 mg/dL    Creatinine 0.52 0.52 - 1.04 mg/dL    GFR Estimate >90 >60 mL/min/1.7m2    GFR Estimate If Black >90 >60 mL/min/1.7m2    Calcium 8.3 (L) 8.5 - 10.1 mg/dL    Bilirubin Total 0.6 0.2 - 1.3 mg/dL    Albumin 3.9 3.4 - 5.0 g/dL    Protein Total 7.1 6.8 - 8.8 g/dL    Alkaline Phosphatase 69 40 - 150 U/L    ALT 21 0 - 50 U/L    AST 15 0 - 45 U/L   Lipase   Result Value Ref Range    Lipase 169 73 - 393 U/L                  Labs Ordered and Resulted from Time of ED Arrival Up to the Time of Departure from the ED   COMPREHENSIVE METABOLIC PANEL - Abnormal; Notable for the following:        Result Value    Glucose 68 (*)     Calcium 8.3 (*)     All other components within normal limits   URINE MACROSCOPIC WITH REFLEX TO MICRO   CBC WITH PLATELETS DIFFERENTIAL   LIPASE       No results found for this or any previous visit (from the past 24 hour(s)).    Medications - No data to display    12:16 PM Patient Assessed    Assessments & Plan (with Medical Decision Making)  57-year-old female with  left abdominal pain, findings and exam consistent with left inguinal hernia, CT scan as above shows fat-containing hernia question incarcerated, clinically patient is not incarcerated nor vomiting, continues to have bowel movements and pass flatus.  Labs are unremarkable.  Reviewed with  general surgery who reviewed CT scan, no evidence for bowel present in hernia.  Recommended follow-up with general surgery for repair.  Working diagnosis, results of studies and plan reviewed with patient expressed understanding and agreement.     I have reviewed the nursing notes.    I have reviewed the findings, diagnosis, plan and need for follow up with the patient.       Discharge Medication List as of 12/16/2017  2:29 PM          Final diagnoses:   Unilateral recurrent inguinal hernia without obstruction or gangrene     This document serves as a record of the services and decisions personally performed and made by Favian Salazar MD. It was created on HIS/HER behalf by Maximino Lui, a trained medical scribe. The creation of this document is based the provider's statements to the medical scribe.  Maximino Lui 12:16 PM 12/16/2017    Provider:   The information in this document, created by the medical scribe for me, accurately reflects the services I personally performed and the decisions made by me. I have reviewed and approved this document for accuracy prior to leaving the patient care area.  Favian Salazar MD 12:16 PM 12/16/2017 12/16/2017   [facility] EMERGENCY DEPARTMENT     Favian Salazar MD  12/17/17 [id number]

## 2017-12-16 NOTE — ED NOTES
Pt reading magazine upon RN entering room.  Placed on monitoring.  No distress.  No c/o at this time.  Call light within reach.

## 2017-12-16 NOTE — ED NOTES
Pt states she got abdominal pain two days ago. Pt also got dysuria at the same time. Pt resting comfortably in bed. No distress. Denies fever, nausea, vomiting, diarrhea.

## 2017-12-16 NOTE — ED AVS SNAPSHOT
[facility] Emergency Department    [address] [address]    Phone:  [phone]    Fax:  [phone]                                       Lydia Pierson   MRN: [id number]    Department:  [facility] Emergency Department   Date of Visit:  12/16/2017           After Visit Summary Signature Page     I have received my discharge instructions, and my questions have been answered. I have discussed any challenges I see with this plan with the nurse or doctor.    ..........................................................................................................................................  Patient/Patient Representative Signature      ..........................................................................................................................................  Patient Representative Print Name and Relationship to Patient    ..................................................               ................................................  Date                                            Time    ..........................................................................................................................................  Reviewed by Signature/Title    ...................................................              ..............................................  Date                                                            Time

## 2017-12-16 NOTE — DISCHARGE INSTRUCTIONS
Hernia (Adult)    A hernia can happen when there is a weakness or defect in the wall of the abdomen or groin. Intestines or nearby tissues may move from their usual location and push through the weakness in the wall. This can cause a hernia (bulge) you may see or feel.  Causes and Risk Factors   A hernia may be present at birth. Or it may be caused by the wear and tear of daily living. Certain factors can make a hernia more likely. These can include:    Heavy lifting    Straining, whether from lifting, movement, or constipation    Chronic cough    Injury to the abdominal wall    Excess weight    Pregnancy    Prior surgery    Older age    Family history of hernia  Symptoms  Symptoms of a hernia may come on suddenly. Or they may appear slowly over time. Some common symptoms include:    Bulge in the groin area, around the navel, or in the scrotum (the bulge may get bigger when you stand and go away when you lie down)    Pain or pressure around the bulge    Pain during activities such as lifting, coughing, or sneezing    A feeling of weakness or pressure in the groin    Pain or swelling in the scrotum  Types of hernias  There are different types of hernia. The type you have depends on its location:    Inguinal: This type is in the groin or scrotum. It is more common in men.    Femoral: This type is in the groin, upper thigh (where the leg bends), or labia. It is more common in women.    Ventral: This type is in the abdominal wall.    Umbilical: This type occurs around the navel (belly button).    Incisional: This type occurs at the site of a previous surgery.  The condition of the hernia can help determine how urgently it needs to be treated.    Reducible: It goes back in by itself, or it can be pushed back in.    Irreducible: It can t be pushed back in.    Incarcerated/Strangulated: The intestine is trapped (incarcerated). If this happens, you won t be able to push the bulge back in. If the incarcerated hernia isn t  treated, it may become strangulated. This means the area loses blood supply and the tissue may die. This requires emergency surgery! Treatment is needed right away!  In most cases, a hernia will not heal on its own. Surgery is usually needed to repair the defect in the abdominal wall or groin. You ll be told more about surgery, if needed.  If your symptoms are not severe, treatment may sometimes be delayed. In such cases, regular follow-up visits with the provider will be needed. You ll be asked to keep track of your symptoms and to watch for signs of more serious problems. You may also be given guidelines similar to the home care instructions below.  Home Care  To help keep a hernia from getting worse, you may be advised to:    Avoid heavy lifting and straining as directed.    Take steps to prevent constipation, such as eating more fiber and drinking more water. This may help reduce straining that can occur when having a bowel movement. Reducing straining may help keep your symptoms from getting worse.    Maintain a healthy weight or lose excess weight. This can help reduce strain on abdominal muscles and tissues.    Stop smoking. This can help prevent coughing that may also strain abdominal muscles and tissues.  Follow-up care  Follow up with your healthcare provider, or as directed. If imaging tests were done, they will be reviewed a doctor. You will be told the results and any new findings that may affect your care.  When to seek medical advice  Call your healthcare provider right away if any of these occur:    Hernia hardens, swells, or grows larger    Hernia can no longer be pushed back in    Pain moves to the lower right abdomen (just below the waistline), or spreads to the back  Call 911  Call 911 right away if any of these occur:    Nausea and vomiting    Severe pain, redness, or tenderness in the area near the hernia    Pain worsens quickly and doesn t get better    Inability to have a bowel movement or  pass gas    Fever of 100.4 F (38 C) or higher    Trouble breathing    Fainting    Rapid heart rate    Vomiting blood    Large amounts of blood in stool  Date Last Reviewed: 6/9/2015 2000-2017 [employer]. [address] [place] [address], [place] [address]. All rights reserved. This information is not intended as a substitute for professional medical care. Always follow your healthcare professional's instructions.

## 2017-12-16 NOTE — ED AVS SNAPSHOT
[facility] Emergency Department    [address] [facility] [address]    Phone:  [phone]    Fax:  [phone]                                       Lydia Pierson   MRN: [id number]    Department:  [facility] Emergency Department   Date of Visit:  12/16/2017           Patient Information     Date Of Birth          1960        Your diagnoses for this visit were:     Unilateral recurrent inguinal hernia without obstruction or gangrene        You were seen by Favian Salazar MD.      Follow-up Information     Call [facility].    Specialty:  Surgery    Contact information:    [address] [place] [id number] [phone]    Additional information:    The medical center is located at   [address] [facility] (between [address] in [place], four miles north   of [place]).        Discharge Instructions         Hernia (Adult)    A hernia can happen when there is a weakness or defect in the wall of the abdomen or groin. Intestines or nearby tissues may move from their usual location and push through the weakness in the wall. This can cause a hernia (bulge) you may see or feel.  Causes and Risk Factors   A hernia may be present at birth. Or it may be caused by the wear and tear of daily living. Certain factors can make a hernia more likely. These can include:    Heavy lifting    Straining, whether from lifting, movement, or constipation    Chronic cough    Injury to the abdominal wall    Excess weight    Pregnancy    Prior surgery    Older age    Family history of hernia  Symptoms  Symptoms of a hernia may come on suddenly. Or they may appear slowly over time. Some common symptoms include:    Bulge in the groin area, around the navel, or in the scrotum (the bulge may get bigger when you stand and go away when you lie down)    Pain or pressure around the bulge    Pain during activities such as lifting, coughing, or sneezing    A feeling of  weakness or pressure in the groin    Pain or swelling in the scrotum  Types of hernias  There are different types of hernia. The type you have depends on its location:    Inguinal: This type is in the groin or scrotum. It is more common in men.    Femoral: This type is in the groin, upper thigh (where the leg bends), or labia. It is more common in women.    Ventral: This type is in the abdominal wall.    Umbilical: This type occurs around the navel (belly button).    Incisional: This type occurs at the site of a previous surgery.  The condition of the hernia can help determine how urgently it needs to be treated.    Reducible: It goes back in by itself, or it can be pushed back in.    Irreducible: It can t be pushed back in.    Incarcerated/Strangulated: The intestine is trapped (incarcerated). If this happens, you won t be able to push the bulge back in. If the incarcerated hernia isn t treated, it may become strangulated. This means the area loses blood supply and the tissue may die. This requires emergency surgery! Treatment is needed right away!  In most cases, a hernia will not heal on its own. Surgery is usually needed to repair the defect in the abdominal wall or groin. You ll be told more about surgery, if needed.  If your symptoms are not severe, treatment may sometimes be delayed. In such cases, regular follow-up visits with the provider will be needed. You ll be asked to keep track of your symptoms and to watch for signs of more serious problems. You may also be given guidelines similar to the home care instructions below.  Home Care  To help keep a hernia from getting worse, you may be advised to:    Avoid heavy lifting and straining as directed.    Take steps to prevent constipation, such as eating more fiber and drinking more water. This may help reduce straining that can occur when having a bowel movement. Reducing straining may help keep your symptoms from getting worse.    Maintain a healthy weight  or lose excess weight. This can help reduce strain on abdominal muscles and tissues.    Stop smoking. This can help prevent coughing that may also strain abdominal muscles and tissues.  Follow-up care  Follow up with your healthcare provider, or as directed. If imaging tests were done, they will be reviewed a doctor. You will be told the results and any new findings that may affect your care.  When to seek medical advice  Call your healthcare provider right away if any of these occur:    Hernia hardens, swells, or grows larger    Hernia can no longer be pushed back in    Pain moves to the lower right abdomen (just below the waistline), or spreads to the back  Call 911  Call 911 right away if any of these occur:    Nausea and vomiting    Severe pain, redness, or tenderness in the area near the hernia    Pain worsens quickly and doesn t get better    Inability to have a bowel movement or pass gas    Fever of 100.4 F (38 C) or higher    Trouble breathing    Fainting    Rapid heart rate    Vomiting blood    Large amounts of blood in stool  Date Last Reviewed: 6/9/2015 2000-2017 [employer]. [address] [place] [address]. All rights reserved. This information is not intended as a substitute for professional medical care. Always follow your healthcare professional's instructions.          24 Hour Appointment Hotline       To make an appointment at any [facility], call [phone] ([phone]). If you don't have a family doctor or clinic, we will help you find one. [facility] clinics are conveniently located to serve the needs of you and your family.             Review of your medicines      Our records show that you are taking the medicines listed below. If these are incorrect, please call your family doctor or clinic.        Dose / Directions Last dose taken    calcium carbonate 1250 MG tablet   Commonly known as:  OS-HOLLY 500 mg [place]. Ca   Dose:  1 tablet        Take 1 tablet by  mouth daily   Refills:  0        ibuprofen 600 MG tablet   Commonly known as:  ADVIL/MOTRIN   Dose:  600 mg   Quantity:  30 tablet        Take 1 tablet by mouth every 6 hours as needed for pain (For mild pain and temperature greater than 102F).   Refills:  0        MULTIVITAMIN WOMEN PO   Dose:  1 tablet        Take 1 tablet by mouth daily   Refills:  0        TYLENOL 325 MG tablet   Dose:  1 tablet   Generic drug:  acetaminophen        Take 1 tablet by mouth every 6 hours as needed.   Refills:  0        VITAMIN D (CHOLECALCIFEROL) PO   Dose:  400 Units        Take 400 Units by mouth daily   Refills:  0                Procedures and tests performed during your visit     CBC with platelets differential    CT Abdomen Pelvis without Contrast (stone protocol)    Comprehensive metabolic panel    Lipase    UA reflex to Microscopic      Orders Needing Specimen Collection     None      Pending Results     Date and Time Order Name Status Description    12/16/2017 1243 CT Abdomen Pelvis without Contrast (stone protocol) Preliminary             Pending Culture Results     No orders found from 12/14/2017 to 12/17/2017.            Pending Results Instructions     If you had any lab results that were not finalized at the time of your Discharge, you can call the ED Lab Result RN at [phone]. You will be contacted by this team for any positive Lab results or changes in treatment. The nurses are available 7 days a week from 10A to 6:30P.  You can leave a message 24 hours per day and they will return your call.        Test Results From Your Hospital Stay        12/16/2017 12:38 PM      Component Results     Component Value Ref Range & Units Status    Color Urine Yellow  Final    Appearance Urine Clear  Final    Glucose Urine Negative NEG^Negative mg/dL Final    Bilirubin Urine Negative NEG^Negative Final    Ketones Urine Negative NEG^Negative mg/dL Final    Specific Gravity Urine 1.015 1.003 - 1.035 Final    Blood Urine Negative  NEG^Negative Final    pH Urine 6.5 5.0 - 7.0 pH Final    Protein Albumin Urine Negative NEG^Negative mg/dL Final    Urobilinogen mg/dL Normal 0.0 - 2.0 mg/dL Final    Nitrite Urine Negative NEG^Negative Final    Leukocyte Esterase Urine Negative NEG^Negative Final    Source Midstream Urine  Final         12/16/2017  1:29 PM      Component Results     Component Value Ref Range & Units Status    WBC 6.2 4.0 - 11.0 10e9/L Final    RBC Count 4.80 3.8 - 5.2 10e12/L Final    Hemoglobin 14.2 11.7 - 15.7 g/dL Final    Hematocrit 42.0 35.0 - 47.0 % Final    MCV 88 78 - 100 fl Final    MCH 29.6 26.5 - 33.0 pg Final    MCHC 33.8 31.5 - 36.5 g/dL Final    RDW 11.4 10.0 - 15.0 % Final    Platelet Count 209 150 - 450 10e9/L Final    Diff Method Automated Method  Final    % Neutrophils 55.1 % Final    % Lymphocytes 30.3 % Final    % Monocytes 9.1 % Final    % Eosinophils 4.8 % Final    % Basophils 0.5 % Final    % Immature Granulocytes 0.2 % Final    Absolute Neutrophil 3.4 1.6 - 8.3 10e9/L Final    Absolute Lymphocytes 1.9 0.8 - 5.3 10e9/L Final    Absolute Monocytes 0.6 0.0 - 1.3 10e9/L Final    Absolute Eosinophils 0.3 0.0 - 0.7 10e9/L Final    Absolute Basophils 0.0 0.0 - 0.2 10e9/L Final    Abs Immature Granulocytes 0.0 0 - 0.4 10e9/L Final         12/16/2017  1:38 PM      Component Results     Component Value Ref Range & Units Status    Sodium 142 133 - 144 mmol/L Final    Potassium 3.8 3.4 - 5.3 mmol/L Final    Chloride 107 94 - 109 mmol/L Final    Carbon Dioxide 25 20 - 32 mmol/L Final    Anion Gap 10 3 - 14 mmol/L Final    Glucose 68 (L) 70 - 99 mg/dL Final    Urea Nitrogen 15 7 - 30 mg/dL Final    Creatinine 0.52 0.52 - 1.04 mg/dL Final    GFR Estimate >90 >60 mL/min/1.7m2 Final    Non  GFR Calc    GFR Estimate If Black >90 >60 mL/min/1.7m2 Final    African American GFR Calc    Calcium 8.3 (L) 8.5 - 10.1 mg/dL Final    Bilirubin Total 0.6 0.2 - 1.3 mg/dL Final    Albumin 3.9 3.4 - 5.0 g/dL Final     Protein Total 7.1 6.8 - 8.8 g/dL Final    Alkaline Phosphatase 69 40 - 150 U/L Final    ALT 21 0 - 50 U/L Final    AST 15 0 - 45 U/L Final         12/16/2017  1:36 PM      Component Results     Component Value Ref Range & Units Status    Lipase 169 73 - 393 U/L Final         12/16/2017  1:30 PM      Narrative     CT ABDOMEN AND PELVIS WITHOUT CONTRAST 12/16/2017 1:04 PM    TECHNIQUE: Images from diaphragm to pubic symphysis without oral or IV  contrast. Radiation dose for this scan was reduced using automated  exposure control, adjustment of the mA and/or kV according to patient  size, or iterative reconstruction technique.    HISTORY: Left flank and abdominal pain.    COMPARISON: 5/12/2017 CT abdomen and pelvis.    FINDINGS:  Focal defect in the lateral mid left kidney is unchanged  consistent with partial nephrectomy. No hydronephrosis or urinary  tract calculi bilaterally. Bladder unremarkable. A subtle hypodensity  in the lateral upper right kidney was identified on a cyst on the  previous enhanced abdomen and pelvic CT.    There is left inguinal hernia which contains fat and a few adjacent  nodes. There is now increased stranding within the left hernia  suggesting either incarceration, inflammation or possibly fat  necrosis. The hernia is approximately 5.7 cm AP, 4 cm transverse and  5.5 cm craniocaudal dimension and appears slightly larger than on the  prior exam.    Within the limits of a noncontrast exam, the tiny cyst inferior right  lobe of the liver is redemonstrated. Liver otherwise unremarkable. No  worrisome splenic, pancreas, or adrenal lesions. Normal-appearing  gallbladder. No periaortic or pelvic adenopathy. No free fluid or  acute bowel abnormality. No aggressive bone lesions.        Impression     IMPRESSION:   1. Fat-containing left inguinal hernia now has increased stranding and  trace amount of fluid and could represent incarcerated hernia,  inflammation or fat necrosis within the hernia. This  "is slightly  larger than on the prior exam.  2. No urinary tract calculi or hydronephrosis and no acute-appearing  bowel abnormality.                Thank you for choosing [facility]       Thank you for choosing [facility] for your care. Our goal is always to provide you with excellent care. Hearing back from our patients is one way we can continue to improve our services. Please take a few minutes to complete the written survey that you may receive in the mail after you visit with us. Thank you!        [employer] Information     [employer] lets you send messages to your doctor, view your test results, renew your prescriptions, schedule appointments and more. To sign up, go to [email or web address] . Click on \"Log in\" on the left side of the screen, which will take you to the Welcome page. Then click on \"Sign up Now\" on the right side of the page.     You will be asked to enter the access code listed below, as well as some personal information. Please follow the directions to create your username and password.     Your access code is: [email or web address]  Expires: 3/16/2018  2:24 PM     Your access code will  in 90 days. If you need help or a new code, please call your [facility] clinic or [phone].        Care EveryWhere ID     This is your Care EveryWhere ID. This could be used by other organizations to access your [facility] medical records  [id number]        Equal Access to Services     BREANNE MANCIA : Hadii juan luis bhagat Sobenjamin, waaxda luqadaha, qaybta kaalmaalek jennings. So [facility] [phone].    ATENCIÓN: Si habla español, tiene a olmedo disposición servicios gratuitos de asistencia lingüística. Josef al [phone].    We comply with applicable federal civil rights laws and [place] laws. We do not discriminate on the basis of race, color, national origin, age, disability, sex, sexual orientation, or gender identity.            After Visit Summary       This is your record. " Keep this with you and show to your community pharmacist(s) and doctor(s) at your next visit.

## 2017-12-19 ENCOUNTER — OFFICE VISIT (OUTPATIENT)
Dept: SURGERY | Facility: CLINIC | Age: 57
End: 2017-12-19
Payer: COMMERCIAL

## 2017-12-19 VITALS
HEART RATE: 76 BPM | BODY MASS INDEX: 27.31 KG/M2 | WEIGHT: 160 LBS | TEMPERATURE: 98.2 F | HEIGHT: 64 IN | DIASTOLIC BLOOD PRESSURE: 71 MMHG | SYSTOLIC BLOOD PRESSURE: 137 MMHG

## 2017-12-19 DIAGNOSIS — K40.90 LEFT INGUINAL HERNIA: Primary | ICD-10-CM

## 2017-12-19 PROCEDURE — 99212 OFFICE O/P EST SF 10 MIN: CPT | Performed by: SURGERY

## 2017-12-19 NOTE — PROGRESS NOTES
Pt returns to see me for a left inguinal hernia.  This has been repaired by me in the past.  She developed left sided pain and was seen in the ER.  A CT scan done showed a left sided hernia.  She does heavy lifting at work and feels that this caused this hernia to recur.  She developed pain at work and it got progressively worse.    On examination:  She has a palpable hernia at the left inguinal incision.    Her CT scan also showed the hernia.    A/P:  Recurrent left inguinal hernia.  I discussed repairing this with her under anesthesia, laparoscopically.  Risks and benefits were explained, including but not limited to bleeding, infection, and anesthesia. She seems to understand and consented to proceed with the procedure. Surgery will be scheduled as soon as possible.    Augusto Sorensen MD, FACS

## 2017-12-19 NOTE — MR AVS SNAPSHOT
"              After Visit Summary   2017    Lydia Pierson    MRN: [id number]           Patient Information     Date Of Birth          1960        Visit Information        Provider Department      2017 2:30 PM Mehdi Sorensen MD [facility]        Today's Diagnoses     Left inguinal hernia    -  1      Care Instructions    Per Dr. oSrensen's instructions          Follow-ups after your visit        Who to contact     If you have questions or need follow up information about today's clinic visit or your schedule please contact [facility] directly at [phone].  Normal or non-critical lab and imaging results will be communicated to you by MyChart, letter or phone within 4 business days after the clinic has received the results. If you do not hear from us within 7 days, please contact the clinic through [employer] or phone. If you have a critical or abnormal lab result, we will notify you by phone as soon as possible.  Submit refill requests through [email or web address] or call your pharmacy and they will forward the refill request to us. Please allow 3 business days for your refill to be completed.          Additional Information About Your Visit        MyChart Information     [email or web address] lets you send messages to your doctor, view your test results, renew your prescriptions, schedule appointments and more. To sign up, go to [email or web address] . Click on \"Log in\" on the left side of the screen, which will take you to the Welcome page. Then click on \"Sign up Now\" on the right side of the page.     You will be asked to enter the access code listed below, as well as some personal information. Please follow the directions to create your username and password.     Your access code is: [email or web address]  Expires: 3/16/2018  2:24 PM     Your access code will  in 90 days. If you need help or a new code, please call your [facility] or [phone].        Care EveryWhere ID     " "This is your Care EveryWhere ID. This could be used by other organizations to access your [facility] medical records  [id number]        Your Vitals Were     Pulse Temperature Height Last Period BMI (Body Mass Index)       76 98.2  F (36.8  C) (Oral) 1.626 m (5' 4\") 12/05/2010 27.46 kg/m2        Blood Pressure from Last 3 Encounters:   12/19/17 137/71   12/16/17 133/81   09/07/17 120/68    Weight from Last 3 Encounters:   12/19/17 72.6 kg (160 lb)   12/16/17 72.6 kg (160 lb)   09/21/17 70.3 kg (155 lb)              Today, you had the following     No orders found for display       Primary Care Provider Office Phone # Fax #    [place] Liz Cordova [phone] [address] [facility] [address]        Equal Access to Services     BREANNE MANCIA AH: Hadii juan luis ku hadasho Soomaali, waaxda luqadaha, qaybta kaalmada adeegyada, alek galdamez hayjyothi cronin . So [facility] [phone].    ATENCIÓN: Si beatrice hartman, tiene a olmedo disposición servicios gratuitos de asistencia lingüística. Llame al [phone].    We comply with applicable federal civil rights laws and [place] laws. We do not discriminate on the basis of race, color, national origin, age, disability, sex, sexual orientation, or gender identity.            Thank you!     Thank you for choosing [facility]  for your care. Our goal is always to provide you with excellent care. Hearing back from our patients is one way we can continue to improve our services. Please take a few minutes to complete the written survey that you may receive in the mail after your visit with us. Thank you!             Your Updated Medication List - Protect others around you: Learn how to safely use, store and throw away your medicines at www.disposemymeds.org.          This list is accurate as of: 12/19/17  5:20 PM.  Always use your most recent med list.                   Brand Name Dispense Instructions for use Diagnosis    calcium " carbonate 1250 MG tablet    OS-HOLLY 500 mg [place]. Ca     Take 1 tablet by mouth daily        ibuprofen 600 MG tablet    ADVIL/MOTRIN    30 tablet    Take 1 tablet by mouth every 6 hours as needed for pain (For mild pain and temperature greater than 102F).    Tear of meniscus of left knee       MULTIVITAMIN WOMEN PO      Take 1 tablet by mouth daily        TYLENOL 325 MG tablet   Generic drug:  acetaminophen      Take 1 tablet by mouth every 6 hours as needed.        VITAMIN D (CHOLECALCIFEROL) PO      Take 400 Units by mouth daily

## 2017-12-19 NOTE — LETTER
[facility]  [address] [address]  Phone: [phone]      December 19, 2017      RE: Lydia Pierson  [address] [facility] [address]        To whom it may concern:    Lydia Pierson is under my professional care. The employee is ABLE to return to work today.    When the patient returns to work, the following restrictions apply until after surgery:  A) Bend: Occasionally (1-3 hours)  B) Squat: Occasionally (1-3 hours)  C) Walk/Stand: Occasionally (1-3 hours)  D) Reach Above Shoulders: Occasionally (1-3 hours)  E) Lift, carry, push, and pull no more than:  11-20 lbs.      Sincerely,    Augusto Sorensen MD

## 2017-12-19 NOTE — LETTER
12/19/2017         RE: Lydia Pierson  [address] [address]        Dear Colleague,    Thank you for referring your patient, Lydia Pierson, to the [facility]. Please see a copy of my visit note below.    Pt returns to see me for a left inguinal hernia.  This has been repaired by me in the past.  She developed left sided pain and was seen in the ER.  A CT scan done showed a left sided hernia.  She does heavy lifting at work and feels that this caused this hernia to recur.  She developed pain at work and it got progressively worse.    On examination:  She has a palpable hernia at the left inguinal incision.    Her CT scan also showed the hernia.    A/P:  Recurrent left inguinal hernia.  I discussed repairing this with her under anesthesia, laparoscopically.  Risks and benefits were explained, including but not limited to bleeding, infection, and anesthesia. She seems to understand and consented to proceed with the procedure. Surgery will be scheduled as soon as possible.    Augusto Sorensen MD, FACS      Again, thank you for allowing me to participate in the care of your patient.        Sincerely,        Mehdi Sorensen MD

## 2017-12-19 NOTE — LETTER
SURGERYPLANNING/SCHEDULING WORKSHEET                              [facility]  [address] [address] [phone]                          Lydia Pierson                :  1960  MRN:  [id number]  Phone: [phone] (home)     Same Day Surgery   Surgeon: Mehdi Sorensen MD  Diagnosis:   Left symptomatic inguinal hernia  Allergies:  Seasonal allergies   A preoperative evaluation by the primary care provider has been requested to summarize and modify, where possible, medical risks to the proposed surgery.  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  Date of Procedure:_    Time: ___       Special Equipment: None  Informed Consent Obtained and Signed:  NO  ====================================================  Instructions to Same Day Surgery Staff  AZAR Stockings Knee High  Preop Antibiotic:  Ancef 2 gm IV  pre-op within one hour prior to incision For > 80 kg  Preop Pain Meds:  None  Preop Orders:  Routine Standing Orders.  ====================================================  Instructions to the patient:  Preop physical exam scheduled (within 30 days or 7 days prior) with:   _____tbd__   Clinic:  ____________________                                         Date______________Time_________________________  Come to the hospital at: ________________________________  HOME PREPARATION:   Shower with Hibiclens the night before or the morning of surgery, gently cleaning skin from neck to feet  Bathe and brush teeth the morning of surgery.  Take medications with a sip of water the morning of surgery:   Check with DrCarlos if taking insulin.  May have  a light meal, toast and clear liquids, up to 8 hrs before surgery  May have clear liquids (liquids one can read through) up to 4 hrs before surgery  NOTHING after 4 hrs before surgery  Stop aspirin 7-10 days before surgery  Stop NSAIDS (Ibuproven, Naproxen, etc) 5 days before surgery  Stop Plavix 7-10 days before surgery      Mehdi Sorensen MD    12/19/2017  This form was electronically signed at chart closure                                                                        Chart Copy

## 2017-12-19 NOTE — NURSING NOTE
"Initial /71 (BP Location: Right arm, Patient Position: Sitting, Cuff Size: Adult Regular)  Pulse 76  Temp 98.2  F (36.8  C) (Oral)  Ht 1.626 m (5' 4\")  Wt 72.6 kg (160 lb)  LMP 12/05/2010  BMI 27.46 kg/m2 Estimated body mass index is 27.46 kg/(m^2) as calculated from the following:    Height as of this encounter: 1.626 m (5' 4\").    Weight as of this encounter: 72.6 kg (160 lb). .    Kaity Benson MA    "

## 2017-12-22 ENCOUNTER — TELEPHONE (OUTPATIENT)
Dept: SURGERY | Facility: CLINIC | Age: 57
End: 2017-12-22

## 2017-12-26 NOTE — TELEPHONE ENCOUNTER
Pt returned call.  Scheduled for surgery 01/18.  Letter completed and sent to [facility], printed and mailed to pt along with surgical packet.  Pt was given home preparations over the phone as well.  She had no further questions at this time.    Mildred Yoon, CMA

## 2018-01-04 ENCOUNTER — OFFICE VISIT (OUTPATIENT)
Dept: FAMILY MEDICINE | Facility: CLINIC | Age: 58
End: 2018-01-04
Payer: COMMERCIAL

## 2018-01-04 ENCOUNTER — NURSE TRIAGE (OUTPATIENT)
Dept: NURSING | Facility: CLINIC | Age: 58
End: 2018-01-04

## 2018-01-04 VITALS
HEART RATE: 63 BPM | RESPIRATION RATE: 14 BRPM | DIASTOLIC BLOOD PRESSURE: 80 MMHG | BODY MASS INDEX: 28.24 KG/M2 | HEIGHT: 64 IN | SYSTOLIC BLOOD PRESSURE: 134 MMHG | WEIGHT: 165.4 LBS | TEMPERATURE: 98 F

## 2018-01-04 DIAGNOSIS — K40.30 INCARCERATED LEFT INGUINAL HERNIA: Primary | ICD-10-CM

## 2018-01-04 PROCEDURE — 99214 OFFICE O/P EST MOD 30 MIN: CPT | Performed by: FAMILY MEDICINE

## 2018-01-04 RX ORDER — HYDROCODONE BITARTRATE AND ACETAMINOPHEN 5; 325 MG/1; MG/1
1-2 TABLET ORAL EVERY 6 HOURS PRN
Qty: 40 TABLET | Refills: 0 | Status: SHIPPED | OUTPATIENT
Start: 2018-01-04 | End: 2018-01-08

## 2018-01-04 NOTE — PATIENT INSTRUCTIONS
After being evaluated by Dr. Anderson and discussing your options with him, you preferred to proceed with expectant management.    Take Hydrocodone-Acetaminophen 5/325 mg 1-2 tablet orally every 6 hrs as needed for severe pain only.  Do not take when driving or operating heavy equipment.    Off work today.    If your pain becomes persistently severe, you have nausea/vomiting/fever, or difficulty in bowel movement, go to ER.    If you feel pain keeps recurring and more bothersome in 24 hours, contact 's clinic for further advice, and possibly schedule your surgery sooner.      Thank you for choosing [facility].  You may be receiving a survey in the mail from [facility] [place] regarding your visit today.  Please take a few minutes to complete and return the survey to let us know how we are doing.      If you have questions or concerns, please contact us via [employer] or you can contact your care team at [phone].    Our Clinic hours are:  Monday 6:40 am  to 7:00 pm  Tuesday -Friday 6:40 am to 5:00 pm    The [place] outpatient lab hours are:  Monday - Friday 6:10 am to 4:45 pm  Saturdays 7:00 am to 11:00 am  Appointments are required, call [phone]    If you have clinical questions after hours or would like to schedule an appointment,  call the clinic at [phone].      Hernia (Adult)    A hernia can happen when there is a weakness or defect in the wall of the abdomen or groin. Intestines or nearby tissues may move from their usual location and push through the weakness in the wall. This can cause a hernia (bulge) you may see or feel.  Causes and Risk Factors   A hernia may be present at birth. Or it may be caused by the wear and tear of daily living. Certain factors can make a hernia more likely. These can include:    Heavy lifting    Straining, whether from lifting, movement, or constipation    Chronic cough    Injury to the abdominal wall    Excess weight    Pregnancy    Prior surgery    Older  age    Family history of hernia  Symptoms  Symptoms of a hernia may come on suddenly. Or they may appear slowly over time. Some common symptoms include:    Bulge in the groin area, around the navel, or in the scrotum (the bulge may get bigger when you stand and go away when you lie down)    Pain or pressure around the bulge    Pain during activities such as lifting, coughing, or sneezing    A feeling of weakness or pressure in the groin    Pain or swelling in the scrotum  Types of hernias  There are different types of hernia. The type you have depends on its location:    Inguinal: This type is in the groin or scrotum. It is more common in men.    Femoral: This type is in the groin, upper thigh (where the leg bends), or labia. It is more common in women.    Ventral: This type is in the abdominal wall.    Umbilical: This type occurs around the navel (belly button).    Incisional: This type occurs at the site of a previous surgery.  The condition of the hernia can help determine how urgently it needs to be treated.    Reducible: It goes back in by itself, or it can be pushed back in.    Irreducible: It can t be pushed back in.    Incarcerated/Strangulated: The intestine is trapped (incarcerated). If this happens, you won t be able to push the bulge back in. If the incarcerated hernia isn t treated, it may become strangulated. This means the area loses blood supply and the tissue may die. This requires emergency surgery! Treatment is needed right away!  In most cases, a hernia will not heal on its own. Surgery is usually needed to repair the defect in the abdominal wall or groin. You ll be told more about surgery, if needed.  If your symptoms are not severe, treatment may sometimes be delayed. In such cases, regular follow-up visits with the provider will be needed. You ll be asked to keep track of your symptoms and to watch for signs of more serious problems. You may also be given guidelines similar to the home care  instructions below.  Home Care  To help keep a hernia from getting worse, you may be advised to:    Avoid heavy lifting and straining as directed.    Take steps to prevent constipation, such as eating more fiber and drinking more water. This may help reduce straining that can occur when having a bowel movement. Reducing straining may help keep your symptoms from getting worse.    Maintain a healthy weight or lose excess weight. This can help reduce strain on abdominal muscles and tissues.    Stop smoking. This can help prevent coughing that may also strain abdominal muscles and tissues.  Follow-up care  Follow up with your healthcare provider, or as directed. If imaging tests were done, they will be reviewed a doctor. You will be told the results and any new findings that may affect your care.  When to seek medical advice  Call your healthcare provider right away if any of these occur:    Hernia hardens, swells, or grows larger    Hernia can no longer be pushed back in    Pain moves to the lower right abdomen (just below the waistline), or spreads to the back  Call 911  Call 911 right away if any of these occur:    Nausea and vomiting    Severe pain, redness, or tenderness in the area near the hernia    Pain worsens quickly and doesn t get better    Inability to have a bowel movement or pass gas    Fever of 100.4 F (38 C) or higher    Trouble breathing    Fainting    Rapid heart rate    Vomiting blood    Large amounts of blood in stool  Date Last Reviewed: 6/9/2015 2000-2017 [employer]. [address] [place] [address]. All rights reserved. This information is not intended as a substitute for professional medical care. Always follow your healthcare professional's instructions.

## 2018-01-04 NOTE — PROGRESS NOTES
SUBJECTIVE:   Lydia Pierson is a 57 year old female who presents to clinic today for the following health issues:  Chief Complaint   Patient presents with     Pain     Left inguinal hernia, surgery scheduled for 01/18   Pt is scheduled for a hernia repair with Dr Sorensen 01/18/2018    ABDOMINAL/Groin   PAIN     Onset: on and off for month but the pain has increased this morning severely     Description:   Character: Stabbing  Location: pelvic region  Radiation: Back    Intensity: severe, 4/10 with IBU    Progression of Symptoms:  same    Accompanying Signs & Symptoms:  Fever/Chills?: no   Gas/Bloating: no   Nausea: no   Vomitting: no   Diarrhea?: no   Constipation:no   Dysuria or Hematuria: no    History:   Trauma: YES- previous surgery in August and re injured at work, [employer]  Previous similar pain: YES   Previous tests done: CT    Precipitating factors:   Does the pain change with:     Food: no      BM: no     Urination: no     Alleviating factors:  IBU, but not today    Therapies Tried and outcome: IBU         Verified above history with patient.      Problem list and histories reviewed & adjusted, as indicated.  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)     Malignant neoplasm of kidney excluding renal pelvis (H)     External hemorrhoids with other complication     Sensorineural hearing loss, asymmetrical     Ovarian cyst     Health Care Home     Hyperlipidemia LDL goal <160     Advanced directives, counseling/discussion     Closed nondisplaced fracture of neck of fifth metacarpal bone of left hand     Past Surgical History:   Procedure Laterality Date     ARTHROSCOPY KNEE WITH MEDIAL MENISCECTOMY  7/7/2011    Procedure:ARTHROSCOPY KNEE WITH MEDIAL MENISCECTOMY; choice anes; Surgeon:MANUEL FLEMING; Location:[facility] OR     HEMORRHOIDECTOMY  2006     HERNIORRHAPHY INGUINAL Left 8/17/2017    Procedure:  HERNIORRHAPHY INGUINAL;  Left Inguinal Hernia Repair;  Surgeon: Mehdi Sorensen MD;  Location: [facility] OR     NEPHRECTOMY RT/LT  2001    left partial nephrectomy- kidney ca     SALPINGO OOPHORECTOMY,R/L/KRISHNA  2010    Rt salpingo oophorectomy      SURGICAL HISTORY OF -   8/1/1995    Vein stripping     SURGICAL HISTORY OF -   1975    wisdom teeth extraction      TONSILLECTOMY & ADENOIDECTOMY  1962     TUBAL LIGATION  1994       Social History   Substance Use Topics     Smoking status: Never Smoker     Smokeless tobacco: Never Used     Alcohol use No     Family History   Problem Relation Age of Onset     C.A.D. Father      bypass     DIABETES Father      Lipids Father      Allergies Sister      allergic rhinitis     Neurologic Disorder Sister      seizure disorder     Respiratory Sister      asthma     Allergies Son      Breast Cancer No family hx of      Cancer - colorectal No family hx of          Current Outpatient Prescriptions   Medication Sig Dispense Refill     HYDROcodone-acetaminophen (NORCO) 5-325 MG per tablet Take 1-2 tablets by mouth every 6 hours as needed for moderate to severe pain 40 tablet 0     Multiple Vitamins-Minerals (MULTIVITAMIN WOMEN PO) Take 1 tablet by mouth daily       VITAMIN D, CHOLECALCIFEROL, PO Take 400 Units by mouth daily       calcium carbonate (OS-HOLLY 500 MG [place]. [place]) 1250 MG tablet Take 1 tablet by mouth daily       acetaminophen (TYLENOL) 325 MG tablet Take 1 tablet by mouth every 6 hours as needed.       ibuprofen (ADVIL,MOTRIN) 600 MG tablet Take 1 tablet by mouth every 6 hours as needed for pain (For mild pain and temperature greater than 102F). 30 tablet 0     Allergies   Allergen Reactions     Seasonal Allergies      BP Readings from Last 3 Encounters:   01/04/18 134/80   12/19/17 137/71   12/16/17 133/81    Wt Readings from Last 3 Encounters:   01/04/18 165 lb 6.4 oz (75 kg)   12/19/17 160 lb (72.6 kg)   12/16/17 160 lb (72.6 kg)                        Reviewed and  "updated as needed this visit by clinical staffTobacco  Allergies  Meds  Problems  Med Hx  Surg Hx  Fam Hx  Soc Hx        Reviewed and updated as needed this visit by Provider  Allergies  Meds  Problems         ROS:  C: NEGATIVE for fever, chills, or change in weight  I: NEGATIVE for jaundice/rash  E: NEGATIVE for jaundice  R: NEGATIVE for significant cough or SOB  CV: NEGATIVE for chest pain, palpitations or peripheral edema  GI: see above   male :see above  M: NEGATIVE for significant arthralgias or myalgia  H: NEGATIVE for bleeding problems    OBJECTIVE:                                                    /80  Pulse 63  Temp 98  F (36.7  C) (Tympanic)  Resp 14  Ht 5' 4\" (1.626 m)  Wt 165 lb 6.4 oz (75 kg)  LMP 12/05/2010  BMI 28.39 kg/m2  Body mass index is 28.39 kg/(m^2).  GENERAL: slightly overweight, alert and no distress  NECK: no tenderness, no adenopathy,  Thyroid not enlarged  RESP: lungs clear to auscultation - no rales, no rhonchi, no wheezes  CV: regular rates and rhythm, normal S1 S2, no S3 or S4 and no murmur, no click or rub  ABD: rounded abdomen, no skin changes, 4 cm firm moderately tender non-reducible left inguinal mass present, no right inguinal mass/tendernress, no guarding, no Muro's sign  MS: extremities- no gross deformities noted, no edema  SKIN: no jaundice or rash    Diagnostic test results:  Diagnostic Test Results:  Results for orders placed or performed during the hospital encounter of 12/16/17   CT Abdomen Pelvis without Contrast (stone protocol)    Narrative    CT ABDOMEN AND PELVIS WITHOUT CONTRAST 12/16/2017 1:04 PM    TECHNIQUE: Images from diaphragm to pubic symphysis without oral or IV  contrast. Radiation dose for this scan was reduced using automated  exposure control, adjustment of the mA and/or kV according to patient  size, or iterative reconstruction technique.    HISTORY: Left flank and abdominal pain.    COMPARISON: 5/12/2017 CT abdomen and " pelvis.    FINDINGS:  Focal defect in the lateral mid left kidney is unchanged  consistent with partial nephrectomy. No hydronephrosis or urinary  tract calculi bilaterally. Bladder unremarkable. A subtle hypodensity  in the lateral upper right kidney was identified on a cyst on the  previous enhanced abdomen and pelvic CT.    There is left inguinal hernia which contains fat and a few adjacent  nodes. There is now increased stranding within the left hernia  suggesting either incarceration, inflammation or possibly fat  necrosis. The hernia is approximately 5.7 cm AP, 4 cm transverse and  5.5 cm craniocaudal dimension and appears slightly larger than on the  prior exam.    Within the limits of a noncontrast exam, the tiny cyst inferior right  lobe of the liver is redemonstrated. Liver otherwise unremarkable. No  worrisome splenic, pancreas, or adrenal lesions. Normal-appearing  gallbladder. No periaortic or pelvic adenopathy. No free fluid or  acute bowel abnormality. No aggressive bone lesions.      Impression    IMPRESSION:   1. Fat-containing left inguinal hernia now has increased stranding and  trace amount of fluid and could represent incarcerated hernia,  inflammation or fat necrosis within the hernia. This is slightly  larger than on the prior exam.  2. No urinary tract calculi or hydronephrosis and no acute-appearing  bowel abnormality.    NOAH JOHNSON MD   UA reflex to Microscopic   Result Value Ref Range    Color Urine Yellow     Appearance Urine Clear     Glucose Urine Negative NEG^Negative mg/dL    Bilirubin Urine Negative NEG^Negative    Ketones Urine Negative NEG^Negative mg/dL    Specific Gravity Urine 1.015 1.003 - 1.035    Blood Urine Negative NEG^Negative    pH Urine 6.5 5.0 - 7.0 pH    Protein Albumin Urine Negative NEG^Negative mg/dL    Urobilinogen mg/dL Normal 0.0 - 2.0 mg/dL    Nitrite Urine Negative NEG^Negative    Leukocyte Esterase Urine Negative NEG^Negative    Source Midstream Urine     CBC with platelets differential   Result Value Ref Range    WBC 6.2 4.0 - 11.0 10e9/L    RBC Count 4.80 3.8 - 5.2 10e12/L    Hemoglobin 14.2 11.7 - 15.7 g/dL    Hematocrit 42.0 35.0 - 47.0 %    MCV 88 78 - 100 fl    MCH 29.6 26.5 - 33.0 pg    MCHC 33.8 31.5 - 36.5 g/dL    RDW 11.4 10.0 - 15.0 %    Platelet Count 209 150 - 450 10e9/L    Diff Method Automated Method     % Neutrophils 55.1 %    % Lymphocytes 30.3 %    % Monocytes 9.1 %    % Eosinophils 4.8 %    % Basophils 0.5 %    % Immature Granulocytes 0.2 %    Absolute Neutrophil 3.4 1.6 - 8.3 10e9/L    Absolute Lymphocytes 1.9 0.8 - 5.3 10e9/L    Absolute Monocytes 0.6 0.0 - 1.3 10e9/L    Absolute Eosinophils 0.3 0.0 - 0.7 10e9/L    Absolute Basophils 0.0 0.0 - 0.2 10e9/L    Abs Immature Granulocytes 0.0 0 - 0.4 10e9/L   Comprehensive metabolic panel   Result Value Ref Range    Sodium 142 133 - 144 mmol/L    Potassium 3.8 3.4 - 5.3 mmol/L    Chloride 107 94 - 109 mmol/L    Carbon Dioxide 25 20 - 32 mmol/L    Anion Gap 10 3 - 14 mmol/L    Glucose 68 (L) 70 - 99 mg/dL    Urea Nitrogen 15 7 - 30 mg/dL    Creatinine 0.52 0.52 - 1.04 mg/dL    GFR Estimate >90 >60 mL/min/1.7m2    GFR Estimate If Black >90 >60 mL/min/1.7m2    Calcium 8.3 (L) 8.5 - 10.1 mg/dL    Bilirubin Total 0.6 0.2 - 1.3 mg/dL    Albumin 3.9 3.4 - 5.0 g/dL    Protein Total 7.1 6.8 - 8.8 g/dL    Alkaline Phosphatase 69 40 - 150 U/L    ALT 21 0 - 50 U/L    AST 15 0 - 45 U/L   Lipase   Result Value Ref Range    Lipase 169 73 - 393 U/L          ASSESSMENT/PLAN:                                                        ICD-10-CM    1. Incarcerated left inguinal hernia K40.30 HYDROcodone-acetaminophen (NORCO) 5-325 MG per tablet          Due to worsened pain today, non-reducible inguinal mass, and patient's concern, called on-call surgeon, Dr. Anderson.  Discussed with patient should determine if she needs more urgent surgery. She concurred.  He came to clinic to evaluate patient. He tried to reduce the inguinal  hernia by palpation with no complete success.  He discussed with patient her options: go to surgery now instead of 2 weeks vs pain control and see if hernia reduces if she relaxes considering patient has no systemic signs today.  Patient preferred to do expectant management with low threshold to call [facility] tomorrow if pain not improved - Dr. Anderson said he will inform Dr. Sorensen who is on call tomorrow.  Patient was advised on judicious use of Norco.  Return precautions discussed and given to patient.      Follow up with Provider - prn   Patient Instructions   After being evaluated by Dr. Anderson and discussing your options with him, you preferred to proceed with expectant management.    Take Hydrocodone-Acetaminophen 5/325 mg 1-2 tablet orally every 6 hrs as needed for severe pain only.  Do not take when driving or operating heavy equipment.    Off work today.    If your pain becomes persistently severe, you have nausea/vomiting/fever, or difficulty in bowel movement, go to ER.    If you feel pain keeps recurring and more bothersome in 24 hours, contact 's clinic for further advice, and possibly schedule your surgery sooner.      Thank you for choosing [facility].  You may be receiving a survey in the mail from [employer] regarding your visit today.  Please take a few minutes to complete and return the survey to let us know how we are doing.      If you have questions or concerns, please contact us via [employer] or you can contact your care team at [phone].    Our Clinic hours are:  Monday 6:40 am  to 7:00 pm  Tuesday -Friday 6:40 am to 5:00 pm    The [place] outpatient lab hours are:  Monday - Friday 6:10 am to 4:45 pm  Saturdays 7:00 am to 11:00 am  Appointments are required, call [phone]    If you have clinical questions after hours or would like to schedule an appointment,  call the clinic at [phone].      Hernia (Adult)    A hernia can happen when there is a  weakness or defect in the wall of the abdomen or groin. Intestines or nearby tissues may move from their usual location and push through the weakness in the wall. This can cause a hernia (bulge) you may see or feel.  Causes and Risk Factors   A hernia may be present at birth. Or it may be caused by the wear and tear of daily living. Certain factors can make a hernia more likely. These can include:    Heavy lifting    Straining, whether from lifting, movement, or constipation    Chronic cough    Injury to the abdominal wall    Excess weight    Pregnancy    Prior surgery    Older age    Family history of hernia  Symptoms  Symptoms of a hernia may come on suddenly. Or they may appear slowly over time. Some common symptoms include:    Bulge in the groin area, around the navel, or in the scrotum (the bulge may get bigger when you stand and go away when you lie down)    Pain or pressure around the bulge    Pain during activities such as lifting, coughing, or sneezing    A feeling of weakness or pressure in the groin    Pain or swelling in the scrotum  Types of hernias  There are different types of hernia. The type you have depends on its location:    Inguinal: This type is in the groin or scrotum. It is more common in men.    Femoral: This type is in the groin, upper thigh (where the leg bends), or labia. It is more common in women.    Ventral: This type is in the abdominal wall.    Umbilical: This type occurs around the navel (belly button).    Incisional: This type occurs at the site of a previous surgery.  The condition of the hernia can help determine how urgently it needs to be treated.    Reducible: It goes back in by itself, or it can be pushed back in.    Irreducible: It can t be pushed back in.    Incarcerated/Strangulated: The intestine is trapped (incarcerated). If this happens, you won t be able to push the bulge back in. If the incarcerated hernia isn t treated, it may become strangulated. This means the area  loses blood supply and the tissue may die. This requires emergency surgery! Treatment is needed right away!  In most cases, a hernia will not heal on its own. Surgery is usually needed to repair the defect in the abdominal wall or groin. You ll be told more about surgery, if needed.  If your symptoms are not severe, treatment may sometimes be delayed. In such cases, regular follow-up visits with the provider will be needed. You ll be asked to keep track of your symptoms and to watch for signs of more serious problems. You may also be given guidelines similar to the home care instructions below.  Home Care  To help keep a hernia from getting worse, you may be advised to:    Avoid heavy lifting and straining as directed.    Take steps to prevent constipation, such as eating more fiber and drinking more water. This may help reduce straining that can occur when having a bowel movement. Reducing straining may help keep your symptoms from getting worse.    Maintain a healthy weight or lose excess weight. This can help reduce strain on abdominal muscles and tissues.    Stop smoking. This can help prevent coughing that may also strain abdominal muscles and tissues.  Follow-up care  Follow up with your healthcare provider, or as directed. If imaging tests were done, they will be reviewed a doctor. You will be told the results and any new findings that may affect your care.  When to seek medical advice  Call your healthcare provider right away if any of these occur:    Hernia hardens, swells, or grows larger    Hernia can no longer be pushed back in    Pain moves to the lower right abdomen (just below the waistline), or spreads to the back  Call 911  Call 911 right away if any of these occur:    Nausea and vomiting    Severe pain, redness, or tenderness in the area near the hernia    Pain worsens quickly and doesn t get better    Inability to have a bowel movement or pass gas    Fever of 100.4 F (38 C) or higher    Trouble  breathing    Fainting    Rapid heart rate    Vomiting blood    Large amounts of blood in stool  Date Last Reviewed: 6/9/2015 2000-2017 [employer]. [address] [place] [address], [place] [address]. All rights reserved. This information is not intended as a substitute for professional medical care. Always follow your healthcare professional's instructions.            José Miguel Dhillon MD  [facility]

## 2018-01-04 NOTE — NURSING NOTE
"Chief Complaint   Patient presents with     Pain     Left inguinal hernia, surgery scheduled for 01/18       Initial /78 (BP Location: Right arm, Patient Position: Chair, Cuff Size: Adult Regular)  Pulse 63  Temp 98  F (36.7  C) (Tympanic)  Resp 16  Ht 5' 4\" (1.626 m)  Wt 165 lb 6.4 oz (75 kg)  LMP 12/05/2010  BMI 28.39 kg/m2 Estimated body mass index is 28.39 kg/(m^2) as calculated from the following:    Height as of this encounter: 5' 4\" (1.626 m).    Weight as of this encounter: 165 lb 6.4 oz (75 kg).  BP completed using cuff size: regular malik Yoon CMA     "

## 2018-01-04 NOTE — TELEPHONE ENCOUNTER
"Scheduled for surgery on Jan18/ repair of inguinal hernia/ awake this AM with pain in the same area off and on past 4 hours/no fever/ rates a  a \"5/10\" sent for an immediate office appointment but she will go to er if she cannot get in soon enough/ she has someone to drive her     Reason for Disposition    [1] MILD-MODERATE pain AND [2] constant AND [3] present > 2 hours    Additional Information    Negative: Shock suspected (e.g., cold/pale/clammy skin, too weak to stand, low BP, rapid pulse)    Negative: Difficult to awaken or acting confused  (e.g., disoriented, slurred speech)    Negative: Passed out (i.e., lost consciousness, collapsed and was not responding)    Negative: Sounds like a life-threatening emergency to the triager    Negative: Chest pain    Negative: Pain is mainly in upper abdomen  (if needed ask: \"is it mainly above the belly button?\")    Negative: Followed an abdomen (stomach) injury    Negative: [1] Abdominal pain AND [2] pregnant < 20 weeks    Negative: [1] Abdominal pain AND [2] pregnant > 20 weeks    Negative: [1] Abdominal pain AND [2] postpartum < 1 month since delivery    Negative: [1] SEVERE pain (e.g., excruciating) AND [2] present > 1 hour    Negative: [1] SEVERE pain AND [2] age > 60    Negative: [1] Vomiting AND [2] contains red blood or black (\"coffee ground\") material  (Exception: few red streaks in vomit that only happened once)    Negative: Blood in bowel movements   (Exception: blood on surface of BM with constipation)    Negative: Black or tarry bowel movements  (Exception: chronic-unchanged  black-grey bowel movements AND is taking iron pills or Pepto-bismol)    Negative: Patient sounds very sick or weak to the triager    Protocols used: ABDOMINAL PAIN - FEMALE-ADULT-AH    "

## 2018-01-04 NOTE — LETTER
[facility]  [address] [place] [id number]  [phone]          January 4, 2018    RE:  Lydia Ellis Dangelo                                                                                                                                                       [id number] [facility] [id number]            To whom it may concern:    Lydia has been seen in clinic today for incarcerated left inguinal hernia. Please excuse her from work January 4 through 5, 2018.        Sincerely,        José Miguel Dhillon MD

## 2018-01-04 NOTE — MR AVS SNAPSHOT
After Visit Summary   1/4/2018    Lydia Pierson    MRN: [id number]           Patient Information     Date Of Birth          1960        Visit Information        Provider Department      1/4/2018 11:00 AM José Miguel Dhillon MD [facility]        Today's Diagnoses     Incarcerated left inguinal hernia    -  1      Care Instructions    After being evaluated by Dr. Anderson and discussing your options with him, you preferred to proceed with expectant management.    Take Hydrocodone-Acetaminophen 5/325 mg 1-2 tablet orally every 6 hrs as needed for severe pain only.  Do not take when driving or operating heavy equipment.    Off work today.    If your pain becomes persistently severe, you have nausea/vomiting/fever, or difficulty in bowel movement, go to ER.    If you feel pain keeps recurring and more bothersome in 24 hours, contact 's clinic for further advice, and possibly schedule your surgery sooner.      Thank you for choosing [facility].  You may be receiving a survey in the mail from [email or web address] [facility] regarding your visit today.  Please take a few minutes to complete and return the survey to let us know how we are doing.      If you have questions or concerns, please contact us via [employer] or you can contact your care team at [phone].    Our Clinic hours are:  Monday 6:40 am  to 7:00 pm  Tuesday -Friday 6:40 am to 5:00 pm    The [place] outpatient lab hours are:  Monday - Friday 6:10 am to 4:45 pm  Saturdays 7:00 am to 11:00 am  Appointments are required, call [phone]    If you have clinical questions after hours or would like to schedule an appointment,  call the clinic at [phone].      Hernia (Adult)    A hernia can happen when there is a weakness or defect in the wall of the abdomen or groin. Intestines or nearby tissues may move from their usual location and push through the weakness in the wall. This can cause a hernia (bulge) you may see or  feel.  Causes and Risk Factors   A hernia may be present at birth. Or it may be caused by the wear and tear of daily living. Certain factors can make a hernia more likely. These can include:    Heavy lifting    Straining, whether from lifting, movement, or constipation    Chronic cough    Injury to the abdominal wall    Excess weight    Pregnancy    Prior surgery    Older age    Family history of hernia  Symptoms  Symptoms of a hernia may come on suddenly. Or they may appear slowly over time. Some common symptoms include:    Bulge in the groin area, around the navel, or in the scrotum (the bulge may get bigger when you stand and go away when you lie down)    Pain or pressure around the bulge    Pain during activities such as lifting, coughing, or sneezing    A feeling of weakness or pressure in the groin    Pain or swelling in the scrotum  Types of hernias  There are different types of hernia. The type you have depends on its location:    Inguinal: This type is in the groin or scrotum. It is more common in men.    Femoral: This type is in the groin, upper thigh (where the leg bends), or labia. It is more common in women.    Ventral: This type is in the abdominal wall.    Umbilical: This type occurs around the navel (belly button).    Incisional: This type occurs at the site of a previous surgery.  The condition of the hernia can help determine how urgently it needs to be treated.    Reducible: It goes back in by itself, or it can be pushed back in.    Irreducible: It can t be pushed back in.    Incarcerated/Strangulated: The intestine is trapped (incarcerated). If this happens, you won t be able to push the bulge back in. If the incarcerated hernia isn t treated, it may become strangulated. This means the area loses blood supply and the tissue may die. This requires emergency surgery! Treatment is needed right away!  In most cases, a hernia will not heal on its own. Surgery is usually needed to repair the defect in  the abdominal wall or groin. You ll be told more about surgery, if needed.  If your symptoms are not severe, treatment may sometimes be delayed. In such cases, regular follow-up visits with the provider will be needed. You ll be asked to keep track of your symptoms and to watch for signs of more serious problems. You may also be given guidelines similar to the home care instructions below.  Home Care  To help keep a hernia from getting worse, you may be advised to:    Avoid heavy lifting and straining as directed.    Take steps to prevent constipation, such as eating more fiber and drinking more water. This may help reduce straining that can occur when having a bowel movement. Reducing straining may help keep your symptoms from getting worse.    Maintain a healthy weight or lose excess weight. This can help reduce strain on abdominal muscles and tissues.    Stop smoking. This can help prevent coughing that may also strain abdominal muscles and tissues.  Follow-up care  Follow up with your healthcare provider, or as directed. If imaging tests were done, they will be reviewed a doctor. You will be told the results and any new findings that may affect your care.  When to seek medical advice  Call your healthcare provider right away if any of these occur:    Hernia hardens, swells, or grows larger    Hernia can no longer be pushed back in    Pain moves to the lower right abdomen (just below the waistline), or spreads to the back  Call 911  Call 911 right away if any of these occur:    Nausea and vomiting    Severe pain, redness, or tenderness in the area near the hernia    Pain worsens quickly and doesn t get better    Inability to have a bowel movement or pass gas    Fever of 100.4 F (38 C) or higher    Trouble breathing    Fainting    Rapid heart rate    Vomiting blood    Large amounts of blood in stool  Date Last Reviewed: 6/9/2015 2000-2017 [employer] [address] [place] [address], [place] [address]. All  "rights reserved. This information is not intended as a substitute for professional medical care. Always follow your healthcare professional's instructions.                Follow-ups after your visit        Your next 10 appointments already scheduled     Jan 08, 2018  9:00 AM CST   Pre-Op physical with KINA El MD   [facility]    [address]  [facility] [address]   [phone]            Jan 18, 2018   Procedure with Mehdi Sorensen MD   [facility] (--)    [address] [address]   [phone]           The medical center is located at [address]. (between [address] in [place], [place] miles north of [place]).              Who to contact     If you have questions or need follow up information about today's clinic visit or your schedule please contact [facility] directly at [phone].  Normal or non-critical lab and imaging results will be communicated to you by [employer], letter or phone within 4 business days after the clinic has received the results. If you do not hear from us within 7 days, please contact the clinic through [employer] or phone. If you have a critical or abnormal lab result, we will notify you by phone as soon as possible.  Submit refill requests through [employer] or call your pharmacy and they will forward the refill request to us. Please allow 3 business days for your refill to be completed.          Additional Information About Your Visit        [employer] Information     [employer] lets you send messages to your doctor, view your test results, renew your prescriptions, schedule appointments and more. To sign up, go to [email or web address] . Click on \"Log in\" on the left side of the screen, which will take you to the Welcome page. Then click on \"Sign up Now\" on the right side of the page.     You will be asked to enter the access code listed below, as well " "as some personal information. Please follow the directions to create your username and password.     Your access code is: [email or web address]  Expires: 3/16/2018  2:24 PM     Your access code will  in 90 days. If you need help or a new code, please call your [facility] clinic or [phone].        Care EveryWhere ID     This is your Care EveryWhere ID. This could be used by other organizations to access your [facility] medical records  [id number]        Your Vitals Were     Pulse Temperature Respirations Height Last Period BMI (Body Mass Index)    63 98  F (36.7  C) (Tympanic) 14 5' 4\" (1.626 m) 2010 28.39 kg/m2       Blood Pressure from Last 3 Encounters:   18 134/80   17 137/71   17 133/81    Weight from Last 3 Encounters:   18 165 lb 6.4 oz (75 kg)   17 160 lb (72.6 kg)   17 160 lb (72.6 kg)              Today, you had the following     No orders found for display         Today's Medication Changes          These changes are accurate as of: 18 12:13 PM.  If you have any questions, ask your nurse or doctor.               Start taking these medicines.        Dose/Directions    HYDROcodone-acetaminophen 5-325 MG per tablet   Commonly known as:  NORCO   Used for:  Incarcerated left inguinal hernia   Started by:  José Miguel Dhillno MD        Dose:  1-2 tablet   Take 1-2 tablets by mouth every 6 hours as needed for moderate to severe pain   Quantity:  40 tablet   Refills:  0            Where to get your medicines      Some of these will need a paper prescription and others can be bought over the counter.  Ask your nurse if you have questions.     Bring a paper prescription for each of these medications     HYDROcodone-acetaminophen 5-325 MG per tablet                Primary Care Provider Office Phone # Fax #    [place] Liz Cordova [phone] [address] [facility] [facility] [address]        Equal Access to Services     BREANNE MANCIA AH: Hadii " juan luis Reveles, wapilida cynthiaalexha, qaybta kajhonatan canjason, waxines rudolph cashaugustmirtha cronin erik. So [facility] [phone].    ATENCIÓN: Si habla devan, tiene a olmedo disposición servicios gratuitos de asistencia lingüística. Josef al [phone].    We comply with applicable federal civil rights laws and [place] laws. We do not discriminate on the basis of race, color, national origin, age, disability, sex, sexual orientation, or gender identity.            Thank you!     Thank you for choosing [facility]  for your care. Our goal is always to provide you with excellent care. Hearing back from our patients is one way we can continue to improve our services. Please take a few minutes to complete the written survey that you may receive in the mail after your visit with us. Thank you!             Your Updated Medication List - Protect others around you: Learn how to safely use, store and throw away your medicines at www.disposemymeds.org.          This list is accurate as of: 1/4/18 12:13 PM.  Always use your most recent med list.                   Brand Name Dispense Instructions for use Diagnosis    calcium carbonate 1250 MG tablet    OS-HOLLY 500 mg [place]. Ca     Take 1 tablet by mouth daily        HYDROcodone-acetaminophen 5-325 MG per tablet    NORCO    40 tablet    Take 1-2 tablets by mouth every 6 hours as needed for moderate to severe pain    Incarcerated left inguinal hernia       ibuprofen 600 MG tablet    ADVIL/MOTRIN    30 tablet    Take 1 tablet by mouth every 6 hours as needed for pain (For mild pain and temperature greater than 102F).    Tear of meniscus of left knee       MULTIVITAMIN WOMEN PO      Take 1 tablet by mouth daily        TYLENOL 325 MG tablet   Generic drug:  acetaminophen      Take 1 tablet by mouth every 6 hours as needed.        VITAMIN D (CHOLECALCIFEROL) PO      Take 400 Units by mouth daily

## 2018-01-05 ENCOUNTER — TELEPHONE (OUTPATIENT)
Dept: SURGERY | Facility: CLINIC | Age: 58
End: 2018-01-05

## 2018-01-05 NOTE — TELEPHONE ENCOUNTER
Reason for Call:  Other appointment    Detailed comments: pt calling stating she is scheduled for surgery on 1/18 but would like to get in sooner if possible.     Phone Number Patient can be reached at: Home number on file [phone] (home)    Best Time: any     Can we leave a detailed message on this number? YES    Call taken on 1/5/2018 at 9:09 AM by Yuly Paulino

## 2018-01-08 ENCOUNTER — ANESTHESIA EVENT (OUTPATIENT)
Dept: SURGERY | Facility: CLINIC | Age: 58
End: 2018-01-08
Payer: COMMERCIAL

## 2018-01-08 ENCOUNTER — OFFICE VISIT (OUTPATIENT)
Dept: FAMILY MEDICINE | Facility: CLINIC | Age: 58
End: 2018-01-08
Payer: COMMERCIAL

## 2018-01-08 VITALS
BODY MASS INDEX: 28.32 KG/M2 | WEIGHT: 165 LBS | HEART RATE: 70 BPM | SYSTOLIC BLOOD PRESSURE: 124 MMHG | TEMPERATURE: 97.6 F | DIASTOLIC BLOOD PRESSURE: 79 MMHG

## 2018-01-08 DIAGNOSIS — C91.01 ACUTE LYMPHOCYTIC LEUKEMIA IN REMISSION (H): ICD-10-CM

## 2018-01-08 DIAGNOSIS — C64.2 MALIGNANT NEOPLASM OF LEFT KIDNEY EXCLUDING RENAL PELVIS (H): ICD-10-CM

## 2018-01-08 DIAGNOSIS — K40.91 RECURRENT LEFT INGUINAL HERNIA: ICD-10-CM

## 2018-01-08 DIAGNOSIS — Z01.818 PREOP GENERAL PHYSICAL EXAM: Primary | ICD-10-CM

## 2018-01-08 PROCEDURE — 99214 OFFICE O/P EST MOD 30 MIN: CPT | Performed by: FAMILY MEDICINE

## 2018-01-08 NOTE — NURSING NOTE
"Initial /79  Pulse 70  Temp 97.6  F (36.4  C) (Tympanic)  Wt 165 lb (74.8 kg)  LMP 12/05/2010  BMI 28.32 kg/m2 Estimated body mass index is 28.32 kg/(m^2) as calculated from the following:    Height as of 1/4/18: 5' 4\" (1.626 m).    Weight as of this encounter: 165 lb (74.8 kg). .      "

## 2018-01-08 NOTE — PROGRESS NOTES
[facility]  [address]  [facility] [address]  [phone]  Dept: [phone]    PRE-OP EVALUATION:  Today's date: 2018    Lydia Pierson (: 1960) presents for pre-operative evaluation assessment as requested by Dr. Sorensen.  She requires evaluation and anesthesia risk assessment prior to undergoing surgery/procedure for treatment of recurrent left inguinal hernia .  Proposed procedure: LAPAROSCOPIC HERNIORRHAPHY INGUINAL    Date of Surgery/ Procedure: 18  Time of Surgery/ Procedure: 10:00am  Hospital/Surgical Facility: [facility]  Primary Physician: Benja Cordova  Type of Anesthesia Anticipated: General    Patient has a Health Care Directive or Living Will:  YES     1. NO - Do you have a history of heart attack, stroke, stent, bypass or surgery on an artery in the head, neck, heart or legs?  2. NO - Do you ever have any pain or discomfort in your chest?  3. NO - Do you have a history of  Heart Failure?  4. NO - Are you troubled by shortness of breath when: walking on the level, up a slight hill or at night?  5. NO - Do you currently have a cold, bronchitis or other respiratory infection?  6. NO - Do you have a cough, shortness of breath or wheezing?  7. NO - Do you sometimes get pains in the calves of your legs when you walk?  8. NO - Do you or anyone in your family have previous history of blood clots?  9. NO - Do you or does anyone in your family have a serious bleeding problem such as prolonged bleeding following surgeries or cuts?  10. YES - HAVE YOU EVER HAD PROBLEMS WITH ANEMIA OR BEEN TOLD TO TAKE IRON PILLS?  With 1 of her pregnancies  11. NO - Have you had any abnormal blood loss such as black, tarry or bloody stools, or abnormal vaginal bleeding?  12. YES - HAVE YOU EVER HAD A BLOOD TRANSFUSION?  With that same pregnancy  13. NO - Have you or any of your relatives ever had problems with anesthesia?  14. NO - Do you have sleep apnea,  excessive snoring or daytime drowsiness?  15. NO - Do you have any prosthetic heart valves?  16. NO - Do you have prosthetic joints?  17. NO - Is there any chance that you may be pregnant?        HPI:                                                      Brief HPI related to upcoming procedure: She had left inguinal hernia surgery in August 2017.  The hernia has recurred and was quite painful about a week ago.  It did settle down and so is coming in now for elective repair      See problem list for active medical problems.  Problems all longstanding and stable, except as noted/documented.  See ROS for pertinent symptoms related to these conditions.                                                                                                  .    MEDICAL HISTORY:                                                    Patient Active Problem List    Diagnosis Date Noted     Closed nondisplaced fracture of neck of fifth metacarpal bone of left hand 08/21/2017     Priority: Medium     Advanced directives, counseling/discussion 02/12/2016     Priority: Medium     Patient does not have an Advance/Health Care Directive (HCD), declines information/referral.    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     left kidney removed 1/01 February 21, 2005 no evid of recurrence on CT chest and abdomen            Past Medical History:   Diagnosis Date     Acute lymphoid leukemia, without mention of having achieved remission(204.00) 1976    ALL at age 16,  no evid of recurrence     Allergic rhinitis, cause unspecified     Rhinitis     Malignant neoplasm of kidney, except pelvis 2001    left kidney removed 1/01     Motion sickness      Past Surgical History:   Procedure Laterality Date     ARTHROSCOPY KNEE WITH MEDIAL MENISCECTOMY  7/7/2011    Procedure:ARTHROSCOPY KNEE WITH MEDIAL MENISCECTOMY; choice anes; Surgeon:MANUEL FLEMING; Location:[facility] OR     HEMORRHOIDECTOMY  2006     HERNIORRHAPHY INGUINAL Left 8/17/2017    Procedure: HERNIORRHAPHY INGUINAL;  Left Inguinal Hernia Repair;  Surgeon: Mehdi Sorensen MD;  Location: [facility] OR     NEPHRECTOMY RT/LT  2001    left partial nephrectomy- kidney ca     SALPINGO OOPHORECTOMY,R/L/KRISHNA  2010    Rt salpingo oophorectomy      SURGICAL HISTORY OF -   8/1/1995    Vein stripping     SURGICAL HISTORY OF -   1975    wisdom teeth extraction      TONSILLECTOMY & ADENOIDECTOMY  1962     TUBAL LIGATION  1994     Current Outpatient Prescriptions   Medication Sig Dispense Refill     Multiple Vitamins-Minerals (MULTIVITAMIN WOMEN PO) Take 1 tablet by mouth daily       VITAMIN D, CHOLECALCIFEROL, PO Take 400 Units by mouth daily       acetaminophen (TYLENOL) 325 MG tablet Take 1 tablet by mouth every 6 hours as needed.       ibuprofen (ADVIL,MOTRIN) 600 MG tablet Take 1 tablet by mouth every 6 hours as needed for pain (For mild pain and temperature greater than 102F). 30 tablet 0     OTC products: None, except as noted above    Allergies   Allergen Reactions     Seasonal Allergies       Latex Allergy: NO    Social History   Substance Use Topics     Smoking status: Never Smoker     Smokeless tobacco: Never Used      Alcohol use No     History   Drug Use No       REVIEW OF SYSTEMS:                                                    C: NEGATIVE for fever, chills, change in weight  E/M: NEGATIVE for ear, mouth and throat problems  R: NEGATIVE for significant cough or SOB  CV: NEGATIVE for chest pain, palpitations or peripheral edema    EXAM:                                                    [facility] 12/05/2010  GENERAL APPEARANCE: healthy, alert and no distress  EYES: Eyes grossly normal to inspection, PERRL and conjunctivae and sclerae normal  HENT: ear canals and TM's normal and nose and mouth without ulcers or lesions  NECK: no adenopathy, no asymmetry, masses, or scars and thyroid normal to palpation  RESP: lungs clear to auscultation - no rales, rhonchi or wheezes  CV: regular rate and rhythm, normal S1 S2, no S3 or S4 and no murmur, click or rub   ABDOMEN: soft, nontender, no HSM or masses and bowel sounds normal  NEURO: Normal strength and tone, sensory exam grossly normal, mentation intact and speech normal    DIAGNOSTICS:                                                    EKG: She had an EKG in 2015 which was unremarkable and a stress test which was negative for ischemia the same year; not repeated today    Recent Labs   Lab Test  12/16/17   1307  04/28/17   1531  02/12/16   0824   HGB  14.2  12.9  13.5   PLT  209  207  214   NA  142   --   141   POTASSIUM  3.8   --   4.0   CR  0.52   --   0.59        IMPRESSION:                                                    Reason for surgery/procedure: Recurrent left inguinal hernia  Diagnosis/reason for consult: Evaluate for anesthesia risk    The proposed surgical procedure is considered LOW risk.    REVISED CARDIAC RISK INDEX  The patient has the following serious cardiovascular risks for perioperative complications such as (MI, PE, VFib and 3  AV Block):  No serious cardiac risks  INTERPRETATION: 0 risks: Class I (very low risk - 0.4% complication rate)    The patient has the  following additional risks for perioperative complications:  No identified additional risks      ICD-10-CM    1. Preop general physical exam Z01.818    2. Recurrent left inguinal hernia K40.91    3. Malignant neoplasm of left kidney excluding renal pelvis (H) C64.2    4. 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    Anticoagulant or Antiplatelet Medication Use  NSAIDS: Ibuprofen (Motrin):         Stop one week prior to surgery          APPROVAL GIVEN to proceed with proposed procedure, without further diagnostic evaluation       Signed Electronically by: KINA El MD    Copy of this evaluation report is provided to requesting physician.    [facility] Preop Guidelines

## 2018-01-08 NOTE — MR AVS SNAPSHOT
After Visit Summary   1/8/2018    Lydia Pierson    MRN: [id number]           Patient Information     Date Of Birth          1960        Visit Information        Provider Department      1/8/2018 9:00 AM KINA El MD [facility]        Today's Diagnoses     Preop general physical exam    -  1      Care Instructions      Before Your Surgery      Call your surgeon if there is any change in your health. This includes signs of a cold or flu (such as a sore throat, runny nose, cough, rash or fever).    Do not smoke, drink alcohol or take over the counter medicine (unless your surgeon or primary care doctor tells you to) for the 24 hours before and after surgery.    If you take prescribed drugs: Follow your doctor s orders about which medicines to take and which to stop until after surgery.    Eating and drinking prior to surgery: follow the instructions from your surgeon    Take a shower or bath the night before surgery. Use the soap your surgeon gave you to gently clean your skin. If you do not have soap from your surgeon, use your regular soap. Do not shave or scrub the surgery site.  Wear clean pajamas and have clean sheets on your bed.           Follow-ups after your visit        Your next 10 appointments already scheduled     Jan 18, 2018   Procedure with Mehdi Sorensen MD   [facility] (--)    [address] [address] [phone]           The medical center is located at [address] (between [address] in [place], [place] north of [place]).              Who to contact     If you have questions or need follow up information about today's clinic visit or your schedule please contact [facility] directly at [phone].  Normal or non-critical lab and imaging results will be communicated to you by MyChart, letter or phone within 4 business days after the clinic has received the  "results. If you do not hear from us within 7 days, please contact the clinic through [employer] or phone. If you have a critical or abnormal lab result, we will notify you by phone as soon as possible.  Submit refill requests through [employer] or call your pharmacy and they will forward the refill request to us. Please allow 3 business days for your refill to be completed.          Additional Information About Your Visit        [employer] Information     [employer] lets you send messages to your doctor, view your test results, renew your prescriptions, schedule appointments and more. To sign up, go to [email or web address] . Click on \"Log in\" on the left side of the screen, which will take you to the Welcome page. Then click on \"Sign up Now\" on the right side of the page.     You will be asked to enter the access code listed below, as well as some personal information. Please follow the directions to create your username and password.     Your access code is: [email or web address]  Expires: 3/16/2018  2:24 PM     Your access code will  in 90 days. If you need help or a new code, please call your [facility] clinic or [phone].        Care EveryWhere ID     This is your Care EveryWhere ID. This could be used by other organizations to access your [facility] medical records  [id number]        Your Vitals Were     Pulse Temperature Last Period BMI (Body Mass Index)          70 97.6  F (36.4  C) (Tympanic) 2010 28.32 kg/m2         Blood Pressure from Last 3 Encounters:   18 124/79   18 134/80   17 137/71    Weight from Last 3 Encounters:   18 165 lb (74.8 kg)   18 165 lb 6.4 oz (75 kg)   17 160 lb (72.6 kg)              Today, you had the following     No orders found for display         Today's Medication Changes          These changes are accurate as of: 18  9:34 AM.  If you have any questions, ask your nurse or doctor.               Stop taking these medicines if you haven't already. " Please contact your care team if you have questions.     calcium carbonate 1250 MG tablet   Commonly known as:  OS-HOLLY 500 mg [place]. Ca   Stopped by:  KINA El MD                    Primary Care Provider Office Phone # Fax #    [place] Liz Cordova [phone] [address]  [facility] [address]        Equal Access to Services     [facility]: Hadii aad ku hadasho Soomaali, waaxda luqadaha, qaybta kaalmada adeegyada, waxay idiin hayaan adeeg kharash la'aan ah. So [facility] [phone].    ATENCIÓN: Si ljla espmelvin, tiene a olmedo disposición servicios gratuitos de asistencia lingüística. Josef al [phone].    We comply with applicable federal civil rights laws and [place] laws. We do not discriminate on the basis of race, color, national origin, age, disability, sex, sexual orientation, or gender identity.            Thank you!     Thank you for choosing [facility]  for your care. Our goal is always to provide you with excellent care. Hearing back from our patients is one way we can continue to improve our services. Please take a few minutes to complete the written survey that you may receive in the mail after your visit with us. Thank you!             Your Updated Medication List - Protect others around you: Learn how to safely use, store and throw away your medicines at www.disposemymeds.org.          This list is accurate as of: 1/8/18  9:34 AM.  Always use your most recent med list.                   Brand Name Dispense Instructions for use Diagnosis    HYDROcodone-acetaminophen 5-325 MG per tablet    NORCO    40 tablet    Take 1-2 tablets by mouth every 6 hours as needed for moderate to severe pain    Incarcerated left inguinal hernia       ibuprofen 600 MG tablet    ADVIL/MOTRIN    30 tablet    Take 1 tablet by mouth every 6 hours as needed for pain (For mild pain and temperature greater than 102F).    Tear of meniscus of left knee       MULTIVITAMIN WOMEN  PO      Take 1 tablet by mouth daily        TYLENOL 325 MG tablet   Generic drug:  acetaminophen      Take 1 tablet by mouth every 6 hours as needed.        VITAMIN D (CHOLECALCIFEROL) PO      Take 400 Units by mouth daily

## 2018-01-08 NOTE — TELEPHONE ENCOUNTER
Spoke with [place] in [facility].  Surgery moved up to 1/11 at 0930.  Pt had preop this am and was seen last week with Dr Dhillon for increased pain, incarcerated hernia.  She had no questions at this time and was informed that surgery staff will call to confirm arrival time.    Mildred Yoon, CMA

## 2018-01-11 ENCOUNTER — HOSPITAL ENCOUNTER (OUTPATIENT)
Facility: CLINIC | Age: 58
Discharge: HOME | End: 2018-01-11
Attending: SURGERY | Admitting: SURGERY
Payer: COMMERCIAL

## 2018-01-11 ENCOUNTER — ANESTHESIA (OUTPATIENT)
Dept: SURGERY | Facility: CLINIC | Age: 58
End: 2018-01-11
Payer: COMMERCIAL

## 2018-01-11 VITALS
OXYGEN SATURATION: 98 % | RESPIRATION RATE: 16 BRPM | WEIGHT: 160 LBS | SYSTOLIC BLOOD PRESSURE: 131 MMHG | HEART RATE: 49 BPM | DIASTOLIC BLOOD PRESSURE: 65 MMHG | HEIGHT: 64 IN | TEMPERATURE: 97.9 F | BODY MASS INDEX: 27.31 KG/M2

## 2018-01-11 DIAGNOSIS — G89.18 POST-OP PAIN: Primary | ICD-10-CM

## 2018-01-11 PROCEDURE — 71000012 ZZH RECOVERY PHASE 1 LEVEL 1 FIRST HR: Performed by: SURGERY

## 2018-01-11 PROCEDURE — 40000305 ZZH STATISTIC PRE PROC ASSESS I: Performed by: SURGERY

## 2018-01-11 PROCEDURE — 27210794 ZZH OR GENERAL SUPPLY STERILE: Performed by: SURGERY

## 2018-01-11 PROCEDURE — 25000128 H RX IP 250 OP 636: Performed by: NURSE ANESTHETIST, CERTIFIED REGISTERED

## 2018-01-11 PROCEDURE — 25000125 ZZHC RX 250: Performed by: SURGERY

## 2018-01-11 PROCEDURE — 25000125 ZZHC RX 250: Performed by: NURSE ANESTHETIST, CERTIFIED REGISTERED

## 2018-01-11 PROCEDURE — 25000132 ZZH RX MED GY IP 250 OP 250 PS 637: Performed by: SURGERY

## 2018-01-11 PROCEDURE — 36000056 ZZH SURGERY LEVEL 3 1ST 30 MIN: Performed by: SURGERY

## 2018-01-11 PROCEDURE — 49553 RPR FEM HERNIA INIT BLOCKED: CPT | Performed by: SURGERY

## 2018-01-11 PROCEDURE — 25000566 ZZH SEVOFLURANE, EA 15 MIN: Performed by: SURGERY

## 2018-01-11 PROCEDURE — 25000128 H RX IP 250 OP 636: Performed by: SURGERY

## 2018-01-11 PROCEDURE — 36000058 ZZH SURGERY LEVEL 3 EA 15 ADDTL MIN: Performed by: SURGERY

## 2018-01-11 PROCEDURE — 71000027 ZZH RECOVERY PHASE 2 EACH 15 MINS: Performed by: SURGERY

## 2018-01-11 PROCEDURE — 37000009 ZZH ANESTHESIA TECHNICAL FEE, EACH ADDTL 15 MIN: Performed by: SURGERY

## 2018-01-11 PROCEDURE — 37000008 ZZH ANESTHESIA TECHNICAL FEE, 1ST 30 MIN: Performed by: SURGERY

## 2018-01-11 PROCEDURE — 49553 RPR FEM HERNIA INIT BLOCKED: CPT | Mod: AS | Performed by: PHYSICIAN ASSISTANT

## 2018-01-11 RX ORDER — HYDROCODONE BITARTRATE AND ACETAMINOPHEN 5; 325 MG/1; MG/1
1-2 TABLET ORAL EVERY 4 HOURS PRN
Qty: 30 TABLET | Refills: 0 | Status: SHIPPED | OUTPATIENT
Start: 2018-01-11 | End: 2018-01-25

## 2018-01-11 RX ORDER — NEOSTIGMINE METHYLSULFATE 1 MG/ML
VIAL (ML) INJECTION PRN
Status: DISCONTINUED | OUTPATIENT
Start: 2018-01-11 | End: 2018-01-11

## 2018-01-11 RX ORDER — FENTANYL CITRATE 50 UG/ML
25-50 INJECTION, SOLUTION INTRAMUSCULAR; INTRAVENOUS
Status: DISCONTINUED | OUTPATIENT
Start: 2018-01-11 | End: 2018-01-11 | Stop reason: HOSPADM

## 2018-01-11 RX ORDER — LIDOCAINE HYDROCHLORIDE 10 MG/ML
INJECTION, SOLUTION INFILTRATION; PERINEURAL PRN
Status: DISCONTINUED | OUTPATIENT
Start: 2018-01-11 | End: 2018-01-11

## 2018-01-11 RX ORDER — PROPOFOL 10 MG/ML
INJECTION, EMULSION INTRAVENOUS PRN
Status: DISCONTINUED | OUTPATIENT
Start: 2018-01-11 | End: 2018-01-11

## 2018-01-11 RX ORDER — MEPERIDINE HYDROCHLORIDE 25 MG/ML
12.5 INJECTION INTRAMUSCULAR; INTRAVENOUS; SUBCUTANEOUS
Status: DISCONTINUED | OUTPATIENT
Start: 2018-01-11 | End: 2018-01-11 | Stop reason: HOSPADM

## 2018-01-11 RX ORDER — NALOXONE HYDROCHLORIDE 0.4 MG/ML
.1-.4 INJECTION, SOLUTION INTRAMUSCULAR; INTRAVENOUS; SUBCUTANEOUS
Status: DISCONTINUED | OUTPATIENT
Start: 2018-01-11 | End: 2018-01-11 | Stop reason: HOSPADM

## 2018-01-11 RX ORDER — CEFAZOLIN SODIUM 2 G/100ML
2 INJECTION, SOLUTION INTRAVENOUS
Status: COMPLETED | OUTPATIENT
Start: 2018-01-11 | End: 2018-01-11

## 2018-01-11 RX ORDER — FENTANYL CITRATE 50 UG/ML
INJECTION, SOLUTION INTRAMUSCULAR; INTRAVENOUS PRN
Status: DISCONTINUED | OUTPATIENT
Start: 2018-01-11 | End: 2018-01-11

## 2018-01-11 RX ORDER — LIDOCAINE 40 MG/G
CREAM TOPICAL
Status: DISCONTINUED | OUTPATIENT
Start: 2018-01-11 | End: 2018-01-11 | Stop reason: HOSPADM

## 2018-01-11 RX ORDER — HYDROCODONE BITARTRATE AND ACETAMINOPHEN 5; 325 MG/1; MG/1
1 TABLET ORAL
Status: COMPLETED | OUTPATIENT
Start: 2018-01-11 | End: 2018-01-11

## 2018-01-11 RX ORDER — ONDANSETRON 4 MG/1
4 TABLET, ORALLY DISINTEGRATING ORAL EVERY 30 MIN PRN
Status: DISCONTINUED | OUTPATIENT
Start: 2018-01-11 | End: 2018-01-11 | Stop reason: HOSPADM

## 2018-01-11 RX ORDER — CEFAZOLIN SODIUM 1 G/3ML
1 INJECTION, POWDER, FOR SOLUTION INTRAMUSCULAR; INTRAVENOUS SEE ADMIN INSTRUCTIONS
Status: DISCONTINUED | OUTPATIENT
Start: 2018-01-11 | End: 2018-01-11 | Stop reason: HOSPADM

## 2018-01-11 RX ORDER — GLYCOPYRROLATE 0.2 MG/ML
INJECTION, SOLUTION INTRAMUSCULAR; INTRAVENOUS PRN
Status: DISCONTINUED | OUTPATIENT
Start: 2018-01-11 | End: 2018-01-11

## 2018-01-11 RX ORDER — DEXAMETHASONE SODIUM PHOSPHATE 4 MG/ML
INJECTION, SOLUTION INTRA-ARTICULAR; INTRALESIONAL; INTRAMUSCULAR; INTRAVENOUS; SOFT TISSUE PRN
Status: DISCONTINUED | OUTPATIENT
Start: 2018-01-11 | End: 2018-01-11

## 2018-01-11 RX ORDER — SODIUM CHLORIDE, SODIUM LACTATE, POTASSIUM CHLORIDE, CALCIUM CHLORIDE 600; 310; 30; 20 MG/100ML; MG/100ML; MG/100ML; MG/100ML
INJECTION, SOLUTION INTRAVENOUS CONTINUOUS
Status: DISCONTINUED | OUTPATIENT
Start: 2018-01-11 | End: 2018-01-11 | Stop reason: HOSPADM

## 2018-01-11 RX ORDER — HYDROMORPHONE HYDROCHLORIDE 1 MG/ML
.3-.5 INJECTION, SOLUTION INTRAMUSCULAR; INTRAVENOUS; SUBCUTANEOUS EVERY 10 MIN PRN
Status: DISCONTINUED | OUTPATIENT
Start: 2018-01-11 | End: 2018-01-11 | Stop reason: HOSPADM

## 2018-01-11 RX ORDER — BUPIVACAINE HYDROCHLORIDE AND EPINEPHRINE 2.5; 5 MG/ML; UG/ML
INJECTION, SOLUTION INFILTRATION; PERINEURAL PRN
Status: DISCONTINUED | OUTPATIENT
Start: 2018-01-11 | End: 2018-01-11 | Stop reason: HOSPADM

## 2018-01-11 RX ORDER — ONDANSETRON 2 MG/ML
4 INJECTION INTRAMUSCULAR; INTRAVENOUS EVERY 30 MIN PRN
Status: DISCONTINUED | OUTPATIENT
Start: 2018-01-11 | End: 2018-01-11 | Stop reason: HOSPADM

## 2018-01-11 RX ORDER — ONDANSETRON 2 MG/ML
INJECTION INTRAMUSCULAR; INTRAVENOUS PRN
Status: DISCONTINUED | OUTPATIENT
Start: 2018-01-11 | End: 2018-01-11

## 2018-01-11 RX ADMIN — CEFAZOLIN SODIUM 2 G: 2 INJECTION, SOLUTION INTRAVENOUS at 09:42

## 2018-01-11 RX ADMIN — FENTANYL CITRATE 25 MCG: 50 INJECTION, SOLUTION INTRAMUSCULAR; INTRAVENOUS at 10:54

## 2018-01-11 RX ADMIN — GLYCOPYRROLATE 0.5 MG: 0.2 INJECTION, SOLUTION INTRAMUSCULAR; INTRAVENOUS at 10:25

## 2018-01-11 RX ADMIN — LIDOCAINE HYDROCHLORIDE 1 ML: 10 INJECTION, SOLUTION EPIDURAL; INFILTRATION; INTRACAUDAL; PERINEURAL at 09:01

## 2018-01-11 RX ADMIN — SODIUM CHLORIDE, POTASSIUM CHLORIDE, SODIUM LACTATE AND CALCIUM CHLORIDE: 600; 310; 30; 20 INJECTION, SOLUTION INTRAVENOUS at 11:12

## 2018-01-11 RX ADMIN — SODIUM CHLORIDE, POTASSIUM CHLORIDE, SODIUM LACTATE AND CALCIUM CHLORIDE: 600; 310; 30; 20 INJECTION, SOLUTION INTRAVENOUS at 09:01

## 2018-01-11 RX ADMIN — HYDROCODONE BITARTRATE AND ACETAMINOPHEN 1 TABLET: 5; 325 TABLET ORAL at 11:56

## 2018-01-11 RX ADMIN — Medication 3 MG: at 10:25

## 2018-01-11 RX ADMIN — LIDOCAINE HYDROCHLORIDE 50 MG: 10 INJECTION, SOLUTION INFILTRATION; PERINEURAL at 09:48

## 2018-01-11 RX ADMIN — ONDANSETRON 4 MG: 2 INJECTION INTRAMUSCULAR; INTRAVENOUS at 10:10

## 2018-01-11 RX ADMIN — FENTANYL CITRATE 25 MCG: 50 INJECTION, SOLUTION INTRAMUSCULAR; INTRAVENOUS at 11:07

## 2018-01-11 RX ADMIN — MIDAZOLAM 2 MG: 1 INJECTION INTRAMUSCULAR; INTRAVENOUS at 09:42

## 2018-01-11 RX ADMIN — FENTANYL CITRATE 100 MCG: 50 INJECTION, SOLUTION INTRAMUSCULAR; INTRAVENOUS at 09:48

## 2018-01-11 RX ADMIN — HYDROMORPHONE HYDROCHLORIDE 0.5 MG: 1 INJECTION, SOLUTION INTRAMUSCULAR; INTRAVENOUS; SUBCUTANEOUS at 11:08

## 2018-01-11 RX ADMIN — FENTANYL CITRATE 25 MCG: 50 INJECTION, SOLUTION INTRAMUSCULAR; INTRAVENOUS at 10:57

## 2018-01-11 RX ADMIN — ROCURONIUM BROMIDE 40 MG: 10 INJECTION INTRAVENOUS at 09:48

## 2018-01-11 RX ADMIN — FENTANYL CITRATE 25 MCG: 50 INJECTION, SOLUTION INTRAMUSCULAR; INTRAVENOUS at 11:03

## 2018-01-11 RX ADMIN — DEXAMETHASONE SODIUM PHOSPHATE 4 MG: 4 INJECTION, SOLUTION INTRA-ARTICULAR; INTRALESIONAL; INTRAMUSCULAR; INTRAVENOUS; SOFT TISSUE at 09:56

## 2018-01-11 RX ADMIN — PROPOFOL 150 MG: 10 INJECTION, EMULSION INTRAVENOUS at 09:48

## 2018-01-11 NOTE — ANESTHESIA PREPROCEDURE EVALUATION
Anesthesia Evaluation     . Pt has had prior anesthetic. Type: General and MAC    No history of anesthetic complications          ROS/MED HX    ENT/Pulmonary:     (+)allergic rhinitis, , . .    Neurologic:  - neg neurologic ROS     Cardiovascular:     (+) Dyslipidemia, ----. : . . . :. .       METS/Exercise Tolerance:  >4 METS   Hematologic:  - neg hematologic  ROS       Musculoskeletal:  - neg musculoskeletal ROS       GI/Hepatic:  - neg GI/hepatic ROS       Renal/Genitourinary:     (+) chronic renal disease, Other Renal/ Genitourinary, Nephrectomy for renal cell cancer      Endo:  - neg endo ROS       Psychiatric:  - neg psychiatric ROS       Infectious Disease:  - neg infectious disease ROS       Malignancy:   (+) Malignancy History of Lymphoma/Leukemia and Other  Lymph CA Remission status post Chemo, Other CA renal cell Remission status post Surgery         Other:    - neg other ROS                 Physical Exam  Normal systems: cardiovascular, pulmonary and dental    Airway   Mallampati: II  TM distance: >3 FB  Neck ROM: full    Dental     Cardiovascular       Pulmonary                     Anesthesia Plan      History & Physical Review  History and physical reviewed and following examination; no interval change.    ASA Status:  2 .    NPO Status:  > 8 hours    Plan for General and ETT with Intravenous and Propofol induction. Maintenance will be Inhalation.    PONV prophylaxis:  Ondansetron (or other 5HT-3) and Dexamethasone or Solumedrol  Additional equipment: Videolaryngoscope      Postoperative Care      Consents  Anesthetic plan, risks, benefits and alternatives discussed with:  Patient..                          .

## 2018-01-11 NOTE — DISCHARGE INSTRUCTIONS
Same Day Surgery Discharge Instructions  Special Precautions After Surgery - Adult    1. It is not unusual to feel lightheaded or faint, up to 24 hours after surgery or while taking pain medication.  If you have these symptoms; sit for a few minutes before standing and have someone assist you when getting up.  2. You should rest and relax for the next 24 hours and must have someone stay with you for at least 24 hours after your discharge.  3. DO NOT DRIVE any vehicle or operate mechanical equipment for 24 hours following the end of your surgery.  DO NOT DRIVE while taking narcotic pain medications that have been prescribed by your physician.  If you had a limb operated on, you must be able to use it fully to drive.  4. DO NOT drink alcoholic beverages for 24 hours following surgery or while taking prescription pain medication.  5. Drink clear liquids (apple juice, ginger ale, broth, 7-Up, etc.).  Progress to your regular diet as you feel able.  6. Any questions call your physician and do not make important decisions for 24 hours.    ACTIVITY  ? As tolerated     INCISIONAL CARE  ? May shower tomorrow     Call for an appointment to return to the clinic in 2 weeks    Medications:  ? Norco as instructed on bottle next dose 4pm     Additional discharge instructions:   Nausea and Vomiting  What are nausea and vomiting?   Nausea is the queasy feeling you usually have before you vomit. Vomiting is the forceful emptying (throwing up) of the stomach's contents through the mouth.   What causes nausea and vomiting?   Nausea and vomiting are symptoms that may occur with many conditions, such as:   Anesthesia medications   side effect of narcotic medicines  exposure to unpleasant odors or sights   stress and anxiety     How is it treated?   At first you should rest your stomach for a few hours by eating nothing solid and sipping only clear liquids. A little later you can eat soft bland foods that are easy to  digest.   It is important to drink small amounts (1 to 4 ounces) often so that you do not become dehydrated. Gradually drink larger amounts of the clear fluids. If you vomit, wait an hour, then start over with a smaller amount of fluid.   Eat slowly and avoid foods that are acidic, spicy, fatty, or fibrous (such as meats, coarse grains, and raw vegetables). Also avoid extremely hot or cold food. In addition, avoid dairy products if you have diarrhea. You may start eating your normal diet again in 3 days or so, when all signs of illness have passed.   Rest as much as possible. Sit or lie down with your head propped up. Do not lie flat for at least 2 hours after eating. Nausea and vomiting usually last only a short period of time. If you have cramping or pain in your belly you can try putting a heating pad set at low or a covered hot water bottle on your belly. Never set a heating pad on high because you could get burned.   If you have been vomiting for more than a day or have had diarrhea for over 3 days, you may need to have an exam by your provider, including a check for dehydration. If you are very dehydrated, you may need to be given fluids intravenously (IV). In children and older adults dehydration can quickly become life threatening.   When should I call my healthcare provider?   Talk with your provider if you are unable to keep fluids down for more than 12 hours or if you have any of the following symptoms with nausea and vomiting:   severe headache or neck ache, or stiff neck   severe abdominal pain   diarrhea and vomiting that last more than 24 hours   blood in the vomited material that may look red, brown, or black, or like coffee grounds   bloody diarrhea   very forceful vomiting   signs of dehydration such as dry mouth, excessive thirst, little or no urination, severe weakness, dizziness, or lightheadedness.   If you have nausea and pain in the jaw, arm, shoulder, chest, or back; sweating; shortness of  breath; or lightheadedness; call 911 for emergency care.     __________________________________________________________________________________________________________________________________  IMPORTANT NUMBERS:    [facility] Main Number:  [phone], [phone]  Pharmacy:  [phone]  Same Day Surgery:  [phone], Monday - Friday until 8:30 p.m.  Urgent Care:  [phone]  Emergency Room:  [phone]      [facility]:  [phone]                                                                             [facility]:  [phone] option [address] [place] [address] Orthopedics:  [phone]     [facility]:  [phone]   [facility]:  [phone]   Home Medical Equipment: [phone]  [facility]:  [phone]

## 2018-01-11 NOTE — ANESTHESIA CARE TRANSFER NOTE
Patient: Lydia Pierson    Procedure(s):  Laparoscopic Left Inguinal Hernia Repair converted to open left femoral  hernia repair - Wound Class: I-Clean    Diagnosis: left symptomatic inguinal hernia  Diagnosis Additional Information: No value filed.    Anesthesia Type:   General, ETT     Note:  Airway :Nasal Cannula  Patient transferred to:PACU  Handoff Report: Identifed the Patient, Identified the Reponsible Provider, Reviewed the pertinent medical history, Discussed the surgical course, Reviewed Intra-OP anesthesia mangement and issues during anesthesia, Set expectations for post-procedure period and Allowed opportunity for questions and acknowledgement of understanding      Vitals: (Last set prior to Anesthesia Care Transfer)    CRNA VITALS  1/11/2018 1011 - 1/11/2018 1046      1/11/2018             Pulse: 77    SpO2: 100 %    Resp Rate (observed): (!)  6                Electronically Signed By: JOAQUIN Lazar CRNA  January 11, 2018  10:46 AM

## 2018-01-11 NOTE — ANESTHESIA POSTPROCEDURE EVALUATION
Patient: Lydia Pierson    Procedure(s):  Laparoscopic Left Inguinal Hernia Repair converted to open left femoral  hernia repair - Wound Class: I-Clean    Diagnosis:left symptomatic inguinal hernia  Diagnosis Additional Information: No value filed.    Anesthesia Type:  General, ETT    Note:  Anesthesia Post Evaluation    Patient location during evaluation: Phase 2  Patient participation: Able to fully participate in evaluation  Level of consciousness: awake and alert  Pain management: adequate  Airway patency: patent  Cardiovascular status: stable  Respiratory status: room air  Hydration status: stable  PONV: none     Anesthetic complications: None          Last vitals:  Vitals:    01/11/18 1115 01/11/18 1130 01/11/18 1144   BP: 119/71 120/75 131/65   Pulse: 56 60 (!) 49   Resp: 16 20 16   Temp:  36.6  C (97.9  F)    SpO2: 98% 97% 98%         Electronically Signed By: JOAQUIN Newman CRNA  January 11, 2018  3:49 PM

## 2018-01-11 NOTE — H&P (VIEW-ONLY)
[facility]  [address]  [facility] [address]  [phone]  Dept: [phone]    PRE-OP EVALUATION:  Today's date: 2018    Lydia Pierson (: 1960) presents for pre-operative evaluation assessment as requested by Dr. Sorensen.  She requires evaluation and anesthesia risk assessment prior to undergoing surgery/procedure for treatment of recurrent left inguinal hernia .  Proposed procedure: LAPAROSCOPIC HERNIORRHAPHY INGUINAL    Date of Surgery/ Procedure: 18  Time of Surgery/ Procedure: 10:00am  Hospital/Surgical Facility: [facility]  Primary Physician: Benja Cordova  Type of Anesthesia Anticipated: General    Patient has a Health Care Directive or Living Will:  YES     1. NO - Do you have a history of heart attack, stroke, stent, bypass or surgery on an artery in the head, neck, heart or legs?  2. NO - Do you ever have any pain or discomfort in your chest?  3. NO - Do you have a history of  Heart Failure?  4. NO - Are you troubled by shortness of breath when: walking on the level, up a slight hill or at night?  5. NO - Do you currently have a cold, bronchitis or other respiratory infection?  6. NO - Do you have a cough, shortness of breath or wheezing?  7. NO - Do you sometimes get pains in the calves of your legs when you walk?  8. NO - Do you or anyone in your family have previous history of blood clots?  9. NO - Do you or does anyone in your family have a serious bleeding problem such as prolonged bleeding following surgeries or cuts?  10. YES - HAVE YOU EVER HAD PROBLEMS WITH ANEMIA OR BEEN TOLD TO TAKE IRON PILLS?  With 1 of her pregnancies  11. NO - Have you had any abnormal blood loss such as black, tarry or bloody stools, or abnormal vaginal bleeding?  12. YES - HAVE YOU EVER HAD A BLOOD TRANSFUSION?  With that same pregnancy  13. NO - Have you or any of your relatives ever had problems with anesthesia?  14. NO - Do you have sleep apnea,  excessive snoring or daytime drowsiness?  15. NO - Do you have any prosthetic heart valves?  16. NO - Do you have prosthetic joints?  17. NO - Is there any chance that you may be pregnant?        HPI:                                                      Brief HPI related to upcoming procedure: She had left inguinal hernia surgery in August 2017.  The hernia has recurred and was quite painful about a week ago.  It did settle down and so is coming in now for elective repair      See problem list for active medical problems.  Problems all longstanding and stable, except as noted/documented.  See ROS for pertinent symptoms related to these conditions.                                                                                                  .    MEDICAL HISTORY:                                                    Patient Active Problem List    Diagnosis Date Noted     Closed nondisplaced fracture of neck of fifth metacarpal bone of left hand 08/21/2017     Priority: Medium     Advanced directives, counseling/discussion 02/12/2016     Priority: Medium     Patient does not have an Advance/Health Care Directive (HCD), declines information/referral.    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     left kidney removed 1/01 February 21, 2005 no evid of recurrence on CT chest and abdomen            Past Medical History:   Diagnosis Date     Acute lymphoid leukemia, without mention of having achieved remission(204.00) 1976    ALL at age 16,  no evid of recurrence     Allergic rhinitis, cause unspecified     Rhinitis     Malignant neoplasm of kidney, except pelvis 2001    left kidney removed 1/01     Motion sickness      Past Surgical History:   Procedure Laterality Date     ARTHROSCOPY KNEE WITH MEDIAL MENISCECTOMY  7/7/2011    Procedure:ARTHROSCOPY KNEE WITH MEDIAL MENISCECTOMY; choice anes; Surgeon:MANUEL FLEMING; Location:[facility] OR     HEMORRHOIDECTOMY  2006     HERNIORRHAPHY INGUINAL Left 8/17/2017    Procedure: HERNIORRHAPHY INGUINAL;  Left Inguinal Hernia Repair;  Surgeon: Mehdi Sorensen MD;  Location: [facility] OR     NEPHRECTOMY RT/LT  2001    left partial nephrectomy- kidney ca     SALPINGO OOPHORECTOMY,R/L/KRISHNA  2010    Rt salpingo oophorectomy      SURGICAL HISTORY OF -   8/1/1995    Vein stripping     SURGICAL HISTORY OF -   1975    wisdom teeth extraction      TONSILLECTOMY & ADENOIDECTOMY  1962     TUBAL LIGATION  1994     Current Outpatient Prescriptions   Medication Sig Dispense Refill     Multiple Vitamins-Minerals (MULTIVITAMIN WOMEN PO) Take 1 tablet by mouth daily       VITAMIN D, CHOLECALCIFEROL, PO Take 400 Units by mouth daily       acetaminophen (TYLENOL) 325 MG tablet Take 1 tablet by mouth every 6 hours as needed.       ibuprofen (ADVIL,MOTRIN) 600 MG tablet Take 1 tablet by mouth every 6 hours as needed for pain (For mild pain and temperature greater than 102F). 30 tablet 0     OTC products: None, except as noted above    Allergies   Allergen Reactions     Seasonal Allergies       Latex Allergy: NO    Social History   Substance Use Topics     Smoking status: Never Smoker     Smokeless tobacco: Never Used      Alcohol use No     History   Drug Use No       REVIEW OF SYSTEMS:                                                    C: NEGATIVE for fever, chills, change in weight  E/M: NEGATIVE for ear, mouth and throat problems  R: NEGATIVE for significant cough or SOB  CV: NEGATIVE for chest pain, palpitations or peripheral edema    EXAM:                                                    [facility] 12/05/2010  GENERAL APPEARANCE: healthy, alert and no distress  EYES: Eyes grossly normal to inspection, PERRL and conjunctivae and sclerae normal  HENT: ear canals and TM's normal and nose and mouth without ulcers or lesions  NECK: no adenopathy, no asymmetry, masses, or scars and thyroid normal to palpation  RESP: lungs clear to auscultation - no rales, rhonchi or wheezes  CV: regular rate and rhythm, normal S1 S2, no S3 or S4 and no murmur, click or rub   ABDOMEN: soft, nontender, no HSM or masses and bowel sounds normal  NEURO: Normal strength and tone, sensory exam grossly normal, mentation intact and speech normal    DIAGNOSTICS:                                                    EKG: She had an EKG in 2015 which was unremarkable and a stress test which was negative for ischemia the same year; not repeated today    Recent Labs   Lab Test  12/16/17   1307  04/28/17   1531  02/12/16   0824   HGB  14.2  12.9  13.5   PLT  209  207  214   NA  142   --   141   POTASSIUM  3.8   --   4.0   CR  0.52   --   0.59        IMPRESSION:                                                    Reason for surgery/procedure: Recurrent left inguinal hernia  Diagnosis/reason for consult: Evaluate for anesthesia risk    The proposed surgical procedure is considered LOW risk.    REVISED CARDIAC RISK INDEX  The patient has the following serious cardiovascular risks for perioperative complications such as (MI, PE, VFib and 3  AV Block):  No serious cardiac risks  INTERPRETATION: 0 risks: Class I (very low risk - 0.4% complication rate)    The patient has the  following additional risks for perioperative complications:  No identified additional risks      ICD-10-CM    1. Preop general physical exam Z01.818    2. Recurrent left inguinal hernia K40.91    3. Malignant neoplasm of left kidney excluding renal pelvis (H) C64.2    4. 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    Anticoagulant or Antiplatelet Medication Use  NSAIDS: Ibuprofen (Motrin):         Stop one week prior to surgery          APPROVAL GIVEN to proceed with proposed procedure, without further diagnostic evaluation       Signed Electronically by: KINA El MD    Copy of this evaluation report is provided to requesting physician.    [place] Preop Guidelines

## 2018-01-11 NOTE — IP AVS SNAPSHOT
[facility] PreOP/Phase II    [address] [address]    Phone:  [phone]    Fax:  [phone]                                       After Visit Summary   1/11/2018    Lydia Pierson    MRN: [id number]           After Visit Summary Signature Page     I have received my discharge instructions, and my questions have been answered. I have discussed any challenges I see with this plan with the nurse or doctor.    ..........................................................................................................................................  Patient/Patient Representative Signature      ..........................................................................................................................................  Patient Representative Print Name and Relationship to Patient    ..................................................               ................................................  Date                                            Time    ..........................................................................................................................................  Reviewed by Signature/Title    ...................................................              ..............................................  Date                                                            Time

## 2018-01-11 NOTE — IP AVS SNAPSHOT
MRN:[id number]                      After Visit Summary   1/11/2018    Lydia Pierson    MRN: [id number]           Thank you!     Thank you for choosing [facility] for your care. Our goal is always to provide you with excellent care. Hearing back from our patients is one way we can continue to improve our services. Please take a few minutes to complete the written survey that you may receive in the mail after you visit with us. Thank you!        Patient Information     Date Of Birth          1960        About your hospital stay     You were admitted on:  January 11, 2018 You last received care in the:  [facility] PreOP/Phase II    You were discharged on:  January 11, 2018       Who to Call     For medical emergencies, please call 911.  For non-urgent questions about your medical care, please call your primary care provider or clinic, [phone]  For questions related to your surgery, please call your surgery clinic        Attending Provider     Provider Specialty    Mehdi Sorensen MD General Surgery       Primary Care Provider Office Phone # Fax #    Jose Jpriscila Liz Cordova [phone]      After Care Instructions     Diet Instructions       Resume pre-procedure diet            Discharge Instructions       Patient to follow up with appointment in 2 weeks            No Alcohol       For 24 hours post procedure            No driving or operating machinery        until the day after procedure            No lifting        No lifting over 20 lbs and no strenuous physical activity for 6 weeks            Shower       No shower for 24 hours post procedure. May shower Postoperative Day (POD)  1            Weight bearing status - As tolerated                 Further instructions from your care team                           Same Day Surgery Discharge Instructions  Special Precautions After Surgery - Adult    1. It is not unusual to feel lightheaded or faint, up to 24  hours after surgery or while taking pain medication.  If you have these symptoms; sit for a few minutes before standing and have someone assist you when getting up.  2. You should rest and relax for the next 24 hours and must have someone stay with you for at least 24 hours after your discharge.  3. DO NOT DRIVE any vehicle or operate mechanical equipment for 24 hours following the end of your surgery.  DO NOT DRIVE while taking narcotic pain medications that have been prescribed by your physician.  If you had a limb operated on, you must be able to use it fully to drive.  4. DO NOT drink alcoholic beverages for 24 hours following surgery or while taking prescription pain medication.  5. Drink clear liquids (apple juice, ginger ale, broth, 7-Up, etc.).  Progress to your regular diet as you feel able.  6. Any questions call your physician and do not make important decisions for 24 hours.    ACTIVITY  ? As tolerated     INCISIONAL CARE  ? May shower tomorrow     Call for an appointment to return to the clinic in 2 weeks    Medications:  ? Norco as instructed on bottle next dose 4pm     Additional discharge instructions:   Nausea and Vomiting  What are nausea and vomiting?   Nausea is the queasy feeling you usually have before you vomit. Vomiting is the forceful emptying (throwing up) of the stomach's contents through the mouth.   What causes nausea and vomiting?   Nausea and vomiting are symptoms that may occur with many conditions, such as:   Anesthesia medications   side effect of narcotic medicines  exposure to unpleasant odors or sights   stress and anxiety     How is it treated?   At first you should rest your stomach for a few hours by eating nothing solid and sipping only clear liquids. A little later you can eat soft bland foods that are easy to digest.   It is important to drink small amounts (1 to 4 ounces) often so that you do not become dehydrated. Gradually drink larger amounts of the clear fluids. If you  vomit, wait an hour, then start over with a smaller amount of fluid.   Eat slowly and avoid foods that are acidic, spicy, fatty, or fibrous (such as meats, coarse grains, and raw vegetables). Also avoid extremely hot or cold food. In addition, avoid dairy products if you have diarrhea. You may start eating your normal diet again in 3 days or so, when all signs of illness have passed.   Rest as much as possible. Sit or lie down with your head propped up. Do not lie flat for at least 2 hours after eating. Nausea and vomiting usually last only a short period of time. If you have cramping or pain in your belly you can try putting a heating pad set at low or a covered hot water bottle on your belly. Never set a heating pad on high because you could get burned.   If you have been vomiting for more than a day or have had diarrhea for over 3 days, you may need to have an exam by your provider, including a check for dehydration. If you are very dehydrated, you may need to be given fluids intravenously (IV). In children and older adults dehydration can quickly become life threatening.   When should I call my healthcare provider?   Talk with your provider if you are unable to keep fluids down for more than 12 hours or if you have any of the following symptoms with nausea and vomiting:   severe headache or neck ache, or stiff neck   severe abdominal pain   diarrhea and vomiting that last more than 24 hours   blood in the vomited material that may look red, brown, or black, or like coffee grounds   bloody diarrhea   very forceful vomiting   signs of dehydration such as dry mouth, excessive thirst, little or no urination, severe weakness, dizziness, or lightheadedness.   If you have nausea and pain in the jaw, arm, shoulder, chest, or back; sweating; shortness of breath; or lightheadedness; call 911 for emergency care.  "    __________________________________________________________________________________________________________________________________  IMPORTANT NUMBERS:    [facility] Main Number:  [phone], [phone]  Pharmacy:  [phone]  Same Day Surgery:  [phone], Monday - Friday until 8:30 p.m.  Urgent Care:  [phone]  Emergency Room:  [phone]      [facility]:  [phone]                                                                             [facility]:  [phone] [address] Orthopedics:  [phone]     [facility] Clinic:  [phone]   Surgery Specialty Clinic:  [phone]   Home Medical Equipment: [phone]  [facility]:  [phone]        Pending Results     No orders found from 2018 to 2018.            Admission Information     Date & Time Provider Department Dept. Phone    2018 Mehdi Sorensen MD [facility] PreOP/Phase [phone]      Your Vitals Were     Blood Pressure Pulse Temperature Respirations Height Weight    131/65 49 97.9  F (36.6  C) (Oral) 16 1.626 m (5' 4\") 72.6 kg (160 lb)    Last Period Pulse Oximetry BMI (Body Mass Index)             2010 98% 27.46 kg/m2         MyChart Information     [employer] lets you send messages to your doctor, view your test results, renew your prescriptions, schedule appointments and more. To sign up, go to [email or web address] . Click on \"Log in\" on the left side of the screen, which will take you to the Welcome page. Then click on \"Sign up Now\" on the right side of the page.     You will be asked to enter the access code listed below, as well as some personal information. Please follow the directions to create your username and password.     Your access code is: [email or web address]  Expires: 3/16/2018  2:24 PM     Your access code will  in 90 days. If you need help or a new code, please call your [facility] or [phone].        [facility] EveryWhere ID     " This is your Care EveryWhere ID. This could be used by other organizations to access your [facility] medical records  [id number]        Equal Access to Services     BREANNE MANCIA : Vita Reveles, wapilimarcia carrasco, jarodisra hermanjhonatan holloway, alek jeanettein hayaarika northkinjal nixon lajoellenrika valenciaCarlos So [facility] [phone].    ATENCIÓN: Si habla español, tiene a olmedo disposición servicios gratuitos de asistencia lingüística. Llame al [phone].    We comply with applicable federal civil rights laws and [place] laws. We do not discriminate on the basis of race, color, national origin, age, disability, sex, sexual orientation, or gender identity.               Review of your medicines      START taking        Dose / Directions    HYDROcodone-acetaminophen 5-325 MG per tablet   Commonly known as:  NORCO   Used for:  Post-op pain        Dose:  1-2 tablet   Take 1-2 tablets by mouth every 4 hours as needed for other (Moderate to Severe Pain)   Quantity:  30 tablet   Refills:  0         CONTINUE these medicines which have NOT CHANGED        Dose / Directions    ibuprofen 600 MG tablet   Commonly known as:  ADVIL/MOTRIN   Used for:  Tear of meniscus of left knee        Dose:  600 mg   Take 1 tablet by mouth every 6 hours as needed for pain (For mild pain and temperature greater than 102F).   Quantity:  30 tablet   Refills:  0       MULTIVITAMIN WOMEN PO        Dose:  1 tablet   Take 1 tablet by mouth daily   Refills:  0       TYLENOL 325 MG tablet   Generic drug:  acetaminophen        Dose:  1 tablet   Take 1 tablet by mouth every 6 hours as needed.   Refills:  0       VITAMIN D (CHOLECALCIFEROL) PO        Dose:  400 Units   Take 400 Units by mouth daily   Refills:  0            Where to get your medicines      Some of these will need a paper prescription and others can be bought over the counter. Ask your nurse if you have questions.     Bring a paper prescription for each of these medications     HYDROcodone-acetaminophen  5-325 MG per tablet                Protect others around you: Learn how to safely use, store and throw away your medicines at www.disposemymeds.org.             Medication List: This is a list of all your medications and when to take them. Check marks below indicate your daily home schedule. Keep this list as a reference.      Medications           Morning Afternoon Evening Bedtime As Needed    HYDROcodone-acetaminophen 5-325 MG per tablet   Commonly known as:  NORCO   Take 1-2 tablets by mouth every 4 hours as needed for other (Moderate to Severe Pain)   Last time this was given:  1 tablet on 1/11/2018 11:56 AM                                ibuprofen 600 MG tablet   Commonly known as:  ADVIL/MOTRIN   Take 1 tablet by mouth every 6 hours as needed for pain (For mild pain and temperature greater than 102F).                                MULTIVITAMIN WOMEN PO   Take 1 tablet by mouth daily                                TYLENOL 325 MG tablet   Take 1 tablet by mouth every 6 hours as needed.   Generic drug:  acetaminophen                                VITAMIN D (CHOLECALCIFEROL) PO   Take 400 Units by mouth daily

## 2018-01-19 NOTE — OP NOTE
DATE OF PROCEDURE:  1/11/2018      PREOPERATIVE DIAGNOSIS:  Left recurrent inguinal hernia.      POSTOPERATIVE DIAGNOSIS:  Left femoral hernia.      PROCEDURE:  Attempted laparoscopic hernia repair, converted to open hernia repair.      ANESTHESIA:  General.      SURGEON:  Mehdi Sorensen MD      ASSISTANT:  Miles Fierro PA-C.  His assistance was needed for retraction and suction.      INDICATIONS FOR SURGERY:  This is a 57-year-old woman who has had a left inguinal hernia repaired in the past, open.  She came back with pain and a CT scan showed recurrence of hernia.  I discussed repairing this again with her at this time laparoscopically.  Risks and benefits were explained.  She seemed to understand and was agreeable to proceed.      DESCRIPTION OF PROCEDURE:  Under general anesthesia, the patient's abdomen and groin were prepped and draped in the usual manner.  Marcaine 0.25% was used to infiltrate circumferentially under the umbilicus and an incision was made with a #15 blade knife and subcutaneous tissue was bluntly dissected down to the fascia.  The fascia was opened, but upon opening the fascia, it seems like we were in the abdomen and there was no finding the preperitoneal space.  She has had a previous open inguinal hernia repair and kidney surgery, and I think this space has been violated and I was unable to get into this preperitoneal space.  Therefore, the laparoscopic repair was abandoned and the previous left inguinal hernia incision was then opened using a 15 blade knife after infiltration of Marcaine and carried down to the fascia.  The external fascia was intact and there was no obvious hernia.  This external fascia was opened and the floor also looked intact.  Because of this, the subcutaneous fatty tissue was then removed over the top of the external oblique fascia down into the inguinal ligament and it was noted that she had a femoral hernia that came out below the inguinal ligament.  The hernia  was then mobilized circumferentially around the hernia sac and base of the neck and tried to be reduced intraabdominally.  The content that was incarcerated was quite large and I was not able to reduce this.  Therefore, the sac was opened and the hernia, contents and sac was amputated at the base, which consists of the sac and preperitoneal fat and maybe a piece of omentum.  After amputating this at the base, hemostasis was achieved to this area and this was then able to be reduced intraabdominally.  The defect of this femoral hernia was then quite small, about 1 cm, and closed by stitching the inguinal ligament to the anterior fascia over the top of the leg.  I used #1   pop-off Ethibond stitch in a figure-of-eight manner and 2 stitches were used to close this hernia site.  The external oblique fascia was then closed again using a running 0-Vicryl stitch.  The subcutaneous tissue was infiltrated with 0.25% Marcaine and closed into multiple layers, deep interrupted 3-0 Vicryl stitch and the skin was closed with 4-0 subcuticular Monocryl stitch.  The patient tolerated the procedure well.  She went to PACU in stable condition.  Estimated blood loss was minimal.            HARI FALL MD             D: 2018 09:34   T: 2018 09:58   MT: JM      Name:     ROSHNI MIRELES   MRN:      [id number]        Account:        [id number]   :      1960           Procedure Date: 2018      Document: [id number]

## 2018-01-25 ENCOUNTER — TELEPHONE (OUTPATIENT)
Dept: SURGERY | Facility: CLINIC | Age: 58
End: 2018-01-25

## 2018-01-25 ENCOUNTER — OFFICE VISIT (OUTPATIENT)
Dept: SURGERY | Facility: CLINIC | Age: 58
End: 2018-01-25
Payer: COMMERCIAL

## 2018-01-25 VITALS
TEMPERATURE: 98.2 F | HEART RATE: 85 BPM | SYSTOLIC BLOOD PRESSURE: 123 MMHG | DIASTOLIC BLOOD PRESSURE: 77 MMHG | RESPIRATION RATE: 14 BRPM | WEIGHT: 160 LBS | BODY MASS INDEX: 27.31 KG/M2 | HEIGHT: 64 IN

## 2018-01-25 DIAGNOSIS — Z09 SURGERY FOLLOW-UP EXAMINATION: Primary | ICD-10-CM

## 2018-01-25 PROCEDURE — 99024 POSTOP FOLLOW-UP VISIT: CPT | Performed by: SURGERY

## 2018-01-25 NOTE — LETTER
1/25/2018         RE: Lydia Pierson  [address] [address]        Dear Colleague,    Thank you for referring your patient, Lydia Pierson, to the [facility]. Please see a copy of my visit note below.    Pt returns to see me for a post op visit.  She had a left femoral hernia repaired.  She is doing well and has minimal pain.  She is no longer on narcotics for pain.    On examination:  Her incision is healing nicely.    A/P:  Since she does heavy lifting at work, I've gave her a work release to return to work 6 weeks after surgery with light duty for another week before returning to no restrictions.    She is to follow up on a PRN basis.    Augusto Sorensen MD, FACS      Again, thank you for allowing me to participate in the care of your patient.        Sincerely,        Mehdi Sorensen MD

## 2018-01-25 NOTE — TELEPHONE ENCOUNTER
Pt returned call, she said she will stop in tomorrow, Friday 01/26, to sign the form so we can mail her records for workers comp.  Form completed and placed with letter and records prepared for mail.      Mildred Yoon, CMA

## 2018-01-25 NOTE — NURSING NOTE
"Initial /77 (BP Location: Left arm, Patient Position: Chair, Cuff Size: Adult Regular)  Pulse 85  Temp 98.2  F (36.8  C) (Oral)  Resp 14  Ht 1.626 m (5' 4\")  Wt 72.6 kg (160 lb)  LMP 12/05/2010  BMI 27.46 kg/m2 Estimated body mass index is 27.46 kg/(m^2) as calculated from the following:    Height as of this encounter: 1.626 m (5' 4\").    Weight as of this encounter: 72.6 kg (160 lb). .    Ivette STEINBERG, CMA    "

## 2018-01-25 NOTE — LETTER
[facility]  [address] [address]  Phone: [phone]      January 25, 2018      RE: Lydia Pierson  [address] [address]        To whom it may concern:    Lydia Pierson is under my professional care. The employee is UNABLE to return to work until Monday, February 26th.    When the patient returns to work, the following restrictions apply until Monday, March 5th:  A) Bend: Occasionally (1-3 hours)  B) Squat: Occasionally (1-3 hours)  C) Walk/Stand: Occasionally (1-3 hours)  D) Reach Above Shoulders: Occasionally (1-3 hours)  E) Lift, carry, push, and pull no more than:  11-20 lbs.No working or lifting restrictions on or about Monday, March 5th.    Sincerely,    Augusto Sorensen MD

## 2018-01-25 NOTE — PROGRESS NOTES
Pt returns to see me for a post op visit.  She had a left femoral hernia repaired.  She is doing well and has minimal pain.  She is no longer on narcotics for pain.    On examination:  Her incision is healing nicely.    A/P:  Since she does heavy lifting at work, I've gave her a work release to return to work 6 weeks after surgery with light duty for another week before returning to no restrictions.    She is to follow up on a PRN basis.    Augusto Sorensen MD, FACS

## 2018-01-25 NOTE — MR AVS SNAPSHOT
"              After Visit Summary   2018    Lydia Pierson    MRN: [id number]           Patient Information     Date Of Birth          1960        Visit Information        Provider Department      2018 1:30 PM Mehdi Sorensen MD [facility]        Today's Diagnoses     Surgery follow-up examination    -  1      Care Instructions    Per Physician's instructions            Follow-ups after your visit        Who to contact     If you have questions or need follow up information about today's clinic visit or your schedule please contact [facility] directly at [phone].  Normal or non-critical lab and imaging results will be communicated to you by MyChart, letter or phone within 4 business days after the clinic has received the results. If you do not hear from us within 7 days, please contact the clinic through [facility] or phone. If you have a critical or abnormal lab result, we will notify you by phone as soon as possible.  Submit refill requests through "[employer] or call your pharmacy and they will forward the refill request to us. Please allow 3 business days for your refill to be completed.          Additional Information About Your Visit        MyChart Information     "[employer] lets you send messages to your doctor, view your test results, renew your prescriptions, schedule appointments and more. To sign up, go to [email or web address] . Click on \"Log in\" on the left side of the screen, which will take you to the Welcome page. Then click on \"Sign up Now\" on the right side of the page.     You will be asked to enter the access code listed below, as well as some personal information. Please follow the directions to create your username and password.     Your access code is: [email or web address]  Expires: 3/16/2018  2:24 PM     Your access code will  in 90 days. If you need help or a new code, please call your [facility] or [phone].        Care EveryWhere " "ID     This is your Care EveryWhere ID. This could be used by other organizations to access your [place] medical records  [id number]        Your Vitals Were     Pulse Temperature Respirations Height Last Period BMI (Body Mass Index)    85 98.2  F (36.8  C) (Oral) 14 1.626 m (5' 4\") 12/05/2010 27.46 kg/m2       Blood Pressure from Last 3 Encounters:   01/25/18 123/77   01/11/18 131/65   01/08/18 124/79    Weight from Last 3 Encounters:   01/25/18 72.6 kg (160 lb)   01/11/18 72.6 kg (160 lb)   01/08/18 74.8 kg (165 lb)              Today, you had the following     No orders found for display       Primary Care Provider Office Phone # Fax #    [place] Liz Cordova [phone] [address] [address]        Equal Access to Services     [facility] Hospital[facility]INA : Hadii aad ku hadasho Soomaali, waaxda luqadaha, qaybta kaalmada adeegyada, waxay jeanettein haystevien shahzad cronin . So [facility] [phone].    ATENCIÓN: Si beatrice hartman, tiene a olmedo disposición servicios gratuitos de asistencia lingüística. Llame al [phone].    We comply with applicable federal civil rights laws and [place] laws. We do not discriminate on the basis of race, color, national origin, age, disability, sex, sexual orientation, or gender identity.            Thank you!     Thank you for choosing [facility]  for your care. Our goal is always to provide you with excellent care. Hearing back from our patients is one way we can continue to improve our services. Please take a few minutes to complete the written survey that you may receive in the mail after your visit with us. Thank you!             Your Updated Medication List - Protect others around you: Learn how to safely use, store and throw away your medicines at www.disposemymeds.org.          This list is accurate as of 1/25/18  2:21 PM.  Always use your most recent med list.                   Brand Name Dispense Instructions for use Diagnosis "    ibuprofen 600 MG tablet    ADVIL/MOTRIN    30 tablet    Take 1 tablet by mouth every 6 hours as needed for pain (For mild pain and temperature greater than 102F).    Tear of meniscus of left knee       MULTIVITAMIN WOMEN PO      Take 1 tablet by mouth daily        TYLENOL 325 MG tablet   Generic drug:  acetaminophen      Take 1 tablet by mouth every 6 hours as needed.        VITAMIN D (CHOLECALCIFEROL) PO      Take 400 Units by mouth daily

## 2018-01-25 NOTE — TELEPHONE ENCOUNTER
Pt dropped off a letter from pt's workmans comp claim to Dr Sorensen today at office visit.  The letter is requesting records.  Records printed but the pt didn't sign an authorization for release.  The letter also states the records can be mailed or electronically uploaded; no fax option.  I was calling to offer the pt her records and she can pass them along as she pleases.  Please inquire with pt.      Mildred Yoon CMA

## 2018-01-26 NOTE — TELEPHONE ENCOUNTER
Lydia stopped and signed the medical release form and then took the records with her. She is going to mail them her self.  Kaity Benson MA

## 2018-01-30 ENCOUNTER — TELEPHONE (OUTPATIENT)
Dept: SURGERY | Facility: CLINIC | Age: 58
End: 2018-01-30

## 2018-01-30 NOTE — TELEPHONE ENCOUNTER
Reason for Call:  Form, our goal is to have forms completed with 72 hours, however, some forms may require a visit or additional information.    Type of letter, form or note:  FMLA    Who is the form from?: Patient    Where did the form come from: Patient or family brought in       What clinic location was the form placed at?: [facility] (ENT, Neurology, Rheumatology, Surgery, Urology, Vascular Surgery)    Where the form was placed: Given to MA/RN    What number is listed as a contact on the form?: [phone]       Additional comments: please complete forms and mail back in pre paid envelope.     Call taken on 1/30/2018 at 10:38 AM by Yuly Paulino

## 2018-01-31 NOTE — TELEPHONE ENCOUNTER
Called patient and left VM, requested call back to discuss dates on her FMLA form. (patient requesting 12/14/17- 2/26/18 Dr Sorensen's letter states 6 weeks off from surgery date which was 1/11/18).    Belinda Sahu MA

## 2018-01-31 NOTE — TELEPHONE ENCOUNTER
Spoke to Dr Sorensen regarding patients FMLA paperwork and the requested dates. Was informed to find out from patient when she calls back if she has been off work since 12/14/17 or off since surgery date of 1/11/18. If patient has been off since 12/14/17, Dr Sorensen has OK'd to complete [facility] paperwork with dates requested. If she has been back to work and only since surgery, then use surgery date through 2/26/18.   Belinda Sahu MA

## 2018-02-05 NOTE — TELEPHONE ENCOUNTER
Called patient and left VM, requested call back to discuss FMLA forms. (see notes below)    Belinda Sahu MA

## 2018-02-05 NOTE — TELEPHONE ENCOUNTER
Patient called back and clarified dates that her and Dr Sorensen talked about for her time off of work.     FMLA forms completed and placed on Dr Gray desk to sign.    Belinda Sahu MA

## 2018-02-07 NOTE — TELEPHONE ENCOUNTER
Form completed for: Lydia Pierson  What was done with form: Mail form to:  Lydia Pierson.  Street Address:  [id number] [place] [address].  City:  [facility] State:  [place] Zip Code:  [address]   Copy also mailed to patient's employer in envelope that was provided.   Belinda Sahu MA

## 2018-03-26 ENCOUNTER — OFFICE VISIT (OUTPATIENT)
Dept: FAMILY MEDICINE | Facility: CLINIC | Age: 58
End: 2018-03-26
Payer: COMMERCIAL

## 2018-03-26 VITALS
WEIGHT: 146 LBS | SYSTOLIC BLOOD PRESSURE: 115 MMHG | BODY MASS INDEX: 25.06 KG/M2 | DIASTOLIC BLOOD PRESSURE: 77 MMHG | OXYGEN SATURATION: 97 % | HEART RATE: 67 BPM

## 2018-03-26 DIAGNOSIS — Z87.19 H/O LEFT INGUINAL HERNIA REPAIR: Primary | ICD-10-CM

## 2018-03-26 DIAGNOSIS — R10.32 LEFT INGUINAL PAIN: ICD-10-CM

## 2018-03-26 DIAGNOSIS — Z98.890 H/O LEFT INGUINAL HERNIA REPAIR: Primary | ICD-10-CM

## 2018-03-26 PROCEDURE — 99214 OFFICE O/P EST MOD 30 MIN: CPT | Performed by: NURSE PRACTITIONER

## 2018-03-26 NOTE — NURSING NOTE
"Initial /77 (BP Location: Left arm, Patient Position: Chair, Cuff Size: Adult Regular)  Pulse 67  Wt 146 lb (66.2 kg)  LMP 12/05/2010  SpO2 97%  BMI 25.06 kg/m2 Estimated body mass index is 25.06 kg/(m^2) as calculated from the following:    Height as of 1/25/18: 5' 4\" (1.626 m).    Weight as of this encounter: 146 lb (66.2 kg). .    Ju Albarado    "

## 2018-03-26 NOTE — MR AVS SNAPSHOT
After Visit Summary   3/26/2018    Lydia Pierson    MRN: [id number]           Patient Information     Date Of Birth          1960        Visit Information        Provider Department      3/26/2018 12:40 PM Mahogany Hogan APRN CNP [facility]        Today's Diagnoses     H/O left inguinal hernia repair    -  1    Left inguinal pain          Care Instructions    1. Call to make appointment for CT scan  2. Follow up with surgeon if needed          Follow-ups after your visit        Additional Services     GENERAL SURG ADULT REFERRAL       Your provider has referred you to: [facility]: [facility] [place] [phone]   [email or web address]    Please be aware that coverage of these services is subject to the terms and limitations of your health insurance plan.  Call member services at your health plan with any benefit or coverage questions.      Please bring the following with you to your appointment:    (1) Any X-Rays, CTs or MRIs which have been performed.  Contact the facility where they were done to arrange for  prior to your scheduled appointment.   (2) List of current medications   (3) This referral request   (4) Any documents/labs given to you for this referral                  Future tests that were ordered for you today     Open Future Orders        Priority Expected Expires Ordered    CT Abdomen Pelvis w Contrast Routine  3/26/2019 3/26/2018            Who to contact     If you have questions or need follow up information about today's clinic visit or your schedule please contact [facility] directly at [phone].  Normal or non-critical lab and imaging results will be communicated to you by MyChart, letter or phone within 4 business days after the clinic has received the results. If you do not hear from us within 7 days, please contact the clinic through MyChart or phone. If you have a critical or abnormal lab  "result, we will notify you by phone as soon as possible.  Submit refill requests through [employer] or call your pharmacy and they will forward the refill request to us. Please allow 3 business days for your refill to be completed.          Additional Information About Your Visit        [employer] Information     [employer] lets you send messages to your doctor, view your test results, renew your prescriptions, schedule appointments and more. To sign up, go to [email or web address] . Click on \"Log in\" on the left side of the screen, which will take you to the Welcome page. Then click on \"Sign up Now\" on the right side of the page.     You will be asked to enter the access code listed below, as well as some personal information. Please follow the directions to create your username and password.     Your access code is: [email or web address]  Expires: 2018 12:58 PM     Your access code will  in 90 days. If you need help or a new code, please call your [place] clinic or [phone].        Care EveryWhere ID     This is your Care EveryWhere ID. This could be used by other organizations to access your [place] medical records  [id number]        Your Vitals Were     Pulse Last Period Pulse Oximetry BMI (Body Mass Index)          67 2010 97% 25.06 kg/m2         Blood Pressure from Last 3 Encounters:   18 115/77   18 123/77   18 131/65    Weight from Last 3 Encounters:   18 146 lb (66.2 kg)   18 160 lb (72.6 kg)   18 160 lb (72.6 kg)              We Performed the Following     GENERAL SURG ADULT REFERRAL        Primary Care Provider Office Phone # Fax #    [place] Liz Cordova [phone] [address] [facility] [address]        Equal Access to Services     BREANNE MANCIA : Vita Reveles, adarsh carrasco, qaoliver kaalek sutton. [facility] [phone].    ATENCIÓN: Si domingo reese " olmedo disposición servicios gratuitos de asistencia lingüística. Josef aragon [phone].    We comply with applicable federal civil rights laws and [place] laws. We do not discriminate on the basis of race, color, national origin, age, disability, sex, sexual orientation, or gender identity.            Thank you!     Thank you for choosing [facility]  for your care. Our goal is always to provide you with excellent care. Hearing back from our patients is one way we can continue to improve our services. Please take a few minutes to complete the written survey that you may receive in the mail after your visit with us. Thank you!             Your Updated Medication List - Protect others around you: Learn how to safely use, store and throw away your medicines at www.disposemymeds.org.          This list is accurate as of 3/26/18 12:58 PM.  Always use your most recent med list.                   Brand Name Dispense Instructions for use Diagnosis    ibuprofen 600 MG tablet    ADVIL/MOTRIN    30 tablet    Take 1 tablet by mouth every 6 hours as needed for pain (For mild pain and temperature greater than 102F).    Tear of meniscus of left knee       MULTIVITAMIN WOMEN PO      Take 1 tablet by mouth daily        TYLENOL 325 MG tablet   Generic drug:  acetaminophen      Take 1 tablet by mouth every 6 hours as needed.        VITAMIN D (CHOLECALCIFEROL) PO      Take 400 Units by mouth daily

## 2018-03-26 NOTE — PROGRESS NOTES
SUBJECTIVE:   Lydia Pierson is a 58 year old female who presents to clinic today for the following health issues:      C/O groin pain. Left sided inguinal x 2 weeks. Had hernia repair 8/2018 and  1/2018 with Dr Sorensen. Patient had post op check with Dr. Sorensen 1/25/18- patient states she was doing well.   Patient states that she is now having a burning sensation in the left groin pain- concerned because this was the same pain that she had prior to hernia repair. Tenderness in left inguinal area. No changes with BM's.   No nausea , vomiting or fevers.     -------------------------------------    Problem list and histories reviewed & adjusted, as indicated.  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)     Malignant neoplasm of kidney excluding renal pelvis (H)     External hemorrhoids with other complication     Sensorineural hearing loss, asymmetrical     Ovarian cyst     Health Care Home     Hyperlipidemia LDL goal <160     Advanced directives, counseling/discussion     Closed nondisplaced fracture of neck of fifth metacarpal bone of left hand     Past Surgical History:   Procedure Laterality Date     ARTHROSCOPY KNEE WITH MEDIAL MENISCECTOMY  7/7/2011    Procedure:ARTHROSCOPY KNEE WITH MEDIAL MENISCECTOMY; choice anes; Surgeon:MANUEL FLEMING; Location:[facility]     HEMORRHOIDECTOMY  2006     HERNIORRHAPHY INGUINAL Left 8/17/2017    Procedure: HERNIORRHAPHY INGUINAL;  Left Inguinal Hernia Repair;  Surgeon: Mehdi Sorensen MD;  Location: [facility] OR     LAPAROSCOPIC HERNIORRHAPHY INGUINAL Left 1/11/2018    Procedure: LAPAROSCOPIC HERNIORRHAPHY INGUINAL;  Laparoscopic Left Inguinal Hernia Repair converted to open left femoral  hernia repair;  Surgeon: Mehdi Sorensen MD;  Location: [facility] OR     NEPHRECTOMY RT/LT  2001    left partial nephrectomy- kidney ca     SALPINGO OOPHORECTOMY,R/L/KRISHNA  2010    Rt  salpingo oophorectomy      SURGICAL HISTORY OF -   8/1/1995    Vein stripping     SURGICAL HISTORY OF -   1975    wisdom teeth extraction      TONSILLECTOMY & ADENOIDECTOMY  1962     TUBAL LIGATION  1994       Social History   Substance Use Topics     Smoking status: Never Smoker     Smokeless tobacco: Never Used     Alcohol use No     Family History   Problem Relation Age of Onset     C.A.D. Father      bypass     DIABETES Father      Lipids Father      Allergies Sister      allergic rhinitis     Neurologic Disorder Sister      seizure disorder     Respiratory Sister      asthma     Allergies Son      Breast Cancer No family hx of      Cancer - colorectal No family hx of            Reviewed and updated as needed this visit by clinical staff       Reviewed and updated as needed this visit by Provider         ROS:  Constitutional, HEENT, cardiovascular, pulmonary, gi and gu systems are negative, except as otherwise noted.  Constitutional, HEENT, cardiovascular, pulmonary, GI, , musculoskeletal, neuro, skin, endocrine and psych systems are negative, except as otherwise noted.    OBJECTIVE:     /77 (BP Location: Left arm, Patient Position: Chair, Cuff Size: Adult Regular)  Pulse 67  Wt 146 lb (66.2 kg)  LMP 12/05/2010  SpO2 97%  BMI 25.06 kg/m2  Body mass index is 25.06 kg/(m^2).  GENERAL: healthy, alert and no distress  ABDOMEN: tenderness left inguinal area and bowel sounds normal  MS: no gross musculoskeletal defects noted, no edema    Diagnostic Test Results:  none     ASSESSMENT/PLAN:       1. Left inguinal pain  - history of 2 previous inguinal surgeries in 8/2017 and 1/2018- patient presents today with similar symptoms in left inguinal area that she experienced prior to surgical repair- ? Neuropathic vs abscess.   - CT Abdomen Pelvis w Contrast; Future  - GENERAL SURG ADULT REFERRAL    2. H/O left inguinal hernia repair    - CT Abdomen Pelvis w Contrast; Future  - GENERAL SURG ADULT  REFERRAL        JOAQUIN Emanuel [facility]

## 2018-03-27 RX ORDER — IOPAMIDOL 755 MG/ML
71 INJECTION, SOLUTION INTRAVASCULAR ONCE
Status: DISCONTINUED | OUTPATIENT
Start: 2018-03-28 | End: 2018-03-27 | Stop reason: CLARIF

## 2018-03-28 ENCOUNTER — HOSPITAL ENCOUNTER (OUTPATIENT)
Dept: CT IMAGING | Facility: CLINIC | Age: 58
Discharge: HOME | End: 2018-03-28
Attending: NURSE PRACTITIONER | Admitting: NURSE PRACTITIONER
Payer: COMMERCIAL

## 2018-03-28 DIAGNOSIS — Z98.890 H/O LEFT INGUINAL HERNIA REPAIR: ICD-10-CM

## 2018-03-28 DIAGNOSIS — Z87.19 H/O LEFT INGUINAL HERNIA REPAIR: ICD-10-CM

## 2018-03-28 DIAGNOSIS — R10.32 LEFT INGUINAL PAIN: ICD-10-CM

## 2018-03-28 PROCEDURE — 72192 CT PELVIS W/O DYE: CPT

## 2018-05-04 ENCOUNTER — OFFICE VISIT (OUTPATIENT)
Dept: SURGERY | Facility: CLINIC | Age: 58
End: 2018-05-04
Payer: COMMERCIAL

## 2018-05-04 VITALS
WEIGHT: 146 LBS | HEIGHT: 64 IN | SYSTOLIC BLOOD PRESSURE: 147 MMHG | DIASTOLIC BLOOD PRESSURE: 81 MMHG | HEART RATE: 64 BPM | TEMPERATURE: 97.8 F | BODY MASS INDEX: 24.92 KG/M2

## 2018-05-04 DIAGNOSIS — Z09 POSTOP CHECK: Primary | ICD-10-CM

## 2018-05-04 PROCEDURE — 99024 POSTOP FOLLOW-UP VISIT: CPT | Performed by: SURGERY

## 2018-05-04 NOTE — PROGRESS NOTES
Patient here to request a letter for workman's comp claim. Patient had hernia repair last August which recurred requiring a second surgery in January. Patient does a lot of heavy lifting at work and I wrote a letter stating that the nature of her work likely contributed to the hernia recurrence. As she has now been repaired twice, I told her she has an increased chance of recurrence and limited her heavy lifting to less than 50 pounds.    Marlo Anderson MD

## 2018-05-04 NOTE — NURSING NOTE
"Initial /81 (BP Location: Right arm, Patient Position: Sitting, Cuff Size: Adult Regular)  Pulse 64  Temp 97.8  F (36.6  C) (Oral)  Ht 1.626 m (5' 4\")  Wt 66.2 kg (146 lb)  LMP 12/05/2010  BMI 25.06 kg/m2 Estimated body mass index is 25.06 kg/(m^2) as calculated from the following:    Height as of this encounter: 1.626 m (5' 4\").    Weight as of this encounter: 66.2 kg (146 lb). .    Kaity Benson MA    "

## 2018-05-04 NOTE — MR AVS SNAPSHOT
"              After Visit Summary   2018    Lydia Pierson    MRN: [id number]           Patient Information     Date Of Birth          1960        Visit Information        Provider Department      2018 7:30 AM Marlo Anderson MD [facility]        Today's Diagnoses     Postop check    -  1      Care Instructions    Per Dr. Anderson's instructions          Follow-ups after your visit        Who to contact     If you have questions or need follow up information about today's clinic visit or your schedule please contact [facility] directly at [phone].  Normal or non-critical lab and imaging results will be communicated to you by [employer], letter or phone within 4 business days after the clinic has received the results. If you do not hear from us within 7 days, please contact the clinic through [employer] or phone. If you have a critical or abnormal lab result, we will notify you by phone as soon as possible.  Submit refill requests through [employer] or call your pharmacy and they will forward the refill request to us. Please allow 3 business days for your refill to be completed.          Additional Information About Your Visit        MyChart Information     [employer] lets you send messages to your doctor, view your test results, renew your prescriptions, schedule appointments and more. To sign up, go to [email or web address] . Click on \"Log in\" on the left side of the screen, which will take you to the Welcome page. Then click on \"Sign up Now\" on the right side of the page.     You will be asked to enter the access code listed below, as well as some personal information. Please follow the directions to create your username and password.     Your access code is: [email or web address]  Expires: 2018 12:58 PM     Your access code will  in 90 days. If you need help or a new code, please call your [facility] or [phone].        Care EveryWhere ID     This is your Care " "EveryWhere ID. This could be used by other organizations to access your [place] medical records  [id number]        Your Vitals Were     Pulse Temperature Height Last Period BMI (Body Mass Index)       64 97.8  F (36.6  C) (Oral) 1.626 m (5' 4\") 12/05/2010 25.06 kg/m2        Blood Pressure from Last 3 Encounters:   05/04/18 147/81   03/26/18 115/77   01/25/18 123/77    Weight from Last 3 Encounters:   05/04/18 66.2 kg (146 lb)   03/26/18 66.2 kg (146 lb)   01/25/18 72.6 kg (160 lb)              Today, you had the following     No orders found for display       Primary Care Provider Office Phone # Fax #    Benja Cordova [phone] [address] [facility] [address]        Equal Access to Services     HARSHAL MANCIA : Hadii juan luis flanagan hadasho Sobenjamin, waaxda luqadaha, qaybta kaalmada adeegyada, alek cronin . [facility] [phone].    ATENCIÓN: Si habla español, tiene a olmedo disposición servicios gratuitos de asistencia lingüística. Llame al [phone].    We comply with applicable federal civil rights laws and [place] laws. We do not discriminate on the basis of race, color, national origin, age, disability, sex, sexual orientation, or gender identity.            Thank you!     Thank you for choosing [facility]  for your care. Our goal is always to provide you with excellent care. Hearing back from our patients is one way we can continue to improve our services. Please take a few minutes to complete the written survey that you may receive in the mail after your visit with us. Thank you!             Your Updated Medication List - Protect others around you: Learn how to safely use, store and throw away your medicines at www.disposemymeds.org.          This list is accurate as of 5/4/18  7:54 AM.  Always use your most recent med list.                   Brand Name Dispense Instructions for use Diagnosis    ibuprofen 600 MG tablet    " ADVIL/MOTRIN    30 tablet    Take 1 tablet by mouth every 6 hours as needed for pain (For mild pain and temperature greater than 102F).    Tear of meniscus of left knee       MULTIVITAMIN WOMEN PO      Take 1 tablet by mouth daily        TYLENOL 325 MG tablet   Generic drug:  acetaminophen      Take 1 tablet by mouth every 6 hours as needed.        VITAMIN D (CHOLECALCIFEROL) PO      Take 400 Units by mouth daily

## 2018-05-04 NOTE — LETTER
5/4/2018         RE: Lydia Pierson  [address] [address] [address]        Dear Colleague,    Thank you for referring your patient, Lydia Pierson, to the [facility]. Please see a copy of my visit note below.    Patient here to request a letter for workman's comp claim. Patient had hernia repair last August which recurred requiring a second surgery in January. Patient does a lot of heavy lifting at work and I wrote a letter stating that the nature of her work likely contributed to the hernia recurrence. As she has now been repaired twice, I told her she has an increased chance of recurrence and limited her heavy lifting to less than 50 pounds.    Marlo Anderson MD     Again, thank you for allowing me to participate in the care of your patient.        Sincerely,        Marlo Anderson MD

## 2018-05-04 NOTE — LETTER
[facility]  [address] [address]  [phone]          May 4, 2018    RE:  Lydia Pierson                                                                                                                                                       [address] [address] [address]            To whom it may concern:    Lydia Pierson is under my professional care for recurrent hernia surgery. Patient does a lot of heavy lifting at work and it is very likely that her recurrence following the first hernia repair was due to the nature of her job.  As she has now been repaired twice, her chance of recurrence again is increased therefore she should limit her lifting to less than 50 pounds.      Sincerely,      Marlo Anderson MD

## 2018-05-11 ENCOUNTER — OFFICE VISIT (OUTPATIENT)
Dept: SURGERY | Facility: CLINIC | Age: 58
End: 2018-05-11
Payer: COMMERCIAL

## 2018-05-11 VITALS
HEART RATE: 71 BPM | BODY MASS INDEX: 24.92 KG/M2 | TEMPERATURE: 97 F | DIASTOLIC BLOOD PRESSURE: 81 MMHG | WEIGHT: 146 LBS | SYSTOLIC BLOOD PRESSURE: 131 MMHG | HEIGHT: 64 IN

## 2018-05-11 DIAGNOSIS — Z09 POSTOP CHECK: Primary | ICD-10-CM

## 2018-05-11 PROCEDURE — 99024 POSTOP FOLLOW-UP VISIT: CPT | Performed by: SURGERY

## 2018-05-11 NOTE — PROGRESS NOTES
"58-year-old female here for a second letter for her workers come clean. Apparently the letter I wrote last Friday was insufficient and deemed \"too subjective\". Patient supplied me with a sample letter from her  as to what the workers comp people are looking for. I'm also expecting a letter from her  to help \"guide me\"in writing and it.  I explained to the patient that my opinion is always subjective. I can never give her 100% guarantee that her work caused her hernia recurrence and as I stated in my last letter, it was highly likely that it could have. When I receive her 's letter I will coupled this with the 's sample letter and try to craft a [employer] letter for her that will meet everybody's needs.    Marlo Anderson MD   "

## 2018-05-11 NOTE — NURSING NOTE
"Initial /81 (BP Location: Right arm, Patient Position: Sitting, Cuff Size: Adult Regular)  Pulse 71  Temp 97  F (36.1  C) (Tympanic)  Ht 1.626 m (5' 4\")  Wt 66.2 kg (146 lb)  LMP 12/05/2010  BMI 25.06 kg/m2 Estimated body mass index is 25.06 kg/(m^2) as calculated from the following:    Height as of this encounter: 1.626 m (5' 4\").    Weight as of this encounter: 66.2 kg (146 lb). .    Kaity Benson MA    "

## 2018-05-11 NOTE — LETTER
"    5/11/2018         RE: Lydia Pierson  [address] [address] [address]        Dear Colleague,    Thank you for referring your patient, Lydia Pierson, to the [facility]. Please see a copy of my visit note below.    58-year-old female here for a second letter for her workers come clean. Apparently the letter I wrote last Friday was insufficient and deemed \"too subjective\". Patient supplied me with a sample letter from her  as to what the workers comp people are looking for. I'm also expecting a letter from her  to help \"guide me\"in writing and it.  I explained to the patient that my opinion is always subjective. I can never give her 100% guarantee that her work caused her hernia recurrence and as I stated in my last letter, it was highly likely that it could have. When I receive her 's letter I will coupled this with the 's sample letter and try to craft a [place] letter for her that will meet everybody's needs.    Marlo Anderson MD     Again, thank you for allowing me to participate in the care of your patient.        Sincerely,        Marlo Anderson MD    "

## 2018-05-11 NOTE — MR AVS SNAPSHOT
"              After Visit Summary   2018    Lydia Pierson    MRN: [id number]           Patient Information     Date Of Birth          1960        Visit Information        Provider Department      2018 7:30 AM Marlo Anderson MD [facility]        Today's Diagnoses     Postop check    -  1      Care Instructions    Per Dr. Anderson's instructions          Follow-ups after your visit        Who to contact     If you have questions or need follow up information about today's clinic visit or your schedule please contact [facility] directly at [phone].  Normal or non-critical lab and imaging results will be communicated to you by classmarketshart, letter or phone within 4 business days after the clinic has received the results. If you do not hear from us within 7 days, please contact the clinic through classmarketshart or phone. If you have a critical or abnormal lab result, we will notify you by phone as soon as possible.  Submit refill requests through Lal[employer] or call your pharmacy and they will forward the refill request to us. Please allow 3 business days for your refill to be completed.          Additional Information About Your Visit        MyChart Information     Lalina lets you send messages to your doctor, view your test results, renew your prescriptions, schedule appointments and more. To sign up, go to [email or web address] . Click on \"Log in\" on the left side of the screen, which will take you to the Welcome page. Then click on \"Sign up Now\" on the right side of the page.     You will be asked to enter the access code listed below, as well as some personal information. Please follow the directions to create your username and password.     Your access code is: [email or web address]  Expires: 2018 12:58 PM     Your access code will  in 90 days. If you need help or a new code, please call your [facility] or [phone].        Care EveryWhere ID     This is your Care " "EveryWhere ID. This could be used by other organizations to access your [facility] medical records  [id number]        Your Vitals Were     Pulse Temperature Height Last Period BMI (Body Mass Index)       71 97  F (36.1  C) (Tympanic) 1.626 m (5' 4\") 12/05/2010 25.06 kg/m2        Blood Pressure from Last 3 Encounters:   05/11/18 131/81   05/04/18 147/81   03/26/18 115/77    Weight from Last 3 Encounters:   05/11/18 66.2 kg (146 lb)   05/04/18 66.2 kg (146 lb)   03/26/18 66.2 kg (146 lb)              Today, you had the following     No orders found for display       Primary Care Provider Office Phone # Fax #    Benja Cordova [phone] [address] [facility] [address]        Equal Access to Services     [facility] : Hadii juan luis flanagan hadasho Sotigistali, waaxda luqadaha, qaybta kaalmada adeegyada, alek cronin . So [facility] [phone].    ATENCIÓN: Si habla español, tiene a olmedo disposición servicios gratuitos de asistencia lingüística. Josef al [phone].    We comply with applicable federal civil rights laws and [place] laws. We do not discriminate on the basis of race, color, national origin, age, disability, sex, sexual orientation, or gender identity.            Thank you!     Thank you for choosing [facility]  for your care. Our goal is always to provide you with excellent care. Hearing back from our patients is one way we can continue to improve our services. Please take a few minutes to complete the written survey that you may receive in the mail after your visit with us. Thank you!             Your Updated Medication List - Protect others around you: Learn how to safely use, store and throw away your medicines at www.disposemymeds.org.          This list is accurate as of 5/11/18  7:40 AM.  Always use your most recent med list.                   Brand Name Dispense Instructions for use Diagnosis    ibuprofen 600 MG tablet    " ADVIL/MOTRIN    30 tablet    Take 1 tablet by mouth every 6 hours as needed for pain (For mild pain and temperature greater than 102F).    Tear of meniscus of left knee       MULTIVITAMIN WOMEN PO      Take 1 tablet by mouth daily        TYLENOL 325 MG tablet   Generic drug:  acetaminophen      Take 1 tablet by mouth every 6 hours as needed.        VITAMIN D (CHOLECALCIFEROL) PO      Take 400 Units by mouth daily

## 2018-06-01 ENCOUNTER — OFFICE VISIT (OUTPATIENT)
Dept: FAMILY MEDICINE | Facility: CLINIC | Age: 58
End: 2018-06-01
Payer: COMMERCIAL

## 2018-06-01 VITALS
DIASTOLIC BLOOD PRESSURE: 80 MMHG | RESPIRATION RATE: 16 BRPM | SYSTOLIC BLOOD PRESSURE: 121 MMHG | BODY MASS INDEX: 22.02 KG/M2 | HEART RATE: 59 BPM | HEIGHT: 64 IN | TEMPERATURE: 97.9 F | WEIGHT: 129 LBS

## 2018-06-01 DIAGNOSIS — B02.9 HERPES ZOSTER WITHOUT COMPLICATION: Primary | ICD-10-CM

## 2018-06-01 DIAGNOSIS — Z12.31 ENCOUNTER FOR SCREENING MAMMOGRAM FOR BREAST CANCER: ICD-10-CM

## 2018-06-01 PROCEDURE — 99213 OFFICE O/P EST LOW 20 MIN: CPT | Performed by: FAMILY MEDICINE

## 2018-06-01 RX ORDER — VALACYCLOVIR HYDROCHLORIDE 1 G/1
1000 TABLET, FILM COATED ORAL 3 TIMES DAILY
Qty: 21 TABLET | Refills: 0 | Status: SHIPPED | OUTPATIENT
Start: 2018-06-01 | End: 2019-01-23

## 2018-06-01 NOTE — PROGRESS NOTES
SUBJECTIVE:   Lydia Pierson is a 58 year old female who presents to clinic today for the following health issues:      Problem:   Chief Complaint   Patient presents with     Pain     stinging, burning pain underneath right arm. pain goes down arm  sx's started 5/31/18     ADDITIONAL HPI: 58 year old female here for above issue.  Pain was initially very focal but now radiating along the length of her inner upper arm.    ROS: 10 point review of systems negative except as per HPI.    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 Father      bypass     DIABETES Father      Lipids Father      Allergies Sister      allergic rhinitis     Neurologic Disorder Sister      seizure disorder     Respiratory Sister      asthma     Allergies Son      Breast Cancer No family hx of      Cancer - colorectal No family hx of        SOCIAL HISTORY:  Social History     Social History     Marital status:      Spouse name: N/A     Number of children: N/A     Years of education: N/A     Occupational History     Not on file.     Social History Main Topics     Smoking  "status: Never Smoker     Smokeless tobacco: Never Used     Alcohol use No     Drug use: No     Sexual activity: Yes     Partners: Male     Birth control/ protection: Surgical      Comment: tubal     Other Topics Concern     Parent/Sibling W/ Cabg, Mi Or Angioplasty Before 65f 55m? No     Social History Narrative       MEDICATIONS:  Current Outpatient Prescriptions   Medication Sig Dispense Refill     valACYclovir (VALTREX) 1000 mg tablet Take 1 tablet (1,000 mg) by mouth 3 times daily 21 tablet 0     acetaminophen (TYLENOL) 325 MG tablet Take 1 tablet by mouth every 6 hours as needed.       ibuprofen (ADVIL,MOTRIN) 600 MG tablet Take 1 tablet by mouth every 6 hours as needed for pain (For mild pain and temperature greater than 102F). 30 tablet 0     Multiple Vitamins-Minerals (MULTIVITAMIN WOMEN PO) Take 1 tablet by mouth daily       VITAMIN D, CHOLECALCIFEROL, PO Take 400 Units by mouth daily         ALLERGIES:     Allergies   Allergen Reactions     Seasonal Allergies        Problem list, Medication list, Allergies, and Medical/Social/Surgical histories reviewed in [facility] and updated as appropriate.    OBJECTIVE:                                                    VITALS: /80 (BP Location: Left arm, Cuff Size: Adult Regular)  Pulse 59  Temp 97.9  F (36.6  C) (Tympanic)  Resp 16  Ht 5' 4\" (1.626 m)  Wt 129 lb (58.5 kg)  LMP 12/05/2010  Breastfeeding? No  BMI 22.14 kg/m2 Body mass index is 22.14 kg/(m^2).  GENERAL: Pleasant, well appearing female.  SKIN:  Arm appears normal. No lesions, sores or other abnormalities.    ASSESSMENT/PLAN:                                                    1. Herpes zoster without complication  Suspect likely evolving Zoster given neuropathic pain spreading in a dermatomal pattern. Start valtrex.  Follow-up if not improving or if worsening.   - valACYclovir (VALTREX) 1000 mg tablet; Take 1 tablet (1,000 mg) by mouth 3 times daily  Dispense: 21 tablet; Refill: 0    2. Encounter " for screening mammogram for breast cancer  - MA Screening Digital Bilateral; Future

## 2018-06-01 NOTE — PATIENT INSTRUCTIONS
Thank you for choosing [facility].  You may be receiving a survey in the mail from [facility] regarding your visit today.  Please take a few minutes to complete and return the survey to let us know how we are doing.      Our Clinic hours are:  Mondays    7:20 am - 7 pm  Tues -  Fri  7:20 am - 5 pm    Clinic Phone: [phone]    The clinic lab opens at 7:30 am Mon - Fri and appointments are required.    [facility]. [phone]  Monday-Thursday 8 am - 7pm  Tues/Wed/Fri 8 am - 5:30 pm

## 2018-06-01 NOTE — MR AVS SNAPSHOT
After Visit Summary   6/1/2018    Lydia Pierson    MRN: [id number]           Patient Information     Date Of Birth          1960        Visit Information        Provider Department      6/1/2018 7:40 AM Benja Cordova MD [facility]        Today's Diagnoses     Encounter for screening mammogram for breast cancer    -  1      Care Instructions          Thank you for choosing [facility].  You may be receiving a survey in the mail from [facility] regarding your visit today.  Please take a few minutes to complete and return the survey to let us know how we are doing.      Our Clinic hours are:  Mondays    7:20 am - 7 pm  Tues -  Fri  7:20 am - 5 pm    Clinic Phone: [phone]    The clinic lab opens at 7:30 am Mon - Fri and appointments are required.    [facility]. [phone]  Monday-Thursday 8 am - 7pm  Tues/Wed/Fri 8 am - 5:30 pm                 Follow-ups after your visit        Your next 10 appointments already scheduled     Jun 13, 2018  7:40 AM CDT   SHORT with Benja Cordova MD   [facility]    [address] [place] [id number] [phone]            Jun 13, 2018  8:45 AM CDT   MA SCREENING DIGITAL BILATERAL with CLMA1   [facility] ([facility])    [address] [place] [id number]   [phone]           Do not use any powder, lotion or deodorant under your arms or on your breast. If you do, we will ask you to remove it before your exam.  Wear comfortable, two-piece clothing.  If you have any allergies, tell your care team.  Bring any previous mammograms from other facilities or have them mailed to the breast center.              Future tests that were ordered for you today     Open Future Orders        Priority Expected Expires Ordered    MA Screening Digital Bilateral Routine  6/1/2019 6/1/2018     "        Who to contact     If you have questions or need follow up information about today's clinic visit or your schedule please contact [facility] directly at [phone].  Normal or non-critical lab and imaging results will be communicated to you by MyChart, letter or phone within 4 business days after the clinic has received the results. If you do not hear from us within 7 days, please contact the clinic through MyChart or phone. If you have a critical or abnormal lab result, we will notify you by phone as soon as possible.  Submit refill requests through [employer] or call your pharmacy and they will forward the refill request to us. Please allow 3 business days for your refill to be completed.          Additional Information About Your Visit        [facility] Information     [employer] lets you send messages to your doctor, view your test results, renew your prescriptions, schedule appointments and more. To sign up, go to [email or web address] . Click on \"Log in\" on the left side of the screen, which will take you to the Welcome page. Then click on \"Sign up Now\" on the right side of the page.     You will be asked to enter the access code listed below, as well as some personal information. Please follow the directions to create your username and password.     Your access code is: [email or web address]  Expires: 2018 12:58 PM     Your access code will  in 90 days. If you need help or a new code, please call your [facility] clinic or [phone].        Care EveryWhere ID     This is your Care EveryWhere ID. This could be used by other organizations to access your [facility] medical records  [id number]        Your Vitals Were     Pulse Temperature Respirations Height Last Period Breastfeeding?    59 97.9  F (36.6  C) (Tympanic) 16 5' 4\" (1.626 m) 2010 No    BMI (Body Mass Index)                   22.14 kg/m2            Blood Pressure from Last 3 Encounters:   18 121/80   18 131/81 "   05/04/18 147/81    Weight from Last 3 Encounters:   06/01/18 129 lb (58.5 kg)   05/11/18 146 lb (66.2 kg)   05/04/18 146 lb (66.2 kg)               Primary Care Provider Office Phone # Fax #    Jose Jpriscila Liz Cordova [phone] [address] [facility] [address]        Equal Access to Services     BREANNE MANCIA : Hadii aad ku hadasho Soomaali, waaxda luqadaha, qaybta kaalmada adeegyada, waxay idiin hayaan adeeg kharash lacarlos enrique ah. So [facility] [phone].    ATENCIÓN: Si beatrice hartman, tiene a olmedo disposición servicios gratuitos de asistencia lingüística. Llame al [phone].    We comply with applicable federal civil rights laws and [place] laws. We do not discriminate on the basis of race, color, national origin, age, disability, sex, sexual orientation, or gender identity.            Thank you!     Thank you for choosing [facility]  for your care. Our goal is always to provide you with excellent care. Hearing back from our patients is one way we can continue to improve our services. Please take a few minutes to complete the written survey that you may receive in the mail after your visit with us. Thank you!             Your Updated Medication List - Protect others around you: Learn how to safely use, store and throw away your medicines at www.disposemymeds.org.          This list is accurate as of 6/1/18  7:51 AM.  Always use your most recent med list.                   Brand Name Dispense Instructions for use Diagnosis    ibuprofen 600 MG tablet    ADVIL/MOTRIN    30 tablet    Take 1 tablet by mouth every 6 hours as needed for pain (For mild pain and temperature greater than 102F).    Tear of meniscus of left knee       MULTIVITAMIN WOMEN PO      Take 1 tablet by mouth daily        TYLENOL 325 MG tablet   Generic drug:  acetaminophen      Take 1 tablet by mouth every 6 hours as needed.        VITAMIN D (CHOLECALCIFEROL) PO      Take 400 Units by mouth daily

## 2018-06-13 ENCOUNTER — OFFICE VISIT (OUTPATIENT)
Dept: FAMILY MEDICINE | Facility: CLINIC | Age: 58
End: 2018-06-13
Payer: COMMERCIAL

## 2018-06-13 ENCOUNTER — RADIANT APPOINTMENT (OUTPATIENT)
Dept: MAMMOGRAPHY | Facility: CLINIC | Age: 58
End: 2018-06-13
Attending: FAMILY MEDICINE
Payer: COMMERCIAL

## 2018-06-13 VITALS
HEIGHT: 64 IN | SYSTOLIC BLOOD PRESSURE: 105 MMHG | RESPIRATION RATE: 16 BRPM | DIASTOLIC BLOOD PRESSURE: 70 MMHG | BODY MASS INDEX: 21.31 KG/M2 | TEMPERATURE: 97.3 F | WEIGHT: 124.8 LBS | HEART RATE: 64 BPM

## 2018-06-13 DIAGNOSIS — Z12.11 SPECIAL SCREENING FOR MALIGNANT NEOPLASMS, COLON: ICD-10-CM

## 2018-06-13 DIAGNOSIS — Z00.00 WELL ADULT EXAM: Primary | ICD-10-CM

## 2018-06-13 DIAGNOSIS — Z13.220 LIPID SCREENING: ICD-10-CM

## 2018-06-13 DIAGNOSIS — Z12.31 ENCOUNTER FOR SCREENING MAMMOGRAM FOR BREAST CANCER: ICD-10-CM

## 2018-06-13 DIAGNOSIS — Z11.59 NEED FOR HEPATITIS C SCREENING TEST: ICD-10-CM

## 2018-06-13 LAB
CHOLEST SERPL-MCNC: 240 MG/DL
HCV AB SERPL QL IA: NONREACTIVE
HDLC SERPL-MCNC: 57 MG/DL
LDLC SERPL CALC-MCNC: 168 MG/DL
NONHDLC SERPL-MCNC: 183 MG/DL
TRIGL SERPL-MCNC: 77 MG/DL

## 2018-06-13 PROCEDURE — 77067 SCR MAMMO BI INCL CAD: CPT | Mod: TC

## 2018-06-13 PROCEDURE — 80061 LIPID PANEL: CPT | Performed by: FAMILY MEDICINE

## 2018-06-13 PROCEDURE — 36415 COLL VENOUS BLD VENIPUNCTURE: CPT | Performed by: FAMILY MEDICINE

## 2018-06-13 PROCEDURE — 86803 HEPATITIS C AB TEST: CPT | Performed by: FAMILY MEDICINE

## 2018-06-13 PROCEDURE — 99396 PREV VISIT EST AGE 40-64: CPT | Performed by: FAMILY MEDICINE

## 2018-06-13 ASSESSMENT — PAIN SCALES - GENERAL: PAINLEVEL: NO PAIN (0)

## 2018-06-13 NOTE — LETTER
[facility]  [address]  [facility] [address]  Phone: [phone]      6/25/2018     Lydia Pierson  [address] [facility] [id number]      Dear Lydia:    Thank you for allowing me to participate in your care. Your recent test results were reviewed and listed below.  Cholesterol mildly elevated.  Not abnormal enough at this point to warrant medication changes but I would recommend: increasing daily exercise, increasing dietary fiber, eliminating trans fats and reducing saturated fats. Otherwise labs look good.       The 10-year ASCVD risk score (Tingleyvenkat DELGADO Jr, et al., 2013) is: 2.5%     Values used to calculate the score:       Age: 58 years       Sex: Female       Is Non- : No       Diabetic: No       Tobacco smoker: No       Systolic Blood Pressure: 105 mmHg       Is BP treated: No       HDL Cholesterol: 57 mg/dL       Total Cholesterol: 240 mg/dL    Your results are provided below for your review  Results for orders placed or performed in visit on 06/13/18   Lipid Profile (Chol, Trig, HDL, LDL calc)   Result Value Ref Range    Cholesterol 240 (H) <200 mg/dL    Triglycerides 77 <150 mg/dL    HDL Cholesterol 57 >49 mg/dL    LDL Cholesterol Calculated 168 (H) <100 mg/dL    Non HDL Cholesterol 183 (H) <130 mg/dL   Hepatitis C antibody   Result Value Ref Range    Hepatitis C Antibody Nonreactive NR^Nonreactive                 Thank you for choosing [facility]. As a result, please continue with the treatment plan discussed in the office. Return as discussed or sooner if symptoms worsen or fail to improve. If you have any further questions or concerns, please do not hesitate to contact us.      Sincerely,        Dr. Benja Cordova

## 2018-06-13 NOTE — MR AVS SNAPSHOT
After Visit Summary   6/13/2018    Lydia Pierson    MRN: [id number]           Patient Information     Date Of Birth          1960        Visit Information        Provider Department      6/13/2018 7:40 AM Benja Cordova MD [facility]        Today's Diagnoses     Well adult exam    -  1    Special screening for malignant neoplasms, colon        Lipid screening        Need for hepatitis C screening test          Care Instructions      Preventive Health Recommendations  Female Ages 50 - 64    Yearly exam: See your health care provider every year in order to  o Review health changes.   o Discuss preventive care.    o Review your medicines if your doctor has prescribed any.      Get a Pap test every three years (unless you have an abnormal result and your provider advises testing more often).    If you get Pap tests with HPV test, you only need to test every 5 years, unless you have an abnormal result.     You do not need a Pap test if your uterus was removed (hysterectomy) and you have not had cancer.    You should be tested each year for STDs (sexually transmitted diseases) if you're at risk.     Have a mammogram every 1 to 2 years.    Have a colonoscopy at age 50, or have a yearly FIT test (stool test). These exams screen for colon cancer.      Have a cholesterol test every 5 years, or more often if advised.    Have a diabetes test (fasting glucose) every three years. If you are at risk for diabetes, you should have this test more often.     If you are at risk for osteoporosis (brittle bone disease), think about having a bone density scan (DEXA).    Shots: Get a flu shot each year. Get a tetanus shot every 10 years.    Nutrition:     Eat at least 5 servings of fruits and vegetables each day.    Eat whole-grain bread, whole-wheat pasta and brown rice instead of white grains and rice.    Talk to your provider about Calcium and Vitamin D.     Lifestyle    Exercise at  least 150 minutes a week (30 minutes a day, 5 days a week). This will help you control your weight and prevent disease.    Limit alcohol to one drink per day.    No smoking.     Wear sunscreen to prevent skin cancer.     See your dentist every six months for an exam and cleaning.    See your eye doctor every 1 to 2 years.            Follow-ups after your visit        Additional Services     GASTROENTEROLOGY ADULT REF PROCEDURE ONLY [facility] [phone]       Last Lab Result: Creatinine (mg/dL)       Date                     Value                 12/16/2017               0.52             ----------  Body mass index is 21.59 kg/(m^2).     Needed:  No  Language:  English    Patient will be contacted to schedule procedure.     Please be aware that coverage of these services is subject to the terms and limitations of your health insurance plan.  Call member services at your health plan with any benefit or coverage questions.  Any procedures must be performed at a [facility] facility OR coordinated by your clinic's referral office.    Please bring the following with you to your appointment:    (1) Any X-Rays, CTs or MRIs which have been performed.  Contact the facility where they were done to arrange for  prior to your scheduled appointment.    (2) List of current medications   (3) This referral request   (4) Any documents/labs given to you for this referral                  Your next 10 appointments already scheduled     Jun 13, 2018  8:45 AM CDT   MA SCREENING DIGITAL BILATERAL with CLMA1   [facility] ([facility])    [address] [place] [id number] [phone]           Do not use any powder, lotion or deodorant under your arms or on your breast. If you do, we will ask you to remove it before your exam.  Wear comfortable, two-piece clothing.  If you have any allergies, tell your care team.  Bring any previous mammograms from other  "facilities or have them mailed to the breast center.              Who to contact     If you have questions or need follow up information about today's clinic visit or your schedule please contact [facility] directly at [phone].  Normal or non-critical lab and imaging results will be communicated to you by MyChart, letter or phone within 4 business days after the clinic has received the results. If you do not hear from us within 7 days, please contact the clinic through MyChart or phone. If you have a critical or abnormal lab result, we will notify you by phone as soon as possible.  Submit refill requests through [employer] or call your pharmacy and they will forward the refill request to us. Please allow 3 business days for your refill to be completed.          Additional Information About Your Visit        [employer] Information     [employer] lets you send messages to your doctor, view your test results, renew your prescriptions, schedule appointments and more. To sign up, go to [email or web address] . Click on \"Log in\" on the left side of the screen, which will take you to the Welcome page. Then click on \"Sign up Now\" on the right side of the page.     You will be asked to enter the access code listed below, as well as some personal information. Please follow the directions to create your username and password.     Your access code is: [email or web address]  Expires: 2018 12:58 PM     Your access code will  in 90 days. If you need help or a new code, please call your [facility] or [phone].        Care EveryWhere ID     This is your Care EveryWhere ID. This could be used by other organizations to access your [place] medical records  [id number]        Your Vitals Were     Pulse Temperature Respirations Height Last Period Breastfeeding?    62 97.3  F (36.3  C) (Tympanic) 16 5' 3.75\" (1.619 m) 2010 No    BMI (Body Mass Index)                   21.59 kg/m2            Blood " Pressure from Last 3 Encounters:   06/13/18 105/70   06/01/18 121/80   05/11/18 131/81    Weight from Last 3 Encounters:   06/13/18 124 lb 12.8 oz (56.6 kg)   06/01/18 129 lb (58.5 kg)   05/11/18 146 lb (66.2 kg)              We Performed the Following     GASTROENTEROLOGY ADULT REF PROCEDURE ONLY [facility] [phone]     Hepatitis C antibody     Lipid Profile (Chol, Trig, HDL, LDL calc)        Primary Care Provider Office Phone # Fax #    Jose Jpriscila Liz Cordova [phone] [address] [facility] [address]        Equal Access to Services     BREANNE MANCIA : Hadii juan luis Reveles, waaxda luqadaha, qaybta kaalmada adekinjalyamarcia, alek valencia. So [facility] [phone].    ATENCIÓN: Si habla español, tiene a olmedo disposición servicios gratuitos de asistencia lingüística. Llame al [phone].    We comply with applicable federal civil rights laws and [place] laws. We do not discriminate on the basis of race, color, national origin, age, disability, sex, sexual orientation, or gender identity.            Thank you!     Thank you for choosing [facility]  for your care. Our goal is always to provide you with excellent care. Hearing back from our patients is one way we can continue to improve our services. Please take a few minutes to complete the written survey that you may receive in the mail after your visit with us. Thank you!             Your Updated Medication List - Protect others around you: Learn how to safely use, store and throw away your medicines at www.disposemymeds.org.          This list is accurate as of 6/13/18  8:26 AM.  Always use your most recent med list.                   Brand Name Dispense Instructions for use Diagnosis    ibuprofen 600 MG tablet    ADVIL/MOTRIN    30 tablet    Take 1 tablet by mouth every 6 hours as needed for pain (For mild pain and temperature greater than 102F).    Tear of meniscus of left  knee       MULTIVITAMIN WOMEN PO      Take 1 tablet by mouth daily        TYLENOL 325 MG tablet   Generic drug:  acetaminophen      Take 1 tablet by mouth every 6 hours as needed.        valACYclovir 1000 mg tablet    VALTREX    21 tablet    Take 1 tablet (1,000 mg) by mouth 3 times daily    Herpes zoster without complication       VITAMIN D (CHOLECALCIFEROL) PO      Take 400 Units by mouth daily

## 2018-06-13 NOTE — PROGRESS NOTES
SUBJECTIVE:   CC: Lydia Pierson is an 58 year old woman who presents for preventive health visit.     Healthy Habits:    Do you get at least three servings of calcium containing foods daily (dairy, green leafy vegetables, etc.)? yes    Amount of exercise or daily activities, outside of work: 2 day(s) per week    Problems taking medications regularly No    Medication side effects: No    Have you had an eye exam in the past two years? no    Do you see a dentist twice per year? yes    Do you have sleep apnea, excessive snoring or daytime drowsiness?no      Chief Complaint   Patient presents with     Physical        Today's PHQ-2 Score:   PHQ-2 ( 1999 [employer]) 7/24/2017 12/13/2016   Q1: Little interest or pleasure in doing things 0 0   Q2: Feeling down, depressed or hopeless 0 0   PHQ-2 Score 0 0       Abuse: Current or Past(Physical, Sexual or Emotional)- No  Do you feel safe in your environment - Yes    Social History   Substance Use Topics     Smoking status: Never Smoker     Smokeless tobacco: Never Used     Alcohol use No     If you drink alcohol do you typically have >3 drinks per day or >7 drinks per week? No                     Reviewed orders with patient.  Reviewed health maintenance and updated orders accordingly - Yes  Labs reviewed in EPIC  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)     Malignant neoplasm of kidney excluding renal pelvis (H)     External hemorrhoids with other complication     Sensorineural hearing loss, asymmetrical     Ovarian cyst     Health Care Home     Hyperlipidemia LDL goal <160     Advanced directives, counseling/discussion     Closed nondisplaced fracture of neck of fifth metacarpal bone of left hand     Past Surgical History:   Procedure Laterality Date     ARTHROSCOPY KNEE WITH MEDIAL MENISCECTOMY  7/7/2011    Procedure:ARTHROSCOPY KNEE WITH MEDIAL MENISCECTOMY; choice anes; Surgeon:MANUEL FLEMING;  Location:[facility] [place]     HEMORRHOIDECTOMY  2006     HERNIORRHAPHY INGUINAL Left 8/17/2017    Procedure: HERNIORRHAPHY INGUINAL;  Left Inguinal Hernia Repair;  Surgeon: Mehdi Sorensen MD;  Location: [facility] OR     LAPAROSCOPIC HERNIORRHAPHY INGUINAL Left 1/11/2018    Procedure: LAPAROSCOPIC HERNIORRHAPHY INGUINAL;  Laparoscopic Left Inguinal Hernia Repair converted to open left femoral  hernia repair;  Surgeon: Mehdi Sorensen MD;  Location: [facility] [place]     NEPHRECTOMY RT/LT  2001    left partial nephrectomy- kidney ca     SALPINGO OOPHORECTOMY,R/L/KRISHNA  2010    Rt salpingo oophorectomy      SURGICAL HISTORY OF -   8/1/1995    Vein stripping     SURGICAL HISTORY OF -   1975    wisdom teeth extraction      TONSILLECTOMY & ADENOIDECTOMY  1962     TUBAL LIGATION  1994       Social History   Substance Use Topics     Smoking status: Never Smoker     Smokeless tobacco: Never Used     Alcohol use No     Family History   Problem Relation Age of Onset     C.A.D. Father      bypass     DIABETES Father      Lipids Father      Allergies Sister      allergic rhinitis     Neurologic Disorder Sister      seizure disorder     Respiratory Sister      asthma     Allergies Son      Breast Cancer No family hx of      Cancer - colorectal No family hx of          Current Outpatient Prescriptions   Medication Sig Dispense Refill     acetaminophen (TYLENOL) 325 MG tablet Take 1 tablet by mouth every 6 hours as needed.       ibuprofen (ADVIL,MOTRIN) 600 MG tablet Take 1 tablet by mouth every 6 hours as needed for pain (For mild pain and temperature greater than 102F). 30 tablet 0     Multiple Vitamins-Minerals (MULTIVITAMIN WOMEN PO) Take 1 tablet by mouth daily       VITAMIN D, CHOLECALCIFEROL, PO Take 400 Units by mouth daily       valACYclovir (VALTREX) 1000 mg tablet Take 1 tablet (1,000 mg) by mouth 3 times daily (Patient not taking: Reported on 6/13/2018) 21 tablet 0     Allergies   Allergen Reactions     Seasonal Allergies         Patient over age 50, mutual decision to screen reflected in health maintenance.    Pertinent mammograms are reviewed under the imaging tab.  History of abnormal Pap smear:   NO - age 30-65 PAP every 5 years with negative HPV co-testing recommended  Last 3 Pap and HPV Results:   PAP / HPV Latest Ref Rng & Units 2/12/2016 4/29/2011   PAP - NIL NIL   HPV 16 DNA NEG Negative -   HPV 18 DNA NEG Negative -   OTHER HR HPV NEG Negative -       Reviewed and updated as needed this visit by clinical staff         Reviewed and updated as needed this visit by Provider        Past Medical History:   Diagnosis Date     Acute lymphoid leukemia, without mention of having achieved remission(204.00) 1976    ALL at age 16,  no evid of recurrence     Allergic rhinitis, cause unspecified     Rhinitis     Malignant neoplasm of kidney, except pelvis 2001    left kidney removed 1/01     Motion sickness       Past Surgical History:   Procedure Laterality Date     ARTHROSCOPY KNEE WITH MEDIAL MENISCECTOMY  7/7/2011    Procedure:ARTHROSCOPY KNEE WITH MEDIAL MENISCECTOMY; choice anes; Surgeon:MANUEL FLEMING; Location:[place]     HEMORRHOIDECTOMY  2006     HERNIORRHAPHY INGUINAL Left 8/17/2017    Procedure: HERNIORRHAPHY INGUINAL;  Left Inguinal Hernia Repair;  Surgeon: Mehdi Sorensen MD;  Location: [facility] [place]     LAPAROSCOPIC HERNIORRHAPHY INGUINAL Left 1/11/2018    Procedure: LAPAROSCOPIC HERNIORRHAPHY INGUINAL;  Laparoscopic Left Inguinal Hernia Repair converted to open left femoral  hernia repair;  Surgeon: Mehdi Sorensen MD;  Location: [facility] [place]     NEPHRECTOMY RT/LT  2001    left partial nephrectomy- kidney ca     SALPINGO OOPHORECTOMY,R/L/KRISHNA  2010    Rt salpingo oophorectomy      SURGICAL HISTORY OF -   8/1/1995    Vein stripping     SURGICAL HISTORY OF -   1975    wisdom teeth extraction      TONSILLECTOMY & ADENOIDECTOMY  1962     TUBAL LIGATION  1994       ROS:  Constitutional, neuro, ENT, endocrine, pulmonary,  "cardiac, gastrointestinal, genitourinary, musculoskeletal, integument and psychiatric systems are negative, except as otherwise noted.     OBJECTIVE:   LMP 12/05/2010  EXAM:  GENERAL: healthy, alert and no distress  EYES: Eyes grossly normal to inspection, PERRL and conjunctivae and sclerae normal  HENT: ear canals and TM's normal, nose and mouth without ulcers or lesions  NECK: no adenopathy, no asymmetry, masses, or scars and thyroid normal to palpation  RESP: lungs clear to auscultation - no rales, rhonchi or wheezes  BREAST: normal without masses, tenderness or nipple discharge and no palpable axillary masses or adenopathy  CV: regular rate and rhythm, normal S1 S2, no S3 or S4, no murmur, click or rub, no peripheral edema and peripheral pulses strong  ABDOMEN: soft, nontender, no hepatosplenomegaly, no masses and bowel sounds normal  MS: no gross musculoskeletal defects noted, no edema  SKIN: no suspicious lesions or rashes  NEURO: Normal strength and tone, mentation intact and speech normal  PSYCH: mentation appears normal, affect normal/bright    ASSESSMENT/PLAN:   1. Well adult exam    2. Special screening for malignant neoplasms, colon  - GASTROENTEROLOGY ADULT REF PROCEDURE ONLY [facility] [phone]    3. Lipid screening  - Lipid Profile (Chol, Trig, HDL, LDL calc)    4. Need for hepatitis C screening test  - Hepatitis C antibody    COUNSELING:   Reviewed preventive health counseling, as reflected in patient instructions         reports that she has never smoked. She has never used smokeless tobacco.    Estimated body mass index is 22.14 kg/(m^2) as calculated from the following:    Height as of 6/1/18: 5' 4\" (1.626 m).    Weight as of 6/1/18: 129 lb (58.5 kg).   Weight management plan noted, stable and monitoring    Counseling Resources:  ATP IV Guidelines  Pooled Cohorts Equation Calculator  Breast Cancer Risk Calculator  FRAX Risk Assessment  ICSI Preventive Guidelines  Dietary Guidelines for " Americans, 2010  USDA's MyPlate  ASA Prophylaxis  Lung CA Screening    Benja Cordova MD  [facility]

## 2018-06-22 NOTE — PROGRESS NOTES
Please mail letter: Cholesterol mildly elevated.  Not abnormal enough at this point to warrant medication changes but I would recommend: increasing daily exercise, increasing dietary fiber, eliminating trans fats and reducing saturated fats. Otherwise labs look good.      The 10-year ASCVD risk score (Nathan DELGADO Jr, et al., 2013) is: 2.5%    Values used to calculate the score:      Age: 58 years      Sex: Female      Is Non- : No      Diabetic: No      Tobacco smoker: No      Systolic Blood Pressure: 105 mmHg      Is BP treated: No      HDL Cholesterol: 57 mg/dL      Total Cholesterol: 240 mg/dL

## 2018-08-30 ENCOUNTER — ANESTHESIA EVENT (OUTPATIENT)
Dept: GASTROENTEROLOGY | Facility: CLINIC | Age: 58
End: 2018-08-30
Payer: COMMERCIAL

## 2018-08-30 NOTE — ANESTHESIA PREPROCEDURE EVALUATION
Anesthesia Evaluation     . Pt has had prior anesthetic. Type: MAC and General    No history of anesthetic complications          ROS/MED HX    ENT/Pulmonary:     (+)allergic rhinitis, , . .    Neurologic: Comment: Motion sickness      Cardiovascular:     (+) Dyslipidemia, ----. : . . . :. . Previous cardiac testing date:results:date: results:ECG reviewed date:9/28/15 results:Sinus Rhythm   -RSR(V1) -nondiagnostic.     PROBABLY NORMAL   date: results:          METS/Exercise Tolerance:  >4 METS   Hematologic:  - neg hematologic  ROS       Musculoskeletal:   (+) fracture upper extremity, , -       GI/Hepatic:  - neg GI/hepatic ROS       Renal/Genitourinary:     (+) chronic renal disease,       Endo:  - neg endo ROS       Psychiatric:  - neg psychiatric ROS       Infectious Disease:  - neg infectious disease ROS       Malignancy:   (+) Malignancy History of Lymphoma/Leukemia and Other  Lymph CA Remission status post, Other CA kidney status post Surgery         Other:                     Physical Exam  Normal systems: cardiovascular and pulmonary    Airway   Mallampati: I  TM distance: >3 FB  Neck ROM: full    Dental     Cardiovascular       Pulmonary                         Anesthesia Plan      History & Physical Review  History and physical reviewed and following examination; no interval change.    ASA Status:  3 .    NPO Status:  > 8 hours    Plan for MAC and General with Intravenous and Propofol induction. Maintenance will be TIVA.  Reason for MAC:  Deep or markedly invasive procedure (G8)         Postoperative Care      Consents                          .

## 2018-09-04 ENCOUNTER — HOSPITAL ENCOUNTER (OUTPATIENT)
Facility: CLINIC | Age: 58
Discharge: HOME | End: 2018-09-04
Attending: SURGERY | Admitting: SURGERY
Payer: COMMERCIAL

## 2018-09-04 ENCOUNTER — ANESTHESIA (OUTPATIENT)
Dept: GASTROENTEROLOGY | Facility: CLINIC | Age: 58
End: 2018-09-04
Payer: COMMERCIAL

## 2018-09-04 ENCOUNTER — SURGERY (OUTPATIENT)
Age: 58
End: 2018-09-04

## 2018-09-04 VITALS
HEART RATE: 70 BPM | HEIGHT: 64 IN | BODY MASS INDEX: 19.97 KG/M2 | TEMPERATURE: 98.2 F | RESPIRATION RATE: 16 BRPM | WEIGHT: 117 LBS | SYSTOLIC BLOOD PRESSURE: 132 MMHG | OXYGEN SATURATION: 100 % | DIASTOLIC BLOOD PRESSURE: 72 MMHG

## 2018-09-04 LAB — COLONOSCOPY: NORMAL

## 2018-09-04 PROCEDURE — 25000128 H RX IP 250 OP 636: Performed by: SURGERY

## 2018-09-04 PROCEDURE — 37000008 ZZH ANESTHESIA TECHNICAL FEE, 1ST 30 MIN: Performed by: SURGERY

## 2018-09-04 PROCEDURE — G0121 COLON CA SCRN NOT HI RSK IND: HCPCS | Performed by: SURGERY

## 2018-09-04 PROCEDURE — 45378 DIAGNOSTIC COLONOSCOPY: CPT | Performed by: SURGERY

## 2018-09-04 PROCEDURE — 25000128 H RX IP 250 OP 636: Performed by: NURSE ANESTHETIST, CERTIFIED REGISTERED

## 2018-09-04 PROCEDURE — 25000125 ZZHC RX 250: Performed by: NURSE ANESTHETIST, CERTIFIED REGISTERED

## 2018-09-04 RX ORDER — LIDOCAINE 40 MG/G
CREAM TOPICAL
Status: DISCONTINUED | OUTPATIENT
Start: 2018-09-04 | End: 2018-09-04 | Stop reason: HOSPADM

## 2018-09-04 RX ORDER — PROPOFOL 10 MG/ML
INJECTION, EMULSION INTRAVENOUS PRN
Status: DISCONTINUED | OUTPATIENT
Start: 2018-09-04 | End: 2018-09-04

## 2018-09-04 RX ORDER — PROPOFOL 10 MG/ML
INJECTION, EMULSION INTRAVENOUS CONTINUOUS PRN
Status: DISCONTINUED | OUTPATIENT
Start: 2018-09-04 | End: 2018-09-04

## 2018-09-04 RX ORDER — SODIUM CHLORIDE, SODIUM LACTATE, POTASSIUM CHLORIDE, CALCIUM CHLORIDE 600; 310; 30; 20 MG/100ML; MG/100ML; MG/100ML; MG/100ML
INJECTION, SOLUTION INTRAVENOUS CONTINUOUS
Status: DISCONTINUED | OUTPATIENT
Start: 2018-09-04 | End: 2018-09-04 | Stop reason: HOSPADM

## 2018-09-04 RX ORDER — ONDANSETRON 2 MG/ML
4 INJECTION INTRAMUSCULAR; INTRAVENOUS
Status: DISCONTINUED | OUTPATIENT
Start: 2018-09-04 | End: 2018-09-04 | Stop reason: HOSPADM

## 2018-09-04 RX ADMIN — PROPOFOL 150 MCG/KG/MIN: 10 INJECTION, EMULSION INTRAVENOUS at 11:43

## 2018-09-04 RX ADMIN — PROPOFOL 100 MG: 10 INJECTION, EMULSION INTRAVENOUS at 11:43

## 2018-09-04 RX ADMIN — SODIUM CHLORIDE, POTASSIUM CHLORIDE, SODIUM LACTATE AND CALCIUM CHLORIDE: 600; 310; 30; 20 INJECTION, SOLUTION INTRAVENOUS at 11:13

## 2018-09-04 RX ADMIN — SODIUM CHLORIDE, POTASSIUM CHLORIDE, SODIUM LACTATE AND CALCIUM CHLORIDE: 600; 310; 30; 20 INJECTION, SOLUTION INTRAVENOUS at 11:40

## 2018-09-04 RX ADMIN — LIDOCAINE HYDROCHLORIDE 40 MG: 10 INJECTION, SOLUTION EPIDURAL; INFILTRATION; INTRACAUDAL; PERINEURAL at 11:43

## 2018-09-04 NOTE — BRIEF OP NOTE
[facility]   Brief Operative Note    Pre-operative diagnosis: screening   Post-operative diagnosis tortuous, but otherwise normal colon     Procedure: Procedure(s):  colonoscopy - Wound Class: II-Clean Contaminated   Surgeon(s): Surgeon(s) and Role:     * Carlin Rodrigues MD - Primary   Estimated blood loss: 0 mL    Specimens: * No specimens in log *   Findings: Moderately tortuous colon  Colon otherwise normal

## 2018-09-04 NOTE — ANESTHESIA POSTPROCEDURE EVALUATION
Patient: Lydia Pierson    Procedure(s):  colonoscopy - Wound Class: II-Clean Contaminated    Diagnosis:screening  Diagnosis Additional Information: No value filed.    Anesthesia Type:  MAC, General    Note:  Anesthesia Post Evaluation    Patient location during evaluation: Phase 2 and Bedside  Patient participation: Able to fully participate in evaluation  Level of consciousness: awake and alert  Pain management: adequate  Airway patency: patent  Cardiovascular status: acceptable  Respiratory status: acceptable  Hydration status: acceptable  PONV: none     Anesthetic complications: None          Last vitals:  Vitals:    09/04/18 1057 09/04/18 1102   BP: 104/71    Resp: 18 16   Temp: 36.8  C (98.2  F)    SpO2: 100%          Electronically Signed By: Federico Lama CRNA, APRN CRNA  September 4, 2018  12:11 PM

## 2018-09-04 NOTE — ANESTHESIA CARE TRANSFER NOTE
Patient: Lydia Pierson    Procedure(s):  colonoscopy - Wound Class: II-Clean Contaminated    Diagnosis: screening  Diagnosis Additional Information: No value filed.    Anesthesia Type:   MAC, General     Note:  Airway :Nasal Cannula  Patient transferred to:Phase II  Handoff Report: Identifed the Patient, Identified the Reponsible Provider, Reviewed the pertinent medical history, Discussed the surgical course, Reviewed Intra-OP anesthesia mangement and issues during anesthesia, Set expectations for post-procedure period and Allowed opportunity for questions and acknowledgement of understanding      Vitals: (Last set prior to Anesthesia Care Transfer)    CRNA VITALS  9/4/2018 1136 - 9/4/2018 1209      9/4/2018             NIBP: 125/76    Pulse: 65    NIBP Mean: 89    SpO2: 98 %    EKG: NSR                Electronically Signed By: Federico Lama CRNA, APRN CRNA  September 4, 2018  12:09 PM

## 2018-09-04 NOTE — H&P
58 year old year old female here for colonoscopy for screening.    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.00) 1976    ALL at age 16,  no evid of recurrence     Allergic rhinitis, cause unspecified     Rhinitis     Malignant neoplasm of kidney, except pelvis 2001    left kidney removed 1/01     Motion sickness        Past Surgical History:   Procedure Laterality Date     ARTHROSCOPY KNEE WITH MEDIAL MENISCECTOMY  7/7/2011    Procedure:ARTHROSCOPY KNEE WITH MEDIAL MENISCECTOMY; choice anes; Surgeon:MANUEL FLEMING; Location:[place]     HEMORRHOIDECTOMY  2006     HERNIORRHAPHY INGUINAL Left 8/17/2017    Procedure: HERNIORRHAPHY INGUINAL;  Left Inguinal Hernia Repair;  Surgeon: Mehdi Sorensen MD;  Location: [facility] [place]     LAPAROSCOPIC HERNIORRHAPHY INGUINAL Left 1/11/2018    Procedure: LAPAROSCOPIC HERNIORRHAPHY INGUINAL;  Laparoscopic Left Inguinal Hernia Repair converted to open left femoral  hernia repair;  Surgeon: Mehdi Sorensen MD;  Location: [facility] [place]     NEPHRECTOMY RT/LT  2001    left partial nephrectomy- kidney ca     SALPINGO OOPHORECTOMY,R/L/KRISHNA  2010    Rt salpingo oophorectomy      SURGICAL HISTORY OF -   8/1/1995    Vein stripping     SURGICAL HISTORY OF -   1975    wisdom teeth extraction      TONSILLECTOMY & ADENOIDECTOMY  1962     TUBAL LIGATION  1994       @[facility]@    No current outpatient prescriptions on file.       Allergies   Allergen Reactions      "Seasonal Allergies        Pt reports that she has never smoked. She has never used smokeless tobacco. She reports that she does not drink alcohol or use illicit drugs.    Exam:  /71  Temp 98.2  F (36.8  C) (Oral)  Resp 16  Ht 1.626 m (5' 4\")  Wt 53.1 kg (117 lb)  LMP 12/05/2010  SpO2 100%  BMI 20.08 kg/m2    Awake, Alert OX3  Lungs - CTA bilaterally  CV - RRR, no murmurs, distal pulses intact  Abd - soft, non-distended, non-tender, +BS  Extr - No cyanosis or edema    A/P 58 year old year old female in need of colonoscopy for screening. Risks, benefits, alternatives, and complications were discussed including the possibility of perforation and the patient agreed to proceed    Carlin Rodrigues MD   "

## 2018-09-05 ENCOUNTER — OFFICE VISIT (OUTPATIENT)
Dept: SURGERY | Facility: CLINIC | Age: 58
End: 2018-09-05
Payer: COMMERCIAL

## 2018-09-05 VITALS
TEMPERATURE: 97 F | HEIGHT: 64 IN | SYSTOLIC BLOOD PRESSURE: 113 MMHG | WEIGHT: 117 LBS | BODY MASS INDEX: 19.97 KG/M2 | HEART RATE: 77 BPM | DIASTOLIC BLOOD PRESSURE: 79 MMHG

## 2018-09-05 DIAGNOSIS — Z09 POSTOP CHECK: Primary | ICD-10-CM

## 2018-09-05 PROCEDURE — 99024 POSTOP FOLLOW-UP VISIT: CPT | Performed by: SURGERY

## 2018-09-05 NOTE — PROGRESS NOTES
Patient here for evaluation of left groin hernia repair.  This has been repaired twice and on physical exam today it has not recurred.  She is getting ready to go before a hearing for Worker's Comp.  I have already written her one letter stating that the hernia recurrence was likely due to at least in part of her job as a .  At her request I wrote her another letter stating this same.  In the new letter, I stated that should she recur a second time, any potential surgery to repair it would be fraught with potential complications.  I suggested she restrict her lifting to less than 50 pounds for life.    Marlo Anderson MD

## 2018-09-05 NOTE — LETTER
9/5/2018         RE: Lydia Pierson  [address] [facility] [address]        Dear Colleague,    Thank you for referring your patient, Lydia Pierson, to the [facility]. Please see a copy of my visit note below.    Patient here for evaluation of left groin hernia repair.  This has been repaired twice and on physical exam today it has not recurred.  She is getting ready to go before a hearing for Worker's Comp.  I have already written her one letter stating that the hernia recurrence was likely due to at least in part of her job as a .  At her request I wrote her another letter stating this same.  In the new letter, I stated that should she recur a second time, any potential surgery to repair it would be fraught with potential complications.  I suggested she restrict her lifting to less than 50 pounds for life.    Marlo Anderson MD     Again, thank you for allowing me to participate in the care of your patient.        Sincerely,        Marlo Anderson MD

## 2018-09-05 NOTE — NURSING NOTE
"Initial /79 (BP Location: Right arm, Patient Position: Sitting, Cuff Size: Adult Regular)  Pulse 77  Temp 97  F (36.1  C) (Tympanic)  Ht 1.626 m (5' 4\")  Wt 53.1 kg (117 lb)  LMP 12/05/2010  BMI 20.08 kg/m2 Estimated body mass index is 20.08 kg/(m^2) as calculated from the following:    Height as of this encounter: 1.626 m (5' 4\").    Weight as of this encounter: 53.1 kg (117 lb). .    Kaity Benson MA    "

## 2018-09-05 NOTE — MR AVS SNAPSHOT
"              After Visit Summary   9/5/2018    Lydia Pierson    MRN: [id number]           Patient Information     Date Of Birth          1960        Visit Information        Provider Department      9/5/2018 9:30 AM Marlo Anderson MD [facility]        Today's Diagnoses     Postop check    -  1      Care Instructions    Per Dr. Anderson's instructions          Follow-ups after your visit        Who to contact     If you have questions or need follow up information about today's clinic visit or your schedule please contact [facility] directly at [phone].  Normal or non-critical lab and imaging results will be communicated to you by MyChart, letter or phone within 4 business days after the clinic has received the results. If you do not hear from us within 7 days, please contact the clinic through MyChart or phone. If you have a critical or abnormal lab result, we will notify you by phone as soon as possible.  Submit refill requests through [employer] or call your pharmacy and they will forward the refill request to us. Please allow 3 business days for your refill to be completed.          Additional Information About Your Visit        Care EveryWhere ID     This is your Care EveryWhere ID. This could be used by other organizations to access your [facility] medical records  [id number]        Your Vitals Were     Pulse Temperature Height Last Period BMI (Body Mass Index)       77 97  F (36.1  C) (Tympanic) 1.626 m (5' 4\") 12/05/2010 20.08 kg/m2        Blood Pressure from Last 3 Encounters:   09/05/18 113/79   09/04/18 132/72   06/13/18 105/70    Weight from Last 3 Encounters:   09/05/18 53.1 kg (117 lb)   09/04/18 53.1 kg (117 lb)   06/13/18 56.6 kg (124 lb 12.8 oz)              Today, you had the following     No orders found for display       Primary Care Provider Office Phone # Fax #    Benja Cordova [phone] [address] [address] [facility] [address] [id number]        Equal Access to Services     [facility] : Hadii aad ku hadchteautumn Sunithabenjamin, wapilida cynthiaalexha, qasavannaisra hermanmeganalek jennings. So [facility] [phone].    ATENCIÓN: Si habla español, tiene a olmedo disposición servicios gratuitos de asistencia lingüística. Olviname al [phone].    We comply with applicable federal civil rights laws and [place] laws. We do not discriminate on the basis of race, color, national origin, age, disability, sex, sexual orientation, or gender identity.            Thank you!     Thank you for choosing [facility]  for your care. Our goal is always to provide you with excellent care. Hearing back from our patients is one way we can continue to improve our services. Please take a few minutes to complete the written survey that you may receive in the mail after your visit with us. Thank you!             Your Updated Medication List - Protect others around you: Learn how to safely use, store and throw away your medicines at www.disposemymeds.org.          This list is accurate as of 9/5/18  9:56 AM.  Always use your most recent med list.                   Brand Name Dispense Instructions for use Diagnosis    ibuprofen 600 MG tablet    ADVIL/MOTRIN    30 tablet    Take 1 tablet by mouth every 6 hours as needed for pain (For mild pain and temperature greater than 102F).    Tear of meniscus of left knee       MULTIVITAMIN WOMEN PO      Take 1 tablet by mouth daily        TYLENOL 325 MG tablet   Generic drug:  acetaminophen      Take 1 tablet by mouth every 6 hours as needed.        valACYclovir 1000 mg tablet    VALTREX    21 tablet    Take 1 tablet (1,000 mg) by mouth 3 times daily    Herpes zoster without complication       VITAMIN D (CHOLECALCIFEROL) PO      Take 400 Units by mouth daily

## 2018-09-05 NOTE — LETTER
[facility]  [address] [address]  [phone]          September 5, 2018    RE:  Lydia Pierson                                                                                                                                                       [address] [address] [facility] [address]            To whom it may concern:    Lydia Pierson is under my professional care for follow up of recurrent hernia surgery. Patient's initial surgery was in August of 2017.  Unfortunately, this hernia recurred despite 6 weeks of convalescence.  Given her occupation of  and the lifting requirements thereof, it is most likely that her job significantly contributed to her early recurrence.  As she has recurred this hernia once, the likelihood of a second recurrence is increased and she should not lift more than 50lbs to minimize this risk.   Should she recur again, the surgery to repair it would be fraught with potential complications due to the abundance of scar tissue formation.      Sincerely,      Marlo Anderson MD

## 2018-09-17 ENCOUNTER — TELEPHONE (OUTPATIENT)
Dept: SURGERY | Facility: CLINIC | Age: 58
End: 2018-09-17

## 2018-09-17 NOTE — TELEPHONE ENCOUNTER
Fax request for WKC letter received from:  Carlin Trimble,     Ph: [phone]  Fax: [phone]    Letter request placed in CMA box    Denise Behrendt  [facility] CSS

## 2018-09-17 NOTE — TELEPHONE ENCOUNTER
Letter request from Carlin Trimble,  placed on providers desk to review and compose.    Belinda SHANNON MA

## 2018-09-26 NOTE — TELEPHONE ENCOUNTER
Pt called regarding status of the letter requested for Workmans Comp. Told pt that provider had the request and it should be done and sent soon. Please advise.    Kamila Salmeron  Clinic Station

## 2018-09-27 NOTE — TELEPHONE ENCOUNTER
I've got this 3 full-page letter from an  sitting on my desk that I haven't had time to read yet and I'll address it eventually.  I've already written her a couple of letters that I deem are adequate.   Patience.    Marlo Anderson MD

## 2018-10-04 NOTE — TELEPHONE ENCOUNTER
Pt called stating that she needs this letter for Workman's Comp completed ASAP as there is a 30 day window for pt to turn it in for benefits to be paid. Pt states that if necessary her  would be willing to compensate Dr. Anderson for his time with an office visit. Pt would like to be contacted directly when the letter is ready. Please advise.    Kamila Salmeron  Clinic Station [place]

## 2018-10-05 NOTE — TELEPHONE ENCOUNTER
"Per Physician OK- RN provided Medical staff with the May and Sept letter with the last faxed request. Per Medical staff, they re-faxed the two letters with the request form to them.  (Copy of their fax with the two faxed letters then sent to legal in interoffice mail)    Legal Office then requested RN send the full medical office notes and records: RN advised they must contact Medical Records with a formal request.  They stated: \"that will take weeks\" - RN reiterated that records request must be requested formally with release of records.    Katina Epps RN  [facility]  "

## 2018-10-05 NOTE — TELEPHONE ENCOUNTER
I have written multiple letters addressing this patient's recurrent hernias.  In these letters I state that her job most likely contributed to her hernia recurrence.  According to the very long letter sent to me by her , he would like me to be definitive that her job definitely and directly caused her hernia.  This is a kind of certainty that no physician can state with 100% certainty.  The 's problem is not with me but with the postal authorities who are living in an alternate reality and denying existence as it is.    Marlo Anderson MD

## 2018-10-05 NOTE — TELEPHONE ENCOUNTER
FYI back to Dr Anderson.    Pt advised and continued to speak over RN restating / asking (over and over) that he needs to write a letter stating a degree of certainty and RN continued to attempt to advise pt that Dr Anderson believes he is unable to do that. Pt would not cease speaking and continued to interrupt RN.  Left message for  (with  voicemail)  That Dr Anderson has provided multiple letters and not able to state any further certainty other than what is  already stated and offered to resend the last letter.   Katina Epps RN  [facility]

## 2018-10-24 ENCOUNTER — OFFICE VISIT (OUTPATIENT)
Dept: FAMILY MEDICINE | Facility: CLINIC | Age: 58
End: 2018-10-24
Payer: COMMERCIAL

## 2018-10-24 VITALS
RESPIRATION RATE: 16 BRPM | HEIGHT: 64 IN | DIASTOLIC BLOOD PRESSURE: 86 MMHG | SYSTOLIC BLOOD PRESSURE: 128 MMHG | BODY MASS INDEX: 21.51 KG/M2 | TEMPERATURE: 97.6 F | HEART RATE: 80 BPM | OXYGEN SATURATION: 98 % | WEIGHT: 126 LBS

## 2018-10-24 DIAGNOSIS — M54.9 MUSCULOSKELETAL BACK PAIN: Primary | ICD-10-CM

## 2018-10-24 PROCEDURE — 99213 OFFICE O/P EST LOW 20 MIN: CPT | Performed by: FAMILY MEDICINE

## 2018-10-24 RX ORDER — TIZANIDINE 2 MG/1
2-4 TABLET ORAL 3 TIMES DAILY
Qty: 90 TABLET | Refills: 0 | Status: SHIPPED | OUTPATIENT
Start: 2018-10-24 | End: 2019-01-23

## 2018-10-24 ASSESSMENT — PAIN SCALES - GENERAL: PAINLEVEL: MODERATE PAIN (4)

## 2018-10-24 NOTE — MR AVS SNAPSHOT
After Visit Summary   10/24/2018    Lydia Pierson    MRN: [id number]           Patient Information     Date Of Birth          1960        Visit Information        Provider Department      10/24/2018 10:00 AM Benja Cordova MD [facility]        Today's Diagnoses     Musculoskeletal back pain    -  1      Care Instructions          Thank you for choosing [facility].  You may be receiving a survey in the mail from [facility] your visit today.  Please take a few minutes to complete and return the survey to let us know how we are doing.      Our Clinic hours are:  Mondays    7:20 am - 7 pm  Tues -  Fri  7:20 am - 5 pm    Clinic Phone: [phone]    The clinic lab opens at 7:30 am Mon - Fri and appointments are required.    [facility]  Ph. [phone]  Monday  8 am - 7pm  Tues - Fri 8 am - 5:30 pm                 Follow-ups after your visit        Who to contact     If you have questions or need follow up information about today's clinic visit or your schedule please contact [facility] directly at [phone].  Normal or non-critical lab and imaging results will be communicated to you by MyChart, letter or phone within 4 business days after the clinic has received the results. If you do not hear from us within 7 days, please contact the clinic through MyChart or phone. If you have a critical or abnormal lab result, we will notify you by phone as soon as possible.  Submit refill requests through [employer] or call your pharmacy and they will forward the refill request to us. Please allow 3 business days for your refill to be completed.          Additional Information About Your Visit        Care EveryWhere ID     This is your Care EveryWhere ID. This could be used by other organizations to access your [facility] medical records  [id number]        Your Vitals Were     Pulse Temperature Respirations Height Last  "Period Pulse Oximetry    80 97.6  F (36.4  C) (Tympanic) 16 5' 4\" (1.626 m) 12/05/2010 98%    Breastfeeding? BMI (Body Mass Index)                No 21.63 kg/m2           Blood Pressure from Last 3 Encounters:   10/24/18 128/86   09/05/18 113/79   09/04/18 132/72    Weight from Last 3 Encounters:   10/24/18 126 lb (57.2 kg)   09/05/18 117 lb (53.1 kg)   09/04/18 117 lb (53.1 kg)              Today, you had the following     No orders found for display         Today's Medication Changes          These changes are accurate as of 10/24/18 10:44 AM.  If you have any questions, ask your nurse or doctor.               Start taking these medicines.        Dose/Directions    tiZANidine 2 MG tablet   Commonly known as:  ZANAFLEX   Used for:  Musculoskeletal back pain   Started by:  Benja Cordova MD        Dose:  2-4 mg   Take 1-2 tablets (2-4 mg) by mouth 3 times daily   Quantity:  90 tablet   Refills:  0            Where to get your medicines      These medications were sent to [place] [address] [place] [address] [address]     Phone:  [phone]     tiZANidine 2 MG tablet                Primary Care Provider Office Phone # Fax #    Benja Cordova [phone] [address] [facility] [address]        Equal Access to Services     HARSHAL MANCIA AH: Hadii juan luis flanagan hadasho Soomaali, waaxda luqadaha, qaybta kaalmada adeegyada, waxay rudolph wiseman adekinjal valencia. So [facility] [phone].    ATENCIÓN: Si habla devan, tiene a olmedo disposición servicios gratuitos de asistencia lingüística. Llame al [phone].    We comply with applicable federal civil rights laws and [place] laws. We do not discriminate on the basis of race, color, national origin, age, disability, sex, sexual orientation, or gender identity.            Thank you!     Thank you for choosing [facility]  for your care. Our goal is always to " provide you with excellent care. Hearing back from our patients is one way we can continue to improve our services. Please take a few minutes to complete the written survey that you may receive in the mail after your visit with us. Thank you!             Your Updated Medication List - Protect others around you: Learn how to safely use, store and throw away your medicines at www.disposemymeds.org.          This list is accurate as of 10/24/18 10:44 AM.  Always use your most recent med list.                   Brand Name Dispense Instructions for use Diagnosis    ibuprofen 600 MG tablet    ADVIL/MOTRIN    30 tablet    Take 1 tablet by mouth every 6 hours as needed for pain (For mild pain and temperature greater than 102F).    Tear of meniscus of left knee       MULTIVITAMIN WOMEN PO      Take 1 tablet by mouth daily        tiZANidine 2 MG tablet    ZANAFLEX    90 tablet    Take 1-2 tablets (2-4 mg) by mouth 3 times daily    Musculoskeletal back pain       TYLENOL 325 MG tablet   Generic drug:  acetaminophen      Take 1 tablet by mouth every 6 hours as needed.        valACYclovir 1000 mg tablet    VALTREX    21 tablet    Take 1 tablet (1,000 mg) by mouth 3 times daily    Herpes zoster without complication       VITAMIN D (CHOLECALCIFEROL) PO      Take 400 Units by mouth daily

## 2018-10-24 NOTE — PATIENT INSTRUCTIONS
Thank you for choosing [facility].  You may be receiving a survey in the mail from [facility] regarding your visit today.  Please take a few minutes to complete and return the survey to let us know how we are doing.      Our Clinic hours are:  Mondays    7:20 am - 7 pm  Tues - Fri  7:20 am - 5 pm    Clinic Phone: [phone]    The clinic lab opens at 7:30 am Mon - Fri and appointments are required.    [facility]. [phone]  Monday  8 am - 7pm  Tues - Fri 8 am - 5:30 pm

## 2018-10-24 NOTE — LETTER
[facility]  [address]  [facility] [address]  Phone: [phone]    October 24, 2018        Lydia Pierson  [address] [address] [facility] [address]          To whom it may concern:    RE: Lydia Pierson    Patient was seen and treated today at our clinic and missed work 10/22/18-10/31/18.  Patient may return to work 10/31/18 with the following:  Light duty-unable to lift more than 20 pounds.  Bend: Occasionally (1-3 hours)  Squat: Occasionally (1-3 hours)  Lift, carry, push, and pull no more than: 11-20 lbs.     Please contact me for questions or concerns.      Sincerely,        Benja Cordova MD

## 2018-10-24 NOTE — PROGRESS NOTES
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         Back Pain       Duration: sx's started 10/22/18        Specific cause: Lifting    Description:   Location of pain: low back bilateral  Character of pain: sharp and stabbing  Pain radiation:none  New numbness or weakness in legs, not attributed to pain:  no     Intensity: Currently 4/10, At its worst 8/10    History:   Pain interferes with job: YES  History of back problems: no prior back problems  Any previous MRI or X-rays: None  Sees a specialist for back pain:  No  Therapies tried without relief: Ibuprofen, aleve and cold    Alleviating factors:   Improved by: ibuprofen and cold      Precipitating factors:  Worsened by: Bending and Sitting          Accompanying Signs & Symptoms:  Risk of Fracture:  None  Risk of Cauda Equina:  None  Risk of Infection:  None  Risk of Cancer:  None  Risk of Ankylosing Spondylitis:  Onset at age <35, male, AND morning back stiffness. no   ADDITIONAL HPI: 58 year old female here for above issue.     ROS: 10 point review of systems negative except as per HPI.    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 Father      bypass     Diabetes Father      Lipids Father      Allergies Sister      allergic rhinitis     Neurologic Disorder Sister      seizure disorder     Respiratory Sister      asthma     Allergies Son      Breast Cancer No family hx of      Cancer - colorectal No family hx of        SOCIAL HISTORY:  Social History     Social History     Marital status:      Spouse name: N/A     Number of children: N/A     Years of education: N/A     Occupational History     Not on file.     Social History Main Topics     Smoking status: Never Smoker     Smokeless tobacco: Never Used     Alcohol use No     Drug use: No     Sexual activity: Yes     Partners: Male     Birth control/ protection: Surgical      Comment: tubal     Other Topics Concern     Parent/Sibling W/ Cabg, Mi Or Angioplasty Before 65f 55m? No     Social History Narrative       MEDICATIONS:  Current Outpatient Prescriptions   Medication Sig Dispense Refill     tiZANidine (ZANAFLEX) 2 MG tablet Take 1-2 tablets (2-4 mg) by mouth 3 times daily 90 tablet 0     acetaminophen (TYLENOL) 325 MG tablet Take 1 tablet by mouth every 6 hours as needed.       ibuprofen (ADVIL,MOTRIN) 600 MG tablet Take 1 tablet by mouth every 6 hours as needed for pain (For mild pain and temperature greater than 102F). 30 tablet 0     Multiple Vitamins-Minerals (MULTIVITAMIN WOMEN PO) Take 1 tablet by mouth daily       valACYclovir (VALTREX) 1000 mg tablet Take 1 tablet (1,000 mg) by mouth 3 times daily (Patient not taking: Reported on 6/13/2018) 21 tablet 0     VITAMIN D, CHOLECALCIFEROL, PO Take 400 Units by mouth daily         ALLERGIES:     Allergies   Allergen Reactions     Seasonal Allergies        Problem list, Medication list, Allergies,  "and Medical/Social/Surgical histories reviewed in [facility] and updated as appropriate.    OBJECTIVE:                                                    VITALS: /86 (BP Location: Right arm, Cuff Size: Adult Regular)  Pulse 80  Temp 97.6  F (36.4  C) (Tympanic)  Resp 16  Ht 5' 4\" (1.626 m)  Wt 126 lb (57.2 kg)  LMP 12/05/2010  SpO2 98%  Breastfeeding? No  BMI 21.63 kg/m2 Body mass index is 21.63 kg/(m^2).  GENERAL: Pleasant, well appearing female.  MUSCULOSKELETAL:  No midline vertebral tenderness to palpation. Has bilateral paravertebral tenderness and tightness. Straight leg raise is negative for radicular symptoms. Strength is 5/5 and DTR 2+ and symmetric throughout lower extremities.     ASSESSMENT/PLAN:                                                    1. Musculoskeletal back pain  Advised ice/heat, NSAIDs. Add tizanidine PRN. Physical therapy if not improved or worse.  - tiZANidine (ZANAFLEX) 2 MG tablet; Take 1-2 tablets (2-4 mg) by mouth 3 times daily  Dispense: 90 tablet; Refill: 0       "

## 2018-10-24 NOTE — LETTER
[facility]  [address]  [facility] [address]  Phone: [phone]    October 24, 2018        Lydia Pierson  [address] [facility] [address]          To whom it may concern:    RE: Lydia Pierson    Patient was seen and treated today at our clinic and missed work.  Patient may return to work 10/22/2018 - 10/31/18 with the following:  Light duty-unable to lift more than 20 pounds.  Bend: Occasionally (1-3 hours)  Squat: Occasionally (1-3 hours)  Lift, carry, push, and pull no more than: 11-20 lbs.     Please contact me for questions or concerns.      Sincerely,        Benja Cordova MD

## 2018-10-31 ENCOUNTER — OFFICE VISIT (OUTPATIENT)
Dept: FAMILY MEDICINE | Facility: CLINIC | Age: 58
End: 2018-10-31
Payer: OTHER MISCELLANEOUS

## 2018-10-31 VITALS
TEMPERATURE: 98.2 F | OXYGEN SATURATION: 100 % | RESPIRATION RATE: 16 BRPM | SYSTOLIC BLOOD PRESSURE: 125 MMHG | HEART RATE: 80 BPM | DIASTOLIC BLOOD PRESSURE: 82 MMHG | HEIGHT: 64 IN | WEIGHT: 126 LBS | BODY MASS INDEX: 21.51 KG/M2

## 2018-10-31 DIAGNOSIS — M54.9 MUSCULOSKELETAL BACK PAIN: Primary | ICD-10-CM

## 2018-10-31 PROCEDURE — 99213 OFFICE O/P EST LOW 20 MIN: CPT | Performed by: FAMILY MEDICINE

## 2018-10-31 ASSESSMENT — PAIN SCALES - GENERAL: PAINLEVEL: NO PAIN (1)

## 2018-10-31 NOTE — LETTER
[facility]  [address]  [facility] [address]  Phone: [phone]    October 24, 2018        Lydia Pierson  [address] [facility] [address]          To whom it may concern:    RE: Lydia Pierson    Patient was seen and treated today at our clinic.  Patient may return to work 11/1/18 with the following:  Light duty-unable to lift more than 20 pounds.  Bend: Occasionally (1-3 hours)  Squat: Occasionally (1-3 hours)  Lift, carry, push, and pull no more than: 11-20 lbs.   Restrictions in place until 11/14/2018    Please contact me for questions or concerns.      Sincerely,        Benja Cordova MD

## 2018-10-31 NOTE — MR AVS SNAPSHOT
After Visit Summary   10/31/2018    Lydia Pierson    MRN: [id number]           Patient Information     Date Of Birth          1960        Visit Information        Provider Department      10/31/2018 10:00 AM Benja Cordova MD [facility]        Care Instructions          Thank you for choosing [facility].  You may be receiving a survey in the mail from [facility] regarding your visit today.  Please take a few minutes to complete and return the survey to let us know how we are doing.      Our Clinic hours are:  Mondays    7:20 am - 7 pm  Tues -  Fri  7:20 am - 5 pm    Clinic Phone: [phone]    The clinic lab opens at 7:30 am Mon - Fri and appointments are required.    [facility]  Ph. [phone]  Monday  8 am - 7pm  Tues - Fri 8 am - 5:30 pm                 Follow-ups after your visit        Follow-up notes from your care team     Return in about 2 weeks (around 11/14/2018) for if not improved or sooner if worsening.      Who to contact     If you have questions or need follow up information about today's clinic visit or your schedule please contact [facility] directly at [phone].  Normal or non-critical lab and imaging results will be communicated to you by MyChart, letter or phone within 4 business days after the clinic has received the results. If you do not hear from us within 7 days, please contact the clinic through MyChart or phone. If you have a critical or abnormal lab result, we will notify you by phone as soon as possible.  Submit refill requests through enEvolv or call your pharmacy and they will forward the refill request to us. Please allow 3 business days for your refill to be completed.          Additional Information About Your Visit        Care EveryWhere ID     This is your Care EveryWhere ID. This could be used by other organizations to access your [facility] medical records  [id number]    "     Your Vitals Were     Pulse Temperature Respirations Height Last Period Pulse Oximetry    80 98.2  F (36.8  C) (Tympanic) 16 5' 4\" (1.626 m) 12/05/2010 100%    Breastfeeding? BMI (Body Mass Index)                No 21.63 kg/m2           Blood Pressure from Last 3 Encounters:   10/31/18 125/82   10/24/18 128/86   09/05/18 113/79    Weight from Last 3 Encounters:   10/31/18 126 lb (57.2 kg)   10/24/18 126 lb (57.2 kg)   09/05/18 117 lb (53.1 kg)              Today, you had the following     No orders found for display       Primary Care Provider Office Phone # Fax #    Benja Liz Cordova [phone] [address] [facility] [address]        Equal Access to Services     [facility]: Hadii juan luis flanagan hadasho Sobnejamin, waaxda luqadaha, qaybta kaalmada adeegyada, alek galdamez hayjyothi cronin . So [facility] [phone].    ATENCIÓN: Si habla español, tiene a olmedo disposición servicios gratuitos de asistencia lingüística. Llame al [phone].    We comply with applicable federal civil rights laws and [place] laws. We do not discriminate on the basis of race, color, national origin, age, disability, sex, sexual orientation, or gender identity.            Thank you!     Thank you for choosing [facility]  for your care. Our goal is always to provide you with excellent care. Hearing back from our patients is one way we can continue to improve our services. Please take a few minutes to complete the written survey that you may receive in the mail after your visit with us. Thank you!             Your Updated Medication List - Protect others around you: Learn how to safely use, store and throw away your medicines at www.disposemymeds.org.          This list is accurate as of 10/31/18 11:02 AM.  Always use your most recent med list.                   Brand Name Dispense Instructions for use Diagnosis    ibuprofen 600 MG tablet    ADVIL/MOTRIN    30 tablet    Take 1 " tablet by mouth every 6 hours as needed for pain (For mild pain and temperature greater than 102F).    Tear of meniscus of left knee       MULTIVITAMIN WOMEN PO      Take 1 tablet by mouth daily        tiZANidine 2 MG tablet    ZANAFLEX    90 tablet    Take 1-2 tablets (2-4 mg) by mouth 3 times daily    Musculoskeletal back pain       TYLENOL 325 MG tablet   Generic drug:  acetaminophen      Take 1 tablet by mouth every 6 hours as needed.        valACYclovir 1000 mg tablet    VALTREX    21 tablet    Take 1 tablet (1,000 mg) by mouth 3 times daily    Herpes zoster without complication       VITAMIN D (CHOLECALCIFEROL) PO      Take 400 Units by mouth daily

## 2018-10-31 NOTE — PROGRESS NOTES
SUBJECTIVE:   Lydia Pierson is a 58 year old female who presents to clinic today for the following health issues:      Problem:   Chief Complaint   Patient presents with     Work Comp     follow up on low back injury,  states she is improving since last week.     ADDITIONAL HPI: 58 year old female here for above issue.  She feels ready to return to work.  Still has some pain with bending/lifting. Much improved overall.    ROS: 10 point review of systems negative except as per HPI.    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 Father      bypass     Diabetes Father      Lipids Father      Allergies Sister      allergic rhinitis     Neurologic Disorder Sister      seizure disorder     Respiratory Sister      asthma     Allergies Son      Breast Cancer No family hx of      Cancer - colorectal No family hx of        SOCIAL HISTORY:  Social History     Social History     Marital status:      Spouse name: N/A     Number of children: N/A     Years of education: N/A     Occupational History     Not on file.     Social History Main Topics      "Smoking status: Never Smoker     Smokeless tobacco: Never Used     Alcohol use No     Drug use: No     Sexual activity: Yes     Partners: Male     Birth control/ protection: Surgical      Comment: tubal     Other Topics Concern     Parent/Sibling W/ Cabg, Mi Or Angioplasty Before 65f 55m? No     Social History Narrative       MEDICATIONS:  Current Outpatient Prescriptions   Medication Sig Dispense Refill     acetaminophen (TYLENOL) 325 MG tablet Take 1 tablet by mouth every 6 hours as needed.       ibuprofen (ADVIL,MOTRIN) 600 MG tablet Take 1 tablet by mouth every 6 hours as needed for pain (For mild pain and temperature greater than 102F). 30 tablet 0     Multiple Vitamins-Minerals (MULTIVITAMIN WOMEN PO) Take 1 tablet by mouth daily       tiZANidine (ZANAFLEX) 2 MG tablet Take 1-2 tablets (2-4 mg) by mouth 3 times daily 90 tablet 0     VITAMIN D, CHOLECALCIFEROL, PO Take 400 Units by mouth daily       valACYclovir (VALTREX) 1000 mg tablet Take 1 tablet (1,000 mg) by mouth 3 times daily (Patient not taking: Reported on 6/13/2018) 21 tablet 0       ALLERGIES:     Allergies   Allergen Reactions     Seasonal Allergies        Problem list, Medication list, Allergies, and Medical/Social/Surgical histories reviewed in [facility] and updated as appropriate.    OBJECTIVE:                                                    VITALS: /82 (BP Location: Right arm, Cuff Size: Adult Regular)  Pulse 80  Temp 98.2  F (36.8  C) (Tympanic)  Resp 16  Ht 5' 4\" (1.626 m)  Wt 126 lb (57.2 kg)  LMP 12/05/2010  SpO2 100%  Breastfeeding? No  BMI 21.63 kg/m2 Body mass index is 21.63 kg/(m^2).  GENERAL: Pleasant, well appearing female.  MUSCULOSKELETAL:  Normal gait. Some lumbar pain with forward flexion at the waist.    ASSESSMENT/PLAN:                                                    1. Musculoskeletal back pain  Cleared to return to work with restriction as per letter.    Patient was seen and treated today at our " clinic.  Patient may return to work 11/1/18 with the following:  Light duty-unable to lift more than 20 pounds.  Bend: Occasionally (1-3 hours)  Squat: Occasionally (1-3 hours)  Lift, carry, push, and pull no more than: 11-20 lbs.   Restrictions in place until 11/14/2018

## 2018-10-31 NOTE — PATIENT INSTRUCTIONS
Thank you for choosing [facility].  You may be receiving a survey in the mail from [facility] regarding your visit today.  Please take a few minutes to complete and return the survey to let us know how we are doing.      Our Clinic hours are:  Mondays    7:20 am - 7 pm  Tues - Fri  7:20 am - 5 pm    Clinic Phone: [phone]    The clinic lab opens at 7:30 am Mon - Fri and appointments are required.    [facility]. [phone]  Monday  8 am - 7pm  Tues - Fri 8 am - 5:30 pm

## 2018-11-08 ENCOUNTER — OFFICE VISIT (OUTPATIENT)
Dept: FAMILY MEDICINE | Facility: CLINIC | Age: 58
End: 2018-11-08
Payer: COMMERCIAL

## 2018-11-08 VITALS
SYSTOLIC BLOOD PRESSURE: 133 MMHG | OXYGEN SATURATION: 98 % | WEIGHT: 126 LBS | DIASTOLIC BLOOD PRESSURE: 72 MMHG | HEIGHT: 64 IN | TEMPERATURE: 97.4 F | RESPIRATION RATE: 16 BRPM | BODY MASS INDEX: 21.51 KG/M2 | HEART RATE: 70 BPM

## 2018-11-08 DIAGNOSIS — M54.9 MUSCULOSKELETAL BACK PAIN: Primary | ICD-10-CM

## 2018-11-08 PROCEDURE — 99213 OFFICE O/P EST LOW 20 MIN: CPT | Performed by: FAMILY MEDICINE

## 2018-11-08 ASSESSMENT — PAIN SCALES - GENERAL: PAINLEVEL: MODERATE PAIN (4)

## 2018-11-08 NOTE — LETTER
[facility]  [address]  [facility] [address]  Phone: [phone]    November 8, 2018        Lydia Pierson  [address] [facility] [address]          To whom it may concern:    RE: Lydia Ellis Christinaflavio    Patient was seen and treated today at our clinic and missed work 11/7/2018 - 11/15/2018.    Please contact me for questions or concerns.      Sincerely,        Benja Cordova MD

## 2018-11-08 NOTE — PROGRESS NOTES
SUBJECTIVE:   Lydia Pierson is a 58 year old female who presents to clinic today for the following health issues:      Problem:   Chief Complaint   Patient presents with     Back Pain     follow up.  not getting any better     ADDITIONAL HPI: 58 year old female here for above issue.  Returned to work and felt that this really flared her back pain.  She had to leave work early on Wednesday after working Monday and Tuesday.  Having increased low back and SI joint area pain.  No loss of bowel or bladder function.    ROS: 10 point review of systems negative except as per HPI.    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 Father      bypass     Diabetes Father      Lipids Father      Allergies Sister      allergic rhinitis     Neurologic Disorder Sister      seizure disorder     Respiratory Sister      asthma     Allergies Son      Breast Cancer No family hx of      Cancer - colorectal No family hx of        SOCIAL HISTORY:  Social History     Social History     Marital status:      Spouse name: N/A     Number of children: N/A     Years of  "education: N/A     Occupational History     Not on file.     Social History Main Topics     Smoking status: Never Smoker     Smokeless tobacco: Never Used     Alcohol use No     Drug use: No     Sexual activity: Yes     Partners: Male     Birth control/ protection: Surgical      Comment: tubal     Other Topics Concern     Parent/Sibling W/ Cabg, Mi Or Angioplasty Before 65f 55m? No     Social History Narrative       MEDICATIONS:  Current Outpatient Prescriptions   Medication Sig Dispense Refill     acetaminophen (TYLENOL) 325 MG tablet Take 1 tablet by mouth every 6 hours as needed.       ibuprofen (ADVIL,MOTRIN) 600 MG tablet Take 1 tablet by mouth every 6 hours as needed for pain (For mild pain and temperature greater than 102F). 30 tablet 0     Multiple Vitamins-Minerals (MULTIVITAMIN WOMEN PO) Take 1 tablet by mouth daily       tiZANidine (ZANAFLEX) 2 MG tablet Take 1-2 tablets (2-4 mg) by mouth 3 times daily 90 tablet 0     VITAMIN D, CHOLECALCIFEROL, PO Take 400 Units by mouth daily       valACYclovir (VALTREX) 1000 mg tablet Take 1 tablet (1,000 mg) by mouth 3 times daily (Patient not taking: Reported on 6/13/2018) 21 tablet 0       ALLERGIES:     Allergies   Allergen Reactions     Seasonal Allergies        Problem list, Medication list, Allergies, and Medical/Social/Surgical histories reviewed in [facility] and updated as appropriate.    OBJECTIVE:                                                    VITALS: /72 (BP Location: Right arm, Cuff Size: Adult Regular)  Pulse 70  Temp 97.4  F (36.3  C) (Tympanic)  Resp 16  Ht 5' 4\" (1.626 m)  Wt 126 lb (57.2 kg)  LMP 12/05/2010  SpO2 98%  Breastfeeding? No  BMI 21.63 kg/m2 Body mass index is 21.63 kg/(m^2).  GENERAL: Pleasant, well appearing female.  Frequent position changes including standing and sitting due to low back discomfort.  Normal gait.  Normal lower extremity strength.  Tender to palpation throughout lumbar paravertebral musculature and bilateral " SI joints.    ASSESSMENT/PLAN:                                                    1. Musculoskeletal back pain  Attempt to go back to work resulted in a flare of her back pain.  I think it would be reasonable to give her some additional time off work due to required lifting at work.  Discussed potential return to work at half days or part-time when she does go back to prevent re-flaring.  Also recommended starting physical therapy.  Follow-up in 2 weeks for recheck.  - PHYSICAL THERAPY REFERRAL; Future

## 2018-11-08 NOTE — MR AVS SNAPSHOT
After Visit Summary   11/8/2018    Lydia Pierson    MRN: [id number]           Patient Information     Date Of Birth          1960        Visit Information        Provider Department      11/8/2018 1:00 PM Benja Cordova MD [facility]        Today's Diagnoses     Musculoskeletal back pain    -  1      Care Instructions          Thank you for choosing [facility].  You may be receiving a survey in the mail from [facility] regarding your visit today.  Please take a few minutes to complete and return the survey to let us know how we are doing.      Our Clinic hours are:  Mondays    7:20 am - 7 pm  Tues -  Fri  7:20 am - 5 pm    Clinic Phone: [phone]    The clinic lab opens at 7:30 am Mon - Fri and appointments are required.    [facility]  Ph. [phone]  Monday  8 am - 7pm  Tues - Fri 8 am - 5:30 pm                 Follow-ups after your visit        Additional Services     PHYSICAL THERAPY REFERRAL       If you have not heard from the scheduling office within 2 business days, please call [phone] for all locations, with the exception of [place], please call [phone] and [place], please call [phone].    Please be aware that coverage of these services is subject to the terms and limitations of your health insurance plan.  Call member services at your health plan with any benefit or coverage questions.                  Your next 10 appointments already scheduled     Nov 14, 2018 10:40 AM CST   SHORT with Benja Cordova MD   [facility] ([facility])    [address] [facility] [id number] [phone]              Future tests that were ordered for you today     Open Future Orders        Priority Expected Expires Ordered    PHYSICAL THERAPY REFERRAL Routine  11/8/2019 11/8/2018            Who to contact     If you have questions or need follow up information about  "today's clinic visit or your schedule please contact [facility] directly at [phone].  Normal or non-critical lab and imaging results will be communicated to you by MyChart, letter or phone within 4 business days after the clinic has received the results. If you do not hear from us within 7 days, please contact the clinic through MyChart or phone. If you have a critical or abnormal lab result, we will notify you by phone as soon as possible.  Submit refill requests through Lookmash or call your pharmacy and they will forward the refill request to us. Please allow 3 business days for your refill to be completed.          Additional Information About Your Visit        Care EveryWhere ID     This is your Care EveryWhere ID. This could be used by other organizations to access your [facility] medical records  [id number]        Your Vitals Were     Pulse Temperature Respirations Height Last Period Pulse Oximetry    70 97.4  F (36.3  C) (Tympanic) 16 5' 4\" (1.626 m) 12/05/2010 98%    Breastfeeding? BMI (Body Mass Index)                No 21.63 kg/m2           Blood Pressure from Last 3 Encounters:   11/08/18 133/72   10/31/18 125/82   10/24/18 128/86    Weight from Last 3 Encounters:   11/08/18 126 lb (57.2 kg)   10/31/18 126 lb (57.2 kg)   10/24/18 126 lb (57.2 kg)               Primary Care Provider Office Phone # Fax #    Janetinde Liz Cordova [phone]       [address] [facility] [address]        Equal Access to Services     [facility] : Hadii juan luis flanagan hadasho Soomaali, waaxda luqadaha, qaybta kaalmada alek holloway . So [facility] [phone].    ATENCIÓN: Si habla español, tiene a olmedo disposición servicios gratuitos de asistencia lingüística. Llame al [phone].    We comply with applicable federal civil rights laws and [place] laws. We do not discriminate on the basis of race, color, national origin, age, disability, sex, " sexual orientation, or gender identity.            Thank you!     Thank you for choosing [facility]  for your care. Our goal is always to provide you with excellent care. Hearing back from our patients is one way we can continue to improve our services. Please take a few minutes to complete the written survey that you may receive in the mail after your visit with us. Thank you!             Your Updated Medication List - Protect others around you: Learn how to safely use, store and throw away your medicines at www.disposemymeds.org.          This list is accurate as of 11/8/18  1:48 PM.  Always use your most recent med list.                   Brand Name Dispense Instructions for use Diagnosis    ibuprofen 600 MG tablet    ADVIL/MOTRIN    30 tablet    Take 1 tablet by mouth every 6 hours as needed for pain (For mild pain and temperature greater than 102F).    Tear of meniscus of left knee       MULTIVITAMIN WOMEN PO      Take 1 tablet by mouth daily        tiZANidine 2 MG tablet    ZANAFLEX    90 tablet    Take 1-2 tablets (2-4 mg) by mouth 3 times daily    Musculoskeletal back pain       TYLENOL 325 MG tablet   Generic drug:  acetaminophen      Take 1 tablet by mouth every 6 hours as needed.        valACYclovir 1000 mg tablet    VALTREX    21 tablet    Take 1 tablet (1,000 mg) by mouth 3 times daily    Herpes zoster without complication       VITAMIN D (CHOLECALCIFEROL) PO      Take 400 Units by mouth daily

## 2018-11-08 NOTE — PATIENT INSTRUCTIONS
Thank you for choosing [facility].  You may be receiving a survey in the mail from [facility] regarding your visit today.  Please take a few minutes to complete and return the survey to let us know how we are doing.      Our Clinic hours are:  Mondays    7:20 am - 7 pm  Tues - Fri  7:20 am - 5 pm    Clinic Phone: [phone]    The clinic lab opens at 7:30 am Mon - Fri and appointments are required.    [facility]. [phone]  Monday  8 am - 7pm  Tues - Fri 8 am - 5:30 pm

## 2018-11-14 ENCOUNTER — OFFICE VISIT (OUTPATIENT)
Dept: FAMILY MEDICINE | Facility: CLINIC | Age: 58
End: 2018-11-14
Payer: COMMERCIAL

## 2018-11-14 VITALS
HEIGHT: 64 IN | RESPIRATION RATE: 16 BRPM | DIASTOLIC BLOOD PRESSURE: 78 MMHG | HEART RATE: 67 BPM | OXYGEN SATURATION: 100 % | SYSTOLIC BLOOD PRESSURE: 135 MMHG | TEMPERATURE: 98.4 F | WEIGHT: 126.6 LBS | BODY MASS INDEX: 21.61 KG/M2

## 2018-11-14 DIAGNOSIS — Z71.89 ADVANCED DIRECTIVES, COUNSELING/DISCUSSION: ICD-10-CM

## 2018-11-14 DIAGNOSIS — M54.9 MUSCULOSKELETAL BACK PAIN: Primary | ICD-10-CM

## 2018-11-14 PROCEDURE — 99213 OFFICE O/P EST LOW 20 MIN: CPT | Performed by: FAMILY MEDICINE

## 2018-11-14 ASSESSMENT — PAIN SCALES - GENERAL: PAINLEVEL: MILD PAIN (3)

## 2018-11-14 NOTE — LETTER
[facility]  [address]  [facility] [address]  Phone: [phone]    November 14, 2018        Lydia Pierson  [address] [facility] [address]          To whom it may concern:    RE: Lydia Pierson    Patient was seen and treated today at our clinic.  Patient may return to work 11/16/18 with the following:  Light duty-unable to lift more than 20 pounds.  Bend: Occasionally (1-3 hours)  Squat: Occasionally (1-3 hours)  Lift, carry, push, and pull no more than: 11-20 lbs.   I am also recommending only working 3 days a week on Mon, Wed, Fri to allow for rest between work days to recover from any flare this might cause. These restrictions are through 11/30/18. I will see her back prior to that date for reassessment.    Please contact me for questions or concerns.      Sincerely,        Benja Cordova MD

## 2018-11-14 NOTE — MR AVS SNAPSHOT
After Visit Summary   11/14/2018    Lydia Pierson    MRN: [id number]           Patient Information     Date Of Birth          1960        Visit Information        Provider Department      11/14/2018 10:40 AM Benja Cordova MD [facility]        Care Instructions          Thank you for choosing [facility].  You may be receiving a survey in the mail from [facility] regarding your visit today.  Please take a few minutes to complete and return the survey to let us know how we are doing.      Our Clinic hours are:  Mondays    7:20 am - 7 pm  Tues -  Fri  7:20 am - 5 pm    Clinic Phone: [phone]    The clinic lab opens at 7:30 am Mon - Fri and appointments are required.    [facility]  Ph. [phone]  Monday  8 am - 7pm  Tues - Fri 8 am - 5:30 pm                 Follow-ups after your visit        Follow-up notes from your care team     Return in about 2 weeks (around 11/28/2018) for re-check.      Who to contact     If you have questions or need follow up information about today's clinic visit or your schedule please contact [facility] directly at [phone].  Normal or non-critical lab and imaging results will be communicated to you by MyChart, letter or phone within 4 business days after the clinic has received the results. If you do not hear from us within 7 days, please contact the clinic through MyChart or phone. If you have a critical or abnormal lab result, we will notify you by phone as soon as possible.  Submit refill requests through [employer] or call your pharmacy and they will forward the refill request to us. Please allow 3 business days for your refill to be completed.          Additional Information About Your Visit        Care EveryWhere ID     This is your Care EveryWhere ID. This could be used by other organizations to access your [facility] medical records  [id number]        Your Vitals Were     Pulse  "Temperature Respirations Height Last Period Pulse Oximetry    67 98.4  F (36.9  C) (Tympanic) 16 5' 4\" (1.626 m) 12/05/2010 100%    Breastfeeding? BMI (Body Mass Index)                No 21.73 kg/m2           Blood Pressure from Last 3 Encounters:   11/14/18 135/78   11/08/18 133/72   10/31/18 125/82    Weight from Last 3 Encounters:   11/14/18 126 lb 9.6 oz (57.4 kg)   11/08/18 126 lb (57.2 kg)   10/31/18 126 lb (57.2 kg)              Today, you had the following     No orders found for display       Primary Care Provider Office Phone # Fax #    Benja Liz Cordova [phone] [id number] [facility] [id number]        Equal Access to Services     [facility]: Hadii juan luis flanagan hadasho Sobenjamin, waaxda luqadaha, qaybta kaalmada adeegyada, alek galdamez hayjyothi cronin . So [facility] [phone].    ATENCIÓN: Si habla español, tiene a olmedo disposición servicios gratuitos de asistencia lingüística. Josef al [phone].    We comply with applicable federal civil rights laws and [place] laws. We do not discriminate on the basis of race, color, national origin, age, disability, sex, sexual orientation, or gender identity.            Thank you!     Thank you for choosing [facility]  for your care. Our goal is always to provide you with excellent care. Hearing back from our patients is one way we can continue to improve our services. Please take a few minutes to complete the written survey that you may receive in the mail after your visit with us. Thank you!             Your Updated Medication List - Protect others around you: Learn how to safely use, store and throw away your medicines at www.disposemymeds.org.          This list is accurate as of 11/14/18 11:26 AM.  Always use your most recent med list.                   Brand Name Dispense Instructions for use Diagnosis    ibuprofen 600 MG tablet    ADVIL/MOTRIN    30 tablet    Take 1 tablet by mouth every 6 " hours as needed for pain (For mild pain and temperature greater than 102F).    Tear of meniscus of left knee       MULTIVITAMIN WOMEN PO      Take 1 tablet by mouth daily        tiZANidine 2 MG tablet    ZANAFLEX    90 tablet    Take 1-2 tablets (2-4 mg) by mouth 3 times daily    Musculoskeletal back pain       TYLENOL 325 MG tablet   Generic drug:  acetaminophen      Take 1 tablet by mouth every 6 hours as needed.        valACYclovir 1000 mg tablet    VALTREX    21 tablet    Take 1 tablet (1,000 mg) by mouth 3 times daily    Herpes zoster without complication       VITAMIN D (CHOLECALCIFEROL) PO      Take 400 Units by mouth daily

## 2018-11-14 NOTE — PROGRESS NOTES
SUBJECTIVE:   Lydia Pierson is a 58 year old female who presents to clinic today for the following health issues:      Problem:   Chief Complaint   Patient presents with     Work Comp     follow up back pain     ADDITIONAL HPI: 58 year old female here for above issue.  Went back to work last week and really flared her back again. Has been seeing chiro and has physical therapy scheduled. Is noticing improvement with .    ROS: 10 point review of systems negative except as per HPI.    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 Father      bypass     Diabetes Father      Lipids Father      Allergies Sister      allergic rhinitis     Neurologic Disorder Sister      seizure disorder     Respiratory Sister      asthma     Allergies Son      Breast Cancer No family hx of      Cancer - colorectal No family hx of        SOCIAL HISTORY:  Social History     Social History     Marital status:      Spouse name: N/A     Number of children: N/A     Years of education: N/A     Occupational History     Not on file.     Social History Main Topics     Smoking  "status: Never Smoker     Smokeless tobacco: Never Used     Alcohol use No     Drug use: No     Sexual activity: Yes     Partners: Male     Birth control/ protection: Surgical      Comment: tubal     Other Topics Concern     Parent/Sibling W/ Cabg, Mi Or Angioplasty Before 65f 55m? No     Social History Narrative       MEDICATIONS:  Current Outpatient Prescriptions   Medication Sig Dispense Refill     acetaminophen (TYLENOL) 325 MG tablet Take 1 tablet by mouth every 6 hours as needed.       ibuprofen (ADVIL,MOTRIN) 600 MG tablet Take 1 tablet by mouth every 6 hours as needed for pain (For mild pain and temperature greater than 102F). 30 tablet 0     Multiple Vitamins-Minerals (MULTIVITAMIN WOMEN PO) Take 1 tablet by mouth daily       tiZANidine (ZANAFLEX) 2 MG tablet Take 1-2 tablets (2-4 mg) by mouth 3 times daily 90 tablet 0     valACYclovir (VALTREX) 1000 mg tablet Take 1 tablet (1,000 mg) by mouth 3 times daily (Patient not taking: Reported on 6/13/2018) 21 tablet 0     VITAMIN D, CHOLECALCIFEROL, PO Take 400 Units by mouth daily         ALLERGIES:     Allergies   Allergen Reactions     Seasonal Allergies        Problem list, Medication list, Allergies, and Medical/Social/Surgical histories reviewed in [facility] and updated as appropriate.    OBJECTIVE:                                                    VITALS: /78 (BP Location: Right arm, Cuff Size: Adult Regular)  Pulse 67  Temp 98.4  F (36.9  C) (Tympanic)  Resp 16  Ht 5' 4\" (1.626 m)  Wt 126 lb 9.6 oz (57.4 kg)  LMP 12/05/2010  SpO2 100%  Breastfeeding? No  BMI 21.73 kg/m2 Body mass index is 21.73 kg/(m^2).  GENERAL: Pleasant, well appearing female.  MUSCULOSKELETAL:  Frequent position changes due to back discomfort. Normal gait. No weakness.    ASSESSMENT/PLAN:                                                    1. Musculoskeletal back pain  Letter for ongoing work restrictions provided. Physical therapy referral provided at last visit. Continue " with this. Work unable to accommodate part-time days but she could do full days with rest day in between to avoid re-flaring this.    2. Advanced directives, counseling/discussion

## 2018-11-14 NOTE — PATIENT INSTRUCTIONS
Thank you for choosing [facility].  You may be receiving a survey in the mail from [facility] regarding your visit today.  Please take a few minutes to complete and return the survey to let us know how we are doing.      Our Clinic hours are:  Mondays    7:20 am - 7 pm  Tues - Fri  7:20 am - 5 pm    Clinic Phone: [phone]    The clinic lab opens at 7:30 am Mon - Fri and appointments are required.    [facility]. [phone]  Monday  8 am - 7pm  Tues - Fri 8 am - 5:30 pm

## 2018-11-15 ENCOUNTER — HOSPITAL ENCOUNTER (OUTPATIENT)
Dept: PHYSICAL THERAPY | Facility: CLINIC | Age: 58
Setting detail: THERAPIES SERIES
End: 2018-11-15
Attending: FAMILY MEDICINE
Payer: COMMERCIAL

## 2018-11-15 DIAGNOSIS — M54.9 MUSCULOSKELETAL BACK PAIN: ICD-10-CM

## 2018-11-15 PROCEDURE — 40000718 ZZHC STATISTIC PT DEPARTMENT ORTHO VISIT: Performed by: PHYSICAL THERAPIST

## 2018-11-15 PROCEDURE — 97110 THERAPEUTIC EXERCISES: CPT | Mod: GP | Performed by: PHYSICAL THERAPIST

## 2018-11-15 PROCEDURE — 97162 PT EVAL MOD COMPLEX 30 MIN: CPT | Mod: GP | Performed by: PHYSICAL THERAPIST

## 2018-11-15 PROCEDURE — 97140 MANUAL THERAPY 1/> REGIONS: CPT | Mod: GP | Performed by: PHYSICAL THERAPIST

## 2018-11-15 NOTE — PROGRESS NOTES
11/15/18 1000   General Information   Type of Visit Initial OP Ortho PT Evaluation   Start of Care Date 11/15/18   Referring Physician Benja Cordova.    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    Orders Evaluate and Treat   Date of Order 11/08/18   Insurance Type Other   Insurance Comments/Visits Authorized Work comp    Medical Diagnosis Musculoskeletal BAck Pain    Surgical/Medical history reviewed (CA-Kidney 01, Leuk when 16, Hernia-Inguinal last 2 years)   Precautions/Limitations (20# lifting, No pushing. )   Special Instructions Saw chiropractor on Monday and Wednesday.    General Information Comments Mm relaxors, Scheduled to go back to work tomorrow for EOD.    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    Impairments A. Pain;C. Swelling   Functional Limitations perform required work activities   Symptom Location P L Very low back near SI.    How/Where did it occur While lifting;At work   Onset date of current episode/exacerbation 10/22/18   Chronicity New   Pain rating (0-10 point scale) (2-5/10 )   Pain quality C. Aching;D. Burning   Frequency of pain/symptoms A. Constant   Pain/symptoms are: Worse during the night   Pain/symptoms exacerbated by A. Sitting;C. Lifting;D. Carrying;L. Work tasks   Pain/symptoms eased by B. Walking;H. Cold;I. OTC medication(s)   Progression of symptoms since onset: Worsened  (Tried to work, but got worse again )   Prior Level of Function   Functional Level Prior Comment Independent w/ ADL's    Current Level of Function   Current Community Support Family/friend caregiver   Patient role/employment history A. Employed  ( )   Living environment House/townhome   Home/community  accessibility Stairs, but no problems    Fall Risk Screen   Fall screen completed by PT   Have you fallen 2 or more times in the past year? No   Have you fallen and had an injury in the past year? No   Timed Up and Go score (seconds) Walks quickly into dept and no balance issues.    Is patient a fall risk? No   System Outcome Measures   Outcome Measures Low Back Pain (see Oswestry and Camacho)   Functional Scales   Functional Scales OPTIMAL   Optimal (1=able to do without difficulty, 2=able to do with little difficulty, 3=able to do with moderate difficulty, 4=able to do with much difficulty, 5=unable to do, 9=NA) Activity 1;Activity 2;Activity 3   Activity 1 comment P can lift lighter weight from a higher table   Activity 2 comment Unable to sit for more than 10'.    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    Lumbar Spine/SI Objective Findings   Observation No acute distress   Integumentary Normal    Posture Head forward, flat thoracic back and lumbar spine.    Gait/Locomotion Normal Gait.    Flexion ROM 90% P at end range    Extension ROM Normal    Right Side Bending ROM Normal But P L SI area   Left Side Bending ROM Normal but slight P L SI area.    Repeated Extension-Standing ROM No change    Repeated Flexion-Standing ROM No change    Pelvic Screen In sitting L SB stuck in Extension, L Shear reproduced SI P.    Transversus Abdominus Strength (Adalid Leg Lowering-deg) Good   Hamstring Flexibility -20 B    Hip Flexor Flexibility Normal   Quadricep Flexibility Normal   Piriformis Flexibility Normal    Lumbar/SI Flexibility Comments L Gas/Soleus tight.    SLR Normal per MD report.    Segmental Mobility L SB stuck in Extension, FRS R L4 and upward.    Patellar Tendon Reflexes  Normal per MD report    Achilles Tendon Reflexes Normal per MD report    Palpation Very tender over L SI area,  L Paraspinals and Q.L.    Planned Therapy Interventions   Planned Therapy Interventions Comment  HEP, MET, MT, STM, Activation.    Planned Modality Interventions   Planned Modality Interventions Comments E.Stim only if indicated.    Clinical Impression   Criteria for Skilled Therapeutic Interventions Met yes, treatment indicated   PT Diagnosis L Sacral Rotation, Mech LBP.    Influenced by the following impairments Pain   Functional limitations due to impairments Workability    Clinical Presentation Evolving/Changing   Clinical Presentation Rationale Camacho, DAVE, Pelvic and LB Malalignment, Hx of Hernia Repair, Kidney tumor   Clinical Decision Making (Complexity) Moderate complexity   Therapy Frequency 2 times/Week   Predicted Duration of Therapy Intervention (days/wks) 5 weeks, 10 visits.    Risk & Benefits of therapy have been explained Yes   Patient, Family & other staff in agreement with plan of care Yes   Clinical Impression Comments L Sacral Rotation, Mech LBP.    Education Assessment   Preferred Learning Style Listening;Demonstration;Pictures/video   Barriers to Learning No barriers   ORTHO GOALS   PT Ortho Eval Goals 1;2;3;4   Ortho Goal 1   Goal Identifier 1   Goal Description STG: Pt will be able to Lift heavier weights at work w/o P.    Target Date 12/28/18   Ortho Goal 2   Goal Identifier 2   Goal Description STG: Pt will be able to sit for more than an hour.    Target Date 12/28/18   Ortho Goal 3   Goal Identifier 3   Goal Description STG: Pt will be able to travel for route w/ slight pain.    Target Date 12/28/18   Ortho Goal 4   Goal Identifier 4   Goal Description LTG: Pt will be able to demonstrate HEP and self cares to manage LBP and RTW.    Target Date 12/28/18   Total Evaluation Time   Total Evaluation Time 58 Total (Eval x 35, Ex x 15, MT x 8)    Daisy Smith, PT, [facility] (#[id number])  [facility]           [phone]  Fax          [phone]  Appt #      [phone]

## 2018-11-26 ENCOUNTER — HOSPITAL ENCOUNTER (OUTPATIENT)
Dept: PHYSICAL THERAPY | Facility: CLINIC | Age: 58
Setting detail: THERAPIES SERIES
End: 2018-11-26
Attending: FAMILY MEDICINE
Payer: COMMERCIAL

## 2018-11-26 PROCEDURE — 40000718 ZZHC STATISTIC PT DEPARTMENT ORTHO VISIT: Performed by: PHYSICAL THERAPIST

## 2018-11-26 PROCEDURE — 97110 THERAPEUTIC EXERCISES: CPT | Mod: GP | Performed by: PHYSICAL THERAPIST

## 2018-11-26 NOTE — PROGRESS NOTES
Outpatient Physical Therapy Discharge Note     Patient: Lydia Pierson  : 1960    Beginning/End Dates of Reporting Period:  11/15/18 to 2018.  Total # of Rx sessions: 2/10     Referring Provider:  Benja Cordova Diagnosis: Musculoskeletal Back Pain     Client Self Report: Feeling much better. Could not find theraball, but other exercises are helping a lot. Wants to start walking more. Okay w/ Discharge of therapy.     Objective Measurements:  Objective Measure: Camacho   Details: 18  Score 0.     11/15/18 INITIALLY: 4    Objective Measure: DAVE  Details: 18  Score 10.    10/15/18 INITIALLY: Score 36.     Objective Measure: Pelvic/LB Alignment.   Details: 18  Normal alignment today.    INITIALLY:  L SB Stuck in Extension, FRS R L4 and upward.     Outcome Measures (most recent score):  Camacho STarT Sub-Score (Q5-9): 0  Camacho STarT Total Score (all 9): 0  Oswestry Score (%): 10 %    Goals:  Goal Identifier 1   Goal Description STG: Pt will be able to Lift heavier weights at work w/o P.   MET   Target Date 18   Date Met  18   Progress:     Goal Identifier 2   Goal Description STG: Pt will be able to sit for more than an hour.    MET    Target Date 18   Date Met  18   Progress:     Goal Identifier 3   Goal Description STG: Pt will be able to travel for route w/ slight pain.  18  MET    Target Date 18   Date Met  18   Progress:     Goal Identifier 4   Goal Description LTG: Pt will be able to demonstrate HEP and self cares to manage LBP and RTW. 18  MET    Target Date 18   Date Met  18   Progress:     Progress Toward Goals:   Progress this reporting period: Pt has met all goals.     Plan:  Discharge from therapy.    Discharge:    Reason for Discharge: Patient has met all goals.  Patient chooses to discontinue therapy.    Equipment Issued:      Discharge Plan: Patient to continue home program.  Pt to  follow up w/MD as appropriate.   Daisy Smith PT, [facility] (#[id number])  [facility]           [phone]  Fax          [phone]  Appt #      [phone]

## 2018-11-30 ENCOUNTER — OFFICE VISIT (OUTPATIENT)
Dept: FAMILY MEDICINE | Facility: CLINIC | Age: 58
End: 2018-11-30
Payer: COMMERCIAL

## 2018-11-30 VITALS
BODY MASS INDEX: 21.68 KG/M2 | SYSTOLIC BLOOD PRESSURE: 122 MMHG | HEIGHT: 64 IN | HEART RATE: 67 BPM | WEIGHT: 127 LBS | TEMPERATURE: 98.1 F | DIASTOLIC BLOOD PRESSURE: 68 MMHG | OXYGEN SATURATION: 98 % | RESPIRATION RATE: 16 BRPM

## 2018-11-30 DIAGNOSIS — M54.9 MUSCULOSKELETAL BACK PAIN: Primary | ICD-10-CM

## 2018-11-30 PROCEDURE — 99213 OFFICE O/P EST LOW 20 MIN: CPT | Performed by: FAMILY MEDICINE

## 2018-11-30 ASSESSMENT — PAIN SCALES - GENERAL: PAINLEVEL: NO PAIN (0)

## 2018-11-30 NOTE — PROGRESS NOTES
SUBJECTIVE:   Lydia Pierson is a 58 year old female who presents to clinic today for the following health issues:      Problem:   Chief Complaint   Patient presents with     Work Comp     follow up on low back. Doing much better.     ADDITIONAL HPI: 58 year old female here for above issue.  Had physical therapy and progressing well. Continuing with HEP. Physical therapy canceled future visits as she's doing well.    ROS: 10 point review of systems negative except as per HPI.    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 Father      bypass     Diabetes Father      Lipids Father      Allergies Sister      allergic rhinitis     Neurologic Disorder Sister      seizure disorder     Respiratory Sister      asthma     Allergies Son      Breast Cancer No family hx of      Cancer - colorectal No family hx of        SOCIAL HISTORY:  Social History     Social History     Marital status:      Spouse name: N/A     Number of children: N/A     Years of education: N/A     Occupational History     Not on file.     Social History Main Topics     Smoking  "status: Never Smoker     Smokeless tobacco: Never Used     Alcohol use No     Drug use: No     Sexual activity: Yes     Partners: Male     Birth control/ protection: Surgical      Comment: tubal     Other Topics Concern     Parent/Sibling W/ Cabg, Mi Or Angioplasty Before 65f 55m? No     Social History Narrative       MEDICATIONS:  Current Outpatient Prescriptions   Medication Sig Dispense Refill     acetaminophen (TYLENOL) 325 MG tablet Take 1 tablet by mouth every 6 hours as needed.       ibuprofen (ADVIL,MOTRIN) 600 MG tablet Take 1 tablet by mouth every 6 hours as needed for pain (For mild pain and temperature greater than 102F). 30 tablet 0     Multiple Vitamins-Minerals (MULTIVITAMIN WOMEN PO) Take 1 tablet by mouth daily       tiZANidine (ZANAFLEX) 2 MG tablet Take 1-2 tablets (2-4 mg) by mouth 3 times daily 90 tablet 0     VITAMIN D, CHOLECALCIFEROL, PO Take 400 Units by mouth daily       valACYclovir (VALTREX) 1000 mg tablet Take 1 tablet (1,000 mg) by mouth 3 times daily (Patient not taking: Reported on 6/13/2018) 21 tablet 0       ALLERGIES:     Allergies   Allergen Reactions     Seasonal Allergies        Problem list, Medication list, Allergies, and Medical/Social/Surgical histories reviewed in [facility] and updated as appropriate.    OBJECTIVE:                                                    VITALS: /68 (BP Location: Right arm, Cuff Size: Adult Regular)  Pulse 67  Temp 98.1  F (36.7  C) (Tympanic)  Resp 16  Ht 5' 4\" (1.626 m)  Wt 127 lb (57.6 kg)  LMP 12/05/2010  SpO2 98%  BMI 21.8 kg/m2 Body mass index is 21.8 kg/(m^2).  GENERAL: Pleasant, well appearing female.  MUSCULOSKELETAL:  No midline vertebral tenderness to palpation. Has bilateral paravertebral tenderness and tightness. Straight leg raise is negative for radicular symptoms. Strength is 5/5 and DTR 2+ and symmetric throughout lower extremities.     ASSESSMENT/PLAN:                                                    1. " Musculoskeletal back pain  Improving significantly. Ok to return to work full time. Paperwork completed.

## 2018-11-30 NOTE — MR AVS SNAPSHOT
"              After Visit Summary   11/30/2018    Lydia Pierson    MRN: [id number]           Patient Information     Date Of Birth          1960        Visit Information        Provider Department      11/30/2018 12:40 PM Benja Cordova MD [facility]        Care Instructions          Thank you for choosing [facility] (formerly [facility]).  You may be receiving a survey in the mail from [facility] regarding your visit today.  Please take a few minutes to complete and return the survey to let us know how we are doing.      Our Clinic hours are:  Mondays    7:20 am - 7 pm  Tues -  Fri  7:20 am - 5 pm    Clinic Phone: [phone]    The clinic lab opens at 7:30 am Mon - Fri and appointments are required.    [facility] [place]  Ph. [phone]  Monday  8 am - 7pm  Tues - Fri 8 am - 5:30 pm                 Follow-ups after your visit        Who to contact     If you have questions or need follow up information about today's clinic visit or your schedule please contact [facility] directly at [phone].  Normal or non-critical lab and imaging results will be communicated to you by MyChart, letter or phone within 4 business days after the clinic has received the results. If you do not hear from us within 7 days, please contact the clinic through MyChart or phone. If you have a critical or abnormal lab result, we will notify you by phone as soon as possible.  Submit refill requests through [employer] or call your pharmacy and they will forward the refill request to us. Please allow 3 business days for your refill to be completed.          Additional Information About Your Visit        Care EveryWhere ID     This is your Care EveryWhere ID. This could be used by other organizations to access your [facility] medical records  [id number]        Your Vitals Were     Pulse Temperature Respirations Height Last Period Pulse Oximetry    67 98.1  F (36.7  C) (Tympanic) 16 5' 4\" " (1.626 m) 12/05/2010 98%    BMI (Body Mass Index)                   21.8 kg/m2            Blood Pressure from Last 3 Encounters:   11/30/18 122/68   11/14/18 135/78   11/08/18 133/72    Weight from Last 3 Encounters:   11/30/18 127 lb (57.6 kg)   11/14/18 126 lb 9.6 oz (57.4 kg)   11/08/18 126 lb (57.2 kg)              Today, you had the following     No orders found for display       Primary Care Provider Office Phone # Fax #    Benja Liz Cordova [phone] [address] [facility] [address]        Equal Access to Services     BREANNE MANCIA : Hadii juan luis choo Sobenjamin, waaxda luqadaha, qaybta kaalmada adekinjalyada, alek valencia. So [facility] [phone].    ATENCIÓN: Si habla español, tiene a olmedo disposición servicios gratuitos de asistencia lingüística. Llame al [phone].    We comply with applicable federal civil rights laws and [place] laws. We do not discriminate on the basis of race, color, national origin, age, disability, sex, sexual orientation, or gender identity.            Thank you!     Thank you for choosing [facility]  for your care. Our goal is always to provide you with excellent care. Hearing back from our patients is one way we can continue to improve our services. Please take a few minutes to complete the written survey that you may receive in the mail after your visit with us. Thank you!             Your Updated Medication List - Protect others around you: Learn how to safely use, store and throw away your medicines at www.disposemymeds.org.          This list is accurate as of 11/30/18  4:31 PM.  Always use your most recent med list.                   Brand Name Dispense Instructions for use Diagnosis    ibuprofen 600 MG tablet    ADVIL/MOTRIN    30 tablet    Take 1 tablet by mouth every 6 hours as needed for pain (For mild pain and temperature greater than 102F).    Tear of meniscus of left knee        MULTIVITAMIN WOMEN PO      Take 1 tablet by mouth daily        tiZANidine 2 MG tablet    ZANAFLEX    90 tablet    Take 1-2 tablets (2-4 mg) by mouth 3 times daily    Musculoskeletal back pain       TYLENOL 325 MG tablet   Generic drug:  acetaminophen      Take 1 tablet by mouth every 6 hours as needed.        valACYclovir 1000 mg tablet    VALTREX    21 tablet    Take 1 tablet (1,000 mg) by mouth 3 times daily    Herpes zoster without complication       VITAMIN D (CHOLECALCIFEROL) PO      Take 400 Units by mouth daily

## 2018-11-30 NOTE — PATIENT INSTRUCTIONS
Thank you for choosing [facility].  You may be receiving a survey in the mail from [facility] regarding your visit today.  Please take a few minutes to complete and return the survey to let us know how we are doing.      Our Clinic hours are:  Mondays    7:20 am - 7 pm  Tues - Fri  7:20 am - 5 pm    Clinic Phone: [phone]    The clinic lab opens at 7:30 am Mon - Fri and appointments are required.    [facility]. [phone]  Monday  8 am - 7pm  Tues - Fri 8 am - 5:30 pm

## 2018-11-30 NOTE — LETTER
[facility]  [address]  [facility] [address]  Phone: [phone]    November 30, 2018        Lydia Pierson  [address] [facility] [address]          To whom it may concern:    RE: Lydia Pierson    Patient was seen and treated today at our clinic. She has muscular lumbar strain that occurred 10/22/2018 while lifting a large stack of magazines at work. She was seen by me in clinic 10/24, 10/31, 11/8, 11/14, 11/30. She was also seen in physical therapy 11/15 and 11/26. She had been advised to be off work from 10/22-10/31 to allow for healing and therapy and then advised to return to work with light duty. This flared her back again so she was off work again 11/7-11/15. She then went back to work with light duty restriction and advised to work Mon, Wed, Fri to allow for any flare that work might trigger to settle down again.  On exam she had tenderness and tightness over lumbar paravertebral musculature. No midline vertebral tenderness. Gain and neuromuscular exam was otherwise normal.    Please contact me for questions or concerns.      Sincerely,        Benja Cordova MD

## 2018-12-27 ENCOUNTER — HOSPITAL ENCOUNTER (OUTPATIENT)
Dept: CT IMAGING | Facility: CLINIC | Age: 58
Discharge: HOME | End: 2018-12-27
Attending: NURSE PRACTITIONER | Admitting: NURSE PRACTITIONER
Payer: COMMERCIAL

## 2018-12-27 ENCOUNTER — OFFICE VISIT (OUTPATIENT)
Dept: FAMILY MEDICINE | Facility: CLINIC | Age: 58
End: 2018-12-27
Payer: COMMERCIAL

## 2018-12-27 VITALS
WEIGHT: 119 LBS | BODY MASS INDEX: 20.32 KG/M2 | TEMPERATURE: 98.4 F | DIASTOLIC BLOOD PRESSURE: 80 MMHG | OXYGEN SATURATION: 98 % | HEART RATE: 94 BPM | RESPIRATION RATE: 12 BRPM | HEIGHT: 64 IN | SYSTOLIC BLOOD PRESSURE: 118 MMHG

## 2018-12-27 DIAGNOSIS — R10.13 ABDOMINAL PAIN, EPIGASTRIC: ICD-10-CM

## 2018-12-27 DIAGNOSIS — R10.11 ABDOMINAL PAIN, RIGHT UPPER QUADRANT: ICD-10-CM

## 2018-12-27 DIAGNOSIS — R10.11 ABDOMINAL PAIN, RIGHT UPPER QUADRANT: Primary | ICD-10-CM

## 2018-12-27 LAB
AMYLASE SERPL-CCNC: 54 U/L (ref 30–110)
ANION GAP SERPL CALCULATED.3IONS-SCNC: 5 MMOL/L (ref 3–14)
BASOPHILS # BLD AUTO: 0 10E9/L (ref 0–0.2)
BASOPHILS NFR BLD AUTO: 0.5 %
BUN SERPL-MCNC: 16 MG/DL (ref 7–30)
CALCIUM SERPL-MCNC: 8.2 MG/DL (ref 8.5–10.1)
CHLORIDE SERPL-SCNC: 107 MMOL/L (ref 94–109)
CO2 SERPL-SCNC: 30 MMOL/L (ref 20–32)
CREAT SERPL-MCNC: 0.62 MG/DL (ref 0.52–1.04)
DIFFERENTIAL METHOD BLD: NORMAL
EOSINOPHIL # BLD AUTO: 0.3 10E9/L (ref 0–0.7)
EOSINOPHIL NFR BLD AUTO: 4.9 %
ERYTHROCYTE [DISTWIDTH] IN BLOOD BY AUTOMATED COUNT: 11.8 % (ref 10–15)
GFR SERPL CREATININE-BSD FRML MDRD: >90 ML/MIN/{1.73_M2}
GLUCOSE SERPL-MCNC: 76 MG/DL (ref 70–99)
HCT VFR BLD AUTO: 42.7 % (ref 35–47)
HGB BLD-MCNC: 14.2 G/DL (ref 11.7–15.7)
LIPASE SERPL-CCNC: 181 U/L (ref 73–393)
LYMPHOCYTES # BLD AUTO: 1.8 10E9/L (ref 0.8–5.3)
LYMPHOCYTES NFR BLD AUTO: 29.8 %
MCH RBC QN AUTO: 30.3 PG (ref 26.5–33)
MCHC RBC AUTO-ENTMCNC: 33.3 G/DL (ref 31.5–36.5)
MCV RBC AUTO: 91 FL (ref 78–100)
MONOCYTES # BLD AUTO: 0.8 10E9/L (ref 0–1.3)
MONOCYTES NFR BLD AUTO: 12.8 %
NEUTROPHILS # BLD AUTO: 3.2 10E9/L (ref 1.6–8.3)
NEUTROPHILS NFR BLD AUTO: 52 %
PLATELET # BLD AUTO: 216 10E9/L (ref 150–450)
POTASSIUM SERPL-SCNC: 4.2 MMOL/L (ref 3.4–5.3)
RBC # BLD AUTO: 4.68 10E12/L (ref 3.8–5.2)
SODIUM SERPL-SCNC: 142 MMOL/L (ref 133–144)
WBC # BLD AUTO: 6.2 10E9/L (ref 4–11)

## 2018-12-27 PROCEDURE — 80048 BASIC METABOLIC PNL TOTAL CA: CPT | Performed by: NURSE PRACTITIONER

## 2018-12-27 PROCEDURE — 25000128 H RX IP 250 OP 636: Performed by: NURSE PRACTITIONER

## 2018-12-27 PROCEDURE — 83690 ASSAY OF LIPASE: CPT | Performed by: NURSE PRACTITIONER

## 2018-12-27 PROCEDURE — 99214 OFFICE O/P EST MOD 30 MIN: CPT | Performed by: NURSE PRACTITIONER

## 2018-12-27 PROCEDURE — 85025 COMPLETE CBC W/AUTO DIFF WBC: CPT | Performed by: NURSE PRACTITIONER

## 2018-12-27 PROCEDURE — 36415 COLL VENOUS BLD VENIPUNCTURE: CPT | Performed by: NURSE PRACTITIONER

## 2018-12-27 PROCEDURE — 82150 ASSAY OF AMYLASE: CPT | Performed by: NURSE PRACTITIONER

## 2018-12-27 PROCEDURE — 25000125 ZZHC RX 250: Performed by: NURSE PRACTITIONER

## 2018-12-27 PROCEDURE — 74177 CT ABD & PELVIS W/CONTRAST: CPT

## 2018-12-27 RX ORDER — OMEPRAZOLE 40 MG/1
40 CAPSULE, DELAYED RELEASE ORAL DAILY
Qty: 90 CAPSULE | Refills: 3 | Status: SHIPPED | OUTPATIENT
Start: 2018-12-27 | End: 2019-01-23

## 2018-12-27 RX ORDER — IOPAMIDOL 755 MG/ML
58 INJECTION, SOLUTION INTRAVASCULAR ONCE
Status: COMPLETED | OUTPATIENT
Start: 2018-12-27 | End: 2018-12-27

## 2018-12-27 RX ORDER — SUCRALFATE 1 G/1
1 TABLET ORAL 4 TIMES DAILY
Qty: 360 TABLET | Refills: 3 | Status: SHIPPED | OUTPATIENT
Start: 2018-12-27 | End: 2019-01-23

## 2018-12-27 RX ADMIN — IOPAMIDOL 58 ML: 755 INJECTION, SOLUTION INTRAVENOUS at 14:49

## 2018-12-27 RX ADMIN — SODIUM CHLORIDE 55 ML: 9 INJECTION, SOLUTION INTRAVENOUS at 14:49

## 2018-12-27 ASSESSMENT — MIFFLIN-ST. JEOR: SCORE: 1094.78

## 2018-12-27 NOTE — PATIENT INSTRUCTIONS
1. Avoid eating within 3-4 hours of bedtime.    2. Eat frequent small meals per day rather than large meals.    3. Avoid tobacco and alcohol products, avoid tight fitting clothes, elevate head of bed with six inch blocks.  4. Prescription written for Omeprazole 40 mg daily for the next 4 weeks to be taken on empty stomach 30 min before breakfast.  Prescription written for Carafate 1 gm up to four times daily for stomach symptoms X 10 days.  5. Avoid NSAIDS.  6. If overweight, weight loss is recommended. Losing even as little as 10 lbs may decrease symptoms.  7. Avoid high fat meals and other foods that aggravate the problem. Foods that may cause more symptoms are: chocolate, tomato-based foods, alcohol, peppermint, caffeinated products, citrus fruits and drinks, onions and garlic.    If after 1-2 weeks symptoms not improving would recommend Upper GI Endoscopy to further evaluate symptoms.  This has been ordered.    Follow up with PCP in 2-3 weeks to discuss above and follow up on symptoms.  Return to clinic or ED if you develop worsening symptoms/pain, blood in stool, throwing up blood, fevers, shortness of breath or chest pain.    LAURA Ortega

## 2018-12-27 NOTE — LETTER
December 27, 2018      Lydia Pierson  [address] [facility] [address]        To Whom It May Concern:    Lydia Pierson  was seen in clinic on 12/27/18 and missed work. Please excuse this absence. If you have any questions please call the clinic at [phone].       Sincerely,        Carolina Mina NP

## 2018-12-27 NOTE — NURSING NOTE
A locked IV line was removed from right antecubital space. Patient tolerated and was coached on applying pressure. A gauze and pressure tape was also used.      RAFFY MacarioN, RN

## 2018-12-27 NOTE — NURSING NOTE
"Initial /80   Pulse 94   Temp 98.4  F (36.9  C) (Tympanic)   Resp 12   Ht 1.626 m (5' 4\")   Wt 54 kg (119 lb)   LMP 12/05/2010   SpO2 98%   BMI 20.43 kg/m   Estimated body mass index is 20.43 kg/m  as calculated from the following:    Height as of this encounter: 1.626 m (5' 4\").    Weight as of this encounter: 54 kg (119 lb). .    Grecia Pink CMA ([facility])  "

## 2018-12-27 NOTE — PROGRESS NOTES
SUBJECTIVE:   Lydia Pierson is a 58 year old female who presents to clinic today for the following health issues:    ABDOMINAL and FLANK   PAIN     Onset: last night it started.     Description:   Character: Dull, squeezing  Location: epigastric region  Radiation: Back - bra line.    Intensity: moderate    Progression of Symptoms:  worsening    Accompanying Signs & Symptoms:  Fever/Chills?: chills   Gas/Bloating: YES  Nausea: YES  Vomitting: no   Diarrhea?: no   Constipation:no   Dysuria or Hematuria: no    History:   Trauma: She had two hernia surgeries in the last year but NO new trauma.   Previous similar pain: no    Previous tests done: none    Precipitating factors:   Does the pain change with:     Food: no      BM: no     Urination: no     Alleviating factors: None     Therapies Tried and outcome: Tums     LMP:  not applicable     Reports has been ongoing since Nov - intermittent - worse lately.  When this happens in the past it would last hours to a full day but resolve.    Ate ham/cheese/houser sub in the am and candied walnuts then pain developed and has been constant since that time.  Had BM's this am without much change.    Reports nausea with this as well.  Reports reflux without much relief.    Had recent episode of back pain from work - taking Advil or Ibuprofen over the counter daily - reports this hasn't been as constant.    Problem list and histories reviewed & adjusted, as indicated.  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)     Malignant neoplasm of kidney excluding renal pelvis (H)     External hemorrhoids with other complication     Sensorineural hearing loss, asymmetrical     Ovarian cyst     Health Care Home     Hyperlipidemia LDL goal <160     Advanced directives, counseling/discussion     Closed nondisplaced fracture of neck of fifth metacarpal bone of left hand     Past Surgical  History:   Procedure Laterality Date     ARTHROSCOPY KNEE WITH MEDIAL MENISCECTOMY  7/7/2011    Procedure:ARTHROSCOPY KNEE WITH MEDIAL MENISCECTOMY; choice anes; Surgeon:MANUEL FLEMING; Location:[facility]     COLONOSCOPY N/A 9/4/2018    Procedure: COLONOSCOPY;  colonoscopy;  Surgeon: Carlin Rodrigues MD;  Location: [facility]     HEMORRHOIDECTOMY  2006     HERNIORRHAPHY INGUINAL Left 8/17/2017    Procedure: HERNIORRHAPHY INGUINAL;  Left Inguinal Hernia Repair;  Surgeon: Mehdi Sorensen MD;  Location: [facility]     LAPAROSCOPIC HERNIORRHAPHY INGUINAL Left 1/11/2018    Procedure: LAPAROSCOPIC HERNIORRHAPHY INGUINAL;  Laparoscopic Left Inguinal Hernia Repair converted to open left femoral  hernia repair;  Surgeon: Mehdi Sorensen MD;  Location: [facility]     NEPHRECTOMY RT/LT  2001    left partial nephrectomy- kidney ca     SALPINGO OOPHORECTOMY,R/L/KRISHNA  2010    Rt salpingo oophorectomy      SURGICAL HISTORY OF -   8/1/1995    Vein stripping     SURGICAL HISTORY OF -   1975    wisdom teeth extraction      TONSILLECTOMY & ADENOIDECTOMY  1962     TUBAL LIGATION  1994       Social History     Tobacco Use     Smoking status: Never Smoker     Smokeless tobacco: Never Used   Substance Use Topics     Alcohol use: No     Family History   Problem Relation Age of Onset     C.A.D. Father         bypass     Diabetes Father      Lipids Father      Allergies Sister         allergic rhinitis     Neurologic Disorder Sister         seizure disorder     Respiratory Sister         asthma     Allergies Son      Breast Cancer No family hx of      Cancer - colorectal No family hx of          Current Outpatient Medications   Medication Sig Dispense Refill     acetaminophen (TYLENOL) 325 MG tablet Take 1 tablet by mouth every 6 hours as needed.       ibuprofen (ADVIL,MOTRIN) 600 MG tablet Take 1 tablet by mouth every 6 hours as needed for pain (For mild pain and temperature greater than 102F). 30 tablet 0     Multiple Vitamins-Minerals  "(MULTIVITAMIN WOMEN PO) Take 1 tablet by mouth daily       tiZANidine (ZANAFLEX) 2 MG tablet Take 1-2 tablets (2-4 mg) by mouth 3 times daily 90 tablet 0     valACYclovir (VALTREX) 1000 mg tablet Take 1 tablet (1,000 mg) by mouth 3 times daily (Patient not taking: Reported on 6/13/2018) 21 tablet 0     VITAMIN D, CHOLECALCIFEROL, PO Take 400 Units by mouth daily       Allergies   Allergen Reactions     Seasonal Allergies      Recent Labs   Lab Test 06/13/18  0843 12/16/17  1307 05/12/17  0806 02/12/16  0824  04/29/11  0935   *  --   --  159*  --  161*   HDL 57  --   --  59  --  43*   TRIG 77  --   --  83  --  89   ALT  --  21  --  31  --   --    CR  --  0.52  --  0.59   < > 0.60   GFRESTIMATED  --  >90 >90 >90  Non African American GFR Calc     < > >90   GFRESTBLACK  --  >90 >90 >90  African American GFR Calc     < > >90   POTASSIUM  --  3.8  --  4.0   < > 4.7    < > = values in this interval not displayed.      BP Readings from Last 3 Encounters:   12/27/18 118/80   11/30/18 122/68   11/14/18 135/78    Wt Readings from Last 3 Encounters:   12/27/18 54 kg (119 lb)   11/30/18 57.6 kg (127 lb)   11/14/18 57.4 kg (126 lb 9.6 oz)                  Labs reviewed in EPIC    Reviewed and updated as needed this visit by clinical staff       Reviewed and updated as needed this visit by Provider         ROS:  Constitutional, HEENT, cardiovascular, pulmonary, GI, , musculoskeletal, neuro, skin, endocrine and psych systems are negative, except as otherwise noted.    OBJECTIVE:     /80   Pulse 94   Temp 98.4  F (36.9  C) (Tympanic)   Resp 12   Ht 1.626 m (5' 4\")   Wt 54 kg (119 lb)   LMP 12/05/2010   SpO2 98%   BMI 20.43 kg/m    Body mass index is 20.43 kg/m .  GENERAL: healthy, alert and no distress  NECK: no adenopathy, no asymmetry, masses, or scars and thyroid normal to palpation  RESP: lungs clear to auscultation - no rales, rhonchi or wheezes  CV: regular rate and rhythm, normal S1 S2, no S3 or S4, no " murmur, click or rub, no peripheral edema and peripheral pulses strong  ABDOMEN: tenderness epigastric and RUQ, pos Muro's sign, pos guarding in RUQ, no organomegaly or masses, bowel sounds normal   MS: no gross musculoskeletal defects noted, no edema    Diagnostic Test Results:  Labs pending.  CT abdomen ordered.    ASSESSMENT/PLAN:     1. Abdominal pain, right upper quadrant   Differential includes pancreatitis, cholecystitis, or other etiology.  CT ordered given above   Labs today.    - CBC with platelets differential  - Basic metabolic panel  - Amylase  - Lipase  - CT Abdomen w/o Contrast; Future    2. Abdominal pain, epigastric     - CBC with platelets differential  - Basic metabolic panel  - Amylase  - Lipase  - CT Abdomen w/o Contrast; Future    There are no Patient Instructions on file for this visit.    Carolina Mina NP  [facility]

## 2018-12-28 ENCOUNTER — TELEPHONE (OUTPATIENT)
Dept: FAMILY MEDICINE | Facility: CLINIC | Age: 58
End: 2018-12-28

## 2018-12-28 NOTE — TELEPHONE ENCOUNTER
Reason for Call:  Procedure  reached out to schedule diagnostic upper gi endoscopy ordered    Detailed comments: patient states that she would like to wait 2 weeks to see if the medication is effective and she will call if not and schedule procedure    Phone Number Patient can be reached at: Home number on file [phone] (home)    Best Time: NA    Can we leave a detailed message on this number? Not Applicable    Call taken on 12/28/2018 at 1:08 PM by Nyla Mai

## 2019-01-23 ENCOUNTER — OFFICE VISIT (OUTPATIENT)
Dept: FAMILY MEDICINE | Facility: CLINIC | Age: 59
End: 2019-01-23
Payer: COMMERCIAL

## 2019-01-23 VITALS
SYSTOLIC BLOOD PRESSURE: 116 MMHG | HEART RATE: 80 BPM | RESPIRATION RATE: 16 BRPM | WEIGHT: 119.4 LBS | OXYGEN SATURATION: 98 % | BODY MASS INDEX: 20.38 KG/M2 | HEIGHT: 64 IN | DIASTOLIC BLOOD PRESSURE: 77 MMHG | TEMPERATURE: 97.3 F

## 2019-01-23 DIAGNOSIS — L72.3 SEBACEOUS CYST: Primary | ICD-10-CM

## 2019-01-23 DIAGNOSIS — B07.0 PLANTAR WARTS: ICD-10-CM

## 2019-01-23 PROCEDURE — 99213 OFFICE O/P EST LOW 20 MIN: CPT | Performed by: FAMILY MEDICINE

## 2019-01-23 ASSESSMENT — MIFFLIN-ST. JEOR: SCORE: 1096.59

## 2019-01-23 ASSESSMENT — PAIN SCALES - GENERAL: PAINLEVEL: NO PAIN (1)

## 2019-01-23 NOTE — PATIENT INSTRUCTIONS
Our Clinic hours are:  Mondays    7:20 am - 7 pm  Tues -  Fri  7:20 am - 5 pm    Clinic Phone: [phone]    The clinic lab opens at 7:30 am Mon - Fri and appointments are required.    [facility] [place]. [phone]  Monday  8 am - 7pm  Tues - Fri 8 am - 5:30 pm

## 2019-01-23 NOTE — PROGRESS NOTES
SUBJECTIVE:   Lydia Pierson is a 58 year old female who presents to clinic today for the following health issues:      Problem:   Chief Complaint   Patient presents with     Foot Problems     left foot, bump on bottom of foot x 5 -6 months, has some discomfort when standing or walking.     Derm Problem     lesion/mole on chest x 5 -6 months     ADDITIONAL HPI: 58 year old female here for above issue.     ROS: 10 point review of systems negative except as per HPI.    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 Father         bypass     Diabetes Father      Lipids Father      Allergies Sister         allergic rhinitis     Neurologic Disorder Sister         seizure disorder     Respiratory Sister         asthma     Allergies Son      Breast Cancer No family hx of      Cancer - colorectal No family hx of        SOCIAL HISTORY:  Social History     Socioeconomic History     Marital status:      Spouse name: Not on file     Number of children: Not on file     Years of education: Not on file     Highest education level: Not on file   Social Needs  "    Financial resource strain: Not on file     Food insecurity - worry: Not on file     Food insecurity - inability: Not on file     Transportation needs - medical: Not on file     Transportation needs - non-medical: Not on file   Occupational History     Not on file   Tobacco Use     Smoking status: Never Smoker     Smokeless tobacco: Never Used   Substance and Sexual Activity     Alcohol use: No     Drug use: No     Sexual activity: Yes     Partners: Male     Birth control/protection: Surgical     Comment: tubal   Other Topics Concern     Parent/sibling w/ CABG, MI or angioplasty before 65F 55M? No   Social History Narrative     Not on file       MEDICATIONS:  Current Outpatient Medications   Medication Sig Dispense Refill     Multiple Vitamins-Minerals (MULTIVITAMIN WOMEN PO) Take 1 tablet by mouth daily       VITAMIN D, CHOLECALCIFEROL, PO Take 400 Units by mouth daily       acetaminophen (TYLENOL) 325 MG tablet Take 1 tablet by mouth every 6 hours as needed.         ALLERGIES:     Allergies   Allergen Reactions     Seasonal Allergies        Problem list, Medication list, Allergies, and Medical/Social/Surgical histories reviewed in [facility] and updated as appropriate.    OBJECTIVE:                                                    VITALS: /77   Pulse 80   Temp 97.3  F (36.3  C) (Tympanic)   Resp 16   Ht 1.626 m (5' 4\")   Wt 54.2 kg (119 lb 6.4 oz)   LMP 12/05/2010   SpO2 98%   BMI 20.49 kg/m   Body mass index is 20.49 kg/m .  GENERAL: Pleasant, well appearing female.  SKIN:  Mid chest small 3mm sebaceous cyst cyst with central comedone. Not inflamed.  Left foot plantar warts at mi metatarsal pas and medial heel.    ASSESSMENT/PLAN:                                                    1. Sebaceous cyst  Reassured benign. Follow-up PRN if it bothers her cosmetically or becomes inflamed.    2. Plantar warts  Discussed treatment options.  Going on vacation soon. Wants to hold off on treatment until she's " back.

## 2019-05-28 ENCOUNTER — OFFICE VISIT (OUTPATIENT)
Dept: FAMILY MEDICINE | Facility: CLINIC | Age: 59
End: 2019-05-28
Payer: COMMERCIAL

## 2019-05-28 VITALS
TEMPERATURE: 97.6 F | SYSTOLIC BLOOD PRESSURE: 110 MMHG | DIASTOLIC BLOOD PRESSURE: 60 MMHG | HEART RATE: 98 BPM | WEIGHT: 121 LBS | OXYGEN SATURATION: 98 % | RESPIRATION RATE: 16 BRPM | BODY MASS INDEX: 20.66 KG/M2 | HEIGHT: 64 IN

## 2019-05-28 DIAGNOSIS — R05.9 COUGH: ICD-10-CM

## 2019-05-28 DIAGNOSIS — J06.9 UPPER RESPIRATORY TRACT INFECTION, UNSPECIFIED TYPE: Primary | ICD-10-CM

## 2019-05-28 PROCEDURE — 99213 OFFICE O/P EST LOW 20 MIN: CPT | Performed by: NURSE PRACTITIONER

## 2019-05-28 RX ORDER — BENZONATATE 200 MG/1
200 CAPSULE ORAL 3 TIMES DAILY PRN
Qty: 30 CAPSULE | Refills: 1 | Status: SHIPPED | OUTPATIENT
Start: 2019-05-28 | End: 2019-12-29

## 2019-05-28 ASSESSMENT — MIFFLIN-ST. JEOR: SCORE: 1098.85

## 2019-05-28 NOTE — LETTER
[facility]  [address]  [facility] [address]  Phone: [phone]    May 28, 2019        Lydia Pierson  [address] [facility] [address]          To whom it may concern:    RE: Lydia Pierson    Patient was seen and treated today at our clinic and missed work.  She will need to be off 5/30/19-5/31/19 if still ill but can return sooner if symptoms are improving.    Please contact me for questions or concerns.      Sincerely,        Adilene Layton NP

## 2019-05-28 NOTE — PATIENT INSTRUCTIONS
1.  Take tessalon pearls as directed for cough.  2.  Continue Advil cold and Nyquil as needed.  3.  Rest, push fluids and follow-up at the end of the week if symptoms are not getting better.      Patient Education     Viral Upper Respiratory Illness (Adult)    You have a viral upper respiratory illness (URI), which is another term for the common cold. This illness is contagious during the first few days. It is spread through the air by coughing and sneezing. It may also be spread by direct contact (touching the sick person and then touching your own eyes, nose, or mouth). Frequent handwashing will decrease risk of spread. Most viral illnesses go away within 7 to 10 days with rest and simple home remedies. Sometimes the illness may last for several weeks. Antibiotics will not kill a virus, and they are generally not prescribed for this condition.  Home care    If symptoms are severe, rest at home for the first 2 to 3 days. When you resume activity, don't let yourself get too tired.    Don't smoke. If you need help stopping, talk with your healthcare provider.    Avoid being exposed to cigarette smoke (yours or others ).    You may use acetaminophen or ibuprofen to control pain and fever, unless another medicine was prescribed. If you have chronic liver or kidney disease, have ever had a stomach ulcer or gastrointestinal bleeding, or are taking blood-thinning medicines, talk with your healthcare provider before using these medicines. Aspirin should never be given to anyone under 18 years of age who is ill with a viral infection or fever. It may cause severe liver or brain damage.    Your appetite may be poor, so a light diet is fine. Stay well hydrated by drinking 6 to 8 glasses of fluids per day (water, soft drinks, juices, tea, or soup). Extra fluids will help loosen secretions in the nose and lungs.    Over-the-counter cold medicines will not shorten the length of time you re sick, but they may be helpful for the  following symptoms: cough, sore throat, and nasal and sinus congestion. If you take prescription medicines, ask your healthcare provider or pharmacist which over-the-counter medicines are safe to use. (Note: Don't use decongestants if you have high blood pressure.)  Follow-up care  Follow up with your healthcare provider, or as advised.  When to seek medical advice  Call your healthcare provider right away if any of these occur:    Cough with lots of colored sputum (mucus)    Severe headache; face, neck, or ear pain    Difficulty swallowing due to throat pain    Fever of 100.4 F (38 C) or higher, or as directed by your healthcare provider  Call 911  Call 911 if any of these occur:    Chest pain, shortness of breath, wheezing, or difficulty breathing    Coughing up blood    Very severe pain with swallowing, especially if it goes along with a muffled voice   Date Last Reviewed: 6/1/2018 2000-2018 [employer] [address] [place] [address], [place] [address]. All rights reserved. This information is not intended as a substitute for professional medical care. Always follow your healthcare professional's instructions.

## 2019-05-28 NOTE — PROGRESS NOTES
Lydia Pierson is a 59 year old female who presents to clinic today for the following health issues:    HPI   ENT Symptoms             Symptoms: cc Present Absent Comment   Fever/Chills  x  Chills, does not typically run fevers   Fatigue x x  Worst symptom   Muscle Aches  x     Eye Irritation   x    Sneezing  x     Nasal Deny/Drg  x  Drainage is mostly post nasal; Mostly clear   Sinus Pressure/Pain   x    Loss of smell  x     Dental pain   x    Sore Throat  x  Worse in the morning, better as the day goes on   Swollen Glands  x     Ear Pain/Fullness   x    Cough  x  Productive, phlegm is clear to yellow but mostly clear   Wheeze   x    Chest Pain   x    Shortness of breath  x  mildly   Rash   x    Other  x  Diarrhea for 3 days, has not had an episode since yesterday afternoon.  Nausea but this has settled as well     Symptom duration:  1 week    Symptom severity:  moderate    Treatments tried:  Nyquil, Advil cold and flu has helped some    Contacts:  [place]      Patient is eating and drinking.  Complains more of cough and fatigue.  States that symptoms have been improving a little. Nyquil helps her sleep at night.      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)     Malignant neoplasm of kidney excluding renal pelvis (H)     External hemorrhoids with other complication     Sensorineural hearing loss, asymmetrical     Ovarian cyst     Health Care Home     Hyperlipidemia LDL goal <160     Advanced directives, counseling/discussion     Closed nondisplaced fracture of neck of fifth metacarpal bone of left hand     Plantar warts     Past Surgical History:   Procedure Laterality Date     ARTHROSCOPY KNEE WITH MEDIAL MENISCECTOMY  7/7/2011    Procedure:ARTHROSCOPY KNEE WITH MEDIAL MENISCECTOMY; choice anes; Surgeon:MANUEL FLEMING; Location:[facility]     COLONOSCOPY N/A 9/4/2018    Procedure: COLONOSCOPY;  colonoscopy;  Surgeon: Carlin Rodrigues MD;   Location: [facility]     HEMORRHOIDECTOMY  2006     HERNIORRHAPHY INGUINAL Left 8/17/2017    Procedure: HERNIORRHAPHY INGUINAL;  Left Inguinal Hernia Repair;  Surgeon: Mehdi Sorensen MD;  Location: [facility]     LAPAROSCOPIC HERNIORRHAPHY INGUINAL Left 1/11/2018    Procedure: LAPAROSCOPIC HERNIORRHAPHY INGUINAL;  Laparoscopic Left Inguinal Hernia Repair converted to open left femoral  hernia repair;  Surgeon: Mehdi Sorensen MD;  Location: [facility]     NEPHRECTOMY RT/LT  2001    left partial nephrectomy- kidney ca     SALPINGO OOPHORECTOMY,R/L/KRISHNA  2010    Rt salpingo oophorectomy      SURGICAL HISTORY OF -   8/1/1995    Vein stripping     SURGICAL HISTORY OF -   1975    wisdom teeth extraction      TONSILLECTOMY & ADENOIDECTOMY  1962     TUBAL LIGATION  1994       Social History     Tobacco Use     Smoking status: Never Smoker     Smokeless tobacco: Never Used   Substance Use Topics     Alcohol use: No     Family History   Problem Relation Age of Onset     C.A.D. Father         bypass     Diabetes Father      Lipids Father      Allergies Sister         allergic rhinitis     Neurologic Disorder Sister         seizure disorder     Respiratory Sister         asthma     Allergies Son      Breast Cancer No family hx of      Cancer - colorectal No family hx of          Current Outpatient Medications   Medication Sig Dispense Refill     benzonatate (TESSALON) 200 MG capsule Take 1 capsule (200 mg) by mouth 3 times daily as needed for cough 30 capsule 1     Multiple Vitamins-Minerals (MULTIVITAMIN WOMEN PO) Take 1 tablet by mouth daily       VITAMIN D, CHOLECALCIFEROL, PO Take 400 Units by mouth daily       acetaminophen (TYLENOL) 325 MG tablet Take 1 tablet by mouth every 6 hours as needed.       Allergies   Allergen Reactions     Seasonal Allergies      Reviewed and updated as needed this visit by Provider  Tobacco  Allergies  Meds  Problems  Med Hx  Surg Hx  Fam Hx         Review of Systems   ROS COMP:  "CONSTITUTIONAL:POSITIVE  for chills  ENT/MOUTH: POSITIVE for nasal congestion, postnasal drainage, sneezing, sore throat and swollen glands  RESP:POSITIVE for cough-productive and sputum mostly clear but can be a little yellow at times  CV: NEGATIVE for chest pain, palpitations or peripheral edema  PSYCHIATRIC: NEGATIVE for changes in mood or affect  ROS otherwise negative      Objective    /60   Pulse 98   Temp 97.6  F (36.4  C) (Tympanic)   Resp 16   Ht 1.626 m (5' 4\")   Wt 54.9 kg (121 lb)   LMP 12/05/2010   SpO2 98%   BMI 20.77 kg/m    Body mass index is 20.77 kg/m .  Physical Exam   GENERAL: healthy, alert and no distress  EYES: Eyes grossly normal to inspection, PERRL and conjunctivae and sclerae normal  HENT: normal cephalic/atraumatic, ear canals and TM's normal, nasal mucosa edematous , oropharynx clear, oral mucous membranes moist and mild post pharyngeal erythema, no tonsillar hypertrophy or exudate  NECK: no adenopathy and no asymmetry, masses, or scars; tenderness over glands bilaterally mostly submandibular  RESP: lungs clear to auscultation - no rales, rhonchi or wheezes  CV: regular rate and rhythm, normal S1 S2, no S3 or S4, no murmur, click or rub, no peripheral edema and peripheral pulses strong  PSYCH: mentation appears normal, affect normal/bright    Diagnostic Test Results:  none         Assessment & Plan     1. Upper respiratory tract infection, unspecified type  Exam is negative.  Most likely viral infection with improving symptoms.  Reassured patient of this and I have given her information on symptom management.  Recommend follow-up in clinic by the end of the week if symptoms worsen or are not improving for re-evaluation.    2. Cough  Tessalon Pearls ordered for cough symptom management.  - benzonatate (TESSALON) 200 MG capsule; Take 1 capsule (200 mg) by mouth 3 times daily as needed for cough  Dispense: 30 capsule; Refill: 1       See Patient Instructions    Return in about " 3 days (around 5/31/2019), or if symptoms worsen or fail to improve.    Adilene Layton NP  [facility]

## 2019-12-19 ENCOUNTER — OFFICE VISIT (OUTPATIENT)
Dept: FAMILY MEDICINE | Facility: CLINIC | Age: 59
End: 2019-12-19
Payer: COMMERCIAL

## 2019-12-19 ENCOUNTER — ANCILLARY PROCEDURE (OUTPATIENT)
Dept: GENERAL RADIOLOGY | Facility: CLINIC | Age: 59
End: 2019-12-19
Attending: NURSE PRACTITIONER
Payer: COMMERCIAL

## 2019-12-19 VITALS
DIASTOLIC BLOOD PRESSURE: 78 MMHG | RESPIRATION RATE: 12 BRPM | WEIGHT: 119.2 LBS | BODY MASS INDEX: 20.35 KG/M2 | SYSTOLIC BLOOD PRESSURE: 116 MMHG | OXYGEN SATURATION: 98 % | HEIGHT: 64 IN | HEART RATE: 85 BPM | TEMPERATURE: 97.8 F

## 2019-12-19 DIAGNOSIS — R07.81 RIB PAIN ON RIGHT SIDE: ICD-10-CM

## 2019-12-19 DIAGNOSIS — R07.81 RIB PAIN ON RIGHT SIDE: Primary | ICD-10-CM

## 2019-12-19 PROCEDURE — 99214 OFFICE O/P EST MOD 30 MIN: CPT | Performed by: NURSE PRACTITIONER

## 2019-12-19 PROCEDURE — 71101 X-RAY EXAM UNILAT RIBS/CHEST: CPT | Mod: RT

## 2019-12-19 RX ORDER — CYCLOBENZAPRINE HCL 10 MG
10 TABLET ORAL 3 TIMES DAILY PRN
Qty: 20 TABLET | Refills: 0 | Status: SHIPPED | OUTPATIENT
Start: 2019-12-19 | End: 2020-06-22

## 2019-12-19 ASSESSMENT — MIFFLIN-ST. JEOR: SCORE: 1090.69

## 2019-12-19 NOTE — NURSING NOTE
"Initial /78 (BP Location: Right arm, Patient Position: Sitting, Cuff Size: Adult Regular)   Pulse 85   Temp 97.8  F (36.6  C) (Tympanic)   Resp 12   Ht 1.626 m (5' 4\")   Wt 54.1 kg (119 lb 3.2 oz)   LMP 12/05/2010   SpO2 98%   BMI 20.46 kg/m   Estimated body mass index is 20.46 kg/m  as calculated from the following:    Height as of this encounter: 1.626 m (5' 4\").    Weight as of this encounter: 54.1 kg (119 lb 3.2 oz). .      "

## 2019-12-19 NOTE — PROGRESS NOTES
Subjective     Lydia Pierson is a 59 year old female who presents to clinic today for the following health issues:    HPI   Joint Pain    Onset: 1 week ago     Description:   Location: right side rib pain no trauma  Character: sharp pain with breathing and movement     Intensity: moderate    Progression of Symptoms: worse    Accompanying Signs & Symptoms:  Other symptoms: none    History:   Previous similar pain: no       Precipitating factors:   Trauma or overuse: no     Alleviating factors:  Improved by: nothing, has been working 15 hour days     Therapies Tried and outcome: Advil does dull the pain     Above HPI reviewed. Additionally, pain is localized to lower anterior right rib area. History of leukemia and RCC, no treatment for these since 2001. Some cough, increasing fatigue. Cough is productive. No fever. No hemoptysis. No shortness of breath or dyspnea on exertion. Painful breathing. Increased pain with movement.       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)     Malignant neoplasm of kidney excluding renal pelvis (H)     External hemorrhoids with other complication     Sensorineural hearing loss, asymmetrical     Ovarian cyst     Health Care Home     Hyperlipidemia LDL goal <160     Advanced directives, counseling/discussion     Closed nondisplaced fracture of neck of fifth metacarpal bone of left hand     Plantar warts     Past Surgical History:   Procedure Laterality Date     ARTHROSCOPY KNEE WITH MEDIAL MENISCECTOMY  7/7/2011    Procedure:ARTHROSCOPY KNEE WITH MEDIAL MENISCECTOMY; choice anes; Surgeon:MANUEL FLEMING; Location:[facility]     COLONOSCOPY N/A 9/4/2018    Procedure: COLONOSCOPY;  colonoscopy;  Surgeon: Carlin Rodrigues MD;  Location: [facility]     HEMORRHOIDECTOMY  2006     HERNIORRHAPHY INGUINAL Left 8/17/2017    Procedure: HERNIORRHAPHY INGUINAL;  Left Inguinal Hernia Repair;  Surgeon: Mehdi Sorensen MD;   Location: [facility]     LAPAROSCOPIC HERNIORRHAPHY INGUINAL Left 1/11/2018    Procedure: LAPAROSCOPIC HERNIORRHAPHY INGUINAL;  Laparoscopic Left Inguinal Hernia Repair converted to open left femoral  hernia repair;  Surgeon: Mehdi Sorensen MD;  Location: [facility]     NEPHRECTOMY RT/LT  2001    left partial nephrectomy- kidney ca     SALPINGO OOPHORECTOMY,R/L/KRISHNA  2010    Rt salpingo oophorectomy      SURGICAL HISTORY OF -   8/1/1995    Vein stripping     SURGICAL HISTORY OF -   1975    wisdom teeth extraction      TONSILLECTOMY & ADENOIDECTOMY  1962     TUBAL LIGATION  1994       Social History     Tobacco Use     Smoking status: Never Smoker     Smokeless tobacco: Never Used   Substance Use Topics     Alcohol use: No     Family History   Problem Relation Age of Onset     C.A.D. Father         bypass     Diabetes Father      Lipids Father      Allergies Sister         allergic rhinitis     Neurologic Disorder Sister         seizure disorder     Respiratory Sister         asthma     Allergies Son      Breast Cancer No family hx of      Cancer - colorectal No family hx of          Current Outpatient Medications   Medication Sig Dispense Refill     acetaminophen (TYLENOL) 325 MG tablet Take 1 tablet by mouth every 6 hours as needed.       cyclobenzaprine (FLEXERIL) 10 MG tablet Take 1 tablet (10 mg) by mouth 3 times daily as needed for muscle spasms 20 tablet 0     Multiple Vitamins-Minerals (MULTIVITAMIN WOMEN PO) Take 1 tablet by mouth daily       VITAMIN D, CHOLECALCIFEROL, PO Take 400 Units by mouth daily       benzonatate (TESSALON) 200 MG capsule Take 1 capsule (200 mg) by mouth 3 times daily as needed for cough (Patient not taking: Reported on 12/19/2019) 30 capsule 1       Reviewed and updated as needed this visit by Provider  Tobacco  Allergies  Meds  Problems  Med Hx  Surg Hx  Fam Hx         Review of Systems   ROS COMP: Constitutional, HEENT, cardiovascular, pulmonary, gi and gu systems are  "negative, except as otherwise noted.      Objective    /78 (BP Location: Right arm, Patient Position: Sitting, Cuff Size: Adult Regular)   Pulse 85   Temp 97.8  F (36.6  C) (Tympanic)   Resp 12   Ht 1.626 m (5' 4\")   Wt 54.1 kg (119 lb 3.2 oz)   LMP 12/05/2010   SpO2 98%   BMI 20.46 kg/m    Body mass index is 20.46 kg/m .  Physical Exam  Vitals signs and nursing note reviewed.   Constitutional:       Appearance: Normal appearance.   HENT:      Head: Normocephalic and atraumatic.      Mouth/Throat:      Mouth: Mucous membranes are moist.   Eyes:      Comments: Non-icteric   Neck:      Musculoskeletal: Neck supple.   Cardiovascular:      Rate and Rhythm: Normal rate and regular rhythm.      Pulses: Normal pulses.      Heart sounds: Normal heart sounds.   Pulmonary:      Effort: Pulmonary effort is normal.      Breath sounds: Normal breath sounds.   Chest:      Chest wall: Tenderness (over area of right anterior 10-12 ribs, point tender. No crepitus.) present.   Abdominal:      General: Abdomen is flat. Bowel sounds are normal.      Palpations: Abdomen is soft.   Neurological:      General: No focal deficit present.      Mental Status: She is alert and oriented to person, place, and time.   Psychiatric:         Mood and Affect: Mood normal.         Behavior: Behavior normal.         Thought Content: Thought content normal.         Judgment: Judgment normal.          Diagnostic Test Results:  Labs reviewed in Epic  Right rib with chest xray- no obvious abnormality on my review, awaiting radiology interpretation.        Assessment & Plan     1. Rib pain on right side  No obvious abnormality on my review of xray, no clear pneumonia or fracture. Likely costochondritis. I am not highly suspicious of but did consider PE as patient does have distant history of malignancy. Not tachycardic or hypoxic. No shortness of breath or dyspnea on exertion.  We discussed utility/limitations of d dimer. Shared decision " making, deferred for now.  Will call if no improvement, should acutely worsening pain, shortness of breath, hemoptysis develop, will be seen in ED right away.  - XR Ribs & Chest Right G/E 3 Views; Future  - cyclobenzaprine (FLEXERIL) 10 MG tablet; Take 1 tablet (10 mg) by mouth 3 times daily as needed for muscle spasms  Dispense: 20 tablet; Refill: 0       See Patient Instructions    Return in about 1 week (around 12/26/2019) for worsening or continued symptoms.    JOAQUIN Shelton [facility]

## 2019-12-19 NOTE — PATIENT INSTRUCTIONS
Flexeril at night and continue advil for pain.    If you develop worsening pain, shortness of breath or begin to cough up blood, go right to the ER.    If no improvement let me know next week.

## 2019-12-29 ENCOUNTER — HOSPITAL ENCOUNTER (EMERGENCY)
Facility: CLINIC | Age: 59
Discharge: ANOTHER HOSPITAL | End: 2019-12-29
Attending: EMERGENCY MEDICINE | Admitting: EMERGENCY MEDICINE
Payer: COMMERCIAL

## 2019-12-29 ENCOUNTER — HOSPITAL ENCOUNTER (INPATIENT)
Facility: CLINIC | Age: 59
LOS: 3 days | Discharge: HOME | DRG: 389 | End: 2020-01-02
Attending: HOSPITALIST | Admitting: HOSPITALIST
Payer: COMMERCIAL

## 2019-12-29 ENCOUNTER — APPOINTMENT (OUTPATIENT)
Dept: CT IMAGING | Facility: CLINIC | Age: 59
End: 2019-12-29
Attending: EMERGENCY MEDICINE
Payer: COMMERCIAL

## 2019-12-29 VITALS
HEART RATE: 74 BPM | WEIGHT: 120.37 LBS | RESPIRATION RATE: 18 BRPM | TEMPERATURE: 97.6 F | DIASTOLIC BLOOD PRESSURE: 82 MMHG | SYSTOLIC BLOOD PRESSURE: 146 MMHG | OXYGEN SATURATION: 99 % | BODY MASS INDEX: 20.66 KG/M2

## 2019-12-29 DIAGNOSIS — R10.84 ABDOMINAL PAIN, GENERALIZED: ICD-10-CM

## 2019-12-29 DIAGNOSIS — K56.609 SMALL BOWEL OBSTRUCTION (H): ICD-10-CM

## 2019-12-29 LAB
ALBUMIN SERPL-MCNC: 4.4 G/DL (ref 3.4–5)
ALBUMIN UR-MCNC: NEGATIVE MG/DL
ALP SERPL-CCNC: 58 U/L (ref 40–150)
ALT SERPL W P-5'-P-CCNC: 27 U/L (ref 0–50)
AMORPH CRY #/AREA URNS HPF: ABNORMAL /HPF
ANION GAP SERPL CALCULATED.3IONS-SCNC: 12 MMOL/L (ref 6–17)
ANION GAP SERPL CALCULATED.3IONS-SCNC: 7 MMOL/L (ref 3–14)
APPEARANCE UR: ABNORMAL
AST SERPL W P-5'-P-CCNC: 19 U/L (ref 0–45)
BASOPHILS # BLD AUTO: 0 10E9/L (ref 0–0.2)
BASOPHILS NFR BLD AUTO: 0.3 %
BILIRUB SERPL-MCNC: 0.7 MG/DL (ref 0.2–1.3)
BILIRUB UR QL STRIP: NEGATIVE
BUN SERPL-MCNC: 19 MG/DL (ref 7–30)
BUN SERPL-MCNC: 20 MG/DL (ref 7–30)
CA-I BLD-SCNC: 4.7 MG/DL (ref 4.4–5.2)
CALCIUM SERPL-MCNC: 9 MG/DL (ref 8.5–10.1)
CHLORIDE BLD-SCNC: 101 MMOL/L (ref 94–109)
CHLORIDE SERPL-SCNC: 103 MMOL/L (ref 94–109)
CO2 BLD-SCNC: 25 MMOL/L (ref 20–32)
CO2 SERPL-SCNC: 27 MMOL/L (ref 20–32)
COLOR UR AUTO: ABNORMAL
CREAT BLD-MCNC: 0.5 MG/DL (ref 0.52–1.04)
CREAT SERPL-MCNC: 0.55 MG/DL (ref 0.52–1.04)
DIFFERENTIAL METHOD BLD: NORMAL
EOSINOPHIL # BLD AUTO: 0.1 10E9/L (ref 0–0.7)
EOSINOPHIL NFR BLD AUTO: 0.5 %
ERYTHROCYTE [DISTWIDTH] IN BLOOD BY AUTOMATED COUNT: 11.3 % (ref 10–15)
GFR SERPL CREATININE-BSD FRML MDRD: >90 ML/MIN/{1.73_M2}
GFR SERPL CREATININE-BSD FRML MDRD: >90 ML/MIN/{1.73_M2}
GLUCOSE BLD-MCNC: 110 MG/DL (ref 70–99)
GLUCOSE SERPL-MCNC: 107 MG/DL (ref 70–99)
GLUCOSE UR STRIP-MCNC: NEGATIVE MG/DL
HCT VFR BLD AUTO: 45.2 % (ref 35–47)
HCT VFR BLD CALC: 46 %PCV (ref 35–47)
HGB BLD CALC-MCNC: 15.6 G/DL (ref 11.7–15.7)
HGB BLD-MCNC: 14.6 G/DL (ref 11.7–15.7)
HGB UR QL STRIP: NEGATIVE
IMM GRANULOCYTES # BLD: 0 10E9/L (ref 0–0.4)
IMM GRANULOCYTES NFR BLD: 0.2 %
KETONES UR STRIP-MCNC: 10 MG/DL
LEUKOCYTE ESTERASE UR QL STRIP: NEGATIVE
LIPASE SERPL-CCNC: 82 U/L (ref 73–393)
LYMPHOCYTES # BLD AUTO: 0.9 10E9/L (ref 0.8–5.3)
LYMPHOCYTES NFR BLD AUTO: 9.6 %
MCH RBC QN AUTO: 29.8 PG (ref 26.5–33)
MCHC RBC AUTO-ENTMCNC: 32.3 G/DL (ref 31.5–36.5)
MCV RBC AUTO: 92 FL (ref 78–100)
MONOCYTES # BLD AUTO: 0.4 10E9/L (ref 0–1.3)
MONOCYTES NFR BLD AUTO: 3.9 %
NEUTROPHILS # BLD AUTO: 8.2 10E9/L (ref 1.6–8.3)
NEUTROPHILS NFR BLD AUTO: 85.5 %
NITRATE UR QL: NEGATIVE
NRBC # BLD AUTO: 0 10*3/UL
NRBC BLD AUTO-RTO: 0 /100
PH UR STRIP: 8.5 PH (ref 5–7)
PLATELET # BLD AUTO: 210 10E9/L (ref 150–450)
POTASSIUM BLD-SCNC: 3.5 MMOL/L (ref 3.4–5.3)
POTASSIUM SERPL-SCNC: 3.4 MMOL/L (ref 3.4–5.3)
PROT SERPL-MCNC: 7.6 G/DL (ref 6.8–8.8)
RBC # BLD AUTO: 4.9 10E12/L (ref 3.8–5.2)
RBC #/AREA URNS AUTO: 1 /HPF (ref 0–2)
SODIUM BLD-SCNC: 138 MMOL/L (ref 133–144)
SODIUM SERPL-SCNC: 137 MMOL/L (ref 133–144)
SOURCE: ABNORMAL
SP GR UR STRIP: 1.02 (ref 1–1.03)
UROBILINOGEN UR STRIP-MCNC: NORMAL MG/DL (ref 0–2)
WBC # BLD AUTO: 9.6 10E9/L (ref 4–11)
WBC #/AREA URNS AUTO: 0 /HPF (ref 0–5)

## 2019-12-29 PROCEDURE — 81001 URINALYSIS AUTO W/SCOPE: CPT | Performed by: EMERGENCY MEDICINE

## 2019-12-29 PROCEDURE — 25800030 ZZH RX IP 258 OP 636: Performed by: EMERGENCY MEDICINE

## 2019-12-29 PROCEDURE — 25000128 H RX IP 250 OP 636: Performed by: EMERGENCY MEDICINE

## 2019-12-29 PROCEDURE — 99220 ZZC INITIAL OBSERVATION CARE,LEVL III: CPT | Performed by: HOSPITALIST

## 2019-12-29 PROCEDURE — 83690 ASSAY OF LIPASE: CPT | Performed by: EMERGENCY MEDICINE

## 2019-12-29 PROCEDURE — 80047 BASIC METABLC PNL IONIZED CA: CPT

## 2019-12-29 PROCEDURE — 80053 COMPREHEN METABOLIC PANEL: CPT | Performed by: EMERGENCY MEDICINE

## 2019-12-29 PROCEDURE — 96374 THER/PROPH/DIAG INJ IV PUSH: CPT | Mod: 59

## 2019-12-29 PROCEDURE — 74177 CT ABD & PELVIS W/CONTRAST: CPT

## 2019-12-29 PROCEDURE — 99285 EMERGENCY DEPT VISIT HI MDM: CPT | Mod: 25

## 2019-12-29 PROCEDURE — 25000128 H RX IP 250 OP 636: Performed by: HOSPITALIST

## 2019-12-29 PROCEDURE — 85014 HEMATOCRIT: CPT

## 2019-12-29 PROCEDURE — 96361 HYDRATE IV INFUSION ADD-ON: CPT

## 2019-12-29 PROCEDURE — 96376 TX/PRO/DX INJ SAME DRUG ADON: CPT

## 2019-12-29 PROCEDURE — 85025 COMPLETE CBC W/AUTO DIFF WBC: CPT | Performed by: EMERGENCY MEDICINE

## 2019-12-29 PROCEDURE — 96375 TX/PRO/DX INJ NEW DRUG ADDON: CPT

## 2019-12-29 RX ORDER — HYDROMORPHONE HYDROCHLORIDE 1 MG/ML
.3-.5 INJECTION, SOLUTION INTRAMUSCULAR; INTRAVENOUS; SUBCUTANEOUS
Status: DISCONTINUED | OUTPATIENT
Start: 2019-12-29 | End: 2019-12-30

## 2019-12-29 RX ORDER — ACETAMINOPHEN 325 MG/1
650 TABLET ORAL EVERY 4 HOURS PRN
Status: DISCONTINUED | OUTPATIENT
Start: 2019-12-29 | End: 2020-01-02 | Stop reason: HOSPADM

## 2019-12-29 RX ORDER — SODIUM CHLORIDE 9 MG/ML
1000 INJECTION, SOLUTION INTRAVENOUS CONTINUOUS
Status: DISCONTINUED | OUTPATIENT
Start: 2019-12-29 | End: 2019-12-29 | Stop reason: HOSPADM

## 2019-12-29 RX ORDER — ONDANSETRON 2 MG/ML
4 INJECTION INTRAMUSCULAR; INTRAVENOUS EVERY 6 HOURS PRN
Status: DISCONTINUED | OUTPATIENT
Start: 2019-12-29 | End: 2019-12-29

## 2019-12-29 RX ORDER — PROCHLORPERAZINE MALEATE 5 MG
10 TABLET ORAL EVERY 6 HOURS PRN
Status: DISCONTINUED | OUTPATIENT
Start: 2019-12-29 | End: 2020-01-02 | Stop reason: HOSPADM

## 2019-12-29 RX ORDER — ONDANSETRON 2 MG/ML
4 INJECTION INTRAMUSCULAR; INTRAVENOUS EVERY 6 HOURS PRN
Status: DISCONTINUED | OUTPATIENT
Start: 2019-12-29 | End: 2020-01-02 | Stop reason: HOSPADM

## 2019-12-29 RX ORDER — ONDANSETRON 2 MG/ML
4 INJECTION INTRAMUSCULAR; INTRAVENOUS
Status: COMPLETED | OUTPATIENT
Start: 2019-12-29 | End: 2019-12-29

## 2019-12-29 RX ORDER — ONDANSETRON 4 MG/1
4 TABLET, ORALLY DISINTEGRATING ORAL EVERY 6 HOURS PRN
Status: DISCONTINUED | OUTPATIENT
Start: 2019-12-29 | End: 2020-01-02 | Stop reason: HOSPADM

## 2019-12-29 RX ORDER — BISACODYL 10 MG
10 SUPPOSITORY, RECTAL RECTAL DAILY PRN
Status: DISCONTINUED | OUTPATIENT
Start: 2019-12-29 | End: 2020-01-02 | Stop reason: HOSPADM

## 2019-12-29 RX ORDER — ONDANSETRON 4 MG/1
4 TABLET, ORALLY DISINTEGRATING ORAL EVERY 6 HOURS PRN
Status: DISCONTINUED | OUTPATIENT
Start: 2019-12-29 | End: 2019-12-29

## 2019-12-29 RX ORDER — ACETAMINOPHEN 650 MG/1
650 SUPPOSITORY RECTAL EVERY 4 HOURS PRN
Status: DISCONTINUED | OUTPATIENT
Start: 2019-12-29 | End: 2020-01-02 | Stop reason: HOSPADM

## 2019-12-29 RX ORDER — HYDROMORPHONE HYDROCHLORIDE 1 MG/ML
0.5 INJECTION, SOLUTION INTRAMUSCULAR; INTRAVENOUS; SUBCUTANEOUS
Status: COMPLETED | OUTPATIENT
Start: 2019-12-29 | End: 2019-12-29

## 2019-12-29 RX ORDER — PROCHLORPERAZINE 25 MG
25 SUPPOSITORY, RECTAL RECTAL EVERY 12 HOURS PRN
Status: DISCONTINUED | OUTPATIENT
Start: 2019-12-29 | End: 2020-01-02 | Stop reason: HOSPADM

## 2019-12-29 RX ORDER — SODIUM CHLORIDE AND POTASSIUM CHLORIDE 150; 900 MG/100ML; MG/100ML
INJECTION, SOLUTION INTRAVENOUS CONTINUOUS
Status: DISCONTINUED | OUTPATIENT
Start: 2019-12-29 | End: 2020-01-01

## 2019-12-29 RX ORDER — IOPAMIDOL 755 MG/ML
63 INJECTION, SOLUTION INTRAVASCULAR ONCE
Status: COMPLETED | OUTPATIENT
Start: 2019-12-29 | End: 2019-12-29

## 2019-12-29 RX ORDER — NALOXONE HYDROCHLORIDE 0.4 MG/ML
.1-.4 INJECTION, SOLUTION INTRAMUSCULAR; INTRAVENOUS; SUBCUTANEOUS
Status: DISCONTINUED | OUTPATIENT
Start: 2019-12-29 | End: 2020-01-02 | Stop reason: HOSPADM

## 2019-12-29 RX ADMIN — SODIUM CHLORIDE 1000 ML: 9 INJECTION, SOLUTION INTRAVENOUS at 18:27

## 2019-12-29 RX ADMIN — HYDROMORPHONE HYDROCHLORIDE 0.5 MG: 1 INJECTION, SOLUTION INTRAMUSCULAR; INTRAVENOUS; SUBCUTANEOUS at 18:31

## 2019-12-29 RX ADMIN — HYDROMORPHONE HYDROCHLORIDE 0.5 MG: 1 INJECTION, SOLUTION INTRAMUSCULAR; INTRAVENOUS; SUBCUTANEOUS at 19:10

## 2019-12-29 RX ADMIN — POTASSIUM CHLORIDE AND SODIUM CHLORIDE: 900; 150 INJECTION, SOLUTION INTRAVENOUS at 23:49

## 2019-12-29 RX ADMIN — ONDANSETRON HYDROCHLORIDE 4 MG: 2 INJECTION, SOLUTION INTRAMUSCULAR; INTRAVENOUS at 18:31

## 2019-12-29 RX ADMIN — IOPAMIDOL 63 ML: 755 INJECTION, SOLUTION INTRAVENOUS at 18:45

## 2019-12-29 RX ADMIN — ONDANSETRON 4 MG: 2 INJECTION INTRAMUSCULAR; INTRAVENOUS at 23:23

## 2019-12-29 RX ADMIN — HYDROMORPHONE HYDROCHLORIDE 0.5 MG: 1 INJECTION, SOLUTION INTRAMUSCULAR; INTRAVENOUS; SUBCUTANEOUS at 23:47

## 2019-12-29 RX ADMIN — HYDROMORPHONE HYDROCHLORIDE 0.5 MG: 1 INJECTION, SOLUTION INTRAMUSCULAR; INTRAVENOUS; SUBCUTANEOUS at 20:56

## 2019-12-29 ASSESSMENT — ENCOUNTER SYMPTOMS
CONSTIPATION: 0
BACK PAIN: 0
ABDOMINAL PAIN: 1
SHORTNESS OF BREATH: 0
FEVER: 0
DIFFICULTY URINATING: 0
VOMITING: 1
COUGH: 0
BLOOD IN STOOL: 0
DIARRHEA: 0
NAUSEA: 1

## 2019-12-29 NOTE — LETTER
[place] [address] [facility]  [address]  [place] [id number]  [phone]          January 2, 2020    RE:  Lydia Pierson                                                                                                                                                       [id number] [place] [id number]            To whom it may concern:    Lydia Rhonda Pierson was under my professional care from 12/29/19 to 1/2/20.  She may return to work on 1/6/20 without any restrictions       Sincerely,        Ivan Downs DO

## 2019-12-29 NOTE — ED TRIAGE NOTES
Pt reports intense abd pain started at noon today and has been getting worse. Pt has been vomiting as well. Pt went to urgent care, no testing done. Pain is mid abdomen, tender in LRQ. No diarrhea.

## 2019-12-30 LAB
ANION GAP SERPL CALCULATED.3IONS-SCNC: 5 MMOL/L (ref 3–14)
BUN SERPL-MCNC: 11 MG/DL (ref 7–30)
CALCIUM SERPL-MCNC: 8.2 MG/DL (ref 8.5–10.1)
CHLORIDE SERPL-SCNC: 108 MMOL/L (ref 94–109)
CO2 SERPL-SCNC: 25 MMOL/L (ref 20–32)
CREAT SERPL-MCNC: 0.42 MG/DL (ref 0.52–1.04)
ERYTHROCYTE [DISTWIDTH] IN BLOOD BY AUTOMATED COUNT: 11.8 % (ref 10–15)
GFR SERPL CREATININE-BSD FRML MDRD: >90 ML/MIN/{1.73_M2}
GLUCOSE SERPL-MCNC: 98 MG/DL (ref 70–99)
HCT VFR BLD AUTO: 39.6 % (ref 35–47)
HGB BLD-MCNC: 13.2 G/DL (ref 11.7–15.7)
MCH RBC QN AUTO: 30.4 PG (ref 26.5–33)
MCHC RBC AUTO-ENTMCNC: 33.3 G/DL (ref 31.5–36.5)
MCV RBC AUTO: 91 FL (ref 78–100)
PLATELET # BLD AUTO: 206 10E9/L (ref 150–450)
POTASSIUM SERPL-SCNC: 4 MMOL/L (ref 3.4–5.3)
RBC # BLD AUTO: 4.34 10E12/L (ref 3.8–5.2)
SODIUM SERPL-SCNC: 138 MMOL/L (ref 133–144)
WBC # BLD AUTO: 7.5 10E9/L (ref 4–11)

## 2019-12-30 PROCEDURE — 96374 THER/PROPH/DIAG INJ IV PUSH: CPT

## 2019-12-30 PROCEDURE — G0378 HOSPITAL OBSERVATION PER HR: HCPCS

## 2019-12-30 PROCEDURE — 99232 SBSQ HOSP IP/OBS MODERATE 35: CPT | Performed by: HOSPITALIST

## 2019-12-30 PROCEDURE — 85027 COMPLETE CBC AUTOMATED: CPT | Performed by: HOSPITALIST

## 2019-12-30 PROCEDURE — 36415 COLL VENOUS BLD VENIPUNCTURE: CPT | Performed by: HOSPITALIST

## 2019-12-30 PROCEDURE — 25000128 H RX IP 250 OP 636: Performed by: HOSPITALIST

## 2019-12-30 PROCEDURE — 12000000 ZZH R&B MED SURG/OB

## 2019-12-30 PROCEDURE — 96375 TX/PRO/DX INJ NEW DRUG ADDON: CPT

## 2019-12-30 PROCEDURE — 96376 TX/PRO/DX INJ SAME DRUG ADON: CPT

## 2019-12-30 PROCEDURE — 80048 BASIC METABOLIC PNL TOTAL CA: CPT | Performed by: HOSPITALIST

## 2019-12-30 RX ORDER — HYDROMORPHONE HYDROCHLORIDE 1 MG/ML
.5-1 INJECTION, SOLUTION INTRAMUSCULAR; INTRAVENOUS; SUBCUTANEOUS
Status: DISCONTINUED | OUTPATIENT
Start: 2019-12-30 | End: 2020-01-01

## 2019-12-30 RX ORDER — HYDROMORPHONE HYDROCHLORIDE 1 MG/ML
0.5 INJECTION, SOLUTION INTRAMUSCULAR; INTRAVENOUS; SUBCUTANEOUS ONCE
Status: COMPLETED | OUTPATIENT
Start: 2019-12-30 | End: 2019-12-30

## 2019-12-30 RX ADMIN — HYDROMORPHONE HYDROCHLORIDE 1 MG: 1 INJECTION, SOLUTION INTRAMUSCULAR; INTRAVENOUS; SUBCUTANEOUS at 05:59

## 2019-12-30 RX ADMIN — ONDANSETRON 4 MG: 2 INJECTION INTRAMUSCULAR; INTRAVENOUS at 15:26

## 2019-12-30 RX ADMIN — HYDROMORPHONE HYDROCHLORIDE 1 MG: 1 INJECTION, SOLUTION INTRAMUSCULAR; INTRAVENOUS; SUBCUTANEOUS at 03:53

## 2019-12-30 RX ADMIN — HYDROMORPHONE HYDROCHLORIDE 0.5 MG: 1 INJECTION, SOLUTION INTRAMUSCULAR; INTRAVENOUS; SUBCUTANEOUS at 13:10

## 2019-12-30 RX ADMIN — ONDANSETRON 4 MG: 2 INJECTION INTRAMUSCULAR; INTRAVENOUS at 08:19

## 2019-12-30 RX ADMIN — HYDROMORPHONE HYDROCHLORIDE 0.5 MG: 1 INJECTION, SOLUTION INTRAMUSCULAR; INTRAVENOUS; SUBCUTANEOUS at 08:18

## 2019-12-30 RX ADMIN — POTASSIUM CHLORIDE AND SODIUM CHLORIDE: 900; 150 INJECTION, SOLUTION INTRAVENOUS at 17:48

## 2019-12-30 RX ADMIN — POTASSIUM CHLORIDE AND SODIUM CHLORIDE: 900; 150 INJECTION, SOLUTION INTRAVENOUS at 08:25

## 2019-12-30 RX ADMIN — HYDROMORPHONE HYDROCHLORIDE 0.5 MG: 1 INJECTION, SOLUTION INTRAMUSCULAR; INTRAVENOUS; SUBCUTANEOUS at 10:22

## 2019-12-30 RX ADMIN — HYDROMORPHONE HYDROCHLORIDE 0.5 MG: 1 INJECTION, SOLUTION INTRAMUSCULAR; INTRAVENOUS; SUBCUTANEOUS at 15:26

## 2019-12-30 RX ADMIN — ONDANSETRON 4 MG: 2 INJECTION INTRAMUSCULAR; INTRAVENOUS at 21:51

## 2019-12-30 RX ADMIN — HYDROMORPHONE HYDROCHLORIDE 0.5 MG: 1 INJECTION, SOLUTION INTRAMUSCULAR; INTRAVENOUS; SUBCUTANEOUS at 17:49

## 2019-12-30 RX ADMIN — PROCHLORPERAZINE EDISYLATE 10 MG: 5 INJECTION INTRAMUSCULAR; INTRAVENOUS at 18:10

## 2019-12-30 RX ADMIN — HYDROMORPHONE HYDROCHLORIDE 0.5 MG: 1 INJECTION, SOLUTION INTRAMUSCULAR; INTRAVENOUS; SUBCUTANEOUS at 22:25

## 2019-12-30 RX ADMIN — HYDROMORPHONE HYDROCHLORIDE 0.5 MG: 1 INJECTION, SOLUTION INTRAMUSCULAR; INTRAVENOUS; SUBCUTANEOUS at 20:13

## 2019-12-30 RX ADMIN — HYDROMORPHONE HYDROCHLORIDE 0.5 MG: 1 INJECTION, SOLUTION INTRAMUSCULAR; INTRAVENOUS; SUBCUTANEOUS at 01:21

## 2019-12-30 ASSESSMENT — ACTIVITIES OF DAILY LIVING (ADL): ADLS_ACUITY_SCORE: 14

## 2019-12-30 NOTE — PLAN OF CARE
Observation goals PRIOR TO DISCHARGE     Comments:   -diagnostic tests and consults completed and resulted: Not met   -vital signs normal or at patient baseline: Met  -tolerating oral intake to maintain hydration: Not met  Nurse to notify provider when observation goals have been met and patient is ready for discharge.

## 2019-12-30 NOTE — PLAN OF CARE
OBS GOALS    -diagnostic tests and consults completed and resulted: NOT MET; GI consults, AM labs  -vital signs normal or at patient baseline: MET  -tolerating oral intake to maintain hydration: NOT MET; NPO. IVF infusing. Nausea controlled w/ zofran

## 2019-12-30 NOTE — H&P
[facility]    History and Physical  Hospitalist       Date of Admission:  12/29/2019    Assessment & Plan   Lydia Pierson is a 59 year old female who presented to  [facility] ED for abdominal pain. Transferred to [facility] for partial small bowel obstruction.    Partial small bowel obstruction  Abdominal pain, nausea/vomiting with onset this AM. CT showed partial SBO and abnormal enhancement of small intestine consistent with inflammation that could be crohns vs enteritis. No hx of crohns however, also had c-scope 2018 which showed colon was normal. Bowel movement hx not suggestive of crohns. Hx of multiple abdominal surgeries  - Admit observation status, low threshold to convert to inpatient if no resolution  - NPO  - NGT if significant vomiting.  - IVF  - [facility] consult due to CT findings  - Consideration for Gen surg consult if no resolution  - Tap water enema in AM (does have moderate stool burden seen)  - PRN dilaudid for pain  - Antiemetics  - Hold off on steroids at this time as she carries no formal dx of crohns.    Hx lymphoid leukemia in remission  Hx renal cell carcinoma s/p partial nephrecotmy  Follow up as outpatient.    Hyperlipidemia  PTA not on any medications, defer follow up to PCP    DVT Prophylaxis: Pneumatic Compression Devices  Code Status: Full Code  Expected discharge: 48 hours, recommended to prior living arrangement once partial small bowel obstruction resolved    Grecia Latif, DO    Primary Care Physician   Benja Cordova    Chief Complaint   Abdominal pain    History is obtained from the patient and ED records    History of Present Illness   Lydia Pierson is a 59 year old female who presents with abdominal pain, nausea, vomiting that started around noon. She tolerated breakfast and felt like herself leading into today. She had 2 bowel movements yesterday, but none today. Denies blood or mucous in her bowel movements. Reports that BMs vary, can be  1-2x per day or every other day. Last colonoscopy was 2018 and she was recommended to return in 10 years for repeat exam. She initially went to urgent care, then presented to [facility]. She received zofran at ED with improvement in nausea. She was nauseous on arrival to [facility], but believes this may be due to ambulance ride (as she does have hx of motion sickness). Dilaudid resulted in temporary improvement in her pain (about 30 minutes).  Denies fevers, chills, cough, shortness of breath, chest pain, numbness, tingling, change in urinary or stool habits.     Past Medical History    I have reviewed this patient's medical history and updated it with pertinent information if needed.   Past Medical History:   Diagnosis Date     Acute lymphoid leukemia, without mention of having achieved remission(204.00) 1976    ALL at age 16,  no evid of recurrence     Allergic rhinitis, cause unspecified     Rhinitis     Malignant neoplasm of kidney, except pelvis 2001    left kidney removed 1/01     Motion sickness        Past Surgical History   I have reviewed this patient's surgical history and updated it with pertinent information if needed.  Past Surgical History:   Procedure Laterality Date     ARTHROSCOPY KNEE WITH MEDIAL MENISCECTOMY  7/7/2011    Procedure:ARTHROSCOPY KNEE WITH MEDIAL MENISCECTOMY; choice anes; Surgeon:MANUEL FLEMING; Location:[place] OR     COLONOSCOPY N/A 9/4/2018    Procedure: COLONOSCOPY;  colonoscopy;  Surgeon: Carlin Rodrigues MD;  Location: [facility] GI     HEMORRHOIDECTOMY  2006     HERNIORRHAPHY INGUINAL Left 8/17/2017    Procedure: HERNIORRHAPHY INGUINAL;  Left Inguinal Hernia Repair;  Surgeon: Mehdi Sorensen MD;  Location: [facility] OR     LAPAROSCOPIC HERNIORRHAPHY INGUINAL Left 1/11/2018    Procedure: LAPAROSCOPIC HERNIORRHAPHY INGUINAL;  Laparoscopic Left Inguinal Hernia Repair converted to open left femoral  hernia repair;  Surgeon: Mehdi Sorensen MD;  Location: [facility] OR     NEPHRECTOMY RT/LT  2001     left partial nephrectomy- kidney ca     SALPINGO OOPHORECTOMY,R/L/KRISHNA  2010    Rt salpingo oophorectomy      SURGICAL HISTORY OF -   8/1/1995    Vein stripping     SURGICAL HISTORY OF -   1975    wisdom teeth extraction      TONSILLECTOMY & ADENOIDECTOMY  1962     TUBAL LIGATION  1994       Prior to Admission Medications   Prior to Admission Medications   Prescriptions Last Dose Informant Patient Reported? Taking?   CALCIUM PO   Yes No   Sig: Take 1 tablet by mouth daily   Multiple Vitamins-Minerals (MULTIVITAMIN WOMEN PO)  Self Yes No   Sig: Take 1 tablet by mouth daily   Probiotic Product (PROBIOTIC PO)   Yes No   Sig: Take 1 tablet by mouth daily   VITAMIN D, CHOLECALCIFEROL, PO  Self Yes No   Sig: Take 1 tablet by mouth daily    acetaminophen (TYLENOL) 325 MG tablet  Self Yes No   Sig: Take 1 tablet by mouth every 6 hours as needed.   cyclobenzaprine (FLEXERIL) 10 MG tablet   No No   Sig: Take 1 tablet (10 mg) by mouth 3 times daily as needed for muscle spasms      Facility-Administered Medications: None     Allergies   Allergies   Allergen Reactions     Seasonal Allergies        Social History   I have reviewed this patient's social history and updated it with pertinent information if needed. Lydia Ellis Dangelo  reports that she has never smoked. She has never used smokeless tobacco. She reports that she does not drink alcohol or use drugs.    Family History   I have reviewed this patient's family history and updated it with pertinent information if needed.   Family History   Problem Relation Age of Onset     C.A.D. Father         bypass     Diabetes Father      Lipids Father      Allergies Sister         allergic rhinitis     Neurologic Disorder Sister         seizure disorder     Respiratory Sister         asthma     Allergies Son      Breast Cancer No family hx of      Cancer - colorectal No family hx of        Review of Systems   The 10 point Review of Systems is negative other than noted in the  HPI    Physical Exam   Temp: 96.3  F (35.7  C) Temp src: Oral BP: (!) 155/68 Pulse: 67   Resp: 18 SpO2: 100 % O2 Device: None (Room air)    Vital Signs with Ranges  Temp:  [96.3  F (35.7  C)-97.6  F (36.4  C)] 96.3  F (35.7  C)  Pulse:  [65-84] 67  Resp:  [18] 18  BP: (125-155)/(57-90) 155/68  SpO2:  [94 %-100 %] 100 %  0 lbs 0 oz    Constitutional: Awake, alert, cooperative, mild distress  Eyes: Conjunctiva and pupils examined and normal.  HEENT: tacky mucous membranes, normal dentition.  Respiratory: Clear to auscultation bilaterally, no crackles or wheezing.  Cardiovascular: Regular rate and rhythm, normal S1 and S2, and no murmur noted.  GI: Soft, non-distended, tender to light palpations, particularly in upper abdomen with some slight guarding, + bowel sounds.  Lymph/Hematologic: No anterior cervical or supraclavicular adenopathy.  Skin: No rashes, no cyanosis, no edema.  Musculoskeletal: No joint swelling, erythema or tenderness.  Neurologic: Cranial nerves 2-12 intact, normal strength and sensation.  Psychiatric: Alert, oriented to person, place and time, no obvious anxiety or depression.    Data   Data reviewed today:  I personally reviewed CT A/P which shows partial small bowel obstruction with areas of small bowel with inflamed appearance (enteritis vs crohns)  Recent Labs   Lab 12/29/19  1825 12/29/19  1823   WBC  --  9.6   HGB 15.6 14.6   MCV  --  92   PLT  --  210    137   POTASSIUM 3.5 3.4   CHLORIDE 101 103   CO2  --  27   BUN 20 19   CR  --  0.55   ANIONGAP 12 7   HOLLY  --  9.0   * 107*   ALBUMIN  --  4.4   PROTTOTAL  --  7.6   BILITOTAL  --  0.7   ALKPHOS  --  58   ALT  --  27   AST  --  19   LIPASE  --  82       Recent Results (from the past 24 hour(s))   CT Abdomen Pelvis w Contrast    Narrative    EXAM: CT ABDOMEN PELVIS W CONTRAST  LOCATION: [facility]  DATE/TIME: 12/29/2019 6:40 PM    INDICATION: Abdominal pain and vomiting.  COMPARISON: None.  TECHNIQUE: CT scan of  the abdomen and pelvis was performed following injection of IV contrast. Multiplanar reformats were obtained. Dose reduction techniques were used.  CONTRAST: 63 mL Isovue-370.    FINDINGS:   LOWER CHEST: Normal.    HEPATOBILIARY: No significant mass or bile duct dilatation. No calcified gallstones.     PANCREAS: No significant mass, duct dilatation, or inflammatory change.    SPLEEN: Normal.    ADRENAL GLANDS: Normal.    KIDNEYS/BLADDER: Right kidney: No renal calculi or hydronephrosis. Peripheral-based simple cysts. Left kidney: No renal calculi or hydronephrosis.    BOWEL: Small hiatal hernia. Stomach is distended with fluid. The duodenal sweep is normal. On coronal image #25 series 5 and axial image 157 series 4 there is a short segment of inflamed small intestine present with mucosal thickening and narrowing with   upstream areas of partial obstruction. Other similar areas of abnormal enhancement and narrowing in small bowel is demonstrated on coronal image #28 series 5 and axial image 94. With associated upstream small bowel dilatation. Overall the findings are   suggestive of possible Crohn's disease. There is no definite interloop fistula or abscess. The colon has a moderate stool burden without significant inflammatory change demonstrated.    LYMPH NODES: Normal.    VASCULATURE: Unremarkable.    PELVIC ORGANS: Normal.    OTHER: None.    MUSCULOSKELETAL: Normal.      Impression    IMPRESSION:   1.  Partial small bowel obstruction secondary to areas of abnormal enhancement and narrowing in small bowel loops which are suggestive of enteritis or Crohn's disease and partial obstruction. No definite intra-abdominal abscess or interloop fistula.

## 2019-12-30 NOTE — PLAN OF CARE
Arrived to unit 2300 from [facility] ED w/ partial SBO and possible Crohns. CT abd complete and resulted. A&Ox4. VSS on RA. Abdominal pain controlled w/ dilaudid. Nausea controlled w/ zofran. NPO. Up SBA. Voiding. No BM overnight. Bowel sounds audible but faint jus LQs. Last BM 12/28/19. UA negative. IVF infusing. AM labs pending. Plan for tap water enema and GI consult.

## 2019-12-30 NOTE — PROGRESS NOTES
[facility]    Medicine Progress Note - Hospitalist Service       Date of Admission:  12/29/2019  Assessment & Plan   Lydia Pierson is a 59 year old female who presented to [facility] ED for abdominal pain. Transferred to [facility] for partial small bowel obstruction.     Partial small bowel obstruction  Abdominal pain, nausea/vomiting with onset this AM. CT showed partial SBO and abnormal enhancement of small intestine consistent with inflammation that could be crohns vs enteritis. No hx of crohns however, also had c-scope 2018 which showed colon was normal. Bowel movement hx not suggestive of crohns. Hx of multiple abdominal surgeries  - will admit to inpatient - discussed with UR  - NPO  - NGT if persistent vomiting - 1 episode this morning 12/30  - IVF continued  - [facility] consult due to CT findings  - Consideration for Gen surg consult if no resolution  - Tap water enema without much improvement  - PRN dilaudid for pain  - Antiemetics  - Hold off on steroids at this time as she carries no formal dx of crohns nor a convincing history of it.     Hx lymphoid leukemia in remission  Hx renal cell carcinoma s/p partial nephrecotmy  Follow up as outpatient.     Hyperlipidemia  PTA not on any medications, defer follow up to PCP    Diet: NPO for Medical/Clinical Reasons Except for: Meds    DVT Prophylaxis: Pneumatic Compression Devices and Ambulate every shift  Simpson Catheter: not present  Code Status: Full Code      Disposition Plan   Expected discharge: 2 - 3 days, recommended to home pending clinical course of SBO.  Entered: Augustin Pak MD 12/30/2019, 1:26 PM       The patient's care was discussed with the Bedside Nurse, Patient and Patient's Family.    Augustin Pak MD  Hospitalist Service  [facility]    ______________________________________________________________________    Interval History   Seen and examined. Still with significant abdo pain, n/v better now.  Emesis x 1 this morning. Passing some gas. Enema awaited. Denies fevers, chillls, sob, chest pain.    Data reviewed today: I reviewed all medications, new labs and imaging results over the last 24 hours. I personally reviewed no images or EKG's today.    Physical Exam   Vital Signs: Temp: 96.1  F (35.6  C) Temp src: Oral BP: 131/68 Pulse: 78   Resp: 12 SpO2: 94 % O2 Device: None (Room air)    Weight: 0 lbs 0 oz    Gen: NAD, pleasant  HEENT: Normocephalic, EOMI, MMM  Resp: no crackles,  no wheezes, no increased work of resp  CV: S1S2 heard, reg rhythm, reg rate, no pedal edema  Abdo: soft, mildly tender on palp but no rebound, nondistended, bowel sounds present  Ext: calves nontender, well perfused  Neuro: AAOx3, CN grossly intact, no facial asymmetry      Data   Recent Labs   Lab 12/30/19  0651 12/29/19  1825 12/29/19  1823   WBC 7.5  --  9.6   HGB 13.2 15.6 14.6   MCV 91  --  92     --  210    138 137   POTASSIUM 4.0 3.5 3.4   CHLORIDE 108 101 103   CO2 25  --  27   BUN 11 20 19   CR 0.42*  --  0.55   ANIONGAP 5 12 7   HOLLY 8.2*  --  9.0   GLC 98 110* 107*   ALBUMIN  --   --  4.4   PROTTOTAL  --   --  7.6   BILITOTAL  --   --  0.7   ALKPHOS  --   --  58   ALT  --   --  27   AST  --   --  19   LIPASE  --   --  82     Recent Results (from the past 24 hour(s))   CT Abdomen Pelvis w Contrast    Narrative    EXAM: CT ABDOMEN PELVIS W CONTRAST  LOCATION: [facility]  DATE/TIME: 12/29/2019 6:40 PM    INDICATION: Abdominal pain and vomiting.  COMPARISON: None.  TECHNIQUE: CT scan of the abdomen and pelvis was performed following injection of IV contrast. Multiplanar reformats were obtained. Dose reduction techniques were used.  CONTRAST: 63 mL Isovue-370.    FINDINGS:   LOWER CHEST: Normal.    HEPATOBILIARY: No significant mass or bile duct dilatation. No calcified gallstones.     PANCREAS: No significant mass, duct dilatation, or inflammatory change.    SPLEEN: Normal.    ADRENAL GLANDS:  Normal.    KIDNEYS/BLADDER: Right kidney: No renal calculi or hydronephrosis. Peripheral-based simple cysts. Left kidney: No renal calculi or hydronephrosis.    BOWEL: Small hiatal hernia. Stomach is distended with fluid. The duodenal sweep is normal. On coronal image #25 series 5 and axial image 157 series 4 there is a short segment of inflamed small intestine present with mucosal thickening and narrowing with   upstream areas of partial obstruction. Other similar areas of abnormal enhancement and narrowing in small bowel is demonstrated on coronal image #28 series 5 and axial image 94. With associated upstream small bowel dilatation. Overall the findings are   suggestive of possible Crohn's disease. There is no definite interloop fistula or abscess. The colon has a moderate stool burden without significant inflammatory change demonstrated.    LYMPH NODES: Normal.    VASCULATURE: Unremarkable.    PELVIC ORGANS: Normal.    OTHER: None.    MUSCULOSKELETAL: Normal.      Impression    IMPRESSION:   1.  Partial small bowel obstruction secondary to areas of abnormal enhancement and narrowing in small bowel loops which are suggestive of enteritis or Crohn's disease and partial obstruction. No definite intra-abdominal abscess or interloop fistula.

## 2019-12-30 NOTE — CONSULTS
GASTROENTEROLOGY CONSULTATION     Lydia Pierson   [address] [place] [address]   59 year old female   Admission Date/Time: 12/29/2019   Encounter Date: 12/30/2019  Primary Care Provider: Benja Cordova     We were asked to see the patient in consultation by Dr. Latif for evaluation of partial small bowel obstruction.     HISTORY OF PRESENT ILLNES: Lydia Pierson is a 59 year old female with a history of leukemia at age 16, renal cancer with partial nephrectomy in 2001, bilateral salpingo oophorectomy, allergic rhinitis who presents with acute abdominal pain, nausea, and vomiting.  The patient was in her usual state of health yesterday.  She had breakfast [place] omelette [place]brow[place]  At noon, 1.5 hours later, she developed a sudden onset of abdominal pain.  This initially was mild, and felt like gas pain.  She took some Gas-X with no relief.  It gradually got worse over the course of the day.  It escalated to a 10 out of 10.  She was seen initially at an urgent care facility.  She had nausea a sudden onset of nausea and vomiting there.  She was then transferred to [facility] ED, and was subsequently transferred to [facility] after CT scan which showed evidence of a partial small bowel obstruction.   Abdominal CT scan showed partial small bowel obstruction secondary to areas of abnormal enhancement narrowing and small bowel loops which are suggestive of enteritis or Crohn's disease.  The colon has a moderate stool burden without significant inflammatory change demonstrated.  Stomach was distended with fluid.  Laboratory work on admission shows an unremarkable CMP, lipase, and CBC with differential, hemoglobin 14.6, WBC 9.6.   She had a colonoscopy in 2018 which showed a tortuous colon, and was otherwise normal.  She has 1 soft bowel movement per day to every other day without melena or hematochezia.  She has gas pain/upset stomach 1 time per month, usually  relieved by Gas-X.  Her pain decreased to a 3/10 this morning, and she was starting to feel little bit better, however after getting up to brush her teeth, she had a sudden onset of nausea with a small emesis.  She is passing a small amount of flatus.  No bowel movement.   There is been no recent travel.  No ill contacts.  No antibiotic use.    She does not smoke cigarettes.  She does not drink alcohol.  No family history of colon cancer, Crohn's disease, or ulcerative colitis.  Past Medical History  Past Medical History:   Diagnosis Date     Acute lymphoid leukemia, without mention of having achieved remission(204.00) 1976    ALL at age 16,  no evid of recurrence     Allergic rhinitis, cause unspecified     Rhinitis     Malignant neoplasm of kidney, except pelvis 2001    left kidney removed 1/01     Motion sickness        Past Surgical History  Past Surgical History:   Procedure Laterality Date     ARTHROSCOPY KNEE WITH MEDIAL MENISCECTOMY  7/7/2011    Procedure:ARTHROSCOPY KNEE WITH MEDIAL MENISCECTOMY; choice anes; Surgeon:MANUEL FLEMING; Location:[facility]     COLONOSCOPY N/A 9/4/2018    Procedure: COLONOSCOPY;  colonoscopy;  Surgeon: Carlin Rodrigues MD;  Location: [facility]     HEMORRHOIDECTOMY  2006     HERNIORRHAPHY INGUINAL Left 8/17/2017    Procedure: HERNIORRHAPHY INGUINAL;  Left Inguinal Hernia Repair;  Surgeon: Mehdi Sorensen MD;  Location: [facility] OR     LAPAROSCOPIC HERNIORRHAPHY INGUINAL Left 1/11/2018    Procedure: LAPAROSCOPIC HERNIORRHAPHY INGUINAL;  Laparoscopic Left Inguinal Hernia Repair converted to open left femoral  hernia repair;  Surgeon: Mehdi Sorensen MD;  Location: [facility] OR     NEPHRECTOMY RT/LT  2001    left partial nephrectomy- kidney ca     SALPINGO OOPHORECTOMY,R/L/KRISHNA  2010    Rt salpingo oophorectomy      SURGICAL HISTORY OF -   8/1/1995    Vein stripping     SURGICAL HISTORY OF -   1975    wisdom teeth extraction      TONSILLECTOMY & ADENOIDECTOMY  1962     TUBAL LIGATION   1994       Family History  Family History   Problem Relation Age of Onset     C.A.D. Father         bypass     Diabetes Father      Lipids Father      Allergies Sister         allergic rhinitis     Neurologic Disorder Sister         seizure disorder     Respiratory Sister         asthma     Allergies Son      Breast Cancer No family hx of      Cancer - colorectal No family hx of        Social History  Social History     Socioeconomic History     Marital status:      Spouse name: Not on file     Number of children: Not on file     Years of education: Not on file     Highest education level: Not on file   Occupational History     Not on file   Social Needs     Financial resource strain: Not on file     Food insecurity:     Worry: Not on file     Inability: Not on file     Transportation needs:     Medical: Not on file     Non-medical: Not on file   Tobacco Use     Smoking status: Never Smoker     Smokeless tobacco: Never Used   Substance and Sexual Activity     Alcohol use: No     Drug use: No     Sexual activity: Yes     Partners: Male     Birth control/protection: Surgical     Comment: tubal   Lifestyle     Physical activity:     Days per week: Not on file     Minutes per session: Not on file     Stress: Not on file   Relationships     Social connections:     Talks on phone: Not on file     Gets together: Not on file     Attends [employer] service: Not on file     Active member of club or organization: Not on file     Attends meetings of clubs or organizations: Not on file     Relationship status: Not on file     Intimate partner violence:     Fear of current or ex partner: Not on file     Emotionally abused: Not on file     Physically abused: Not on file     Forced sexual activity: Not on file   Other Topics Concern     Parent/sibling w/ CABG, MI or angioplasty before 65F 55M? No   Social History Narrative     Not on file       Medications  Prior to Admission medications    Medication Sig Start Date End Date  Taking? Authorizing Provider   acetaminophen (TYLENOL) 325 MG tablet Take 1 tablet by mouth every 6 hours as needed.   Yes Reported, Patient   calcium carbonate-vitamin D (OS-HOLLY) 500-400 MG-UNIT tablet Take 1 tablet by mouth daily   Yes Unknown, Entered By History   cyclobenzaprine (FLEXERIL) 10 MG tablet Take 1 tablet (10 mg) by mouth 3 times daily as needed for muscle spasms 12/19/19  Yes Cassy Alejandre APRN CNP   Multiple Vitamins-Minerals (MULTIVITAMIN WOMEN PO) Take 1 tablet by mouth daily   Yes Reported, Patient   Probiotic Product (PROBIOTIC PO) Take 1 tablet by mouth daily   Yes Unknown, Entered By History   VITAMIN D, CHOLECALCIFEROL, PO Take 3 tablets by mouth daily Unknown OTC dose    Reported, Patient       Allergies:  Seasonal allergies    ROS: A ten point review of systems was obtained and negative other than the symptoms noted above in the HPI.     PHYSICAL EXAM:   /62 (BP Location: Right arm)   Pulse 70   Temp 96.1  F (35.6  C) (Oral)   Resp 12   LMP 12/05/2010   SpO2 98%    Constitutional: healthy, alert, no acute distress  Cardiovascular: regular rate and rhythm  Respiratory: clear to auscultation bilaterally  Psychiatric: normal pleasant affect  Head: atraumatic, normocephalic  Neck: supple, no thyromegaly  ENT: mucous membranes are moist, no oral lesions are noted  Abdomen: soft, +tender upper abdomen, non-distended, normally active bowel sounds  Neuro: Neurologically intact grossly  Skin: warm, dry, no rashes are noted    LABORATORY DATA  I reviewed the patient's new clinical lab test results.   Recent Labs   Lab Test 12/30/19  0651 12/29/19 1825 12/29/19 1823 12/27/18  1359   WBC 7.5  --  9.6 6.2   HGB 13.2 15.6 14.6 14.2   MCV 91  --  92 91     --  210 216      Recent Labs   Lab Test 12/30/19  0651 12/29/19 1825 12/29/19 1823 12/27/18  1359    138 137 142   POTASSIUM 4.0 3.5 3.4 4.2   CHLORIDE 108 101 103 107   CO2 25  --  27 30   BUN 11 20 19 16   CR 0.42*   --  0.55 0.62   ANIONGAP 5 12 7 5   HOLLY 8.2*  --  9.0 8.2*      Recent Labs   Lab Test 12/29/19  1823 12/29/19  1721 12/27/18  1359 12/16/17  1307 12/16/17  1155 03/13/17  1050 02/12/16  0824   ALBUMIN 4.4  --   --  3.9  --   --  4.1   BILITOTAL 0.7  --   --  0.6  --   --  1.0   ALT 27  --   --  21  --   --  31   AST 19  --   --  15  --   --  23   ALKPHOS 58  --   --  69  --   --  61   PROTEIN  --  Negative  --   --  Negative Negative  --    LIPASE 82  --  181 169  --   --   --    AMYLASE  --   --  54  --   --   --   --         IMAGING  CT 12/29/19  FINDINGS:   LOWER CHEST: Normal.  HEPATOBILIARY: No significant mass or bile duct dilatation. No calcified gallstones.   PANCREAS: No significant mass, duct dilatation, or inflammatory change.  SPLEEN: Normal.  ADRENAL GLANDS: Normal.  KIDNEYS/BLADDER: Right kidney: No renal calculi or hydronephrosis. Peripheral-based simple cysts. Left kidney: No renal calculi or hydoonephrosis.  BOWEL: Small hiatal hernia. Stomach is distended with fluid. The duodenal sweep is normal. On coronal image #25 series 5 and axial image 157 series 4 there is a short segment of inflamed small intestine present with mucosal thickening and narrowing with upstream areas of partial obstruction. Other similar areas of abnormal enhancement and narrowing in small bowel is demonstrated on coronal image #28 series 5 and axial image 94. With associated upstream small bowel dilatation. Overall the findings are suggestive of possible Crohn's disease. There is no definite interloop fistula or abscess. The colon has a moderate stool burden without significant inflammatory change demonstrated  LYMPH NODES: Normal.  VASCULATURE: Unremarkable.  PELVIC ORGANS: Normal.  OTHER: None.  MUSCULOSKELETAL: Normal.                                                            IMPRESSION:   1.  Partial small bowel obstruction secondary to areas of abnormal enhancement and narrowing in small bowel loops which are  suggestive of enteritis or Crohn's disease and partial obstruction. No definite intra-abdominal abscess or interloop fistula.    Colonoscopy 9/4/18  Findings:        The perianal and digital rectal examinations were normal.        The sigmoid colon was moderately tortuous. Advancing the scope required        using manual pressure.        The exam was otherwise normal throughout the examined colon.        The retroflexed view of the distal rectum and anal verge was normal and        showed no anal or rectal abnormalities.                                                                                     Impression:    - Tortuous colon.                        - No specimens collected.    IMPRESSION: Lydia Pierson is a 59 year old female who presents with acute abdominal pain, nausea, and vomiting, found to have a partial small bowel obstruction. Imaging consistent with possible Crohn's vs enteritis. Abdominal discomfort improving, but had nausea and vomiting this morning. No chronic symptoms suggestive of inflammatory bowel disease.      PLAN/RECOMMENDATIONS:   -NPO  -Antiemetics/pain medications PRN  -NG placement for persistent vomiting   -Consider endoscopic evaluation once SBO symptoms improve     I will discuss the patient's findings and plan with Dr. Terrell who will also independently see and examine the patient.   Thank you for involving me in this patient's care, please do not hesitate to call with questions or concerns.     Gracie Santa, CNP  [facility]   Cell: [phone]   After 5PM please call: [phone]

## 2019-12-30 NOTE — PHARMACY-ADMISSION MEDICATION HISTORY
Pharmacy Medication History  Admission medication history interview status for the 12/29/2019  admission is complete. See EPIC admission navigator for prior to admission medications     Medication history sources: Patient  Medication history source reliability: Moderate  Adherence assessment: Moderate    Significant changes made to the medication list:  none      Additional medication history information:   Patient does not recall OTC doses of Calcium w/Vit D and Vitamin D    Medication reconciliation completed by provider prior to medication history? Yes    Time spent in this activity: 20 min      Prior to Admission medications    Medication Sig Last Dose Taking? Auth Provider   acetaminophen (TYLENOL) 325 MG tablet Take 1 tablet by mouth every 6 hours as needed. prn Yes Reported, Patient   calcium carbonate-vitamin D (OS-HOLLY) 500-400 MG-UNIT tablet Take 1 tablet by mouth daily unknown Yes Unknown, Entered By History   cyclobenzaprine (FLEXERIL) 10 MG tablet Take 1 tablet (10 mg) by mouth 3 times daily as needed for muscle spasms Past Week at Unknown time Yes Cassy Alejandre, JOAQUIN CNP   Multiple Vitamins-Minerals (MULTIVITAMIN WOMEN PO) Take 1 tablet by mouth daily unknown Yes Reported, Patient   Probiotic Product (PROBIOTIC PO) Take 1 tablet by mouth daily unknown Yes Unknown, Entered By History   VITAMIN D, CHOLECALCIFEROL, PO Take 3 tablets by mouth daily Unknown OTC dose unknown  Reported, Patient

## 2019-12-30 NOTE — PLAN OF CARE
PRIMARY DIAGNOSIS: ACUTE PAIN  OUTPATIENT/OBSERVATION GOALS TO BE MET BEFORE DISCHARGE:  1. Pain Status: Improved but still requiring IV narcotics.    2. Return to near baseline physical activity: Yes    3. Cleared for discharge by consultants (if involved): No    Discharge Planner Nurse   Safe discharge environment identified: No  Barriers to discharge: Yes       Entered by: Elly Khanna 12/30/2019 4:18 PM     Please review provider order for any additional goals.   Nurse to notify provider when observation goals have been met and patient is ready for discharge.

## 2019-12-30 NOTE — PROVIDER NOTIFICATION
MD Notification    Notified Person: DO    Notified Person Name: Dr. Latif    Notification Date/Time: 0059 12/30/19    Notification Interaction: answering service    Purpose of Notification: uncontrolled pain    Orders Received: awaiting callback    Comments: nausea controlled w/ zofran      ADDENDUM 0111: 0.5mg IV dilaudid for breakthrough pain right now. Current IV dilaudid order modified from 0.3-0.5 mg q2 hours to 0.5-1.0 mg q 2 hours. Dr. Latif entered orders, see EMAR.

## 2019-12-30 NOTE — PLAN OF CARE
Observation goals PRIOR TO DISCHARGE     Comments:   -diagnostic tests and consults completed and resulted: Not met, GI following  -vital signs normal or at patient baseline: Met  -tolerating oral intake to maintain hydration: Not met, NPO  Nurse to notify provider when observation goals have been met and patient is ready for discharge.     Pt A&O, VSS. On RA. Ongoing pain in upper abdm area, using prn dilaudid. Off and on nausea with 2 small emesis this shift. Prn zofran given. BS active but slow. Enema given, very small watery result. NPO. GI following. IV infusing. Up SBA. Will cont to monitor, make MD aware if ongoing vomiting or increased pain.  at bedside.

## 2019-12-31 ENCOUNTER — APPOINTMENT (OUTPATIENT)
Dept: GENERAL RADIOLOGY | Facility: CLINIC | Age: 59
DRG: 389 | End: 2019-12-31
Attending: INTERNAL MEDICINE
Payer: COMMERCIAL

## 2019-12-31 PROCEDURE — 74018 RADEX ABDOMEN 1 VIEW: CPT

## 2019-12-31 PROCEDURE — 12000000 ZZH R&B MED SURG/OB

## 2019-12-31 PROCEDURE — 99232 SBSQ HOSP IP/OBS MODERATE 35: CPT | Performed by: INTERNAL MEDICINE

## 2019-12-31 PROCEDURE — 25000128 H RX IP 250 OP 636: Performed by: HOSPITALIST

## 2019-12-31 PROCEDURE — 99207 ZZC CDG-CODE CATEGORY CHANGED: CPT | Performed by: INTERNAL MEDICINE

## 2019-12-31 RX ADMIN — HYDROMORPHONE HYDROCHLORIDE 0.5 MG: 1 INJECTION, SOLUTION INTRAMUSCULAR; INTRAVENOUS; SUBCUTANEOUS at 23:56

## 2019-12-31 RX ADMIN — HYDROMORPHONE HYDROCHLORIDE 1 MG: 1 INJECTION, SOLUTION INTRAMUSCULAR; INTRAVENOUS; SUBCUTANEOUS at 00:37

## 2019-12-31 RX ADMIN — HYDROMORPHONE HYDROCHLORIDE 1 MG: 1 INJECTION, SOLUTION INTRAMUSCULAR; INTRAVENOUS; SUBCUTANEOUS at 17:31

## 2019-12-31 RX ADMIN — POTASSIUM CHLORIDE AND SODIUM CHLORIDE: 900; 150 INJECTION, SOLUTION INTRAVENOUS at 01:35

## 2019-12-31 RX ADMIN — HYDROMORPHONE HYDROCHLORIDE 1 MG: 1 INJECTION, SOLUTION INTRAMUSCULAR; INTRAVENOUS; SUBCUTANEOUS at 02:45

## 2019-12-31 RX ADMIN — HYDROMORPHONE HYDROCHLORIDE 1 MG: 1 INJECTION, SOLUTION INTRAMUSCULAR; INTRAVENOUS; SUBCUTANEOUS at 04:49

## 2019-12-31 RX ADMIN — HYDROMORPHONE HYDROCHLORIDE 0.5 MG: 1 INJECTION, SOLUTION INTRAMUSCULAR; INTRAVENOUS; SUBCUTANEOUS at 20:44

## 2019-12-31 RX ADMIN — HYDROMORPHONE HYDROCHLORIDE 1 MG: 1 INJECTION, SOLUTION INTRAMUSCULAR; INTRAVENOUS; SUBCUTANEOUS at 06:58

## 2019-12-31 RX ADMIN — POTASSIUM CHLORIDE AND SODIUM CHLORIDE: 900; 150 INJECTION, SOLUTION INTRAVENOUS at 13:37

## 2019-12-31 RX ADMIN — HYDROMORPHONE HYDROCHLORIDE 1 MG: 1 INJECTION, SOLUTION INTRAMUSCULAR; INTRAVENOUS; SUBCUTANEOUS at 09:08

## 2019-12-31 RX ADMIN — POTASSIUM CHLORIDE AND SODIUM CHLORIDE: 900; 150 INJECTION, SOLUTION INTRAVENOUS at 23:56

## 2019-12-31 RX ADMIN — HYDROMORPHONE HYDROCHLORIDE 1 MG: 1 INJECTION, SOLUTION INTRAMUSCULAR; INTRAVENOUS; SUBCUTANEOUS at 12:22

## 2019-12-31 ASSESSMENT — ACTIVITIES OF DAILY LIVING (ADL)
ADLS_ACUITY_SCORE: 12
ADLS_ACUITY_SCORE: 13
TOILETING: 0-->INDEPENDENT
ADLS_ACUITY_SCORE: 13
TRANSFERRING: 0-->INDEPENDENT
ADLS_ACUITY_SCORE: 12
RETIRED_COMMUNICATION: 0-->UNDERSTANDS/COMMUNICATES WITHOUT DIFFICULTY
AMBULATION: 0-->INDEPENDENT
ADLS_ACUITY_SCORE: 12
DRESS: 0-->INDEPENDENT
RETIRED_EATING: 0-->INDEPENDENT
BATHING: 0-->INDEPENDENT
COGNITION: 0 - NO COGNITION ISSUES REPORTED
ADLS_ACUITY_SCORE: 13
FALL_HISTORY_WITHIN_LAST_SIX_MONTHS: NO
SWALLOWING: 0-->SWALLOWS FOODS/LIQUIDS WITHOUT DIFFICULTY

## 2019-12-31 NOTE — PROVIDER NOTIFICATION
Pt a&o, up with SBA, pain in abdomen, prn meds given and pt had emesis, prn anti nausea given.  Pt changed to inpt status, report given to new nurse.  Pt iv fluids infusing.  Pt bowel sounds faint.  GI following pt and may order NG.  Continue to monitor and POC

## 2019-12-31 NOTE — PLAN OF CARE
TIME & DATE: 12/31/19 8537-0504  Cognitive Concerns/ Orientation : A&Ox4    BEHAVIOR & AGGRESSION TOOL COLOR: Green   CIWA SCORE: NA       ABNL VS/O2: VSS on RA   MOBILITY: SBA  PAIN MANAGMENT: Continues with abdominal pain 8-10/10. Pt requesting 1 mg IV dilaudid, given x2.  DIET: NPO ex meds   BOWEL/BLADDER: Pt reports not passing gas. Some hypoactive bowel tones in RLQ, LLQ. Voiding appropriately.   ABNL LAB/BG: WDL   DRAIN/DEVICES: PIV in L AC infusing NS+20k   TELEMETRY RHYTHM: NA   SKIN: WDL   TESTS/PROCEDURES: NA  D/C DAY/GOALS/PLACE: Pending progress.  OTHER IMPORTANT INFO:

## 2019-12-31 NOTE — PROGRESS NOTES
[facility]    Medicine Progress Note - Hospitalist Service       Date of Admission:  12/29/2019  Assessment & Plan   Lydia Pierson is a 59 year old female who presented to [facility] ED for abdominal pain. Transferred to [facility] for partial small bowel obstruction.     Partial small bowel obstruction  Abdominal pain, nausea/vomiting with onset this AM. CT showed partial SBO and abnormal enhancement of small intestine consistent with inflammation that could be crohns vs enteritis. No hx of crohns however, also had c-scope 2018 which showed colon was normal. Bowel movement hx not suggestive of crohns. Hx of multiple abdominal surgeries. GI following along and plan for today  - NPO  - abdominal XR   - Ambulate nursing unit  - NG placement if develops n/v   - Surgical consultation if symptoms fail to improve   - GI plan on endoscopic evaluation once SBO improves      Hx lymphoid leukemia in remission  Hx renal cell carcinoma s/p partial nephrecotmy  Follow up as outpatient.     Hyperlipidemia  PTA not on any medications, defer follow up to PCP    Diet: NPO for Medical/Clinical Reasons Except for: Meds    DVT Prophylaxis: Pneumatic Compression Devices and Ambulate every shift  Simpson Catheter: not present  Code Status: Full Code      Disposition Plan   Expected discharge: 2 - 3 days, recommended to home pending clinical course of SBO.  Entered: Clay Rasheed MD 12/31/2019, 2:25 PM       The patient's care was discussed with the Bedside Nurse, Patient and Patient's Family.    Clay Rasheed MD  Hospitalist Service  [facility]    ______________________________________________________________________    Interval History   No nausea or vomiting, pain improved but still present intermittently    Data reviewed today: I reviewed all medications, new labs and imaging results over the last 24 hours. I personally reviewed no images or EKG's today.    Physical Exam    Vital Signs: Temp: 98.2  F (36.8  C) Temp src: Oral BP: 127/74 Pulse: 94   Resp: 16 SpO2: 95 % O2 Device: None (Room air)    Weight: 0 lbs 0 oz    Gen: NAD, pleasant  HEENT: Normocephalic, EOMI, MMM  Resp: no crackles,  no wheezes, no increased work of resp  CV: S1S2 heard, reg rhythm, reg rate, no pedal edema  Abdo: soft, notender on palp, no rebound, nondistended, bowel sounds present but  decreased  Ext: no edema  Neuro: AAOx3, CN grossly intact      Data   Recent Labs   Lab 12/30/19  0651 12/29/19  1825 12/29/19  1823   WBC 7.5  --  9.6   HGB 13.2 15.6 14.6   MCV 91  --  92     --  210    138 137   POTASSIUM 4.0 3.5 3.4   CHLORIDE 108 101 103   CO2 25  --  27   BUN 11 20 19   CR 0.42*  --  0.55   ANIONGAP 5 12 7   HOLLY 8.2*  --  9.0   GLC 98 110* 107*   ALBUMIN  --   --  4.4   PROTTOTAL  --   --  7.6   BILITOTAL  --   --  0.7   ALKPHOS  --   --  58   ALT  --   --  27   AST  --   --  19   LIPASE  --   --  82     No results found for this or any previous visit (from the past 24 hour(s)).

## 2019-12-31 NOTE — PLAN OF CARE
Cognitive Concerns/ Orientation : A&Ox4    BEHAVIOR & AGGRESSION TOOL COLOR: Green   CIWA SCORE: NA    ABNL VS/O2: VSS, on RA   MOBILITY: SBA, steady on feet.   PAIN MANAGMENT: 6-2/10 upper abdominal pains. IV dilaudid 0.5mg given x2  DIET: NPO ex meds   BOWEL/BLADDER: Pt reports not passing gas. Some hypoactive bowel tones in RLQ, LLQ. Voiding appropriately.   ABNL LAB/BG: WDL   DRAIN/DEVICES: PIV in L AC infusing NS+20k   TELEMETRY RHYTHM: NA   SKIN: WDL   TESTS/PROCEDURES: NA  D/C DAY/GOALS/PLACE: Pending improvements.   OTHER IMPORTANT INFO: Pt calls as needed. Resting and trying to manage abdominal discomfort. Calls as needed.

## 2019-12-31 NOTE — PROGRESS NOTES
GASTROENTEROLOGY PROGRESS NOTE     SUBJECTIVE: Had persistent pain throughout the night and had one emesis, but feels much improved this morning. Currently denies n/v. No flatus yet. Abdominal pain was a 3/10 before she was given pain medication. Intends on ambulating soon.     OBJECTIVE:   /74 (BP Location: Right arm)   Pulse 94   Temp 98.2  F (36.8  C) (Oral)   Resp 16   LMP 2010   SpO2 95%    Temp (24hrs), Av.4  F (36.3  C), Min:96.1  F (35.6  C), Max:98.2  F (36.8  C)     No data found.     Intake/Output Summary (Last 24 hours) at 2019 1019  Last data filed at 2019 0700  Gross per 24 hour   Intake 840 ml   Output 250 ml   Net 590 ml      PHYSICAL EXAM   Constitutional: In bed, no acute distress  Cardiovascular: Regular rate and rhythm  Respiratory: Clear to auscultation bilaterally  Abdomen: Soft, non-tender, non-distended, normally active bowel sounds. No guarding or rebound tenderness.    ROS, FH, SH: See initial GI consult for details.     I have reviewed the patient's new clinical lab results:   Recent Labs   Lab Test 19  0651 19  18219  1823 12/27/18  1359   WBC 7.5  --  9.6 6.2   HGB 13.2 15.6 14.6 14.2   MCV 91  --  92 91     --  210 216      Recent Labs   Lab Test 19  0651 19  1825 19  1823 18  1359    138 137 142   POTASSIUM 4.0 3.5 3.4 4.2   CHLORIDE 108 101 103 107   CO2 25  --  27 30   BUN 11 20 19 16   CR 0.42*  --  0.55 0.62   ANIONGAP 5 12 7 5   HOLLY 8.2*  --  9.0 8.2*      Recent Labs   Lab Test 19  1823 19  1721 18  1359 17  1307 17  1155 17  1050 16  0824   ALBUMIN 4.4  --   --  3.9  --   --  4.1   BILITOTAL 0.7  --   --  0.6  --   --  1.0   ALT 27  --   --  21  --   --  31   AST 19  --   --  15  --   --  23   ALKPHOS 58  --   --  69  --   --  61   PROTEIN  --  Negative  --   --  Negative Negative  --    LIPASE 82  --  181 169  --   --   --    AMYLASE  --   --  54   --   --   --   --         IMAGING   CT 12/29/19  IMPRESSION:   1.  Partial small bowel obstruction secondary to areas of abnormal enhancement and narrowing in small bowel loops which are suggestive of enteritis or Crohn's disease and partial obstruction. No definite intra-abdominal abscess or interloop fistula.      ENDOSCOPIC WORKUP  Colonoscopy 9/4/18  Findings:        The perianal and digital rectal examinations were normal.        The sigmoid colon was moderately tortuous. Advancing the scope required        using manual pressure.        The exam was otherwise normal throughout the examined colon.        The retroflexed view of the distal rectum and anal verge was normal and        showed no anal or rectal abnormalities.                                                                                     Impression:    - Tortuous colon.                        - No specimens collected.    IMPRESSION:  Lydia Pierson is a 59 year old female who presented 12/29 with acute abdominal pain, nausea, and vomiting, found to have a partial small bowel obstruction. Imaging consistent with possible Crohn's vs enteritis. No chronic symptoms suggestive of inflammatory bowel disease.    Symptoms currently improved this AM, will continue bowel rest and supportive cares.      PLAN/RECOMMENDATIONS:    -NPO  -Will plan on abdominal XR in the morning  -Ambulate nursing unit  -If severe pain returns = will get XR sooner  -Will need NG placement if develops n/v   -Surgical consultation if symptoms fail to improve   -Will plan on endoscopic evaluation once SBO improves     Gracie Santa CNP   [facility]   Cell: [phone] until 1PM. Please call Dr. Terrell after 1PM.   After 5PM: [phone]

## 2020-01-01 LAB
ANION GAP SERPL CALCULATED.3IONS-SCNC: 6 MMOL/L (ref 3–14)
BUN SERPL-MCNC: 11 MG/DL (ref 7–30)
CALCIUM SERPL-MCNC: 8.3 MG/DL (ref 8.5–10.1)
CHLORIDE SERPL-SCNC: 106 MMOL/L (ref 94–109)
CO2 SERPL-SCNC: 27 MMOL/L (ref 20–32)
CREAT SERPL-MCNC: 0.51 MG/DL (ref 0.52–1.04)
GFR SERPL CREATININE-BSD FRML MDRD: >90 ML/MIN/{1.73_M2}
GLUCOSE SERPL-MCNC: 66 MG/DL (ref 70–99)
POTASSIUM SERPL-SCNC: 4 MMOL/L (ref 3.4–5.3)
SODIUM SERPL-SCNC: 139 MMOL/L (ref 133–144)

## 2020-01-01 PROCEDURE — 80048 BASIC METABOLIC PNL TOTAL CA: CPT | Performed by: INTERNAL MEDICINE

## 2020-01-01 PROCEDURE — 99232 SBSQ HOSP IP/OBS MODERATE 35: CPT | Performed by: INTERNAL MEDICINE

## 2020-01-01 PROCEDURE — 25000128 H RX IP 250 OP 636: Performed by: HOSPITALIST

## 2020-01-01 PROCEDURE — 36415 COLL VENOUS BLD VENIPUNCTURE: CPT | Performed by: INTERNAL MEDICINE

## 2020-01-01 PROCEDURE — 12000000 ZZH R&B MED SURG/OB

## 2020-01-01 PROCEDURE — 25800025 ZZH RX 258: Performed by: INTERNAL MEDICINE

## 2020-01-01 RX ORDER — DEXTROSE MONOHYDRATE, SODIUM CHLORIDE, SODIUM LACTATE, POTASSIUM CHLORIDE, CALCIUM CHLORIDE 5; 600; 310; 179; 20 G/100ML; MG/100ML; MG/100ML; MG/100ML; MG/100ML
INJECTION, SOLUTION INTRAVENOUS CONTINUOUS
Status: DISCONTINUED | OUTPATIENT
Start: 2020-01-01 | End: 2020-01-02 | Stop reason: HOSPADM

## 2020-01-01 RX ADMIN — DEXTROSE MONOHYDRATE, SODIUM CHLORIDE, SODIUM LACTATE, POTASSIUM CHLORIDE, CALCIUM CHLORIDE: 5; 600; 310; 179; 20 INJECTION, SOLUTION INTRAVENOUS at 22:19

## 2020-01-01 RX ADMIN — HYDROMORPHONE HYDROCHLORIDE 0.5 MG: 1 INJECTION, SOLUTION INTRAMUSCULAR; INTRAVENOUS; SUBCUTANEOUS at 09:35

## 2020-01-01 RX ADMIN — DEXTROSE MONOHYDRATE, SODIUM CHLORIDE, SODIUM LACTATE, POTASSIUM CHLORIDE, CALCIUM CHLORIDE: 5; 600; 310; 179; 20 INJECTION, SOLUTION INTRAVENOUS at 12:11

## 2020-01-01 RX ADMIN — POTASSIUM CHLORIDE AND SODIUM CHLORIDE: 900; 150 INJECTION, SOLUTION INTRAVENOUS at 09:40

## 2020-01-01 RX ADMIN — HYDROMORPHONE HYDROCHLORIDE 0.5 MG: 1 INJECTION, SOLUTION INTRAMUSCULAR; INTRAVENOUS; SUBCUTANEOUS at 03:12

## 2020-01-01 ASSESSMENT — ACTIVITIES OF DAILY LIVING (ADL)
ADLS_ACUITY_SCORE: 12

## 2020-01-01 NOTE — PLAN OF CARE
TIME & DATE: 12/31/19 3884-0070  Cognitive Concerns/ Orientation : A&Ox4    BEHAVIOR & AGGRESSION TOOL COLOR: Green   CIWA SCORE: NA       ABNL VS/O2: VSS on RA   MOBILITY: SBA - ambulating in halls frequently  PAIN MANAGMENT: Patient continues to c/o abdominal pain and requesting 1 mg IV dilaudid, Q2-3hrs; however, reports pain is better overall  DIET: NPO except for meds - no N/V   BOWEL/BLADDER:  BS/flatus (+) but still has not had a BM; Voiding appropriately.   ABNL LAB/BG: WDL   DRAIN/DEVICES: PIV infusing  TELEMETRY RHYTHM: NA   SKIN: WDL   TESTS/PROCEDURES: Abdominal xray this pm - results are pending  D/C DAY/GOALS/PLACE: 2 - 3 days, recommended to home pending clinical course of SBO.  OTHER IMPORTANT INFO:  Gi following - Will need NG placement if develops N/V; Surgical consultation if symptoms fail to improve; Will plan on endoscopic evaluation once SBO improves

## 2020-01-01 NOTE — PROGRESS NOTES
[facility] Progress Note    Subjective:  Feeling better with less abdominal pain and no nausea/vomiting.  Has not yet started taking PO.    Objective:  Physical Exam:  /68 (BP Location: Left arm)   Pulse 80   Temp 98.2  F (36.8  C) (Oral)   Resp 16   LMP 2010   SpO2 95%   Temp (24hrs), Av.2  F (36.8  C), Min:98.1  F (36.7  C), Max:98.3  F (36.8  C)    No data found.    Intake/Output Summary (Last 24 hours) at 2020 1520  Last data filed at 2020 0600  Gross per 24 hour   Intake 2282 ml   Output --   Net 2282 ml     General Appearance: Comfortable, laying in bed  Abdomen: Normal    Labs / Imaging:  Recent Labs   Lab Test 19  0651 19  1825 19  1823 18  1359 17  1307 17  1531 16  0824 09/28/15  0545   WBC 7.5  --  9.6 6.2 6.2 6.6 5.1 5.8   HGB 13.2 15.6 14.6 14.2 14.2 12.9 13.5 13.9   MCV 91  --  92 91 88 88 86 88     --  210 216 209 207 214 203     No lab results found.    Invalid input(s): FERRITIN  Recent Labs   Lab Test 20  0914 19  0651 19  1825 19  1823 18  1359 17  1307 16  0824   POTASSIUM 4.0 4.0 3.5 3.4 4.2 3.8 4.0   CHLORIDE 106 108 101 103 107 107 109   BUN 11 11 20 19 16 15 17     Recent Labs   Lab Test 19  1823 19  1721 18  1359 17  1307 17  1155 17  1050 16  0824 04/15/13  1352   PROTEIN  --  Negative  --   --  Negative Negative  --  Negative   ALBUMIN 4.4  --   --  3.9  --   --  4.1  --    BILITOTAL 0.7  --   --  0.6  --   --  1.0  --    AST 19  --   --  15  --   --  23  --    ALT 27  --   --  21  --   --  31  --    AMYLASE  --   --  54  --   --   --   --   --    LIPASE 82  --  181 169  --   --   --   --      CT 19  IMPRESSION:   1.  Partial small bowel obstruction secondary to areas of abnormal enhancement and narrowing in small bowel loops which are suggestive of enteritis or Crohn's disease and partial obstruction. No  definite intra-abdominal abscess or interloop fistula.        ENDOSCOPIC WORKUP  Colonoscopy 9/4/18  Findings:        The perianal and digital rectal examinations were normal.        The sigmoid colon was moderately tortuous. Advancing the scope required        using manual pressure.        The exam was otherwise normal throughout the examined colon.        The retroflexed view of the distal rectum and anal verge was normal and        showed no anal or rectal abnormalities.                                                                                     Impression:    - Tortuous colon.                        - No specimens collected.      Assessment / Plan:  59 year old female with history of multiple prior abdominal surgeries who presented (12/29/19) with acute abdominal pain, nausea, and vomiting, found to have a partial small bowel obstruction. Imaging consistent with possible Crohn's vs enteritis. No chronic symptoms suggestive of inflammatory bowel disease.  Symptoms improving with AXR (12/31/19) no longer showing evidence of obstruction, but did have significant stool in colon.     - agree with advancing diet, starting with clear liquids this evening  - will order Fleets enema for possible constipation noted on AXR  - suggest colonoscopy to TI with [facility] at some point in the future      Clyde Lyons MD  [facility]

## 2020-01-01 NOTE — PROGRESS NOTES
[facility]    Medicine Progress Note - Hospitalist Service       Date of Admission:  12/29/2019  Assessment & Plan   Lydia Pierson is a 59 year old female who presented to [facility] ED for abdominal pain. Transferred to [facility] for partial small bowel obstruction.     Partial small bowel obstruction  Abdominal pain, nausea/vomiting with onset this AM. CT showed partial SBO and abnormal enhancement of small intestine consistent with inflammation that could be crohns vs enteritis. No hx of crohns however, also had c-scope 2018 which showed colon was normal. Bowel movement hx not suggestive of crohns. Hx of multiple abdominal surgeries. Pain has essentially subsided since last night. No obvious obstruction on KUB yesterday passing gas today  - try clear liquid  - Ambulate nursing unit  - NG placement if develops n/v   - Surgical consultation if symptoms fail to improve   - GI plan on endoscopic evaluation as per GI who has been following along     Hx lymphoid leukemia in remission  Hx renal cell carcinoma s/p partial nephrecotmy  Follow up as outpatient.     Hyperlipidemia  PTA not on any medications, defer follow up to PCP    Diet: Clear Liquid Diet    DVT Prophylaxis: Pneumatic Compression Devices and Ambulate every shift  Simpson Catheter: not present  Code Status: Full Code      Disposition Plan   Expected discharge: 2 - 3 days, recommended to home pending clinical course of SBO.  Entered: Clay Rasheed MD 01/01/2020, 2:12 PM       The patient's care was discussed with the Bedside Nurse, Patient and Patient's Family.    Clay Rasheed MD  Hospitalist Service  [facility]    ______________________________________________________________________    Interval History   No nausea or vomiting, pain has essentially subsided, Feeling better and has started to pass gas.     Data reviewed today: I reviewed all medications, new labs and imaging results over the  last 24 hours. I personally reviewed no images or EKG's today.    Physical Exam   Vital Signs: Temp: 98.2  F (36.8  C) Temp src: Oral BP: 120/68 Pulse: 80   Resp: 16 SpO2: 95 % O2 Device: None (Room air)    Weight: 0 lbs 0 oz    Gen: NAD, pleasant  HEENT: Normocephalic, EOMI, MMM  Resp: no crackles,  no wheezes, no increased work of resp  CV: S1S2 heard, reg rhythm, reg rate, no pedal edema  Abdo: soft, non tender on palp, no rebound, nondistended, bowel sounds present  Ext: no edema  Neuro: AAOx3, CN grossly intact      Data   Recent Labs   Lab 01/01/20  0914 12/30/19  0651 12/29/19  1825 12/29/19  1823   WBC  --  7.5  --  9.6   HGB  --  13.2 15.6 14.6   MCV  --  91  --  92   PLT  --  206  --  210    138 138 137   POTASSIUM 4.0 4.0 3.5 3.4   CHLORIDE 106 108 101 103   CO2 27 25  --  27   BUN 11 11 20 19   CR 0.51* 0.42*  --  0.55   ANIONGAP 6 5 12 7   HOLLY 8.3* 8.2*  --  9.0   GLC 66* 98 110* 107*   ALBUMIN  --   --   --  4.4   PROTTOTAL  --   --   --  7.6   BILITOTAL  --   --   --  0.7   ALKPHOS  --   --   --  58   ALT  --   --   --  27   AST  --   --   --  19   LIPASE  --   --   --  82     Recent Results (from the past 24 hour(s))   XR Abdomen 1 View    Narrative    ABDOMEN ONE VIEW   12/31/2019 6:02 PM     HISTORY: Follow up small bowel obstruction.    COMPARISON: CT abdomen and pelvis 12/29/2019.       Impression    IMPRESSION: No visible dilated small bowel loops. Prominent stool  throughout the colon. No free air. Clear lung bases.    SABINA STANTON MD

## 2020-01-02 VITALS
HEART RATE: 76 BPM | DIASTOLIC BLOOD PRESSURE: 80 MMHG | RESPIRATION RATE: 16 BRPM | SYSTOLIC BLOOD PRESSURE: 134 MMHG | TEMPERATURE: 97.4 F | OXYGEN SATURATION: 99 %

## 2020-01-02 PROCEDURE — 25800025 ZZH RX 258: Performed by: INTERNAL MEDICINE

## 2020-01-02 PROCEDURE — 99238 HOSP IP/OBS DSCHRG MGMT 30/<: CPT | Performed by: INTERNAL MEDICINE

## 2020-01-02 RX ADMIN — DEXTROSE MONOHYDRATE, SODIUM CHLORIDE, SODIUM LACTATE, POTASSIUM CHLORIDE, CALCIUM CHLORIDE: 5; 600; 310; 179; 20 INJECTION, SOLUTION INTRAVENOUS at 08:57

## 2020-01-02 ASSESSMENT — ACTIVITIES OF DAILY LIVING (ADL)
ADLS_ACUITY_SCORE: 12

## 2020-01-02 NOTE — PLAN OF CARE
Afebrile, no pain. Frequently ambulating in halls. Positive bowel sounds and flatus. Soft abdomen. Tolerating full liquid diet. Diet advanced to low fiber for dinner. Possible discharge later today.

## 2020-01-02 NOTE — PLAN OF CARE
TIME & DATE: 1/1/20 8117-9371  Cognitive Concerns/ Orientation : A&Ox4    BEHAVIOR & AGGRESSION TOOL COLOR: Green   CIWA SCORE: N/A       ABNL VS/O2: VSS on RA   MOBILITY: Independent - frequently ambulates in halls  PAIN MANAGMENT: Patient continues to c/o abdominal pain but reports improvement - dilaudid IV given x1 - transitioning to dilaudid po  DIET: Diet advanced to clear liquids - tolerating well but appetite is poor  BOWEL/BLADDER:  BS/flatus (+); had a medium formed BM following TWE; Voiding appropriately.   ABNL LAB/BG: Glucose - 66 - IVF changed to D5LR with 20KCl   DRAIN/DEVICES: PIV infusing   TELEMETRY RHYTHM: N/A   SKIN: WDL   TESTS/PROCEDURES: Abdominal xray yesterday showed no obstruction  D/C DAY/GOALS/PLACE: 2 - 3 days, recommended to home pending clinical course of SBO.  OTHER IMPORTANT INFO:  GI following - ordered Fleets enema for possible constipation noted on AXR; however, had BM following TWE so Fleets was not administered; suggest colonoscopy to TI with [facility] at some point in the future.

## 2020-01-02 NOTE — DISCHARGE SUMMARY
[facility]  Hospitalist Discharge Summary       Date of Admission:  12/29/2019  Date of Discharge:  1/2/2020  Discharging Provider: Ivan Downs DO      Discharge Diagnoses   1. Partial small bowel obstruction   2. H/o Lymphoid leukemia, in remission   3. H/o Renal cell carcinoma s/p partial nephrectomy   4. HLP     Follow-ups Needed After Discharge   Follow-up Appointments     Follow-up and recommended labs and tests       Follow up with primary care provider, Benja Cordova, within 2-4   weeks, for hospital follow- up. No follow up labs or test are needed.           Unresulted Labs Ordered in the Past 30 Days of this Admission     No orders found from 11/29/2019 to 12/30/2019.        Discharge Disposition   Discharged to home  Condition at discharge: Stable    Hospital Course   SBO treated medically.  Advanced diet to low fiber without issue.  Discharged to home in stable condition.  Seen by GI and agreed with medical management.    Consultations This Hospital Stay   GASTROENTEROLOGY IP CONSULT    Code Status   Full Code    Time Spent on this Encounter   I, Ivan Downs DO, personally saw the patient today and spent less than or equal to 30 minutes discharging this patient.       Ivan Downs DO  [facility]  ______________________________________________________________________    Physical Exam   Vital Signs: Temp: 97.4  F (36.3  C) Temp src: Oral BP: 134/80 Pulse: 76   Resp: 16 SpO2: 99 % O2 Device: None (Room air)    Weight: 0 lbs 0 oz  General Appearance: Resting comfortably.  NAD   Respiratory: Clear to auscultation.  No respiratory distress  Cardiovascular: RRR.  No murmurs  GI: Bowel sounds present.  Non-tender  Skin: No rashes.  No cyanosis  Other: No edema.  No calf tenderness        Primary Care Physician   Benja Cordova    Discharge Orders      Follow-up and recommended labs and tests     Follow up with primary care provider,  Benja Cordova, within 2-4 weeks, for hospital follow- up. No follow up labs or test are needed.     Reason for your hospital stay    Small bowel obstruction     Activity    Your activity upon discharge: activity as tolerated     Diet    Follow this diet upon discharge: Orders Placed This Encounter      Low Fiber Diet       Significant Results and Procedures   Most Recent 3 CBC's:  Recent Labs   Lab Test 12/30/19  0651 12/29/19  1825 12/29/19  1823 12/27/18  1359   WBC 7.5  --  9.6 6.2   HGB 13.2 15.6 14.6 14.2   MCV 91  --  92 91     --  210 216     Most Recent 3 BMP's:  Recent Labs   Lab Test 01/01/20  0914 12/30/19  0651 12/29/19  1825 12/29/19  1823    138 138 137   POTASSIUM 4.0 4.0 3.5 3.4   CHLORIDE 106 108 101 103   CO2 27 25  --  27   BUN 11 11 20 19   CR 0.51* 0.42*  --  0.55   ANIONGAP 6 5 12 7   HOLLY 8.3* 8.2*  --  9.0   GLC 66* 98 110* 107*   ,   Results for orders placed or performed during the hospital encounter of 12/29/19   XR Abdomen 1 View    Narrative    ABDOMEN ONE VIEW   12/31/2019 6:02 PM     HISTORY: Follow up small bowel obstruction.    COMPARISON: CT abdomen and pelvis 12/29/2019.       Impression    IMPRESSION: No visible dilated small bowel loops. Prominent stool  throughout the colon. No free air. Clear lung bases.    SABINA STANTON MD       Discharge Medications   Current Discharge Medication List      CONTINUE these medications which have NOT CHANGED    Details   acetaminophen (TYLENOL) 325 MG tablet Take 1 tablet by mouth every 6 hours as needed.      calcium carbonate-vitamin D (OS-HOLLY) 500-400 MG-UNIT tablet Take 1 tablet by mouth daily      cyclobenzaprine (FLEXERIL) 10 MG tablet Take 1 tablet (10 mg) by mouth 3 times daily as needed for muscle spasms  Qty: 20 tablet, Refills: 0    Associated Diagnoses: Rib pain on right side      Multiple Vitamins-Minerals (MULTIVITAMIN WOMEN PO) Take 1 tablet by mouth daily      Probiotic Product (PROBIOTIC PO) Take 1 tablet by  mouth daily      UNABLE TO FIND MEDICATION NAME: Advanced Digestive Enzyme; take 1 tab once daily in the am.      VITAMIN D, CHOLECALCIFEROL, PO Take 3 tablets by mouth daily Unknown OTC dose           Allergies   Allergies   Allergen Reactions     Seasonal Allergies

## 2020-01-02 NOTE — PROGRESS NOTES
[facility]  [facility]  Gastroenterology Progress note    Interval History:    Tolerating clear liquid diet.  No nausea or pain.  2 BM yesterday.    Vital Signs:      /71 (BP Location: Left arm)   Pulse 83   Temp 97.8  F (36.6  C) (Oral)   Resp 14   LMP 2010   SpO2 98%   Temp (24hrs), Av.2  F (36.8  C), Min:97.8  F (36.6  C), Max:98.4  F (36.9  C)    No data found.    Intake/Output Summary (Last 24 hours) at 2020  Last data filed at 2020  Gross per 24 hour   Intake 1457 ml   Output 400 ml   Net 1057 ml         Constitutional: NAD, comfortable  Cardiovascular: RRR, normal S1, S2   Respiratory: CTAB  Abdomen: soft, non-tender, nondistended.    Additional Comments:  ROS, FH, SH: See initial GI consult for details.    Laboratory Data:  Recent Labs   Lab Test 19  0651 19  1825 19  1823 18  1359   WBC 7.5  --  9.6 6.2   HGB 13.2 15.6 14.6 14.2   MCV 91  --  92 91     --  210 216     Recent Labs   Lab Test 20  0914 19  0651 19  1825 19  1823    138 138 137   POTASSIUM 4.0 4.0 3.5 3.4   CHLORIDE 106 108 101 103   CO2 27 25  --  27   BUN 11 11 20 19   CR 0.51* 0.42*  --  0.55   ANIONGAP 6 5 12 7   HOLLY 8.3* 8.2*  --  9.0     Recent Labs   Lab Test 19  1823 19  1721 18  1359 17  1307 17  1155 17  1050 16  0824   ALBUMIN 4.4  --   --  3.9  --   --  4.1   BILITOTAL 0.7  --   --  0.6  --   --  1.0   ALT 27  --   --  21  --   --  31   AST 19  --   --  15  --   --  23   ALKPHOS 58  --   --  69  --   --  61   PROTEIN  --  Negative  --   --  Negative Negative  --    LIPASE 82  --  181 169  --   --   --    AMYLASE  --   --  54  --   --   --   --          Assessment:  60 yo female with history of multiple prior abdominal surgeries who presents with acute abdominal pain, nausea, vomiting and was found to have a partial small bowel obstruction.  Imaging  consistent with possible Crohn's vs enteritis.  No chronic symptoms suggestive of inflammatory bowel disease.  Symptoms improved and AXR 12/31 no longer shows evidence of obstruction but patient does have significant stool in colon.  Plan:  -  Continue to advance diet as tolerated.  -  Outpatient colonoscopy with intubation of the terminal ileum.    AREN Sterling  [facility]  Office:  [phone] call if needed after 5PM  Cell:  [phone], not available after 5PM at this number

## 2020-01-02 NOTE — PLAN OF CARE
TIME & DATE: 1/2/20 1900-0730  Cognitive Concerns/ Orientation : A&Ox4    BEHAVIOR & AGGRESSION TOOL COLOR: Green   CIWA SCORE: N/A       ABNL VS/O2: VSS on RA   MOBILITY: Independent  PAIN MANAGMENT: denies pain, has not asked for pain medication since shift start.  DIET: Diet advanced to clear liquids, denies nausea, poor appetite still  BOWEL/BLADDER:  BS/flatus (+); 2 BMs yesterday. Voiding appropriately.   ABNL LAB/BG: Glucose - 66 - IVF changed to D5LR with 20KCl   DRAIN/DEVICES: PIV infusing   TELEMETRY RHYTHM: N/A   SKIN: WDL   TESTS/PROCEDURES: Abdominal xray showed no obstruction  D/C DAY/GOALS/PLACE: pending diet advancement.  OTHER IMPORTANT INFO:  GI following, suggest colonoscopy to TI with [facility] at some point in the future.

## 2020-01-02 NOTE — PLAN OF CARE
AO X4. VSS. Tolerating low fiber diet adequately, appetite good. Passing gas. Pt ready for discharge. All belongings with patient. Discharge information discussed with patient. No meds being sent home with patient.  will be picking up in an hour.

## 2020-01-03 ENCOUNTER — TELEPHONE (OUTPATIENT)
Dept: FAMILY MEDICINE | Facility: CLINIC | Age: 60
End: 2020-01-03

## 2020-01-03 NOTE — TELEPHONE ENCOUNTER
Hospital/TCU/ED for chronic condition Discharge Protocol    Has follow-up appointment scheduled.    Genie Haney RN

## 2020-01-03 NOTE — TELEPHONE ENCOUNTER
ED/UC/IP follow up phone call:SBO, abdominal Pain [place] 1/2/2020    RN please call to follow up.    Number of ED visits in past 12 months = 1/1  Junie SalMediSys Health Networksharla  [facility] [place]

## 2020-01-17 ENCOUNTER — OFFICE VISIT (OUTPATIENT)
Dept: FAMILY MEDICINE | Facility: CLINIC | Age: 60
End: 2020-01-17
Payer: COMMERCIAL

## 2020-01-17 VITALS
OXYGEN SATURATION: 100 % | HEART RATE: 63 BPM | WEIGHT: 118 LBS | SYSTOLIC BLOOD PRESSURE: 130 MMHG | BODY MASS INDEX: 20.14 KG/M2 | DIASTOLIC BLOOD PRESSURE: 74 MMHG | HEIGHT: 64 IN | RESPIRATION RATE: 16 BRPM

## 2020-01-17 DIAGNOSIS — Z00.00 ROUTINE GENERAL MEDICAL EXAMINATION AT A HEALTH CARE FACILITY: Primary | ICD-10-CM

## 2020-01-17 DIAGNOSIS — K56.609 SBO (SMALL BOWEL OBSTRUCTION) (H): ICD-10-CM

## 2020-01-17 PROCEDURE — 99396 PREV VISIT EST AGE 40-64: CPT | Performed by: FAMILY MEDICINE

## 2020-01-17 ASSESSMENT — MIFFLIN-ST. JEOR: SCORE: 1081.27

## 2020-01-17 NOTE — PATIENT INSTRUCTIONS
Shingrix is the new shingles vaccine. It is typically a pharmacy benefit under most insurances. The [facility] pharmacy can check your insurance and give it if it's covered and you don't need an appointment to do this.        Preventive Health Recommendations  Female Ages 50 - 64    Yearly exam: See your health care provider every year in order to  o Review health changes.   o Discuss preventive care.    o Review your medicines if your doctor has prescribed any.      Get a Pap test every three years (unless you have an abnormal result and your provider advises testing more often).    If you get Pap tests with HPV test, you only need to test every 5 years, unless you have an abnormal result.     You do not need a Pap test if your uterus was removed (hysterectomy) and you have not had cancer.    You should be tested each year for STDs (sexually transmitted diseases) if you're at risk.     Have a mammogram every 1 to 2 years.    Have a colonoscopy at age 50, or have a yearly FIT test (stool test). These exams screen for colon cancer.      Have a cholesterol test every 5 years, or more often if advised.    Have a diabetes test (fasting glucose) every three years. If you are at risk for diabetes, you should have this test more often.     If you are at risk for osteoporosis (brittle bone disease), think about having a bone density scan (DEXA).    Shots: Get a flu shot each year. Get a tetanus shot every 10 years.    Nutrition:     Eat at least 5 servings of fruits and vegetables each day.    Eat whole-grain bread, whole-wheat pasta and brown rice instead of white grains and rice.    Get adequate Calcium and Vitamin D.     Lifestyle    Exercise at least 150 minutes a week (30 minutes a day, 5 days a week). This will help you control your weight and prevent disease.    Limit alcohol to one drink per day.    No smoking.     Wear sunscreen to prevent skin cancer.     See your dentist every six months for an exam and  cleaning.    See your eye doctor every 1 to 2 years.

## 2020-01-17 NOTE — PROGRESS NOTES
SUBJECTIVE:   CC: Lydia Pierson is an 59 year old woman who presents for preventive health visit.     Healthy Habits:    Do you get at least three servings of calcium containing foods daily (dairy, green leafy vegetables, etc.)? yes    Amount of exercise or daily activities, outside of work:     Problems taking medications regularly No    Medication side effects: No    Have you had an eye exam in the past two years? yes    Do you see a dentist twice per year? yes    Do you have sleep apnea, excessive snoring or daytime drowsiness?yes      Patient would like to discuss diet and fiber - and how much Vitamin D she should be taking     Chief Complaint   Patient presents with     Physical      Also had recent hospitalization for SBO. Overall improved.      Today's PHQ-2 Score:   PHQ-2 ( 1999 Pf[employer]) 1/17/2020 10/31/2018   Q1: Little interest or pleasure in doing things 0 0   Q2: Feeling down, depressed or hopeless 0 0   PHQ-2 Score 0 0       Abuse: Current or Past(Physical, Sexual or Emotional)- No  Do you feel safe in your environment? Yes        Social History     Tobacco Use     Smoking status: Never Smoker     Smokeless tobacco: Never Used   Substance Use Topics     Alcohol use: No     If you drink alcohol do you typically have >3 drinks per day or >7 drinks per week? No                     Reviewed orders with patient.  Reviewed health maintenance and updated orders accordingly - Yes  Labs reviewed in EPIC  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)     Malignant neoplasm of kidney excluding renal pelvis (H)     External hemorrhoids with other complication     Sensorineural hearing loss, asymmetrical     Ovarian cyst     Health Care Home     Hyperlipidemia LDL goal <160     Advanced directives, counseling/discussion     Closed nondisplaced fracture of neck of fifth metacarpal bone of left hand     Plantar warts     Small bowel obstruction  (H)     Past Surgical History:   Procedure Laterality Date     ARTHROSCOPY KNEE WITH MEDIAL MENISCECTOMY  7/7/2011    Procedure:ARTHROSCOPY KNEE WITH MEDIAL MENISCECTOMY; choice anes; Surgeon:MANUEL FLEMING; Location:[facility]     COLONOSCOPY N/A 9/4/2018    Procedure: COLONOSCOPY;  colonoscopy;  Surgeon: Carlin Rodrigues MD;  Location: [facility]     HEMORRHOIDECTOMY  2006     HERNIORRHAPHY INGUINAL Left 8/17/2017    Procedure: HERNIORRHAPHY INGUINAL;  Left Inguinal Hernia Repair;  Surgeon: Mehdi Sorensen MD;  Location: [facility]     LAPAROSCOPIC HERNIORRHAPHY INGUINAL Left 1/11/2018    Procedure: LAPAROSCOPIC HERNIORRHAPHY INGUINAL;  Laparoscopic Left Inguinal Hernia Repair converted to open left femoral  hernia repair;  Surgeon: Mehdi Sorensen MD;  Location: [facility]     NEPHRECTOMY RT/LT  2001    left partial nephrectomy- kidney ca     SALPINGO OOPHORECTOMY,R/L/KRISHNA  2010    Rt salpingo oophorectomy      SURGICAL HISTORY OF -   8/1/1995    Vein stripping     SURGICAL HISTORY OF -   1975    wisdom teeth extraction      TONSILLECTOMY & ADENOIDECTOMY  1962     TUBAL LIGATION  1994       Social History     Tobacco Use     Smoking status: Never Smoker     Smokeless tobacco: Never Used   Substance Use Topics     Alcohol use: No     Family History   Problem Relation Age of Onset     C.A.D. Father         bypass     Diabetes Father      Lipids Father      Allergies Sister         allergic rhinitis     Neurologic Disorder Sister         seizure disorder     Respiratory Sister         asthma     Allergies Son      Breast Cancer No family hx of      Cancer - colorectal No family hx of          Current Outpatient Medications   Medication Sig Dispense Refill     acetaminophen (TYLENOL) 325 MG tablet Take 1 tablet by mouth every 6 hours as needed.       calcium carbonate-vitamin D (OS-HOLLY) 500-400 MG-UNIT tablet Take 1 tablet by mouth daily       Multiple Vitamins-Minerals (MULTIVITAMIN WOMEN PO) Take 1 tablet by mouth  daily       Probiotic Product (PROBIOTIC PO) Take 1 tablet by mouth daily       VITAMIN D, CHOLECALCIFEROL, PO Take 3 tablets by mouth daily Unknown OTC dose       cyclobenzaprine (FLEXERIL) 10 MG tablet Take 1 tablet (10 mg) by mouth 3 times daily as needed for muscle spasms (Patient not taking: Reported on 1/17/2020) 20 tablet 0     UNABLE TO FIND MEDICATION NAME: Advanced Digestive Enzyme; take 1 tab once daily in the am.       Allergies   Allergen Reactions     Seasonal Allergies        Mammogram Screening: Patient over age 50, mutual decision to screen reflected in health maintenance.    Pertinent mammograms are reviewed under the imaging tab.  History of abnormal Pap smear:   NO - age 30-65 PAP every 5 years with negative HPV co-testing recommended  Last 3 Pap and HPV Results:   PAP / HPV Latest Ref Rng & Units 2/12/2016 4/29/2011   PAP - NIL NIL   HPV 16 DNA NEG Negative -   HPV 18 DNA NEG Negative -   OTHER HR HPV NEG Negative -     PAP / HPV Latest Ref Rng & Units 2/12/2016 4/29/2011   PAP - NIL NIL   HPV 16 DNA NEG Negative -   HPV 18 DNA NEG Negative -   OTHER HR HPV NEG Negative -     Reviewed and updated as needed this visit by clinical staff  Tobacco  Allergies  Meds  Med Hx  Surg Hx  Fam Hx  Soc Hx        Reviewed and updated as needed this visit by Provider        Past Medical History:   Diagnosis Date     Acute lymphoid leukemia, without mention of having achieved remission(204.00) 1976    ALL at age 16,  no evid of recurrence     Allergic rhinitis, cause unspecified     Rhinitis     Malignant neoplasm of kidney, except pelvis 2001    left kidney removed 1/01     Motion sickness       Past Surgical History:   Procedure Laterality Date     ARTHROSCOPY KNEE WITH MEDIAL MENISCECTOMY  7/7/2011    Procedure:ARTHROSCOPY KNEE WITH MEDIAL MENISCECTOMY; choice anes; Surgeon:MANUEL FLEMING; Location:[facility]     COLONOSCOPY N/A 9/4/2018    Procedure: COLONOSCOPY;  colonoscopy;  Surgeon: Carlin Rodrigues,  "MD;  Location: [facility]     HEMORRHOIDECTOMY  2006     HERNIORRHAPHY INGUINAL Left 8/17/2017    Procedure: HERNIORRHAPHY INGUINAL;  Left Inguinal Hernia Repair;  Surgeon: Mehdi Sorensen MD;  Location: [facility]     LAPAROSCOPIC HERNIORRHAPHY INGUINAL Left 1/11/2018    Procedure: LAPAROSCOPIC HERNIORRHAPHY INGUINAL;  Laparoscopic Left Inguinal Hernia Repair converted to open left femoral  hernia repair;  Surgeon: Mehdi Sorensen MD;  Location: [facility]     NEPHRECTOMY RT/LT  2001    left partial nephrectomy- kidney ca     SALPINGO OOPHORECTOMY,R/L/KRISHNA  2010    Rt salpingo oophorectomy      SURGICAL HISTORY OF -   8/1/1995    Vein stripping     SURGICAL HISTORY OF -   1975    wisdom teeth extraction      TONSILLECTOMY & ADENOIDECTOMY  1962     TUBAL LIGATION  1994       ROS:  Constitutional, neuro, ENT, endocrine, pulmonary, cardiac, gastrointestinal, genitourinary, musculoskeletal, integument and psychiatric systems are negative, except as otherwise noted.     OBJECTIVE:   /74   Pulse 63   Resp 16   Ht 1.619 m (5' 3.75\")   Wt 53.5 kg (118 lb)   LMP 12/05/2010   SpO2 100%   BMI 20.41 kg/m    EXAM:  GENERAL APPEARANCE: healthy, alert and no distress  EYES: Eyes grossly normal to inspection, PERRL and conjunctivae and sclerae normal  HENT: ear canals and TM's normal, nose and mouth without ulcers or lesions, oropharynx clear and oral mucous membranes moist  NECK: no adenopathy, no asymmetry, masses, or scars and thyroid normal to palpation  RESP: lungs clear to auscultation - no rales, rhonchi or wheezes  BREAST: normal without masses, tenderness or nipple discharge and no palpable axillary masses or adenopathy  CV: regular rate and rhythm, normal S1 S2, no S3 or S4, no murmur, click or rub, no peripheral edema and peripheral pulses strong  ABDOMEN: soft, nontender, no hepatosplenomegaly, no masses and bowel sounds normal  MS: no musculoskeletal defects are noted and gait is age appropriate without " "ataxia  SKIN: no suspicious lesions or rashes  NEURO: Normal strength and tone, sensory exam grossly normal, mentation intact and speech normal  PSYCH: mentation appears normal and affect normal/bright    ASSESSMENT/PLAN:   1. Routine general medical examination at a health care facility  Discussed vaccines that are due. She declines pneumonia, influenza and zoster vaccinations. Fear of more harm than good. Counseled on medical facts. She declines.    2. SBO (small bowel obstruction) (H)  Improved. Follow-up if recurrent symptoms.      COUNSELING:   Reviewed preventive health counseling, as reflected in patient instructions    Estimated body mass index is 20.41 kg/m  as calculated from the following:    Height as of this encounter: 1.619 m (5' 3.75\").    Weight as of this encounter: 53.5 kg (118 lb).         reports that she has never smoked. She has never used smokeless tobacco.      Counseling Resources:  ATP IV Guidelines  Pooled Cohorts Equation Calculator  Breast Cancer Risk Calculator  FRAX Risk Assessment  ICSI Preventive Guidelines  Dietary Guidelines for Americans, 2010  USDA's MyPlate  ASA Prophylaxis  Lung CA Screening    Benja Cordova MD  [facility]  "

## 2020-02-07 ENCOUNTER — HOSPITAL ENCOUNTER (EMERGENCY)
Facility: CLINIC | Age: 60
Discharge: HOME | End: 2020-02-07
Attending: NURSE PRACTITIONER | Admitting: NURSE PRACTITIONER
Payer: COMMERCIAL

## 2020-02-07 VITALS
DIASTOLIC BLOOD PRESSURE: 82 MMHG | BODY MASS INDEX: 22.3 KG/M2 | RESPIRATION RATE: 16 BRPM | HEART RATE: 74 BPM | TEMPERATURE: 97.6 F | HEIGHT: 61 IN | OXYGEN SATURATION: 99 % | SYSTOLIC BLOOD PRESSURE: 130 MMHG

## 2020-02-07 DIAGNOSIS — R31.9 HEMATURIA: ICD-10-CM

## 2020-02-07 DIAGNOSIS — R82.998 LEUKOCYTES IN URINE: ICD-10-CM

## 2020-02-07 LAB
ALBUMIN UR-MCNC: 30 MG/DL
APPEARANCE UR: ABNORMAL
BILIRUB UR QL STRIP: NEGATIVE
COLOR UR AUTO: YELLOW
GLUCOSE UR STRIP-MCNC: NEGATIVE MG/DL
HGB UR QL STRIP: ABNORMAL
KETONES UR STRIP-MCNC: NEGATIVE MG/DL
LEUKOCYTE ESTERASE UR QL STRIP: ABNORMAL
NITRATE UR QL: NEGATIVE
PH UR STRIP: 8 PH (ref 5–7)
RBC #/AREA URNS AUTO: >182 /HPF (ref 0–2)
SOURCE: ABNORMAL
SP GR UR STRIP: 1.01 (ref 1–1.03)
SQUAMOUS #/AREA URNS AUTO: 1 /HPF (ref 0–1)
UROBILINOGEN UR STRIP-MCNC: 0 MG/DL (ref 0–2)
WBC #/AREA URNS AUTO: 69 /HPF (ref 0–5)

## 2020-02-07 PROCEDURE — 81001 URINALYSIS AUTO W/SCOPE: CPT | Performed by: NURSE PRACTITIONER

## 2020-02-07 PROCEDURE — G0463 HOSPITAL OUTPT CLINIC VISIT: HCPCS | Performed by: NURSE PRACTITIONER

## 2020-02-07 PROCEDURE — 87088 URINE BACTERIA CULTURE: CPT | Performed by: NURSE PRACTITIONER

## 2020-02-07 PROCEDURE — 87186 SC STD MICRODIL/AGAR DIL: CPT | Performed by: NURSE PRACTITIONER

## 2020-02-07 PROCEDURE — 99214 OFFICE O/P EST MOD 30 MIN: CPT | Mod: Z6 | Performed by: NURSE PRACTITIONER

## 2020-02-07 PROCEDURE — 87086 URINE CULTURE/COLONY COUNT: CPT | Performed by: NURSE PRACTITIONER

## 2020-02-07 RX ORDER — SULFAMETHOXAZOLE/TRIMETHOPRIM 800-160 MG
1 TABLET ORAL 2 TIMES DAILY
Qty: 6 TABLET | Refills: 0 | Status: SHIPPED | OUTPATIENT
Start: 2020-02-07 | End: 2020-02-10

## 2020-02-07 ASSESSMENT — ENCOUNTER SYMPTOMS
DIFFICULTY URINATING: 1
NAUSEA: 0
DYSURIA: 1
SORE THROAT: 0
SPEECH DIFFICULTY: 0
SHORTNESS OF BREATH: 0
VOMITING: 0
EYE REDNESS: 0
COLOR CHANGE: 0
FREQUENCY: 1
DIARRHEA: 0
CONFUSION: 0
HEMATURIA: 1
DIAPHORESIS: 0
COUGH: 0
CHILLS: 0
ABDOMINAL PAIN: 0
CONSTIPATION: 0
FEVER: 0

## 2020-02-07 NOTE — ED AVS SNAPSHOT
[facility] Emergency Department  [address] [address]  Phone:  [phone]  Fax:  [phone]                                    Lydia Pierson   MRN: [id number]    Department:  [facility] Emergency Department   Date of Visit:  2/7/2020           After Visit Summary Signature Page    I have received my discharge instructions, and my questions have been answered. I have discussed any challenges I see with this plan with the nurse or doctor.    ..........................................................................................................................................  Patient/Patient Representative Signature      ..........................................................................................................................................  Patient Representative Print Name and Relationship to Patient    ..................................................               ................................................  Date                                   Time    ..........................................................................................................................................  Reviewed by Signature/Title    ...................................................              ..............................................  Date                                               Time          22EPIC Rev 08/18

## 2020-02-07 NOTE — ED PROVIDER NOTES
"  History     Chief Complaint   Patient presents with     UTI     HPI    Lydia Pierson is a 59 year old female who presents to the ED/UC with dysuria, frequency, hematuria, incontinence, hesitancy, urgency and low back ache.  Symptoms began this am.  She denies long duration, rigors, flank pain, temperature > 101 degrees F. and Vomiting, significant nausea or diarrhea.  Patient has history of occ UTIs, no previous history of kidney stones and history of partial nephrectomy and this is last noted UTI per patient.  She has tried nothing for symptoms.  Recent illnesses:  none.  LMP post menopausal.      Allergies:  Allergies   Allergen Reactions     Seasonal Allergies        Problem List:    Patient Active Problem List    Diagnosis Date Noted     Small bowel obstruction (H) 12/29/2019     Priority: Medium     Plantar warts 01/23/2019     Priority: Medium     Closed nondisplaced fracture of neck of fifth metacarpal bone of left hand 08/21/2017     Priority: Medium     Advanced directives, counseling/discussion 02/12/2016     Priority: Medium     Patient does not have an Advance/Health Care Directive (HCD), given \"What is Advance Care Planning?\" flyer and requests blank HCD form.    Gracie Seo  November 14, 2018         Hyperlipidemia LDL goal <160 10/01/2015     Priority: Medium     Health Care Home 09/02/2011     Priority: Medium     X  09/02/2011 Unable to Contact  Rosemarie Lorenzo RN CCM    DX V65.8 REPLACED WITH 20358 HEALTH CARE HOME (04/08/2013)       Ovarian cyst 01/11/2010     Priority: Medium     Sensorineural hearing loss, asymmetrical 11/02/2007     Priority: Medium     External hemorrhoids with other complication 07/19/2006     Priority: Medium     Allergic rhinitis 02/21/2005     Priority: Medium     Rhinitis         Acute lymphocytic leukemia in remission (H) 02/21/2005     Priority: Medium     ALL at age 16,  no evid of recurrence           Malignant neoplasm of kidney excluding renal pelvis " (H) 01/01/2001     Priority: Medium     left renal cell carcinoma, nuclear grade 2 with invasion through capsule.     left kidney removed 1/01 February 21, 2005 no evid of recurrence on CT chest and abdomen              Past Medical History:    Past Medical History:   Diagnosis Date     Acute lymphoid leukemia, without mention of having achieved remission(204.00) 1976     Allergic rhinitis, cause unspecified      Malignant neoplasm of kidney, except pelvis 2001     Motion sickness        Past Surgical History:    Past Surgical History:   Procedure Laterality Date     ARTHROSCOPY KNEE WITH MEDIAL MENISCECTOMY  7/7/2011    Procedure:ARTHROSCOPY KNEE WITH MEDIAL MENISCECTOMY; choice anes; Surgeon:MANUEL FLEMING; Location:[facility]     COLONOSCOPY N/A 9/4/2018    Procedure: COLONOSCOPY;  colonoscopy;  Surgeon: Carlin Rodrigues MD;  Location: [facility]     HEMORRHOIDECTOMY  2006     HERNIORRHAPHY INGUINAL Left 8/17/2017    Procedure: HERNIORRHAPHY INGUINAL;  Left Inguinal Hernia Repair;  Surgeon: Mehdi Sorensen MD;  Location: [facility]     LAPAROSCOPIC HERNIORRHAPHY INGUINAL Left 1/11/2018    Procedure: LAPAROSCOPIC HERNIORRHAPHY INGUINAL;  Laparoscopic Left Inguinal Hernia Repair converted to open left femoral  hernia repair;  Surgeon: Mehdi Sorensen MD;  Location: [facility]     NEPHRECTOMY RT/LT  2001    left partial nephrectomy- kidney ca     SALPINGO OOPHORECTOMY,R/L/KRISHNA  2010    Rt salpingo oophorectomy      SURGICAL HISTORY OF -   8/1/1995    Vein stripping     SURGICAL HISTORY OF -   1975    wisdom teeth extraction      TONSILLECTOMY & ADENOIDECTOMY  1962     TUBAL LIGATION  1994       Family History:    Family History   Problem Relation Age of Onset     C.A.D. Father         bypass     Diabetes Father      Lipids Father      Allergies Sister         allergic rhinitis     Neurologic Disorder Sister         seizure disorder     Respiratory Sister         asthma     Allergies Son      Breast Cancer No family hx  "of      Cancer - colorectal No family hx of        Social History:  Marital Status:   [2]  Social History     Tobacco Use     Smoking status: Never Smoker     Smokeless tobacco: Never Used   Substance Use Topics     Alcohol use: No     Drug use: No        Medications:    sulfamethoxazole-trimethoprim (BACTRIM DS) 800-160 MG tablet  acetaminophen (TYLENOL) 325 MG tablet  calcium carbonate-vitamin D (OS-HOLLY) 500-400 MG-UNIT tablet  cyclobenzaprine (FLEXERIL) 10 MG tablet  Multiple Vitamins-Minerals (MULTIVITAMIN WOMEN PO)  Probiotic Product (PROBIOTIC PO)  UNABLE TO FIND  VITAMIN D, CHOLECALCIFEROL, PO      Review of Systems   Constitutional: Negative for chills, diaphoresis and fever.   HENT: Negative for congestion and sore throat.    Eyes: Negative for redness.   Respiratory: Negative for cough and shortness of breath.    Cardiovascular: Negative for chest pain.   Gastrointestinal: Negative for abdominal pain, constipation, diarrhea, nausea and vomiting.   Genitourinary: Positive for difficulty urinating, dysuria, frequency, hematuria and urgency. Negative for vaginal discharge and vaginal pain.   Skin: Negative for color change.   Neurological: Negative for speech difficulty.   Psychiatric/Behavioral: Negative for confusion.   All other systems reviewed and are negative.      Physical Exam   BP: 130/82  Pulse: 74  Temp: 97.6  F (36.4  C)  Resp: 16  Height: 154.9 cm (5' 1\")  SpO2: 99 %      Physical Exam  Vitals signs and nursing note reviewed.   Constitutional:       General: She is not in acute distress.     Appearance: She is well-developed. She is not diaphoretic.   HENT:      Head: Normocephalic and atraumatic.   Eyes:      General: No scleral icterus.        Right eye: No discharge.         Left eye: No discharge.      Conjunctiva/sclera: Conjunctivae normal.   Neck:      Musculoskeletal: Normal range of motion and neck supple.   Cardiovascular:      Rate and Rhythm: Regular rhythm.      Heart sounds: " Normal heart sounds. No murmur. No friction rub.   Pulmonary:      Effort: Pulmonary effort is normal. No respiratory distress.      Breath sounds: Normal breath sounds. No stridor. No wheezing or rales.   Chest:      Chest wall: No tenderness.   Abdominal:      General: Bowel sounds are normal. There is no distension.      Palpations: Abdomen is soft.      Tenderness: There is abdominal tenderness (left suprapubic area ). There is no guarding.   Skin:     General: Skin is warm and dry.      Coloration: Skin is not pale.      Findings: No erythema or rash.   Neurological:      Mental Status: She is alert and oriented to person, place, and time.         ED Course        Procedures    Results for orders placed or performed during the hospital encounter of 02/07/20 (from the past 24 hour(s))   UA reflex to Microscopic   Result Value Ref Range    Color Urine Yellow     Appearance Urine Cloudy     Glucose Urine Negative NEG^Negative mg/dL    Bilirubin Urine Negative NEG^Negative    Ketones Urine Negative NEG^Negative mg/dL    Specific Gravity Urine 1.015 1.003 - 1.035    Blood Urine Large (A) NEG^Negative    pH Urine 8.0 (H) 5.0 - 7.0 pH    Protein Albumin Urine 30 (A) NEG^Negative mg/dL    Urobilinogen mg/dL 0.0 0.0 - 2.0 mg/dL    Nitrite Urine Negative NEG^Negative    Leukocyte Esterase Urine Moderate (A) NEG^Negative    Source Midstream Urine     RBC Urine >182 (H) 0 - 2 /HPF    WBC Urine 69 (H) 0 - 5 /HPF    Squamous Epithelial /HPF Urine 1 0 - 1 /HPF       Medications - No data to display    Assessments & Plan (with Medical Decision Making)  Patient presents to urgent care with a 12-hour history of left-sided suprapubic pain, dysuria, hematuria, urinary urgency with a previous history of renal tumor and carcinoma and rare urinary tract infections.  Patient denies any fever, aches, chills, sweats.  On examination patient has suprapubic tenderness that is predominantly on her left side.  Urinalysis reveals moderate  leukocyte esterase, large blood.  Will initiate treatment with Bactrim.  Advised patient with her history of the cancer to obtain a follow-up visit and urinalysis in 1 week.  May need further evaluation and ultrasound.  Discussed worrisome reasons to return immediately.  Urine culture ordered.  No concern today for pyelonephritis or renal or nephrolithiasis.  Patient verbalized understanding regarding all the above and was discharged in stable condition.     I have reviewed the nursing notes.    I have reviewed the findings, diagnosis, plan and need for follow up with the patient.    Discharge Medication List as of 2/7/2020  5:36 PM      START taking these medications    Details   sulfamethoxazole-trimethoprim (BACTRIM DS) 800-160 MG tablet Take 1 tablet by mouth 2 times daily for 3 days, Disp-6 tablet, R-0, E-Prescribe             Final diagnoses:   Leukocytes in urine   Hematuria       2/7/2020   [facility] EMERGENCY DEPARTMENT     Mahogany Estrada, JOAQUIN CNP  02/07/20 [id number]

## 2020-02-07 NOTE — DISCHARGE INSTRUCTIONS
Start bactrim and take twice daily for 3 days  Increase water intake   Follow up in one week for recheck due to hx of kidney cancer

## 2020-02-08 LAB
BACTERIA SPEC CULT: ABNORMAL
Lab: ABNORMAL
SPECIMEN SOURCE: ABNORMAL

## 2020-02-12 ENCOUNTER — OFFICE VISIT (OUTPATIENT)
Dept: FAMILY MEDICINE | Facility: CLINIC | Age: 60
End: 2020-02-12
Payer: COMMERCIAL

## 2020-02-12 VITALS
OXYGEN SATURATION: 99 % | SYSTOLIC BLOOD PRESSURE: 122 MMHG | TEMPERATURE: 97.8 F | DIASTOLIC BLOOD PRESSURE: 68 MMHG | HEART RATE: 88 BPM | WEIGHT: 119.8 LBS | BODY MASS INDEX: 22.64 KG/M2

## 2020-02-12 DIAGNOSIS — R31.29 OTHER MICROSCOPIC HEMATURIA: Primary | ICD-10-CM

## 2020-02-12 LAB
ALBUMIN UR-MCNC: NEGATIVE MG/DL
APPEARANCE UR: CLEAR
BILIRUB UR QL STRIP: NEGATIVE
COLOR UR AUTO: YELLOW
GLUCOSE UR STRIP-MCNC: NEGATIVE MG/DL
HGB UR QL STRIP: NEGATIVE
KETONES UR STRIP-MCNC: ABNORMAL MG/DL
LEUKOCYTE ESTERASE UR QL STRIP: NEGATIVE
NITRATE UR QL: NEGATIVE
PH UR STRIP: 6 PH (ref 5–7)
SOURCE: ABNORMAL
SP GR UR STRIP: 1.02 (ref 1–1.03)
UROBILINOGEN UR STRIP-ACNC: 0.2 EU/DL (ref 0.2–1)

## 2020-02-12 PROCEDURE — 81003 URINALYSIS AUTO W/O SCOPE: CPT | Performed by: INTERNAL MEDICINE

## 2020-02-12 PROCEDURE — 99213 OFFICE O/P EST LOW 20 MIN: CPT | Performed by: INTERNAL MEDICINE

## 2020-02-12 NOTE — PROGRESS NOTES
Subjective     Lydia Pierson is a 59 year old female who presents to clinic today for the following health issues:  Chief Complaint   Patient presents with     ER F/U     [facility] 2/7/20, no longer having symptoms        HPI     Lydia was in UC on 2/7 for hematuria, dysuria, and frequency.  U/A showed both RBCs and WBCs and she was started on Bactrim for UTI.  The culture grew pan-sensitive E coli.  She was advised to follow-up for recheck for resolution of blood in the urine since she has kidney cancer in 2001.     ED/UC Followup:    Facility:  [facility]   Date of visit: 2/7/20  Reason for visit: hematuria, dysuria, frequency  Current Status: not currently having symptoms at all.          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)     Malignant neoplasm of kidney excluding renal pelvis (H)     External hemorrhoids with other complication     Sensorineural hearing loss, asymmetrical     Ovarian cyst     Health Care Home     Hyperlipidemia LDL goal <160     Advanced directives, counseling/discussion     Closed nondisplaced fracture of neck of fifth metacarpal bone of left hand     Plantar warts     Small bowel obstruction (H)     Past Surgical History:   Procedure Laterality Date     ARTHROSCOPY KNEE WITH MEDIAL MENISCECTOMY  7/7/2011    Procedure:ARTHROSCOPY KNEE WITH MEDIAL MENISCECTOMY; choice anes; Surgeon:MANUEL FLEMING; Location:[facility]     COLONOSCOPY N/A 9/4/2018    Procedure: COLONOSCOPY;  colonoscopy;  Surgeon: Carlin Rodrigues MD;  Location: [facility]     HEMORRHOIDECTOMY  2006     HERNIORRHAPHY INGUINAL Left 8/17/2017    Procedure: HERNIORRHAPHY INGUINAL;  Left Inguinal Hernia Repair;  Surgeon: Mehdi Sorensen MD;  Location: [facility]     LAPAROSCOPIC HERNIORRHAPHY INGUINAL Left 1/11/2018    Procedure: LAPAROSCOPIC HERNIORRHAPHY INGUINAL;  Laparoscopic Left Inguinal Hernia Repair converted to open left  femoral  hernia repair;  Surgeon: Mehdi Sorensen MD;  Location: [facility]     NEPHRECTOMY RT/LT  2001    left partial nephrectomy- kidney ca     SALPINGO OOPHORECTOMY,R/L/KRISHNA  2010    Rt salpingo oophorectomy      SURGICAL HISTORY OF -   8/1/1995    Vein stripping     SURGICAL HISTORY OF -   1975    wisdom teeth extraction      TONSILLECTOMY & ADENOIDECTOMY  1962     TUBAL LIGATION  1994       Social History     Tobacco Use     Smoking status: Never Smoker     Smokeless tobacco: Never Used   Substance Use Topics     Alcohol use: No     Family History   Problem Relation Age of Onset     C.A.D. Father         bypass     Diabetes Father      Lipids Father      Allergies Sister         allergic rhinitis     Neurologic Disorder Sister         seizure disorder     Respiratory Sister         asthma     Allergies Son      Breast Cancer No family hx of      Cancer - colorectal No family hx of          Current Outpatient Medications   Medication Sig Dispense Refill     calcium carbonate-vitamin D (OS-HOLLY) 500-400 MG-UNIT tablet Take 1 tablet by mouth daily       Multiple Vitamins-Minerals (MULTIVITAMIN WOMEN PO) Take 1 tablet by mouth daily       Probiotic Product (PROBIOTIC PO) Take 1 tablet by mouth daily       UNABLE TO FIND MEDICATION NAME: Advanced Digestive Enzyme; take 1 tab once daily in the am.       VITAMIN D, CHOLECALCIFEROL, PO Take 3 tablets by mouth daily Unknown OTC dose       acetaminophen (TYLENOL) 325 MG tablet Take 1 tablet by mouth every 6 hours as needed.       cyclobenzaprine (FLEXERIL) 10 MG tablet Take 1 tablet (10 mg) by mouth 3 times daily as needed for muscle spasms (Patient not taking: Reported on 1/17/2020) 20 tablet 0     Allergies   Allergen Reactions     Seasonal Allergies        Reviewed and updated as needed this visit by Provider         Review of Systems   ROS COMP: Constitutional, gu systems are negative, except as otherwise noted.      Objective    /68 (BP Location: Right arm,  Patient Position: Sitting, Cuff Size: Adult Regular)   Pulse 88   Temp 97.8  F (36.6  C) (Tympanic)   Wt 54.3 kg (119 lb 12.8 oz)   LMP 12/05/2010   SpO2 99%   BMI 22.64 kg/m    Body mass index is 22.64 kg/m .  Physical Exam     GENERAL: healthy, alert and no distress  RESP: lungs clear to auscultation - no rales, rhonchi or wheezes  CV: regular rate and rhythm, normal S1 S2, no S3 or S4, no murmur, click or rub  ABDOMEN: soft, non-tender, no hepatosplenomegaly, no masses and bowel sounds normal  BACK: no CVA tenderness     Diagnostic Test Results:  Labs reviewed in Epic  Results for orders placed or performed in visit on 02/12/20 (from the past 24 hour(s))   *UA reflex to Microscopic and Culture ([facility] (except [place] and [place])   Result Value Ref Range    Color Urine Yellow     Appearance Urine Clear     Glucose Urine Negative NEG^Negative mg/dL    Bilirubin Urine Negative NEG^Negative    Ketones Urine Trace (A) NEG^Negative mg/dL    Specific Gravity Urine 1.025 1.003 - 1.035    Blood Urine Negative NEG^Negative    pH Urine 6.0 5.0 - 7.0 pH    Protein Albumin Urine Negative NEG^Negative mg/dL    Urobilinogen Urine 0.2 0.2 - 1.0 EU/dL    Nitrite Urine Negative NEG^Negative    Leukocyte Esterase Urine Negative NEG^Negative    Source Midstream Urine            Assessment & Plan     1. Other microscopic hematuria    Hematuria has resolved after treatment for UTI, so was likely secondary to infection.  Advised her to call if any hematuria recurs as we would then want to pursue further work-up.      - *UA reflex to Microscopic and Culture ([place] [facility] (except [place])       Rajinder Salomon MD  [facility]

## 2020-06-22 ENCOUNTER — VIRTUAL VISIT (OUTPATIENT)
Dept: FAMILY MEDICINE | Facility: CLINIC | Age: 60
End: 2020-06-22
Payer: COMMERCIAL

## 2020-06-22 DIAGNOSIS — L23.9 ALLERGIC DERMATITIS: Primary | ICD-10-CM

## 2020-06-22 PROCEDURE — 99213 OFFICE O/P EST LOW 20 MIN: CPT | Mod: TEL | Performed by: NURSE PRACTITIONER

## 2020-06-22 RX ORDER — PREDNISONE 20 MG/1
TABLET ORAL
Qty: 20 TABLET | Refills: 0 | Status: SHIPPED | OUTPATIENT
Start: 2020-06-22 | End: 2021-09-10

## 2020-06-22 NOTE — PATIENT INSTRUCTIONS
Can stop all topical treatments.  Try the prednisone as prescribed.  Call or seek emergent care if rash is worsening or any other concerns.  Can continue with benadryl as needed for sleep.

## 2020-06-22 NOTE — PROGRESS NOTES
"Lydia Pierson is a 60 year old female who is being evaluated via a billable telephone visit.      The patient has been notified of following:     \"This telephone visit will be conducted via a call between you and your physician/provider. We have found that certain health care needs can be provided without the need for a physical exam.  This service lets us provide the care you need with a short phone conversation.  If a prescription is necessary we can send it directly to your pharmacy.  If lab work is needed we can place an order for that and you can then stop by our lab to have the test done at a later time.    Telephone visits are billed at different rates depending on your insurance coverage. During this emergency period, for some insurers they may be billed the same as an in-person visit.  Please reach out to your insurance provider with any questions.    If during the course of the call the physician/provider feels a telephone visit is not appropriate, you will not be charged for this service.\"    Patient has given verbal consent for Telephone visit?  Yes    What phone number would you like to be contacted at? [phone]    How would you like to obtain your AVS? Mail a copy    Subjective     Lydia Pierson is a 60 year old female who presents via phone visit today for the following health issues:    HPI  Rash      Duration: Started Sunday 6/7/2020    Description  Location: all over  Itching: severe    Intensity:  severe    Accompanying signs and symptoms: She got a Shingrix shot 6/5/2020 and the injection was mixed wrong.     History (similar episodes/previous evaluation): see above    Precipitating or alleviating factors:  New exposures:  medication Shingrix shot, weeding in her garden.  Recent travel: no      Therapies tried and outcome: hydrocortisone cream -  Helped the itch, calamine lotion, Benadryl/diphenhydramine -  not effective and Claritin/loratadine -  not effective       She thinks " "it's from her gardening. Rash is very itchy and she cannot \"control\" it. It's been spreading.  No other concerns and is otherwise well. She has taken prednisone in the past.        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)     Malignant neoplasm of kidney excluding renal pelvis (H)     External hemorrhoids with other complication     Sensorineural hearing loss, asymmetrical     Ovarian cyst     Health Care Home     Hyperlipidemia LDL goal <160     Advanced directives, counseling/discussion     Closed nondisplaced fracture of neck of fifth metacarpal bone of left hand     Plantar warts     Small bowel obstruction (H)     Past Surgical History:   Procedure Laterality Date     ARTHROSCOPY KNEE WITH MEDIAL MENISCECTOMY  7/7/2011    Procedure:ARTHROSCOPY KNEE WITH MEDIAL MENISCECTOMY; choice anes; Surgeon:MANUEL FLEMING; Location:[facility]     COLONOSCOPY N/A 9/4/2018    Procedure: COLONOSCOPY;  colonoscopy;  Surgeon: Carlin Rodrigues MD;  Location: [facility]     HEMORRHOIDECTOMY  2006     HERNIORRHAPHY INGUINAL Left 8/17/2017    Procedure: HERNIORRHAPHY INGUINAL;  Left Inguinal Hernia Repair;  Surgeon: Mehdi Sorensen MD;  Location: [facility]     LAPAROSCOPIC HERNIORRHAPHY INGUINAL Left 1/11/2018    Procedure: LAPAROSCOPIC HERNIORRHAPHY INGUINAL;  Laparoscopic Left Inguinal Hernia Repair converted to open left femoral  hernia repair;  Surgeon: Mehdi Sorensen MD;  Location: [facility]     NEPHRECTOMY RT/LT  2001    left partial nephrectomy- kidney ca     SALPINGO OOPHORECTOMY,R/L/KRISHNA  2010    Rt salpingo oophorectomy      SURGICAL HISTORY OF -   8/1/1995    Vein stripping     SURGICAL HISTORY OF -   1975    wisdom teeth extraction      TONSILLECTOMY & ADENOIDECTOMY  1962     TUBAL LIGATION  1994       Social History     Tobacco Use     Smoking status: Never Smoker     Smokeless tobacco: Never Used   Substance Use Topics     Alcohol use: No     Family " History   Problem Relation Age of Onset     C.A.D. Father         bypass     Diabetes Father      Lipids Father      Allergies Sister         allergic rhinitis     Neurologic Disorder Sister         seizure disorder     Respiratory Sister         asthma     Allergies Son      Breast Cancer No family hx of      Cancer - colorectal No family hx of          Current Outpatient Medications   Medication Sig Dispense Refill     calcium carbonate-vitamin D (OS-HOLLY) 500-400 MG-UNIT tablet Take 1 tablet by mouth daily       Multiple Vitamins-Minerals (MULTIVITAMIN WOMEN PO) Take 1 tablet by mouth daily       predniSONE (DELTASONE) 20 MG tablet Take 3 tabs by mouth daily x 3 days, then 2 tabs daily x 3 days, then 1 tab daily x 3 days, then 1/2 tab daily x 3 days. 20 tablet 0     Probiotic Product (PROBIOTIC PO) Take 1 tablet by mouth daily       VITAMIN D, CHOLECALCIFEROL, PO Take 3 tablets by mouth daily Unknown OTC dose       acetaminophen (TYLENOL) 325 MG tablet Take 1 tablet by mouth every 6 hours as needed.       UNABLE TO FIND MEDICATION NAME: Advanced Digestive Enzyme; take 1 tab once daily in the am.       Allergies   Allergen Reactions     Seasonal Allergies      BP Readings from Last 3 Encounters:   02/12/20 122/68   02/07/20 130/82   01/17/20 130/74    Wt Readings from Last 3 Encounters:   02/12/20 54.3 kg (119 lb 12.8 oz)   01/17/20 53.5 kg (118 lb)   12/29/19 54.6 kg (120 lb 5.9 oz)                    Reviewed and updated as needed this visit by Provider         Review of Systems          Objective   Reported vitals:  [facility] 12/05/2010      PSYCH: Alert and oriented times 3; coherent speech, normal   rate and volume, able to articulate logical thoughts, able   to abstract reason, no tangential thoughts, no hallucinations   or delusions  Her affect is normal  RESP: No cough, no audible wheezing, able to talk in full sentences  Remainder of exam unable to be completed due to telephone visits    Diagnostic Test  Results:  Labs reviewed in Epic  none         Assessment/Plan:  1. Allergic dermatitis    - predniSONE (DELTASONE) 20 MG tablet; Take 3 tabs by mouth daily x 3 days, then 2 tabs daily x 3 days, then 1 tab daily x 3 days, then 1/2 tab daily x 3 days.  Dispense: 20 tablet; Refill: 0  Discussed how to take the medication(s), expected outcomes, potential side effects.  Patient Instructions   Can stop all topical treatments.  Try the prednisone as prescribed.  Call or seek emergent care if rash is worsening or any other concerns.  Can continue with benadryl as needed for sleep.        No follow-ups on file.      Phone call duration:  8 minutes    JOAQUIN Newberry CNP

## 2020-07-14 ENCOUNTER — VIRTUAL VISIT (OUTPATIENT)
Dept: FAMILY MEDICINE | Facility: OTHER | Age: 60
End: 2020-07-14

## 2020-07-14 DIAGNOSIS — Z20.822 ENCOUNTER FOR LABORATORY TESTING FOR COVID-19 VIRUS: Primary | ICD-10-CM

## 2020-07-14 PROCEDURE — U0003 INFECTIOUS AGENT DETECTION BY NUCLEIC ACID (DNA OR RNA); SEVERE ACUTE RESPIRATORY SYNDROME CORONAVIRUS 2 (SARS-COV-2) (CORONAVIRUS DISEASE [COVID-19]), AMPLIFIED PROBE TECHNIQUE, MAKING USE OF HIGH THROUGHPUT TECHNOLOGIES AS DESCRIBED BY CMS-2020-01-R: HCPCS | Performed by: FAMILY MEDICINE

## 2020-07-14 PROCEDURE — 99207 ZZC NO CHARGE LOS: CPT

## 2020-07-14 NOTE — LETTER
July 19, 2020        Lydia Ellis [facility]  [address] [place] [address]    This letter provides a written record that you were tested for COVID-19 on 07/14/2020.       Your result was negative. This means that we didn t find the virus that causes COVID-19 in your sample. A test may show negative when you do actually have the virus. This can happen when the virus is in the early stages of infection, before you feel illness symptoms.    If you have symptoms   Stay home and away from others (self-isolate) until you meet ALL of the guidelines below:    You ve had no fever--and no medicine that reduces fever--for 3 full days (72 hours). And      Your other symptoms have gotten better. For example, your cough or breathing has improved. And     At least 10 days have passed since your symptoms started.    During this time:    Stay home. Don t go to work, school or anywhere else.     Stay in your own room, including for meals. Use your own bathroom if you can.    Stay away from others in your home. No hugging, kissing or shaking hands. No visitors.    Clean  high touch  surfaces often (doorknobs, counters, handles, etc.). Use a household cleaning spray or wipes. You can find a full list on the EPA website at www.epa.gov/pesticide-registration/list-n-disinfectants-use-against-sars-cov-2.    Cover your mouth and nose with a mask, tissue or washcloth to avoid spreading germs.    Wash your hands and face often with soap and water.    Going back to work  Check with your employer for any guidelines to follow for going back to work.    Employers: This document serves as formal notice that your employee tested negative for COVID-19, as of the testing date shown above.

## 2020-07-14 NOTE — PROGRESS NOTES
"Date: 2020 10:25:53  Clinician: Chato Santiago  Clinician NPI: [id number]  Patient: Lydia Pierson  Patient : 1960  Patient Address: [address] [place] [address] [address] [address]  Patient Phone: [phone]  Visit Protocol: URI  Patient Summary:  Lydia is a 60 year old ( : 1960 ) female who initiated a Visit for COVID-19 (Coronavirus) evaluation and screening. When asked the question \"Please sign me up to receive news, health information and promotions. \", Lydia responded \"No\".    Lydia states her symptoms started 1-2 days ago.   Her symptoms consist of malaise, a headache, chills, a sore throat, myalgia, facial pain or pressure, a cough, and nasal congestion. She is experiencing mild difficulty breathing with activities but can speak normally in full sentences.   Symptom details     Nasal secretions: The color of her mucus is white and clear.    Cough: Lydia coughs every 5-10 minutes and her cough is not more bothersome at night. Phlegm comes into her throat when she coughs. She does not believe her cough is caused by post-nasal drip. The color of the phlegm is white and clear.     Sore throat: Lydia reports having mild throat pain (1-3 on a 10 point pain scale), does not have exudate on her tonsils, and can swallow liquids. She is not sure if the lymph nodes in her neck are enlarged. A rash has not appeared on the skin since the sore throat started.     Facial pain or pressure: The facial pain or pressure does not feel worse when bending or leaning forward.     Headache: She states the headache is mild (1-3 on a 10 point pain scale).      Lydia denies having wheezing, nausea, teeth pain, ageusia, diarrhea, vomiting, rhinitis, ear pain, anosmia, and fever. She also denies having recent facial or sinus surgery in the past 60 days and taking antibiotic medication in the past month.   Precipitating events  Within the past week, Lydia has not been exposed to someone with strep throat. She has not recently been " exposed to someone with influenza. Lydia has been in close contact with the following high risk individuals: children under the age of 5, people with asthma, heart disease or diabetes, and adults 65 or older.   Pertinent COVID-19 (Coronavirus) information  In the past 14 days, Lydia has not worked in a congregate living setting.   She does not work or volunteer as healthcare worker or a  and does not work or volunteer in a healthcare facility.   Lydia also has not lived in a congregate living setting in the past 14 days. She does not live with a healthcare worker.   Lydia has not had a close contact with a laboratory-confirmed COVID-19 patient within 14 days of symptom onset.   Pertinent medical history  Lydia does not get yeast infections when she takes antibiotics.   Lydia does not need a return to work/school note.   Weight: 122 lbs   Lydia does not smoke or use smokeless tobacco.   Weight: 122 lbs    MEDICATIONS: No current medications, ALLERGIES: NKDA  Clinician Response:  Dear Lydia,   Your symptoms show that you may have coronavirus (COVID-19). This illness can cause fever, cough and trouble breathing. Many people get a mild case and get better on their own. Some people can get very sick.  What should I do?  We would like to test you for this virus.   1. Please call [phone] to schedule your visit. Explain that you were [facility] by [facility] to have a COVID-19 test. Be ready to share your [facility] visit ID number.  The following will serve as your written order for this COVID Test, ordered by me, for the indication of suspected COVID [Z20.828]: The test will be ordered in [employer], our electronic health record, after you are scheduled. It will show as ordered and authorized by Leonid Calvillo MD.  Order: COVID-19 (Coronavirus) PCR for SYMPTOMATIC testing from [facility].      2. When it's time for your COVID test:  Stay at least 6 feet away from others. (If someone will drive you to your test, stay in the backseat,  "as far away from the  as you can.)   Cover your mouth and nose with a mask, tissue or washcloth.  Go straight to the testing site. Don't make any stops on the way there or back.      3.Starting now: Stay home and away from others (self-isolate) until:   You've had no fever---and no medicine that reduces fever---for 3 full days (72 hours). And...   Your other symptoms have gotten better. For example, your cough or breathing has improved. And...   At least 10 days have passed since your symptoms started.       During this time, don't leave the house except for testing or medical care.   Stay in your own room, even for meals. Use your own bathroom if you can.   Stay away from others in your home. No hugging, kissing or shaking hands. No visitors.  Don't go to work, school or anywhere else.    Clean \"high touch\" surfaces often (doorknobs, counters, handles, etc.). Use a household cleaning spray or wipes. You'll find a full list of  on the EPA website: www.epa.gov/pesticide-registration/list-n-disinfectants-use-against-sars-cov-2.   Cover your mouth and nose with a mask, tissue or washcloth to avoid spreading germs.  Wash your hands and face often. Use soap and water.  Caregivers in these groups are at risk for severe illness due to COVID-19:  o People 65 years and older  o People who live in a nursing home or long-term care facility  o People with chronic disease (lung, heart, cancer, diabetes, kidney, liver, immunologic)  o People who have a weakened immune system, including those who:   Are in cancer treatment  Take medicine that weakens the immune system, such as corticosteroids  Had a bone marrow or organ transplant  Have an immune deficiency  Have poorly controlled HIV or AIDS  Are obese (body mass index of 40 or higher)  Smoke regularly   o Caregivers should wear gloves while washing dishes, handling laundry and cleaning bedrooms and bathrooms.  o Use caution when washing and drying laundry: Don't " shake dirty laundry, and use the warmest water setting that you can.  o For more tips, go to www.cdc.gov/coronavirus/2019-ncov/downloads/10Things.pdf.    4.Sign up for Terence Muhammad. We know it's scary to hear that you might have COVID-19. We want to track your symptoms to make sure you're okay over the next 2 weeks. Please look for an email from Terence Muhammad---this is a free, online program that we'll use to keep in touch. To sign up, follow the link in the email. Learn more at [email or web address]  How can I take care of myself?   Get lots of rest. Drink extra fluids (unless a doctor has told you not to).   Take Tylenol (acetaminophen) for fever or pain. If you have liver or kidney problems, ask your family doctor if it's okay to take Tylenol.   Adults can take either:    650 mg (two 325 mg pills) every 4 to 6 hours, or...   1,000 mg (two 500 mg pills) every 8 hours as needed.    Note: Don't take more than 3,000 mg in one day. Acetaminophen is found in many medicines (both prescribed and over-the-counter medicines). Read all labels to be sure you don't take too much.   For children, check the Tylenol bottle for the right dose. The dose is based on the child's age or weight.    If you have other health problems (like cancer, heart failure, an organ transplant or severe kidney disease): Call your specialty clinic if you don't feel better in the next 2 days.       Know when to call 911. Emergency warning signs include:    Trouble breathing or shortness of breath Pain or pressure in the chest that doesn't go away Feeling confused like you haven't felt before, or not being able to wake up Bluish-colored lips or face.  Where can I get more information?    "[employer] [facility] -- About COVID-19: [email or web address]   CDC -- What to Do If You're Sick: www.cdc.gov/coronavirus/2019-ncov/about/steps-when-sick.html   CDC -- Ending Home Isolation:  www.cdc.gov/coronavirus/2019-ncov/hcp/disposition-in-home-patients.html   [facility] [place] -- Caring for Someone: www.cdc.gov/coronavirus/2019-ncov/if-you-are-sick/care-for-someone.html   [facility] -- Interim Guidance for Hospital Discharge to Home: www.[facility].[place]/diseases/coronavirus/hcp/hospdischarge.pdf   [facility] clinical trials (COVID-19 research studies): clinicalaffairs.[facility]-clinical-trials    Below are the COVID-19 hotlines at the [place] Department of [facility] ([facility]). Interpreters are available.    For health questions: Call 809-811-2358 or 1-244.659.9843 (7 a.m. to 7 p.m.) For questions about schools and childcare: Call 646-400-1773 or 1-921.749.5322 (7 a.m. to 7 p.m.)    Diagnosis: Cough  Diagnosis ICD: R05

## 2020-07-15 LAB
SARS-COV-2 RNA SPEC QL NAA+PROBE: NOT DETECTED
SPECIMEN SOURCE: NORMAL

## 2021-05-26 ENCOUNTER — RECORDS - HEALTHEAST (OUTPATIENT)
Dept: ADMINISTRATIVE | Facility: CLINIC | Age: 61
End: 2021-05-26

## 2021-05-28 ENCOUNTER — RECORDS - HEALTHEAST (OUTPATIENT)
Dept: ADMINISTRATIVE | Facility: CLINIC | Age: 61
End: 2021-05-28

## 2021-05-30 ENCOUNTER — RECORDS - HEALTHEAST (OUTPATIENT)
Dept: ADMINISTRATIVE | Facility: CLINIC | Age: 61
End: 2021-05-30

## 2021-06-25 ENCOUNTER — IMMUNIZATION (OUTPATIENT)
Dept: LAB | Facility: CLINIC | Age: 61
End: 2021-06-25
Payer: COMMERCIAL

## 2021-07-21 ENCOUNTER — RECORDS - HEALTHEAST (OUTPATIENT)
Dept: ADMINISTRATIVE | Facility: CLINIC | Age: 61
End: 2021-07-21

## 2021-08-23 ENCOUNTER — HOSPITAL ENCOUNTER (EMERGENCY)
Facility: CLINIC | Age: 61
Discharge: HOME | End: 2021-08-23
Attending: FAMILY MEDICINE | Admitting: FAMILY MEDICINE
Payer: COMMERCIAL

## 2021-08-23 VITALS
RESPIRATION RATE: 18 BRPM | BODY MASS INDEX: 26.07 KG/M2 | WEIGHT: 138 LBS | TEMPERATURE: 97.2 F | HEART RATE: 83 BPM | DIASTOLIC BLOOD PRESSURE: 79 MMHG | SYSTOLIC BLOOD PRESSURE: 143 MMHG | OXYGEN SATURATION: 99 %

## 2021-08-23 DIAGNOSIS — N30.01 ACUTE CYSTITIS WITH HEMATURIA: ICD-10-CM

## 2021-08-23 LAB
ALBUMIN UR-MCNC: 100 MG/DL
APPEARANCE UR: ABNORMAL
BILIRUB UR QL STRIP: NEGATIVE
COLOR UR AUTO: YELLOW
GLUCOSE UR STRIP-MCNC: NEGATIVE MG/DL
HGB UR QL STRIP: ABNORMAL
KETONES UR STRIP-MCNC: NEGATIVE MG/DL
LEUKOCYTE ESTERASE UR QL STRIP: ABNORMAL
NITRATE UR QL: NEGATIVE
PH UR STRIP: 6 [PH] (ref 5–7)
RBC URINE: >182 /HPF
SP GR UR STRIP: 1.02 (ref 1–1.03)
UROBILINOGEN UR STRIP-MCNC: NORMAL MG/DL
WBC URINE: 20 /HPF

## 2021-08-23 PROCEDURE — 87086 URINE CULTURE/COLONY COUNT: CPT | Performed by: FAMILY MEDICINE

## 2021-08-23 PROCEDURE — 81001 URINALYSIS AUTO W/SCOPE: CPT | Performed by: FAMILY MEDICINE

## 2021-08-23 PROCEDURE — 99284 EMERGENCY DEPT VISIT MOD MDM: CPT | Performed by: FAMILY MEDICINE

## 2021-08-23 PROCEDURE — 99283 EMERGENCY DEPT VISIT LOW MDM: CPT | Performed by: FAMILY MEDICINE

## 2021-08-23 PROCEDURE — 250N000013 HC RX MED GY IP 250 OP 250 PS 637: Performed by: FAMILY MEDICINE

## 2021-08-23 RX ORDER — CEPHALEXIN 500 MG/1
500 CAPSULE ORAL ONCE
Status: COMPLETED | OUTPATIENT
Start: 2021-08-23 | End: 2021-08-23

## 2021-08-23 RX ORDER — CEPHALEXIN 500 MG/1
500 CAPSULE ORAL 2 TIMES DAILY
Qty: 14 CAPSULE | Refills: 0 | Status: SHIPPED | OUTPATIENT
Start: 2021-08-23 | End: 2021-08-30

## 2021-08-23 RX ADMIN — CEPHALEXIN 500 MG: 500 CAPSULE ORAL at 08:59

## 2021-08-23 NOTE — ED PROVIDER NOTES
"  HPI   Patient is a 61-year-old female presenting with concerns for bladder infection.  She had some urgency with urination yesterday.  This morning when she awoke there was dysuria, frequency, and obvious hematuria.  She did notice some clots but no difficulty with retention as described.  She has had bladder infections in the past, the most recent was about a year and a half ago.  No pelvic pain or flank pain at this time.  No nausea or vomiting.  No fever.  No lightheadedness or fainting.  No vaginal discharge or irritation.  No trauma or injury.        Allergies:  Allergies   Allergen Reactions     Seasonal Allergies      Problem List:    Patient Active Problem List    Diagnosis Date Noted     Small bowel obstruction (H) 12/29/2019     Priority: Medium     Plantar warts 01/23/2019     Priority: Medium     Closed nondisplaced fracture of neck of fifth metacarpal bone of left hand 08/21/2017     Priority: Medium     Advanced directives, counseling/discussion 02/12/2016     Priority: Medium     Patient does not have an Advance/Health Care Directive (HCD), given \"What is Advance Care Planning?\" flyer and requests blank HCD form.    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     left kidney removed 1/01 February 21, 2005 no evid of recurrence on CT chest and abdomen            Past Medical History:    Past Medical History:   Diagnosis Date     Acute lymphoid leukemia, without mention of having achieved remission(204.00) 1976     Allergic rhinitis, cause unspecified      Malignant neoplasm of kidney, except pelvis 2001     Motion sickness      Past Surgical History:    Past Surgical History:   Procedure Laterality Date     ARTHROSCOPY KNEE WITH MEDIAL MENISCECTOMY  7/7/2011    Procedure:ARTHROSCOPY KNEE WITH MEDIAL MENISCECTOMY; choice anes; Surgeon:MANUEL FLEMING; Location:[facility]     COLONOSCOPY N/A 9/4/2018    Procedure: COLONOSCOPY;  colonoscopy;  Surgeon: Carlin Rodrigues MD;  Location: [facility]     HEMORRHOIDECTOMY  2006     HERNIORRHAPHY INGUINAL Left 8/17/2017    Procedure: HERNIORRHAPHY INGUINAL;  Left Inguinal Hernia Repair;  Surgeon: Mehdi Sorensen MD;  Location: [facility]     LAPAROSCOPIC HERNIORRHAPHY INGUINAL Left 1/11/2018    Procedure: LAPAROSCOPIC HERNIORRHAPHY INGUINAL;  Laparoscopic Left Inguinal Hernia Repair converted to open left femoral  hernia repair;  Surgeon: Mehdi oSrensen MD;  Location: [facility]     NEPHRECTOMY RT/LT  2001    left partial nephrectomy- kidney ca     SALPINGO OOPHORECTOMY,R/L/KRISHNA  2010    Rt salpingo oophorectomy      SURGICAL HISTORY OF -   8/1/1995    Vein stripping     SURGICAL HISTORY OF -   1975    wisdom teeth extraction      TONSILLECTOMY & ADENOIDECTOMY  1962     TUBAL LIGATION  1994     Family History:    Family History   Problem Relation Age of Onset     C.A.D. Father         bypass     Diabetes Father      Lipids Father      Allergies Sister         allergic rhinitis     Neurologic Disorder Sister         seizure disorder     Respiratory Sister         asthma     Allergies Son      Breast Cancer No family hx of      Cancer - colorectal No family hx of      Social  History:  Marital Status:   [2]  Social History     Tobacco Use     Smoking status: Never Smoker     Smokeless tobacco: Never Used   Substance Use Topics     Alcohol use: No     Drug use: No      Medications:    cephALEXin (KEFLEX) 500 MG capsule  acetaminophen (TYLENOL) 325 MG tablet  calcium carbonate-vitamin D (OS-HOLLY) 500-400 MG-UNIT tablet  Multiple Vitamins-Minerals (MULTIVITAMIN WOMEN PO)  predniSONE (DELTASONE) 20 MG tablet  Probiotic Product (PROBIOTIC PO)  UNABLE TO FIND  VITAMIN D, CHOLECALCIFEROL, PO      Review of Systems   All other systems reviewed and are negative.      PE   BP: (!) 143/79  Pulse: 83  Temp: 97.2  F (36.2  C)  Resp: 18  Weight: 62.6 kg (138 lb)  SpO2: 99 %  Physical Exam  Vitals reviewed.   Constitutional:       General: She is not in acute distress.     Appearance: She is well-developed.   HENT:      Head: Normocephalic and atraumatic.      Right Ear: External ear normal.      Left Ear: External ear normal.      Nose: Nose normal.      Mouth/Throat:      Mouth: Mucous membranes are moist.      Pharynx: Oropharynx is clear.   Eyes:      Extraocular Movements: Extraocular movements intact.      Conjunctiva/sclera: Conjunctivae normal.      Pupils: Pupils are equal, round, and reactive to light.   Cardiovascular:      Rate and Rhythm: Normal rate and regular rhythm.   Pulmonary:      Effort: Pulmonary effort is normal.   Musculoskeletal:         General: Normal range of motion.      Cervical back: Normal range of motion.   Skin:     General: Skin is warm and dry.   Neurological:      Mental Status: She is alert and oriented to person, place, and time.   Psychiatric:         Behavior: Behavior normal.         ED COURSE and MDM   0734.  The patient has symptoms that are concerning for a bladder infection.  Urine analysis pending.  If positive, the patient will be given antibiotic here and then a prescription sent to her pharmacy.    0852.  Urine analysis is concerning for  infection, especially in light of her symptoms.  Keflex given here.  Keflex prescription for home.  Follow-up recommendations provided.    LABS  Labs Ordered and Resulted from Time of ED Arrival Up to the Time of Departure from the ED   ROUTINE UA WITH MICROSCOPIC REFLEX TO CULTURE - Abnormal; Notable for the following components:       Result Value    Appearance Urine Cloudy (*)     Blood Urine Large (*)     Protein Albumin Urine 100  (*)     Leukocyte Esterase Urine Small (*)     RBC Urine >182 (*)     WBC Urine 20 (*)     All other components within normal limits    Narrative:     Urine Culture ordered based on laboratory criteria   URINE CULTURE       IMAGING  Images reviewed by me.  Radiology report also reviewed.  No orders to display       Procedures    Medications   cephALEXin (KEFLEX) capsule 500 mg (has no administration in time range)         IMPRESSION       ICD-10-CM    1. Acute cystitis with hematuria  N30.01             Medication List      Started    cephALEXin 500 MG capsule  Commonly known as: KEFLEX  500 mg, Oral, 2 TIMES DAILY                          Jas Melton MD  08/23/21 0803

## 2021-08-23 NOTE — DISCHARGE INSTRUCTIONS
Return to the Emergency Room if the following occurs:     Severely worsened pain, fever >101, urinary retention, vomiting, or for any concern at anytime.    Or, follow-up with the following provider as we discussed:     Return to your primary doctor and/your urologist in 3-4 weeks for repeat urine analysis.  This should be done given the fact that there was blood in the urine and it should be verified that it has resolved completely (microscopic study).    Medications discussed:    Keflex.  First dose given in the ER.  Second dose can be taken this evening.  Prescription sent to [facility] pharmacy in [place], [place].    If you received pain-relieving or sedating medication during your time in the ER, avoid alcohol, driving automobiles, or working with machinery.  Also, a responsible adult must stay with you.        Call the Nurse Advice Line at [phone] or [phone] for any concern at anytime.

## 2021-08-25 LAB — BACTERIA UR CULT: ABNORMAL

## 2021-08-26 NOTE — PROGRESS NOTES
[facility]  [address] [facility] [address]  [phone]  Dept: [phone]    PRE-OP EVALUATION:  Today's date: 2017    Lydia Pierson (: 1960) presents for pre-operative evaluation assessment as requested by Dr. Sorensen.  She requires evaluation and anesthesia risk assessment prior to undergoing surgery/procedure for treatment of inguinal hernia .  Proposed procedure:        Left Inguinal Hernia Repair           Date of Surgery/ Procedure: 2017  Time of Surgery/ Procedure: 7:30 am  Hospital/Surgical Facility: [facility]  Fax number for surgical facility: [phone]  Primary Physician: Benja Cordova  Type of Anesthesia Anticipated: general    Patient has a Health Care Directive or Living Will:  NO    1. NO - Do you have a history of heart attack, stroke, stent, bypass or surgery on an artery in the head, neck, heart or legs?  2. NO - Do you ever have any pain or discomfort in your chest?  3. NO - Do you have a history of  Heart Failure?  4. NO - Are you troubled by shortness of breath when: walking on the level, up a slight hill or at night?  5. NO - Do you currently have a cold, bronchitis or other respiratory infection?  6. NO - Do you have a cough, shortness of breath or wheezing?  7. NO - Do you sometimes get pains in the calves of your legs when you walk?  8. NO - Do you or anyone in your family have previous history of blood clots?  9. NO - Do you or does anyone in your family have a serious bleeding problem such as prolonged bleeding following surgeries or cuts?  10.YES - Have you ever had problems with anemia or been told to take iron pills? With leukemia treatment  11. NO - Have you had any abnormal blood loss such as black, tarry or bloody stools, or abnormal vaginal bleeding?  12. YES - Have you ever had a blood transfusion? With the treatment  13. NO - Have you or any of your relatives ever had problems with anesthesia?  14. YES - Do you have  sleep apnea, excessive snoring or daytime drowsiness? Snoring and daytime drowsiness  15. NO - Do you have any prosthetic heart valves?  16. NO - Do you have prosthetic joints?  17. NO - Is there any chance that you may be pregnant?        HPI:                                                      Brief HPI related to upcoming procedure: Several weeks ago she developed some swelling associated with pain in the left lower abdomen and groin region. After evaluation it was determined this was an inguinal hernia. She has been seen by general surgery who recommends the above procedure      See problem list for active medical problems.  Problems all longstanding and stable, except as noted/documented.  See ROS for pertinent symptoms related to these conditions.                                                                                                  .    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     left kidney removed 1/01 February 21, 2005 no evid of recurrence on CT chest and abdomen           Advanced directives, counseling/discussion 02/12/2016     Priority: Medium     Patient does not have an Advance/Health Care Directive (HCD), declines information/referral.    Gracie Seo  February 12, 2016         Hyperlipidemia LDL goal <160 10/01/2015     Priority: Medium     Health Care Home 09/02/2011     Priority: Medium     X  09/02/2011 Unable to Contact  Rosemarie Lorenzo RN [facility]    DX V65.8 REPLACED WITH [id number] [facility] CARE HOME (04/08/2013)       Ovarian cyst 01/11/2010     Priority: Medium     Sensorineural hearing loss, asymmetrical 11/02/2007      Priority: Medium     External hemorrhoids with other complication 07/19/2006     Priority: Medium     Allergic rhinitis 02/21/2005     Priority: Medium     Rhinitis          Past Medical History:   Diagnosis Date     Ac lym leuk wo achv rmsn 1976    ALL at age 16,  no evid of recurrence     Allergic rhinitis, cause unspecified     Rhinitis     Malignant neoplasm of kidney, except pelvis 2001    left kidney removed 1/01     Past Surgical History:   Procedure Laterality Date     ARTHROSCOPY KNEE WITH MEDIAL MENISCECTOMY  7/7/2011    Procedure:ARTHROSCOPY KNEE WITH MEDIAL MENISCECTOMY; choice anes; Surgeon:MANUEL FLEMING; Location:[facility]     HEMORRHOIDECTOMY  2006     NEPHRECTOMY RT/LT  2001    left partial nephrectomy- kidney ca     SALPINGO OOPHORECTOMY,R/L/KRISHNA  2010    Rt salpingo oophorectomy      SURGICAL HISTORY OF -   8/1/1995    Vein stripping     SURGICAL HISTORY OF -   1975    wisdom teeth extraction      TONSILLECTOMY & ADENOIDECTOMY  1962     TUBAL LIGATION  1994     Current Outpatient Prescriptions   Medication Sig Dispense Refill     acetaminophen (TYLENOL) 325 MG tablet Take 1 tablet by mouth every 6 hours as needed.       ibuprofen (ADVIL,MOTRIN) 600 MG tablet Take 1 tablet by mouth every 6 hours as needed for pain (For mild pain and temperature greater than 102F). 30 tablet 0     LYSINE OR AS NEEDED       VITAMIN D 400 UNIT OR TABS 1 daily in winter       WOMENS DAILY MULTIVITAMIN OR TABS 1 TABLET DAILY       CALCIUM 600 600 MG OR TABS 1 daily       OTC products: None, except as noted above    Allergies   Allergen Reactions     Seasonal Allergies       Latex Allergy: NO    Social History   Substance Use Topics     Smoking status: Never Smoker     Smokeless tobacco: Never Used     Alcohol use No     History   Drug Use No       REVIEW OF SYSTEMS:                                                    C: NEGATIVE for fever, chills, change in weight  EYES: NEGATIVE for vision changes or irritation  E/M:  "NEGATIVE for ear, mouth and throat problems  R: NEGATIVE for significant cough or SOB  CV: NEGATIVE for chest pain, palpitations or peripheral edema  GI: NEGATIVE for nausea, heartburn, or change in bowel habits; positive for some discomfort in the region of the hernia  : negative for, dysuria, frequency, and hematuria    EXAM:                                                    /60 (BP Location: Right arm, Patient Position: Chair, Cuff Size: Adult Regular)  Pulse 68  Ht 5' 4\" (1.626 m)  Wt 158 lb 12.8 oz (72 kg)  LMP 12/05/2010  BMI 27.26 kg/m2    GENERAL APPEARANCE: healthy, alert and no distress     EYES: EOMI, PERRL     HENT: ear canals and TM's normal and nose and mouth without ulcers or lesions     NECK: no adenopathy, no asymmetry, masses, or scars and thyroid normal to palpation     RESP: lungs clear to auscultation - no rales, rhonchi or wheezes     CV: regular rates and rhythm, normal S1 S2, no S3 or S4 and no murmur, click or rub     ABDOMEN: no organomegaly and tender left lower quadrant and inguinal area, no guarding rigidity or rebound and no mass     MS: extremities normal- no gross deformities noted, no evidence of inflammation in joints, FROM in all extremities.     SKIN: no suspicious lesions or rashes     NEURO: Normal strength and tone, sensory exam grossly normal, mentation intact and speech normal     PSYCH: mentation appears normal. and affect normal/bright    DIAGNOSTICS:                                                    EKG: Not indicated due to non-vascular surgery and low risk of event (age <65 and without cardiac risk factors). She had an EKG in 9/15 in the ED which was unremarkable    Recent Labs   Lab Test  04/28/17   1531  02/12/16   0824  09/28/15   0545   HGB  12.9  13.5  13.9   PLT  207  214  203   NA   --   141  143   POTASSIUM   --   4.0  3.8   CR   --   0.59  0.58        IMPRESSION:                                                    Reason for surgery/procedure: " Symptomatic left inguinal hernia  Diagnosis/reason for consult: Evaluate for anesthesia risk     The proposed surgical procedure is considered LOW risk.    REVISED CARDIAC RISK INDEX  The patient has the following serious cardiovascular risks for perioperative complications such as (MI, PE, VFib and 3  AV Block):  No serious cardiac risks  INTERPRETATION: 0 risks: Class I (very low risk - 0.4% complication rate)    The patient has the following additional risks for perioperative complications:  No identified additional risks      ICD-10-CM    1. Preop general physical exam Z01.818        RECOMMENDATIONS:                                                          --Patient is to take all scheduled medications on the day of surgery EXCEPT for modifications listed below.    Anticoagulant or Antiplatelet Medication Use  NSAIDS: Ibuprofen (Motrin):         Stop 4 days prior to surgery          APPROVAL GIVEN to proceed with proposed procedure, without further diagnostic evaluation       Signed Electronically by: KINA El MD    Copy of this evaluation report is provided to requesting physician.    [facility] Preop Guidelines   Yes

## 2021-09-10 ENCOUNTER — OFFICE VISIT (OUTPATIENT)
Dept: FAMILY MEDICINE | Facility: CLINIC | Age: 61
End: 2021-09-10
Payer: COMMERCIAL

## 2021-09-10 VITALS
HEIGHT: 64 IN | DIASTOLIC BLOOD PRESSURE: 86 MMHG | WEIGHT: 143 LBS | TEMPERATURE: 97.1 F | HEART RATE: 76 BPM | SYSTOLIC BLOOD PRESSURE: 130 MMHG | RESPIRATION RATE: 16 BRPM | BODY MASS INDEX: 24.41 KG/M2

## 2021-09-10 DIAGNOSIS — F32.0 MAJOR DEPRESSIVE DISORDER, SINGLE EPISODE, MILD (H): Primary | ICD-10-CM

## 2021-09-10 PROCEDURE — 99214 OFFICE O/P EST MOD 30 MIN: CPT | Performed by: FAMILY MEDICINE

## 2021-09-10 RX ORDER — CITALOPRAM HYDROBROMIDE 20 MG/1
20 TABLET ORAL DAILY
Qty: 30 TABLET | Refills: 11 | Status: SHIPPED | OUTPATIENT
Start: 2021-09-10 | End: 2021-10-06

## 2021-09-10 ASSESSMENT — MIFFLIN-ST. JEOR: SCORE: 1184.67

## 2021-09-10 NOTE — PROGRESS NOTES
"    Assessment & Plan     Major depressive disorder, single episode, mild (H)  Start Celexa.  Discussed use and side effects. Follow-up for re-check in 1 month.   - citalopram (CELEXA) 20 MG tablet; Take 1 tablet (20 mg) by mouth daily To start 1/2 tab daily x 6 days then full tab daily.    UTI symptoms resolved. Advised follow-up 1 month physical and medication re-check. Due for routine screening mammogram. Mammogram scheduling information provided. She will schedule her own appointment.             Return in about 1 month (around 10/10/2021) for Physical.    Benja Cordova MD  [facility]    Tony Freeman is a 61 year old who presents for the following health issues     HPI     ED/UC Followup:    Facility:  [facility] ED  Date of visit: 8/23/2021  Reason for visit: Hematuria  Current Status: resolved     Was seen in ER 8/23/21 and treated with keflex for UTI. Symptoms have resolved.    She has a history of SAD and has noticed more baseline depressive symptoms. Hard time getting motivated to get out of bed and start her day. Wondering about starting medications.    Review of Systems   Constitutional, neuro, ENT, endocrine, pulmonary, cardiac, gastrointestinal, genitourinary, musculoskeletal, integument and psychiatric systems are negative, except as otherwise noted.       Objective    /86   Pulse 76   Temp 97.1  F (36.2  C) (Tympanic)   Resp 16   Ht 1.619 m (5' 3.75\")   Wt 64.9 kg (143 lb)   LMP 12/05/2010   BMI 24.74 kg/m    Body mass index is 24.74 kg/m .  Physical Exam   GENERAL: Pleasant, well appearing female.  PSYCH: Alert and oriented x3, neatly dressed and well groomed, makes good eye contact, fluid speech - non-pressured, no psychomotor agitation or slowing, good memory, judgement and insight, no auditory or visual hallucinations, bright affect which is mood congruent. No suicidal ideation.                 "

## 2021-09-10 NOTE — PATIENT INSTRUCTIONS
Your BMI is Body mass index is There is no height or weight on file to calculate BMI.     A BMI of 18.5 to 24.9 is in the healthy range. A person with a BMI of 25 to 29.9 is considered overweight, and someone with a BMI of 30 or greater is considered obese. More than two-thirds of American adults are considered overweight or obese.  Weight management is a personal decision.  If you are interested in exploring weight loss strategies, the following discussion covers the approaches that may be successful. Body mass index (BMI) is one way to tell whether you are at a healthy weight, overweight, or obese. It measures your weight in relation to your height.  Being overweight or obese increases the risk for further weight gain. Excess weight may lead to heart disease and diabetes.  Creating and following plans for healthy eating and physical activity may help you improve your health.  Weight control is part of healthy lifestyle and includes exercise, emotional health, and healthy eating habits. Careful eating habits lifelong are the mainstay of weight control. Though there are significant health benefits from weight loss, long-term weight loss with diet alone may be very difficult to achieve- studies show long-term success with dietary management alone in less than 10% of people. Attaining a healthy weight may be especially difficult to achieve in those with severe obesity. In some cases, medications, devices and surgical management might be considered.  What can you do?    Keep a food journal to help with mindful eating and finding ways to modify your diet.      Reduce the amount of processed food in your diet. Focus on adding vegetables, and lean proteins.    Reduce dietary carbohydrates. Limiting to  gm of carbohydrates per day has been shows to help boost weight loss.  If you have diabetes or are on diabetic medications do not do this without talking to your physician or healthcare provider.    Diet combined with  exercise helps maintain muscle while optimizing fat loss. Strength training is particularly important for building and maintaining muscle mass. Exercise helps reduce stress, increase energy, and improves fitness. Increasing exercise without diet control, however, may not burn enough calories to loose weight.         Start walking three days a week 10-20 minutes at a time    Work towards walking thirty minutes five days a week      Eventually, increase the speed of your walking for 1-2 minutes at time    In addition, we recommend that you review healthy lifestyles and methods for weight loss available through the National Institutes of Health patient information sites:  http://win.niddk.nih.gov/publications/index.htm    Also look into health and wellness programs that may be available through your health insurance provider, employer, local community center, or steffi club.

## 2021-09-10 NOTE — NURSING NOTE
"Initial /86   Pulse 76   Temp 97.1  F (36.2  C) (Tympanic)   Resp 16   Ht 1.619 m (5' 3.75\")   Wt 64.9 kg (143 lb)   LMP 12/05/2010   BMI 24.74 kg/m   Estimated body mass index is 24.74 kg/m  as calculated from the following:    Height as of this encounter: 1.619 m (5' 3.75\").    Weight as of this encounter: 64.9 kg (143 lb). .      "

## 2021-10-05 DIAGNOSIS — F32.0 MAJOR DEPRESSIVE DISORDER, SINGLE EPISODE, MILD (H): ICD-10-CM

## 2021-10-06 RX ORDER — CITALOPRAM HYDROBROMIDE 20 MG/1
20 TABLET ORAL DAILY
Qty: 30 TABLET | Refills: 10 | Status: SHIPPED | OUTPATIENT
Start: 2021-10-06 | End: 2021-10-18

## 2021-10-18 ENCOUNTER — OFFICE VISIT (OUTPATIENT)
Dept: FAMILY MEDICINE | Facility: CLINIC | Age: 61
End: 2021-10-18
Payer: COMMERCIAL

## 2021-10-18 VITALS
WEIGHT: 142 LBS | RESPIRATION RATE: 16 BRPM | TEMPERATURE: 97.5 F | HEART RATE: 70 BPM | SYSTOLIC BLOOD PRESSURE: 118 MMHG | DIASTOLIC BLOOD PRESSURE: 72 MMHG | HEIGHT: 63 IN | BODY MASS INDEX: 25.16 KG/M2 | OXYGEN SATURATION: 99 %

## 2021-10-18 DIAGNOSIS — Z13.228 SCREENING FOR ENDOCRINE, METABOLIC AND IMMUNITY DISORDER: ICD-10-CM

## 2021-10-18 DIAGNOSIS — Z00.00 ROUTINE HISTORY AND PHYSICAL EXAMINATION OF ADULT: Primary | ICD-10-CM

## 2021-10-18 DIAGNOSIS — Z12.4 SCREENING FOR MALIGNANT NEOPLASM OF CERVIX: ICD-10-CM

## 2021-10-18 DIAGNOSIS — Z13.220 LIPID SCREENING: ICD-10-CM

## 2021-10-18 DIAGNOSIS — F32.0 MAJOR DEPRESSIVE DISORDER, SINGLE EPISODE, MILD (H): ICD-10-CM

## 2021-10-18 DIAGNOSIS — Z13.29 SCREENING FOR ENDOCRINE, METABOLIC AND IMMUNITY DISORDER: ICD-10-CM

## 2021-10-18 DIAGNOSIS — Z13.0 SCREENING FOR ENDOCRINE, METABOLIC AND IMMUNITY DISORDER: ICD-10-CM

## 2021-10-18 LAB
ANION GAP SERPL CALCULATED.3IONS-SCNC: 4 MMOL/L (ref 3–14)
BUN SERPL-MCNC: 16 MG/DL (ref 7–30)
CALCIUM SERPL-MCNC: 8.8 MG/DL (ref 8.5–10.1)
CHLORIDE BLD-SCNC: 110 MMOL/L (ref 94–109)
CHOLEST SERPL-MCNC: 263 MG/DL
CO2 SERPL-SCNC: 27 MMOL/L (ref 20–32)
CREAT SERPL-MCNC: 0.64 MG/DL (ref 0.52–1.04)
FASTING STATUS PATIENT QL REPORTED: YES
GFR SERPL CREATININE-BSD FRML MDRD: >90 ML/MIN/1.73M2
GLUCOSE BLD-MCNC: 77 MG/DL (ref 70–99)
HDLC SERPL-MCNC: 69 MG/DL
LDLC SERPL CALC-MCNC: 178 MG/DL
NONHDLC SERPL-MCNC: 194 MG/DL
POTASSIUM BLD-SCNC: 4.4 MMOL/L (ref 3.4–5.3)
SODIUM SERPL-SCNC: 141 MMOL/L (ref 133–144)
TRIGL SERPL-MCNC: 79 MG/DL

## 2021-10-18 PROCEDURE — 80061 LIPID PANEL: CPT | Performed by: FAMILY MEDICINE

## 2021-10-18 PROCEDURE — 99396 PREV VISIT EST AGE 40-64: CPT | Performed by: FAMILY MEDICINE

## 2021-10-18 PROCEDURE — 87624 HPV HI-RISK TYP POOLED RSLT: CPT | Performed by: FAMILY MEDICINE

## 2021-10-18 PROCEDURE — 80048 BASIC METABOLIC PNL TOTAL CA: CPT | Performed by: FAMILY MEDICINE

## 2021-10-18 PROCEDURE — 36415 COLL VENOUS BLD VENIPUNCTURE: CPT | Performed by: FAMILY MEDICINE

## 2021-10-18 PROCEDURE — G0145 SCR C/V CYTO,THINLAYER,RESCR: HCPCS | Performed by: FAMILY MEDICINE

## 2021-10-18 RX ORDER — CITALOPRAM HYDROBROMIDE 20 MG/1
20 TABLET ORAL DAILY
Qty: 90 TABLET | Refills: 4 | Status: SHIPPED | OUTPATIENT
Start: 2021-10-18 | End: 2022-07-07

## 2021-10-18 ASSESSMENT — ENCOUNTER SYMPTOMS
SORE THROAT: 0
PALPITATIONS: 0
FEVER: 0
DIZZINESS: 1
EYE PAIN: 0
SHORTNESS OF BREATH: 0
COUGH: 0
BREAST MASS: 0
HEADACHES: 1
HEMATOCHEZIA: 0
WEAKNESS: 0
FREQUENCY: 0
PARESTHESIAS: 0
NAUSEA: 0
NERVOUS/ANXIOUS: 0
CHILLS: 0
HEMATURIA: 0
ARTHRALGIAS: 1
MYALGIAS: 0
HEARTBURN: 0
DIARRHEA: 0
ABDOMINAL PAIN: 1
CONSTIPATION: 0
JOINT SWELLING: 0
DYSURIA: 0

## 2021-10-18 ASSESSMENT — PATIENT HEALTH QUESTIONNAIRE - PHQ9
SUM OF ALL RESPONSES TO PHQ QUESTIONS 1-9: 2
5. POOR APPETITE OR OVEREATING: NOT AT ALL

## 2021-10-18 ASSESSMENT — ANXIETY QUESTIONNAIRES
2. NOT BEING ABLE TO STOP OR CONTROL WORRYING: NOT AT ALL
IF YOU CHECKED OFF ANY PROBLEMS ON THIS QUESTIONNAIRE, HOW DIFFICULT HAVE THESE PROBLEMS MADE IT FOR YOU TO DO YOUR WORK, TAKE CARE OF THINGS AT HOME, OR GET ALONG WITH OTHER PEOPLE: NOT DIFFICULT AT ALL
5. BEING SO RESTLESS THAT IT IS HARD TO SIT STILL: NOT AT ALL
GAD7 TOTAL SCORE: 0
3. WORRYING TOO MUCH ABOUT DIFFERENT THINGS: NOT AT ALL
7. FEELING AFRAID AS IF SOMETHING AWFUL MIGHT HAPPEN: NOT AT ALL
1. FEELING NERVOUS, ANXIOUS, OR ON EDGE: NOT AT ALL
6. BECOMING EASILY ANNOYED OR IRRITABLE: NOT AT ALL

## 2021-10-18 ASSESSMENT — PAIN SCALES - GENERAL: PAINLEVEL: NO PAIN (0)

## 2021-10-18 ASSESSMENT — MIFFLIN-ST. JEOR: SCORE: 1168.24

## 2021-10-18 NOTE — PROGRESS NOTES
SUBJECTIVE:   CC: Lydia Pierson is an 61 year old woman who presents for preventive health visit.       Patient has been advised of split billing requirements and indicates understanding: Yes  Healthy Habits:     Getting at least 3 servings of Calcium per day:  Yes    Bi-annual eye exam:  Yes    Dental care twice a year:  Yes    Sleep apnea or symptoms of sleep apnea:  Daytime drowsiness    Diet:  Regular (no restrictions)    Frequency of exercise:  2-3 days/week    Duration of exercise:  15-30 minutes    Medication side effects:  None    PHQ-2 Total Score: 0    Additional concerns today:  No              Today's PHQ-2 Score:   PHQ-2 ( 1999 Pfizer) 10/18/2021   Q1: Little interest or pleasure in doing things 0   Q2: Feeling down, depressed or hopeless 0   PHQ-2 Score 0   Q1: Little interest or pleasure in doing things Not at all   Q2: Feeling down, depressed or hopeless Not at all   PHQ-2 Score 0       Abuse: Current or Past (Physical, Sexual or Emotional) - No  Do you feel safe in your environment? Yes        Social History     Tobacco Use     Smoking status: Never Smoker     Smokeless tobacco: Never Used   Substance Use Topics     Alcohol use: No         Alcohol Use 10/18/2021   Prescreen: >3 drinks/day or >7 drinks/week? Not Applicable   Prescreen: >3 drinks/day or >7 drinks/week? -       Reviewed orders with patient.  Reviewed health maintenance and updated orders accordingly - Yes  Labs reviewed in EPIC  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)     Malignant neoplasm of kidney excluding renal pelvis (H)     External hemorrhoids with other complication     Sensorineural hearing loss, asymmetrical     Ovarian cyst     Health Care Home     Hyperlipidemia LDL goal <160     Advanced directives, counseling/discussion     Closed nondisplaced fracture of neck of fifth metacarpal bone of left hand     Plantar warts     Small bowel obstruction  (H)     Past Surgical History:   Procedure Laterality Date     ARTHROSCOPY KNEE WITH MEDIAL MENISCECTOMY  7/7/2011    Procedure:ARTHROSCOPY KNEE WITH MEDIAL MENISCECTOMY; choice anes; Surgeon:MANUEL FLEMING; Location:[facility]     COLONOSCOPY N/A 9/4/2018    Procedure: COLONOSCOPY;  colonoscopy;  Surgeon: Carlin Rodrigues MD;  Location: [facility]     HEMORRHOIDECTOMY  2006     HERNIORRHAPHY INGUINAL Left 8/17/2017    Procedure: HERNIORRHAPHY INGUINAL;  Left Inguinal Hernia Repair;  Surgeon: Mehdi Sorensen MD;  Location: [facility]     LAPAROSCOPIC HERNIORRHAPHY INGUINAL Left 1/11/2018    Procedure: LAPAROSCOPIC HERNIORRHAPHY INGUINAL;  Laparoscopic Left Inguinal Hernia Repair converted to open left femoral  hernia repair;  Surgeon: Mehdi Sorensen MD;  Location: [facility]     NEPHRECTOMY RT/LT  2001    left partial nephrectomy- kidney ca     SALPINGO OOPHORECTOMY,R/L/KRISHNA  2010    Rt salpingo oophorectomy      SURGICAL HISTORY OF -   8/1/1995    Vein stripping     SURGICAL HISTORY OF -   1975    wisdom teeth extraction      TONSILLECTOMY & ADENOIDECTOMY  1962     TUBAL LIGATION  1994       Social History     Tobacco Use     Smoking status: Never Smoker     Smokeless tobacco: Never Used   Substance Use Topics     Alcohol use: No     Family History   Problem Relation Age of Onset     C.A.D. Father         bypass     Diabetes Father      Lipids Father      Allergies Sister         allergic rhinitis     Neurologic Disorder Sister         seizure disorder     Respiratory Sister         asthma     Allergies Son      Breast Cancer No family hx of      Cancer - colorectal No family hx of          Current Outpatient Medications   Medication Sig Dispense Refill     acetaminophen (TYLENOL) 325 MG tablet Take 1 tablet by mouth every 6 hours as needed.       calcium carbonate-vitamin D (OS-HOLLY) 500-400 MG-UNIT tablet Take 1 tablet by mouth daily       citalopram (CELEXA) 20 MG tablet Take 1 tablet (20 mg) by mouth daily 90  tablet 4     Multiple Vitamins-Minerals (MULTIVITAMIN WOMEN PO) Take 1 tablet by mouth daily       Probiotic Product (PROBIOTIC PO) Take 1 tablet by mouth daily       UNABLE TO FIND MEDICATION NAME: Advanced Digestive Enzyme; take 1 tab once daily in the am.       VITAMIN D, CHOLECALCIFEROL, PO Take 3 tablets by mouth daily Unknown OTC dose       Allergies   Allergen Reactions     Seasonal Allergies        Breast Cancer Screening:    Breast CA Risk Assessment (FHS-7) 10/18/2021   Do you have a family history of breast, colon, or ovarian cancer? No / Unknown         Mammogram Screening: Recommended mammography every 1-2 years with patient discussion and risk factor consideration  Pertinent mammograms are reviewed under the imaging tab.    History of abnormal Pap smear:   NO - age 30-65 PAP every 5 years with negative HPV co-testing recommended  Last 3 Pap and HPV Results:   PAP / HPV Latest Ref Rng & Units 2/12/2016 4/29/2011   PAP (Historical) - NIL NIL   HPV16 NEG Negative -   HPV18 NEG Negative -   HRHPV NEG Negative -     PAP / HPV Latest Ref Rng & Units 2/12/2016 4/29/2011   PAP (Historical) - NIL NIL   HPV16 NEG Negative -   HPV18 NEG Negative -   HRHPV NEG Negative -     Reviewed and updated as needed this visit by clinical staff  Tobacco  Allergies  Meds  Problems  Med Hx  Surg Hx  Fam Hx  Soc Hx          Reviewed and updated as needed this visit by Provider  Tobacco  Allergies  Meds  Problems  Med Hx  Surg Hx  Fam Hx         Past Medical History:   Diagnosis Date     Acute lymphoid leukemia, without mention of having achieved remission(204.00) 1976    ALL at age 16,  no evid of recurrence     Allergic rhinitis, cause unspecified     Rhinitis     Malignant neoplasm of kidney, except pelvis 2001    left kidney removed 1/01     Motion sickness       Past Surgical History:   Procedure Laterality Date     ARTHROSCOPY KNEE WITH MEDIAL MENISCECTOMY  7/7/2011    Procedure:ARTHROSCOPY KNEE WITH MEDIAL  MENISCECTOMY; choice anes; Surgeon:MANUEL FLEMING; Location:[facility]     COLONOSCOPY N/A 9/4/2018    Procedure: COLONOSCOPY;  colonoscopy;  Surgeon: Carlin Rodrigues MD;  Location: [facility]     HEMORRHOIDECTOMY  2006     HERNIORRHAPHY INGUINAL Left 8/17/2017    Procedure: HERNIORRHAPHY INGUINAL;  Left Inguinal Hernia Repair;  Surgeon: Mehdi Sorensen MD;  Location: [facility]     LAPAROSCOPIC HERNIORRHAPHY INGUINAL Left 1/11/2018    Procedure: LAPAROSCOPIC HERNIORRHAPHY INGUINAL;  Laparoscopic Left Inguinal Hernia Repair converted to open left femoral  hernia repair;  Surgeon: Mehdi Sorensen MD;  Location: [facility]     NEPHRECTOMY RT/LT  2001    left partial nephrectomy- kidney ca     SALPINGO OOPHORECTOMY,R/L/KRISHNA  2010    Rt salpingo oophorectomy      SURGICAL HISTORY OF -   8/1/1995    Vein stripping     SURGICAL HISTORY OF -   1975    wisdom teeth extraction      TONSILLECTOMY & ADENOIDECTOMY  1962     TUBAL LIGATION  1994       Review of Systems   Constitutional: Negative for chills and fever.   HENT: Positive for congestion and hearing loss. Negative for ear pain and sore throat.    Eyes: Negative for pain and visual disturbance.   Respiratory: Negative for cough and shortness of breath.    Cardiovascular: Negative for chest pain, palpitations and peripheral edema.   Gastrointestinal: Positive for abdominal pain. Negative for constipation, diarrhea, heartburn, hematochezia and nausea.   Breasts:  Negative for tenderness, breast mass and discharge.   Genitourinary: Negative for dysuria, frequency, genital sores, hematuria, pelvic pain, urgency, vaginal bleeding and vaginal discharge.   Musculoskeletal: Positive for arthralgias. Negative for joint swelling and myalgias.   Skin: Negative for rash.   Neurological: Positive for dizziness and headaches. Negative for weakness and paresthesias.   Psychiatric/Behavioral: Positive for mood changes. The patient is not nervous/anxious.           OBJECTIVE:   /72  "  Pulse 70   Temp 97.5  F (36.4  C) (Tympanic)   Resp 16   Ht 1.6 m (5' 3\")   Wt 64.4 kg (142 lb)   LMP 12/05/2010   SpO2 99%   BMI 25.15 kg/m    Physical Exam  GENERAL APPEARANCE: healthy, alert and no distress  EYES: Eyes grossly normal to inspection, PERRL and conjunctivae and sclerae normal  HENT: ear canals and TM's normal  NECK: no adenopathy, no asymmetry, masses, or scars and thyroid normal to palpation  RESP: lungs clear to auscultation - no rales, rhonchi or wheezes  BREAST: normal without masses, tenderness or nipple discharge and no palpable axillary masses or adenopathy  CV: regular rate and rhythm, normal S1 S2, no S3 or S4, no murmur, click or rub, no peripheral edema and peripheral pulses strong  ABDOMEN: soft, nontender, no hepatosplenomegaly, no masses and bowel sounds normal  MS: no musculoskeletal defects are noted and gait is age appropriate without ataxia  SKIN: no suspicious lesions or rashes  NEURO: Normal strength and tone, sensory exam grossly normal, mentation intact and speech normal  PSYCH: mentation appears normal and affect normal/bright    Diagnostic Test Results:  Labs reviewed in Epic    ASSESSMENT/PLAN:   Routine history and physical examination of adult  Declined flu vaccine.  - MA SCREENING DIGITAL BILAT - Future  (s+30); Future  - Pap Screen with HPV - recommended age 30 - 65 years    Major depressive disorder, single episode, mild (H)  Well controlled. Refilled medication.     - citalopram (CELEXA) 20 MG tablet; Take 1 tablet (20 mg) by mouth daily    Lipid screening  - Lipid panel reflex to direct LDL Fasting; Future  - Lipid panel reflex to direct LDL Fasting    Screening for endocrine, metabolic and immunity disorder  - Basic metabolic panel; Future  - Basic metabolic panel        Patient has been advised of split billing requirements and indicates understanding: Yes  COUNSELING:  Reviewed preventive health counseling, as reflected in patient " "instructions    Estimated body mass index is 25.15 kg/m  as calculated from the following:    Height as of this encounter: 1.6 m (5' 3\").    Weight as of this encounter: 64.4 kg (142 lb).    Weight management plan: See AVS    She reports that she has never smoked. She has never used smokeless tobacco.      Counseling Resources:  ATP IV Guidelines  Pooled Cohorts Equation Calculator  Breast Cancer Risk Calculator  BRCA-Related Cancer Risk Assessment: FHS-7 Tool  FRAX Risk Assessment  ICSI Preventive Guidelines  Dietary Guidelines for Americans, 2010  USDA's MyPlate  ASA Prophylaxis  Lung CA Screening    Benja Cordova MD  [facility]  "

## 2021-10-18 NOTE — PATIENT INSTRUCTIONS
Your BMI is Body mass index is Body mass index is 25.15 kg/m .     A BMI of 18.5 to 24.9 is in the healthy range. A person with a BMI of 25 to 29.9 is considered overweight, and someone with a BMI of 30 or greater is considered obese. More than two-thirds of American adults are considered overweight or obese.  Weight management is a personal decision.  If you are interested in exploring weight loss strategies, the following discussion covers the approaches that may be successful. Body mass index (BMI) is one way to tell whether you are at a healthy weight, overweight, or obese. It measures your weight in relation to your height.  Being overweight or obese increases the risk for further weight gain. Excess weight may lead to heart disease and diabetes.  Creating and following plans for healthy eating and physical activity may help you improve your health.  Weight control is part of healthy lifestyle and includes exercise, emotional health, and healthy eating habits. Careful eating habits lifelong are the mainstay of weight control. Though there are significant health benefits from weight loss, long-term weight loss with diet alone may be very difficult to achieve- studies show long-term success with dietary management alone in less than 10% of people. Attaining a healthy weight may be especially difficult to achieve in those with severe obesity. In some cases, medications, devices and surgical management might be considered.  What can you do?    Keep a food journal to help with mindful eating and finding ways to modify your diet.      Reduce the amount of processed food in your diet. Focus on adding vegetables, and lean proteins.    Reduce dietary carbohydrates. Limiting to  gm of carbohydrates per day has been shows to help boost weight loss.  If you have diabetes or are on diabetic medications do not do this without talking to your physician or healthcare provider.    Diet combined with exercise helps  maintain muscle while optimizing fat loss. Strength training is particularly important for building and maintaining muscle mass. Exercise helps reduce stress, increase energy, and improves fitness. Increasing exercise without diet control, however, may not burn enough calories to loose weight.         Start walking three days a week 10-20 minutes at a time    Work towards walking thirty minutes five days a week      Eventually, increase the speed of your walking for 1-2 minutes at time    In addition, we recommend that you review healthy lifestyles and methods for weight loss available through the National Institutes of Health patient information sites:  http://win.niddk.nih.gov/publications/index.htm    Also look into health and wellness programs that may be available through your health insurance provider, employer, local community center, or steffi club.

## 2021-10-18 NOTE — NURSING NOTE
"Initial /72   Pulse 70   Temp 97.5  F (36.4  C) (Tympanic)   Resp 16   Ht 1.6 m (5' 3\")   Wt 64.4 kg (142 lb)   LMP 12/05/2010   SpO2 99%   BMI 25.15 kg/m   Estimated body mass index is 25.15 kg/m  as calculated from the following:    Height as of this encounter: 1.6 m (5' 3\").    Weight as of this encounter: 64.4 kg (142 lb). .      "

## 2021-10-19 ASSESSMENT — ANXIETY QUESTIONNAIRES: GAD7 TOTAL SCORE: 0

## 2021-10-20 LAB
BKR LAB AP GYN ADEQUACY: NORMAL
BKR LAB AP GYN INTERPRETATION: NORMAL
BKR LAB AP HPV REFLEX: NORMAL
BKR LAB AP PREVIOUS ABNORMAL: NORMAL
PATH REPORT.COMMENTS IMP SPEC: NORMAL
PATH REPORT.RELEVANT HX SPEC: NORMAL

## 2021-10-22 LAB
HUMAN PAPILLOMA VIRUS 16 DNA: NEGATIVE
HUMAN PAPILLOMA VIRUS 18 DNA: NEGATIVE
HUMAN PAPILLOMA VIRUS FINAL DIAGNOSIS: NORMAL
HUMAN PAPILLOMA VIRUS OTHER HR: NEGATIVE

## 2021-11-13 ENCOUNTER — HEALTH MAINTENANCE LETTER (OUTPATIENT)
Age: 61
End: 2021-11-13

## 2021-12-03 ENCOUNTER — OFFICE VISIT (OUTPATIENT)
Dept: URGENT CARE | Facility: URGENT CARE | Age: 61
End: 2021-12-03
Payer: COMMERCIAL

## 2021-12-03 ENCOUNTER — TELEPHONE (OUTPATIENT)
Dept: FAMILY MEDICINE | Facility: CLINIC | Age: 61
End: 2021-12-03
Payer: COMMERCIAL

## 2021-12-03 VITALS
OXYGEN SATURATION: 98 % | TEMPERATURE: 98.2 F | DIASTOLIC BLOOD PRESSURE: 70 MMHG | RESPIRATION RATE: 18 BRPM | WEIGHT: 143 LBS | SYSTOLIC BLOOD PRESSURE: 118 MMHG | BODY MASS INDEX: 25.33 KG/M2 | HEART RATE: 88 BPM

## 2021-12-03 DIAGNOSIS — N30.00 ACUTE CYSTITIS WITHOUT HEMATURIA: Primary | ICD-10-CM

## 2021-12-03 DIAGNOSIS — R30.0 DYSURIA: ICD-10-CM

## 2021-12-03 LAB
ALBUMIN UR-MCNC: NEGATIVE MG/DL
APPEARANCE UR: CLEAR
BACTERIA #/AREA URNS HPF: ABNORMAL /HPF
BILIRUB UR QL STRIP: NEGATIVE
COLOR UR AUTO: YELLOW
GLUCOSE UR STRIP-MCNC: NEGATIVE MG/DL
HGB UR QL STRIP: ABNORMAL
KETONES UR STRIP-MCNC: NEGATIVE MG/DL
LEUKOCYTE ESTERASE UR QL STRIP: ABNORMAL
NITRATE UR QL: NEGATIVE
PH UR STRIP: 7 [PH] (ref 5–7)
RBC #/AREA URNS AUTO: ABNORMAL /HPF
SP GR UR STRIP: 1.01 (ref 1–1.03)
SQUAMOUS #/AREA URNS AUTO: ABNORMAL /LPF
UROBILINOGEN UR STRIP-ACNC: 1 E.U./DL
WBC #/AREA URNS AUTO: ABNORMAL /HPF

## 2021-12-03 PROCEDURE — 87086 URINE CULTURE/COLONY COUNT: CPT | Performed by: PHYSICIAN ASSISTANT

## 2021-12-03 PROCEDURE — 87186 SC STD MICRODIL/AGAR DIL: CPT | Performed by: PHYSICIAN ASSISTANT

## 2021-12-03 PROCEDURE — 99213 OFFICE O/P EST LOW 20 MIN: CPT | Performed by: PHYSICIAN ASSISTANT

## 2021-12-03 PROCEDURE — 81001 URINALYSIS AUTO W/SCOPE: CPT | Performed by: PHYSICIAN ASSISTANT

## 2021-12-03 RX ORDER — SULFAMETHOXAZOLE/TRIMETHOPRIM 800-160 MG
1 TABLET ORAL 2 TIMES DAILY
Qty: 10 TABLET | Refills: 0 | Status: SHIPPED | OUTPATIENT
Start: 2021-12-03 | End: 2021-12-08

## 2021-12-03 NOTE — TELEPHONE ENCOUNTER
Patient is on her way to  in [place] for UTI.  No trevor testing is needed.  She could go onto [facility] website for the monoclonial infusions if she would like. Alessia FIELDS RN

## 2021-12-03 NOTE — TELEPHONE ENCOUNTER
Reason for Call:  Other call back    Detailed comments: PT tested positive for Covid from a test on 12/1 and PT's  also tested positive.  Both had a rapid test.  PT has had symptoms since 11/30.  PT has questions about antibody testing is this something should do.    Phone Number Patient can be reached at: Cell number on file:    Telephone Information:   Mobile [phone]       Best Time: Anytime    Can we leave a detailed message on this number? YES    Call taken on 12/3/2021 at 11:03 AM by Diana Huffman

## 2021-12-03 NOTE — PROGRESS NOTES
Assessment & Plan     Acute cystitis without hematuria  Will treat with sulfamethoxazole-trimethoprim (BACTRIM DS) 800-160 MG tablet; Take 1 tablet by mouth 2 times daily for 5 days. Push fluids. Return to clinic if symptoms worsen or do not improve; otherwise follow up as needed      Dysuria    - UA macro with reflex to Microscopic and Culture - Clinc Collect  - Urine Microscopic Exam  - Urine Culture                 Return in about 2 days (around 12/5/2021), or if symptoms worsen or fail to improve.    CHRIST Rocha [facility] [place]            Subjective   Chief Complaint   Patient presents with     Urinary Problem     Urgency, burning with urination, strong odor to urine. Symptoms began yesterday. Is covid positive.       HPI     UTI    Onset of symptoms was 1day(s).  Course of illness is same  Severity moderate  Current and associated symptoms dysuria and frequency  Treatment and measures tried Increase fluid intake  Predisposing factors include history of frequent UTI's  Patient denies rigors, flank pain and temperature > 101 degrees F.                    Review of Systems   Constitutional, HEENT, cardiovascular, pulmonary, gi and gu systems are negative, except as otherwise noted.      Objective    /70 (BP Location: Right arm, Patient Position: Sitting, Cuff Size: Adult Regular)   Pulse 88   Temp 98.2  F (36.8  C) (Tympanic)   Resp 18   Wt 64.9 kg (143 lb)   LMP 12/05/2010   SpO2 98%   BMI 25.33 kg/m    Body mass index is 25.33 kg/m .  Physical Exam  Constitutional:       General: She is not in acute distress.     Appearance: She is well-developed.   Abdominal:      Tenderness: There is no right CVA tenderness or left CVA tenderness.   Psychiatric:         Behavior: Behavior normal.            Results for orders placed or performed in visit on 12/03/21 (from the past 24 hour(s))   UA macro with reflex to Microscopic and Culture - Clinc Collect    Specimen:  Urine, Clean Catch   Result Value Ref Range    Color Urine Yellow Colorless, Straw, Light Yellow, Yellow    Appearance Urine Clear Clear    Glucose Urine Negative Negative mg/dL    Bilirubin Urine Negative Negative    Ketones Urine Negative Negative mg/dL    Specific Gravity Urine 1.015 1.003 - 1.035    Blood Urine Large (A) Negative    pH Urine 7.0 5.0 - 7.0    Protein Albumin Urine Negative Negative mg/dL    Urobilinogen Urine 1.0 0.2, 1.0 E.U./dL    Nitrite Urine Negative Negative    Leukocyte Esterase Urine Large (A) Negative   Urine Microscopic Exam   Result Value Ref Range    Bacteria Urine Few (A) None Seen /HPF    RBC Urine 10-25 (A) 0-2 /HPF /HPF    WBC Urine 25-50 (A) 0-5 /HPF /HPF    Squamous Epithelials Urine Few (A) None Seen /LPF

## 2021-12-05 LAB — BACTERIA UR CULT: ABNORMAL

## 2021-12-31 ENCOUNTER — HOSPITAL ENCOUNTER (OUTPATIENT)
Dept: MAMMOGRAPHY | Facility: CLINIC | Age: 61
Discharge: HOME | End: 2021-12-31
Attending: FAMILY MEDICINE | Admitting: FAMILY MEDICINE
Payer: COMMERCIAL

## 2021-12-31 DIAGNOSIS — Z12.31 VISIT FOR SCREENING MAMMOGRAM: ICD-10-CM

## 2021-12-31 PROCEDURE — 77067 SCR MAMMO BI INCL CAD: CPT

## 2022-02-02 ENCOUNTER — VIRTUAL VISIT (OUTPATIENT)
Dept: FAMILY MEDICINE | Facility: CLINIC | Age: 62
End: 2022-02-02
Payer: COMMERCIAL

## 2022-02-02 ENCOUNTER — NURSE TRIAGE (OUTPATIENT)
Dept: FAMILY MEDICINE | Facility: CLINIC | Age: 62
End: 2022-02-02

## 2022-02-02 DIAGNOSIS — J01.90 ACUTE SINUSITIS WITH SYMPTOMS > 10 DAYS: Primary | ICD-10-CM

## 2022-02-02 PROCEDURE — 99213 OFFICE O/P EST LOW 20 MIN: CPT | Mod: TEL | Performed by: PHYSICIAN ASSISTANT

## 2022-02-02 NOTE — PROGRESS NOTES
Lydia is a 61 year old who is being evaluated via a billable telephone visit.      What phone number would you like to be contacted at? [phone]  How would you like to obtain your AVS? MyChart    Assessment & Plan     Acute sinusitis with symptoms > 10 days  Worsening symptomatology of sinusitis.  Recommend treatment with Augmentin.  Supportive care with saline nasal spray, Mucinex for decongestion and Tylenol for pain control.  Steamy showers also recommended.  - amoxicillin-clavulanate (AUGMENTIN) 875-125 MG tablet  Dispense: 14 tablet; Refill: 0      Return in about 5 days (around 2/7/2022) for Evaluation if worsening or not improving.    Randa Medina PA-C  [facility]   Lydia is a 61 year old who presents for the following health issues     HPI     Acute Illness  Acute illness concerns: Sinus Problem  Onset/Duration: x1 week  Symptoms:  Fever: no  Chills/Sweats: no  Headache (location?): YES  Sinus Pressure: YES  Conjunctivitis:  no  Ear Pain: YES- left pressure off and on  Rhinorrhea: YES- bloody, clear-yellow  Congestion: YES- nasal  Sore Throat: YES- little - off and on-more in morning  Cough: YES-dry and productive of clear sputum, productive of yellow sputum  Wheeze: no  Decreased Appetite: no  Nausea: no  Vomiting: no  Diarrhea: no  Dysuria/Freq.: no  Dysuria or Hematuria: no  Fatigue/Achiness: no  Sick/Strep Exposure: no  Therapies tried and outcome: advil - minor relief, saline solution    COVID 1.5 months ago.  Resolved completely    Review of Systems   Constitutional, HEENT, cardiovascular, pulmonary, GI, , musculoskeletal, neuro, skin, endocrine and psych systems are negative, except as otherwise noted.      Objective           Vitals:  No vitals were obtained today due to virtual visit.    Physical Exam   healthy, alert and no distress  PSYCH: Alert and oriented times 3; coherent speech, normal   rate and volume, able to articulate logical thoughts,  able   to abstract reason, no tangential thoughts, no hallucinations   or delusions  Her affect is normal  RESP: No cough, no audible wheezing, able to talk in full sentences  Remainder of exam unable to be completed due to telephone visits          Phone call duration: 6 minutes

## 2022-02-02 NOTE — TELEPHONE ENCOUNTER
"She has been coughing for about a week. She had covid 6 weeks ago. She notes sinus pain and pressure for the past week. This morning she noticed some brown tinged mucus. Per protocol she needs appt. Transferred to schedule.Sue Del Angel RN on 2/2/2022 at 8:25 AM      Additional Information    Negative: Sounds like a life-threatening emergency to the triager    Negative: Difficulty breathing, and not from stuffy nose (e.g., not relieved by cleaning out the nose)    Negative: SEVERE headache and has fever    Negative: Patient sounds very sick or weak to the triager    Negative: SEVERE sinus pain    Negative: Severe headache    Negative: Redness or swelling on the cheek, forehead, or around the eye    Negative: Fever > 103 F (39.4 C)    Negative: Fever > 101 F (38.3 C) and over 60 years of age    Negative: Fever > 100.0 F (37.8 C) and has diabetes mellitus or a weak immune system (e.g., HIV positive, cancer chemotherapy, organ transplant, splenectomy, chronic steroids)    Negative: Fever > 100.0 F (37.8 C) and bedridden (e.g., nursing home patient, stroke, chronic illness, recovering from surgery)    Lots of coughing    Negative: Using nasal washes and pain medicine > 24 hours and sinus pain (lower forehead, cheekbone, or eye) persists    Negative: Nasal discharge present > 10 days    Negative: Sinus congestion (pressure, fullness) present > 10 days    Negative: Fever present > 3 days (72 hours)    Negative: Fever returns after gone for over 24 hours and symptoms worse or not improved    Negative: Sinus pain (not just congestion) and fever    Negative: Earache    Answer Assessment - Initial Assessment Questions  1. LOCATION: \"Where does it hurt?\"       sinuses  2. ONSET: \"When did the sinus pain start?\"  (e.g., hours, days)       Week ago  3. SEVERITY: \"How bad is the pain?\"   (Scale 1-10; mild, moderate or severe)    - MILD (1-3): doesn't interfere with normal activities     - MODERATE (4-7): interferes with normal " "activities (e.g., work or school) or awakens from sleep    - SEVERE (8-10): excruciating pain and patient unable to do any normal activities         moderate  4. RECURRENT SYMPTOM: \"Have you ever had sinus problems before?\" If so, ask: \"When was the last time?\" and \"What happened that time?\"       unsure  5. NASAL CONGESTION: \"Is the nose blocked?\" If so, ask, \"Can you open it or must you breathe through the mouth?\"      no  6. NASAL DISCHARGE: \"Do you have discharge from your nose?\" If so ask, \"What color?\"      Yes, yellowish and then brownish this morning  7. FEVER: \"Do you have a fever?\" If so, ask: \"What is it, how was it measured, and when did it start?\"       no  8. OTHER SYMPTOMS: \"Do you have any other symptoms?\" (e.g., sore throat, cough, earache, difficulty breathing)      cough  9. PREGNANCY: \"Is there any chance you are pregnant?\" \"When was your last menstrual period?\"      no    Protocols used: SINUS PAIN AND CONGESTION-A-OH      "

## 2022-07-07 ENCOUNTER — OFFICE VISIT (OUTPATIENT)
Dept: FAMILY MEDICINE | Facility: CLINIC | Age: 62
End: 2022-07-07
Payer: COMMERCIAL

## 2022-07-07 VITALS
DIASTOLIC BLOOD PRESSURE: 78 MMHG | HEART RATE: 65 BPM | TEMPERATURE: 97.1 F | OXYGEN SATURATION: 97 % | SYSTOLIC BLOOD PRESSURE: 128 MMHG | WEIGHT: 151 LBS | BODY MASS INDEX: 26.75 KG/M2 | HEIGHT: 63 IN | RESPIRATION RATE: 16 BRPM

## 2022-07-07 DIAGNOSIS — F32.0 MAJOR DEPRESSIVE DISORDER, SINGLE EPISODE, MILD (H): Primary | ICD-10-CM

## 2022-07-07 DIAGNOSIS — R19.7 DIARRHEA, UNSPECIFIED TYPE: ICD-10-CM

## 2022-07-07 DIAGNOSIS — C91.01 ACUTE LYMPHOCYTIC LEUKEMIA IN REMISSION (H): ICD-10-CM

## 2022-07-07 PROCEDURE — 99213 OFFICE O/P EST LOW 20 MIN: CPT | Performed by: FAMILY MEDICINE

## 2022-07-07 ASSESSMENT — ENCOUNTER SYMPTOMS
DYSURIA: 0
JOINT SWELLING: 1
EYE PAIN: 0
HEARTBURN: 0
ARTHRALGIAS: 1
COUGH: 0
WEAKNESS: 0
SHORTNESS OF BREATH: 0
DIZZINESS: 1
FEVER: 0
PARESTHESIAS: 0
PALPITATIONS: 0
FREQUENCY: 0
NAUSEA: 1
HEMATURIA: 0
NERVOUS/ANXIOUS: 0
CONSTIPATION: 0
SORE THROAT: 1
MYALGIAS: 0
ABDOMINAL PAIN: 1
DIARRHEA: 1
HEADACHES: 1
CHILLS: 1
BREAST MASS: 0
HEMATOCHEZIA: 0

## 2022-07-07 ASSESSMENT — PAIN SCALES - GENERAL: PAINLEVEL: NO PAIN (0)

## 2022-07-07 ASSESSMENT — PATIENT HEALTH QUESTIONNAIRE - PHQ9
SUM OF ALL RESPONSES TO PHQ QUESTIONS 1-9: 2
SUM OF ALL RESPONSES TO PHQ QUESTIONS 1-9: 2
10. IF YOU CHECKED OFF ANY PROBLEMS, HOW DIFFICULT HAVE THESE PROBLEMS MADE IT FOR YOU TO DO YOUR WORK, TAKE CARE OF THINGS AT HOME, OR GET ALONG WITH OTHER PEOPLE: NOT DIFFICULT AT ALL

## 2022-07-07 NOTE — NURSING NOTE
"Initial /78   Pulse 65   Temp 97.1  F (36.2  C) (Tympanic)   Resp 16   Ht 1.6 m (5' 3\")   Wt 68.5 kg (151 lb)   LMP 12/05/2010   SpO2 97%   BMI 26.75 kg/m   Estimated body mass index is 26.75 kg/m  as calculated from the following:    Height as of this encounter: 1.6 m (5' 3\").    Weight as of this encounter: 68.5 kg (151 lb). .      "

## 2022-07-07 NOTE — PROGRESS NOTES
"  Assessment & Plan     Major depressive disorder, single episode, mild (H)  In remission.  She has weaned down to escitalopram 10 mg daily.  She like to try going off.  Advised she can go ahead and do so.  Follow-up as needed.    Diarrhea, unspecified type  She has had this intermittently since COVID.  We certainly have seen some diarrhea as COVID long-haul symptoms.  Discussed we could do additional work-up including stool cultures and labs.  She prefer to hold off on that for now since its not very frequent.  She plans to schedule a physical in October and will recheck with me on that then.  Sooner if worsening symptoms.  Advised in the absence of fevers or bloody stools she could try some Imodium after episodes of diarrhea and see if that helps.    HCC chart reconciliation and known higher risk conditions that I take into account for medical decision making:   Acute lymphocytic leukemia in remission (H)  Labs of been stable.  Checking annually with physical.           BMI:   Estimated body mass index is 26.75 kg/m  as calculated from the following:    Height as of this encounter: 1.6 m (5' 3\").    Weight as of this encounter: 68.5 kg (151 lb).       No follow-ups on file.    Benja Cordova MD  [facility]    Tony Freeman is a 62 year old, presenting for the following health issues:  Diarrhea and Recheck Medication      HPI     Medication Followup of citalopram    Taking Medication as prescribed: yes but decreased dose to 0.5 tablet    Side Effects:  None    Medication Helping Symptoms:  Yes    Patient would like to discontinue medication    Diarrhea  Onset/Duration: weekly for the past 6 months  Description:       Consistency of stool: loose and explosive       Blood in stool: No       Number of loose stools past 24 hours: 1  Progression of Symptoms: intermittent  Accompanying signs and symptoms:       Fever: No       Nausea/Vomiting: No       Abdominal pain: YES       " "Weight loss: No       Episodes of constipation: No  History   Ill contacts: No  Recent use of antibiotics: No  Recent travels: No  Recent medication-new or changes(Rx or OTC): No  Precipitating or alleviating factors: None  Therapies tried and outcome: Imodium AD        Review of Systems   Constitutional: Positive for chills. Negative for fever.   HENT: Positive for congestion, hearing loss and sore throat. Negative for ear pain.    Eyes: Negative for pain and visual disturbance.   Respiratory: Negative for cough and shortness of breath.    Cardiovascular: Negative for chest pain, palpitations and peripheral edema.   Gastrointestinal: Positive for abdominal pain, diarrhea and nausea. Negative for constipation, heartburn and hematochezia.   Breasts:  Negative for tenderness, breast mass and discharge.   Genitourinary: Positive for pelvic pain. Negative for dysuria, frequency, genital sores, hematuria, urgency, vaginal bleeding and vaginal discharge.   Musculoskeletal: Positive for arthralgias and joint swelling. Negative for myalgias.   Skin: Negative for rash.   Neurological: Positive for dizziness and headaches. Negative for weakness and paresthesias.   Psychiatric/Behavioral: Negative for mood changes. The patient is not nervous/anxious.       Constitutional, neuro, ENT, endocrine, pulmonary, cardiac, gastrointestinal, genitourinary, musculoskeletal, integument and psychiatric systems are negative, except as otherwise noted.       Objective    /78   Pulse 65   Temp 97.1  F (36.2  C) (Tympanic)   Resp 16   Ht 1.6 m (5' 3\")   Wt 68.5 kg (151 lb)   LMP 12/05/2010   SpO2 97%   BMI 26.75 kg/m    Body mass index is 26.75 kg/m .  Physical Exam   GENERAL: Pleasant, well appearing female.  PSYCH: Alert and oriented x3, neatly dressed and well groomed, makes good eye contact, fluid speech - non-pressured, no psychomotor agitation or slowing, good memory, judgement and insight, no auditory or visual " hallucinations, bright affect which is mood congruent. No suicidal ideation.                     .  ..  Answers for HPI/ROS submitted by the patient on 7/7/2022  If you checked off any problems, how difficult have these problems made it for you to do your work, take care of things at home, or get along with other people?: Not difficult at all  PHQ9 TOTAL SCORE: 2

## 2022-07-07 NOTE — PATIENT INSTRUCTIONS
"10,000 lux lightbox (SAD)    [employer].    * * *    Per federal transparency in medicine laws, lab and imaging results are released \"real time\" into My Chart.  This may mean that you see the results before I have a chance to review them. My Chart will alert you again when I review the lab and enter comments.  Sometimes with imaging or labs there may be serious or unexpected results. Critical results are paged to me or the after hours on-call provider so that they can be reviewed immediately.  This is not true of non-critical abnormal results. Unfortunately, this means that it's possible you may be alerted of a serious finding before I have a change to review it.  If you ever receive a result that you are concerned about and I have not already contacted you, please feel free to reach out to me or the care team so that you get the answers you need.    Additionally, it is my goal that you understand the care plan discussed at your visit and that any questions you have are answered.  Please feel free to reach out if you need clarification or explanation of any information addressed at your office visit.    "

## 2022-07-29 ENCOUNTER — OFFICE VISIT (OUTPATIENT)
Dept: FAMILY MEDICINE | Facility: CLINIC | Age: 62
End: 2022-07-29
Payer: COMMERCIAL

## 2022-07-29 VITALS
HEART RATE: 68 BPM | OXYGEN SATURATION: 99 % | TEMPERATURE: 96.6 F | WEIGHT: 151 LBS | RESPIRATION RATE: 14 BRPM | DIASTOLIC BLOOD PRESSURE: 76 MMHG | BODY MASS INDEX: 26.75 KG/M2 | SYSTOLIC BLOOD PRESSURE: 124 MMHG | HEIGHT: 63 IN

## 2022-07-29 DIAGNOSIS — J01.90 ACUTE SINUSITIS WITH SYMPTOMS > 10 DAYS: Primary | ICD-10-CM

## 2022-07-29 PROCEDURE — 99213 OFFICE O/P EST LOW 20 MIN: CPT | Performed by: NURSE PRACTITIONER

## 2022-07-29 ASSESSMENT — PAIN SCALES - GENERAL: PAINLEVEL: MILD PAIN (3)

## 2022-07-29 NOTE — PATIENT INSTRUCTIONS
"I believe a sinus infection is the cause of your symptoms.  I have sent a prescription for Augmentin to your pharmacy.  Take this twice daily with food. Take a probiotic such as Culturelle or Florastor while on the antibiotic or eat a Greek yogurt containing \"live active cultures\" daily.       For relief of your symptoms:   Use Richard's vapor rub on your nostrils to promote air flow through your nose  Take hot steam showers/baths at least 2x per day until sinuses are cleared  Apply warm packs to the face.    Drink plenty of fluids to assist with clearing of secretions  Take Sudafed twice daily   Use afrin spray as prescribed for nasal congestion for the next 3 days.   Take Tylenol or Ibuprofen for pain. Do not take more than: Tylenol 1000 mg 4 times a day = 4000 mg per day. Ibuprofen 600 mg 5 times a day = 3000 mg WITH FOOD  Nasal saline rinses/sprays 1-2 times daily. Obtain nasal saline spray (Ayr or Ocean are brand names).  Get into a hot shower, and wait for the sensation of your sinuses \"opening\". Occlude one side of your nose, and use a gentle spray of saline into the opposite side of your nose.  Then blow your nose to try to mobilize the nasal secretions.    Please take your medicines as recommended above and review the discharge instructions for concerning signs/symptoms that would require your prompt return to the clinic for further evaluation. Please follow up in clinic as we have recommended below.  If your symptoms worsen prior to your follow up appointment, do not hesitate to return here to the clinic or emergency department for further evaluation.    "

## 2022-08-28 ENCOUNTER — HOSPITAL ENCOUNTER (EMERGENCY)
Facility: CLINIC | Age: 62
Discharge: HOME | End: 2022-08-28
Attending: EMERGENCY MEDICINE | Admitting: EMERGENCY MEDICINE
Payer: COMMERCIAL

## 2022-08-28 VITALS
DIASTOLIC BLOOD PRESSURE: 74 MMHG | HEART RATE: 76 BPM | TEMPERATURE: 98.3 F | WEIGHT: 155 LBS | HEIGHT: 64 IN | BODY MASS INDEX: 26.46 KG/M2 | RESPIRATION RATE: 22 BRPM | OXYGEN SATURATION: 98 % | SYSTOLIC BLOOD PRESSURE: 148 MMHG

## 2022-08-28 DIAGNOSIS — N39.0 ACUTE UTI: ICD-10-CM

## 2022-08-28 LAB
ALBUMIN UR-MCNC: 100 MG/DL
APPEARANCE UR: ABNORMAL
BILIRUB UR QL STRIP: NEGATIVE
COLOR UR AUTO: ABNORMAL
GLUCOSE UR STRIP-MCNC: NEGATIVE MG/DL
HGB UR QL STRIP: ABNORMAL
KETONES UR STRIP-MCNC: NEGATIVE MG/DL
LEUKOCYTE ESTERASE UR QL STRIP: ABNORMAL
NITRATE UR QL: NEGATIVE
PH UR STRIP: 6 [PH] (ref 5–7)
RBC URINE: >182 /HPF
SP GR UR STRIP: 1.02 (ref 1–1.03)
TRANSITIONAL EPI: 2 /HPF
UROBILINOGEN UR STRIP-MCNC: NORMAL MG/DL
WBC CLUMPS #/AREA URNS HPF: PRESENT /HPF
WBC URINE: >182 /HPF

## 2022-08-28 PROCEDURE — 99284 EMERGENCY DEPT VISIT MOD MDM: CPT | Performed by: EMERGENCY MEDICINE

## 2022-08-28 PROCEDURE — 99283 EMERGENCY DEPT VISIT LOW MDM: CPT | Performed by: EMERGENCY MEDICINE

## 2022-08-28 PROCEDURE — 81001 URINALYSIS AUTO W/SCOPE: CPT | Performed by: EMERGENCY MEDICINE

## 2022-08-28 PROCEDURE — 87086 URINE CULTURE/COLONY COUNT: CPT | Performed by: EMERGENCY MEDICINE

## 2022-08-28 RX ORDER — SULFAMETHOXAZOLE/TRIMETHOPRIM 800-160 MG
1 TABLET ORAL 2 TIMES DAILY
Qty: 10 TABLET | Refills: 0 | Status: SHIPPED | OUTPATIENT
Start: 2022-08-28 | End: 2023-03-24

## 2022-08-28 NOTE — ED TRIAGE NOTES
Pt presents for potential UTI. Symptoms started yesterday. Pt has pain/pressure, burning and frequency with urination. Today she noticed blood in her urine.      Triage Assessment     Row Name 08/28/22 0726       Triage Assessment (Adult)    Airway WDL WDL       Respiratory WDL    Respiratory WDL WDL       Cognitive/Neuro/Behavioral WDL    Cognitive/Neuro/Behavioral WDL WDL

## 2022-08-28 NOTE — DISCHARGE INSTRUCTIONS
Return if symptoms worsen or new symptoms develop.  Follow-up with primary care physician early next week for recheck.  Follow-up with urology if hematuria persists.  Take antibiotics as directed.  If any fevers abdominal pain decreased urine output or other concerns please return for recheck.

## 2022-08-28 NOTE — ED PROVIDER NOTES
"  History     Chief Complaint   Patient presents with     Rule out Urinary Tract Infection     Pt presents for potential UTI. Symptoms started yesterday. Pt has pain/pressure, burning and frequency with urination. Today she noticed blood in her urine.      RAY Pierson is a 62 year old female with a past medical history significant for acute lymphocytic leukemia in remission, hyperlipidemia who presents emergency department complaining of urgency and frequency.  Patient states symptoms have been going on since yesterday she has had some burning with urination along with frequency.  She noticed some blood in her urine today.  She has had previous urinary tract infections which are similar to this.  She denies any fevers or chills.  Has not had a headache.  She denies any neck pain she denies any chest pain or shortness of breath.  She has not had any nausea vomiting diarrhea denies any significant abdominal pain or flank pain denies any swelling in her legs calf pain or rash.    Allergies:  Allergies   Allergen Reactions     Seasonal Allergies        Problem List:    Patient Active Problem List    Diagnosis Date Noted     Major depressive disorder, single episode, mild (H) 07/07/2022     Priority: Medium     Small bowel obstruction (H) 12/29/2019     Priority: Medium     Plantar warts 01/23/2019     Priority: Medium     Closed nondisplaced fracture of neck of fifth metacarpal bone of left hand 08/21/2017     Priority: Medium     Advanced directives, counseling/discussion 02/12/2016     Priority: Medium     Patient does not have an Advance/Health Care Directive (HCD), given \"What is Advance Care Planning?\" flyer and requests blank HCD form.    Gracie Seo  November 14, 2018         Hyperlipidemia LDL goal <160 10/01/2015     Priority: Medium     Health Care Home 09/02/2011     Priority: Medium     X  09/02/2011 Unable to Contact  Rosemarie Lorenzo RN [facility]    DX V65.8 REPLACED WITH 28729 HEALTH CARE HOME " (04/08/2013)       Ovarian cyst 01/11/2010     Priority: Medium     Sensorineural hearing loss, asymmetrical 11/02/2007     Priority: Medium     External hemorrhoids with other complication 07/19/2006     Priority: Medium     Allergic rhinitis 02/21/2005     Priority: Medium     Rhinitis         Acute lymphocytic leukemia in remission (H) 02/21/2005     Priority: Medium     ALL at age 16,  no evid of recurrence           Malignant neoplasm of kidney excluding renal pelvis (H) 01/01/2001     Priority: Medium     left renal cell carcinoma, nuclear grade 2 with invasion through capsule.     left kidney removed 1/01 February 21, 2005 no evid of recurrence on CT chest and abdomen              Past Medical History:    Past Medical History:   Diagnosis Date     Acute lymphoid leukemia, without mention of having achieved remission(204.00) 1976     Allergic rhinitis, cause unspecified      Malignant neoplasm of kidney, except pelvis 2001     Motion sickness        Past Surgical History:    Past Surgical History:   Procedure Laterality Date     ARTHROSCOPY KNEE WITH MEDIAL MENISCECTOMY  7/7/2011    Procedure:ARTHROSCOPY KNEE WITH MEDIAL MENISCECTOMY; choice anes; Surgeon:MANUEL FLEMING; Location:[facility]     COLONOSCOPY N/A 9/4/2018    Procedure: COLONOSCOPY;  colonoscopy;  Surgeon: Carlin Rodrigues MD;  Location: [facility]     HEMORRHOIDECTOMY  2006     HERNIORRHAPHY INGUINAL Left 8/17/2017    Procedure: HERNIORRHAPHY INGUINAL;  Left Inguinal Hernia Repair;  Surgeon: Mehdi Sorensen MD;  Location: [facility] OR     LAPAROSCOPIC HERNIORRHAPHY INGUINAL Left 1/11/2018    Procedure: LAPAROSCOPIC HERNIORRHAPHY INGUINAL;  Laparoscopic Left Inguinal Hernia Repair converted to open left femoral  hernia repair;  Surgeon: Mehdi Sorensen MD;  Location: [facility] OR     NEPHRECTOMY RT/LT  2001    left partial nephrectomy- kidney ca     SALPINGO OOPHORECTOMY,R/L/KRISHNA  2010    Rt salpingo oophorectomy      SURGICAL HISTORY OF -   8/1/1995     "Vein stripping     SURGICAL HISTORY OF -   1975    wisdom teeth extraction      TONSILLECTOMY & ADENOIDECTOMY  1962     TUBAL LIGATION  1994       Family History:    Family History   Problem Relation Age of Onset     C.A.D. Father         bypass     Diabetes Father      Lipids Father      Allergies Sister         allergic rhinitis     Neurologic Disorder Sister         seizure disorder     Respiratory Sister         asthma     Allergies Son      Breast Cancer No family hx of      Cancer - colorectal No family hx of        Social History:  Marital Status:   [2]  Social History     Tobacco Use     Smoking status: Never Smoker     Smokeless tobacco: Never Used   Substance Use Topics     Alcohol use: No     Drug use: No        Medications:    acetaminophen (TYLENOL) 325 MG tablet  calcium carbonate-vitamin D (OS-HOLLY) 500-400 MG-UNIT tablet  Multiple Vitamins-Minerals (MULTIVITAMIN WOMEN PO)  Probiotic Product (PROBIOTIC PO)  UNABLE TO FIND  VITAMIN D, CHOLECALCIFEROL, PO          Review of Systems  As per HPI.  Physical Exam   BP: (!) 148/74  Pulse: 76  Temp: 98.3  F (36.8  C)  Resp: 22  Height: 162.6 cm (5' 4\")  Weight: 70.3 kg (155 lb)  SpO2: 98 %      Physical Exam  Vitals and nursing note reviewed.   Constitutional:       General: She is not in acute distress.     Appearance: Normal appearance. She is not ill-appearing, toxic-appearing or diaphoretic.   HENT:      Head: Normocephalic and atraumatic.      Nose: Nose normal.      Mouth/Throat:      Mouth: Mucous membranes are moist.      Pharynx: Oropharynx is clear.   Eyes:      Conjunctiva/sclera: Conjunctivae normal.   Cardiovascular:      Rate and Rhythm: Regular rhythm.      Pulses: Normal pulses.      Heart sounds: Normal heart sounds. No murmur heard.  Pulmonary:      Effort: Pulmonary effort is normal.      Breath sounds: Normal breath sounds. No stridor. No wheezing or rhonchi.   Abdominal:      General: Abdomen is flat. Bowel sounds are normal. There " is no distension.      Palpations: Abdomen is soft.      Tenderness: There is no abdominal tenderness. There is no right CVA tenderness or left CVA tenderness.   Musculoskeletal:         General: No tenderness.      Cervical back: Normal range of motion and neck supple. No tenderness.      Right lower leg: No edema.      Left lower leg: No edema.   Skin:     General: Skin is warm and dry.      Findings: No rash.   Neurological:      General: No focal deficit present.      Mental Status: She is alert and oriented to person, place, and time.      Motor: No weakness.      Coordination: Coordination normal.   Psychiatric:         Mood and Affect: Mood normal.         ED Course                 Procedures              Critical Care time:  none               No results found for this or any previous visit (from the past 24 hour(s)).    Medications - No data to display    Assessments & Plan (with Medical Decision Making) records were reviewed.  Urine samples obtained.  Urine was significant for large blood 100 protein moderate leukocyte Estrace WBC clumps present with greater than 102 RBCs greater than 182 WBCs.  Patient is not having abdominal pain at this time or flank pain and I do not think imaging studies are warranted she feels comfortable with not having imaging studies done.  I reviewed previous cultures and they are pan sensitive with usual E. coli growth with 3 past infections.  I am going to have covered with Bactrim and have her return if fevers abdominal pain decreased urine output continued hematuria or other symptoms present.  Patient is agreement this plan.     I have reviewed the nursing notes.    I have reviewed the findings, diagnosis, plan and need for follow up with the patient.       Discharge Medication List as of 8/28/2022  8:21 AM      START taking these medications    Details   sulfamethoxazole-trimethoprim (BACTRIM DS) 800-160 MG tablet Take 1 tablet by mouth 2 times daily, Disp-10 tablet, R-0,  [employer]             Final diagnoses:   Acute UTI       8/28/2022   [facility] EMERGENCY DEPT     Carlin Gloria MD  08/29/22 0426

## 2022-08-29 ENCOUNTER — PATIENT OUTREACH (OUTPATIENT)
Dept: FAMILY MEDICINE | Facility: CLINIC | Age: 62
End: 2022-08-29

## 2022-08-29 LAB — BACTERIA UR CULT: NORMAL

## 2022-08-29 NOTE — RESULT ENCOUNTER NOTE
Final urine culture report is negative.  Adult Negative Urine culture parameters per protocol: Any # Urogenital single or mixed organism, <10,000 col/ml single organism (cath/midstream), and > 3 organisms (No susceptibilities performed).  [facility] Emergency Dept discharge antibiotic prescribed (If applicable): Bactrim DS  Treatment recommendations per [facility] ED Lab Result Urine Culture protocol.

## 2022-08-29 NOTE — TELEPHONE ENCOUNTER
Writer called patient and left message requesting a call back to clinic.    1st attempt.    Solitario VITALE [facility]

## 2022-08-29 NOTE — TELEPHONE ENCOUNTER
What type of discharge? Emergency Department  Risk of Hospital admission or ED visit: 5%  Is a TCM episode required? No  When should the patient follow up with PCP? within 30 days of discharge.    Solitario Chance RN  [facility]

## 2022-08-30 NOTE — TELEPHONE ENCOUNTER
Writer left message requesting a call back to clinic.    2nd attempt.    Solitario VITALE [facility]

## 2022-10-13 ENCOUNTER — OFFICE VISIT (OUTPATIENT)
Dept: FAMILY MEDICINE | Facility: CLINIC | Age: 62
End: 2022-10-13
Payer: COMMERCIAL

## 2022-10-13 VITALS
RESPIRATION RATE: 14 BRPM | DIASTOLIC BLOOD PRESSURE: 74 MMHG | SYSTOLIC BLOOD PRESSURE: 130 MMHG | BODY MASS INDEX: 27.31 KG/M2 | TEMPERATURE: 97.5 F | WEIGHT: 160 LBS | HEART RATE: 72 BPM | OXYGEN SATURATION: 98 % | HEIGHT: 64 IN

## 2022-10-13 DIAGNOSIS — R30.9 PAINFUL URINATION: Primary | ICD-10-CM

## 2022-10-13 DIAGNOSIS — Z87.440 PERSONAL HISTORY OF URINARY TRACT INFECTION: ICD-10-CM

## 2022-10-13 LAB
ALBUMIN UR-MCNC: NEGATIVE MG/DL
APPEARANCE UR: CLEAR
BILIRUB UR QL STRIP: NEGATIVE
COLOR UR AUTO: YELLOW
GLUCOSE UR STRIP-MCNC: NEGATIVE MG/DL
HGB UR QL STRIP: NEGATIVE
KETONES UR STRIP-MCNC: NEGATIVE MG/DL
LEUKOCYTE ESTERASE UR QL STRIP: NEGATIVE
NITRATE UR QL: NEGATIVE
PH UR STRIP: 6.5 [PH] (ref 5–7)
RBC #/AREA URNS AUTO: ABNORMAL /HPF
SP GR UR STRIP: >=1.03 (ref 1–1.03)
SQUAMOUS #/AREA URNS AUTO: ABNORMAL /LPF
UROBILINOGEN UR STRIP-ACNC: 0.2 E.U./DL
WBC #/AREA URNS AUTO: ABNORMAL /HPF

## 2022-10-13 PROCEDURE — 81001 URINALYSIS AUTO W/SCOPE: CPT | Performed by: NURSE PRACTITIONER

## 2022-10-13 PROCEDURE — 99213 OFFICE O/P EST LOW 20 MIN: CPT | Performed by: NURSE PRACTITIONER

## 2022-10-13 ASSESSMENT — PAIN SCALES - GENERAL: PAINLEVEL: MILD PAIN (2)

## 2022-10-13 NOTE — PROGRESS NOTES
"  Assessment & Plan       ICD-10-CM    1. Painful urination  R30.9 UA with Microscopic reflex to Culture - Clinic Collect     Urine Microscopic      2. Personal history of urinary tract infection  Z87.440 UA with Microscopic reflex to Culture - lab collect        Urinalysis completed today does not show any signs of infection.  Discussed symptomatic management.  Okay to recheck a urine in 3 days if symptoms continue to persist.  She does have a history of UTIs.     BMI:   Estimated body mass index is 27.46 kg/m  as calculated from the following:    Height as of this encounter: 1.626 m (5' 4\").    Weight as of this encounter: 72.6 kg (160 lb).       Return if symptoms worsen or fail to improve.    Gracie Crawford, JOAQUIN CNP  [facility]    Tony Freeman is a 62 year old, presenting for the following health issues:  UTI (Painful urination x 1 day)      HPI     Genitourinary - Female  Onset/Duration: 1 day  Description:   Painful urination (Dysuria): YES           Frequency: YES  Blood in urine (Hematuria): No  Delay in urine (Hesitency): no  Intensity: moderate  Progression of Symptoms:  improving  Accompanying Signs & Symptoms:  Fever/chills: YES- chills  Flank pain: YES left side  Nausea and vomiting: No  Vaginal symptoms: none  Abdominal/Pelvic Pain: YES  History:   History of frequent UTI s: YES  History of kidney stones: No  Sexually Active: YES  Possibility of pregnancy: No  Precipitating or alleviating factors: None  Therapies tried and outcome: Increase fluid intake    Review of Systems   Constitutional, HEENT, cardiovascular, pulmonary, gi and gu systems are negative, except as otherwise noted.      Objective    /74 (BP Location: Right arm, Patient Position: Chair, Cuff Size: Adult Large)   Pulse 72   Temp 97.5  F (36.4  C) (Tympanic)   Resp 14   Ht 1.626 m (5' 4\")   Wt 72.6 kg (160 lb)   LMP 12/05/2010   SpO2 98%   Breastfeeding No   BMI 27.46 kg/m    Body mass " index is 27.46 kg/m .  Physical Exam   GENERAL: healthy, alert and no distress  RESP: lungs clear to auscultation - no rales, rhonchi or wheezes  CV: regular rate and rhythm, normal S1 S2, no S3 or S4, no murmur, click or rub, no peripheral edema and peripheral pulses strong  BACK: no CVA tenderness, no paralumbar tenderness  LYMPH: no cervical, supraclavicular, axillary, or inguinal adenopathy        .  Results for orders placed or performed in visit on 10/13/22 (from the past 24 hour(s))   UA with Microscopic reflex to Culture - Clinic Collect    Specimen: Urine, Clean Catch   Result Value Ref Range    Color Urine Yellow Colorless, Straw, Light Yellow, Yellow    Appearance Urine Clear Clear    Glucose Urine Negative Negative mg/dL    Bilirubin Urine Negative Negative    Ketones Urine Negative Negative mg/dL    Specific Gravity Urine >=1.030 1.003 - 1.035    Blood Urine Negative Negative    pH Urine 6.5 5.0 - 7.0    Protein Albumin Urine Negative Negative mg/dL    Urobilinogen Urine 0.2 0.2, 1.0 E.U./dL    Nitrite Urine Negative Negative    Leukocyte Esterase Urine Negative Negative   Urine Microscopic   Result Value Ref Range    RBC Urine None Seen 0-2 /HPF /HPF    WBC Urine None Seen 0-5 /HPF /HPF    Squamous Epithelials Urine Few (A) None Seen /LPF    Narrative    Urine Culture not indicated

## 2022-10-13 NOTE — PATIENT INSTRUCTIONS
Okay to try Cranberry Pills.     AZO is also okay to use. This will change the urine color. But can help with symptoms of urgency/ frequency/ burning. Do not take for at least 24 hours prior to leaving a sample in clinic.     Drink lots of water and visit the restroom frequently.     Follow up as needed.     Urine order is in place if symptoms do not improve/ change for worse.

## 2022-11-20 ENCOUNTER — HEALTH MAINTENANCE LETTER (OUTPATIENT)
Age: 62
End: 2022-11-20

## 2022-11-30 ENCOUNTER — HOSPITAL ENCOUNTER (EMERGENCY)
Facility: CLINIC | Age: 62
Discharge: HOME | End: 2022-11-30
Attending: PHYSICIAN ASSISTANT | Admitting: PHYSICIAN ASSISTANT
Payer: COMMERCIAL

## 2022-11-30 VITALS
BODY MASS INDEX: 27.31 KG/M2 | TEMPERATURE: 98.7 F | OXYGEN SATURATION: 96 % | WEIGHT: 160 LBS | HEIGHT: 64 IN | HEART RATE: 77 BPM | RESPIRATION RATE: 20 BRPM | SYSTOLIC BLOOD PRESSURE: 166 MMHG | DIASTOLIC BLOOD PRESSURE: 86 MMHG
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
## 2022-11-30 DIAGNOSIS — S01.01XA LACERATION OF SCALP, INITIAL ENCOUNTER: ICD-10-CM

## 2022-11-30 PROCEDURE — 12001 RPR S/N/AX/GEN/TRNK 2.5CM/<: CPT | Performed by: PHYSICIAN ASSISTANT

## 2022-11-30 PROCEDURE — 99283 EMERGENCY DEPT VISIT LOW MDM: CPT | Performed by: PHYSICIAN ASSISTANT

## 2022-11-30 PROCEDURE — 99282 EMERGENCY DEPT VISIT SF MDM: CPT | Mod: 25 | Performed by: PHYSICIAN ASSISTANT

## 2022-11-30 ASSESSMENT — ENCOUNTER SYMPTOMS
NEUROLOGICAL NEGATIVE: 1
LIGHT-HEADEDNESS: 0
WEAKNESS: 0
RESPIRATORY NEGATIVE: 1
FEVER: 0
SEIZURES: 0
ACTIVITY CHANGE: 0
PHOTOPHOBIA: 0
CARDIOVASCULAR NEGATIVE: 1
GASTROINTESTINAL NEGATIVE: 1
PSYCHIATRIC NEGATIVE: 1
AGITATION: 0
NECK STIFFNESS: 0
EYES NEGATIVE: 1
DIZZINESS: 0
FACIAL SWELLING: 0
TREMORS: 0
HEADACHES: 0
CONSTITUTIONAL NEGATIVE: 1
FACIAL ASYMMETRY: 0
MUSCULOSKELETAL NEGATIVE: 1
BACK PAIN: 0
NECK PAIN: 0
NUMBNESS: 0
APPETITE CHANGE: 0
FATIGUE: 0
SPEECH DIFFICULTY: 0
CONFUSION: 0

## 2022-11-30 ASSESSMENT — ACTIVITIES OF DAILY LIVING (ADL): ADLS_ACUITY_SCORE: 33

## 2022-11-30 NOTE — ED PROVIDER NOTES
"  History     Chief Complaint   Patient presents with     Fall     Fell this a.m. head laceration  No LOC, not currently on blood thinners.      HPI  Lydia Pierson is a 62 year old female who slipped and fell this morning on the driveway while taking the garbage out. Patient denies loss of consciousness, confusion, nausea, vomiting, headache, not currently on blood thinners, no rib/chest tenderness, abdominal pain, hip or back pain.  Patient states she did hit the back of her head and has a laceration to the posterior scalp that is still bleeding.  She states that her neck was slightly sore initially after fall but currently is has no pain.  She has not taken anything for symptoms. Her last Tdap was in 2013.     Allergies:  Allergies   Allergen Reactions     Seasonal Allergies        Problem List:    Patient Active Problem List    Diagnosis Date Noted     Major depressive disorder, single episode, mild (H) 07/07/2022     Priority: Medium     Small bowel obstruction (H) 12/29/2019     Priority: Medium     Plantar warts 01/23/2019     Priority: Medium     Closed nondisplaced fracture of neck of fifth metacarpal bone of left hand 08/21/2017     Priority: Medium     Advanced directives, counseling/discussion 02/12/2016     Priority: Medium     Patient does not have an Advance/Health Care Directive (HCD), given \"What is Advance Care Planning?\" flyer and requests blank HCD form.    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     left kidney removed 1/01 February 21, 2005 no evid of recurrence on CT chest and abdomen              Past Medical History:    Past Medical History:   Diagnosis Date     Acute lymphoid leukemia, without mention of having achieved remission(204.00) 1976     Allergic rhinitis, cause unspecified      Malignant neoplasm of kidney, except pelvis 2001     Motion sickness        Past Surgical History:    Past Surgical History:   Procedure Laterality Date     ARTHROSCOPY KNEE WITH MEDIAL MENISCECTOMY  7/7/2011    Procedure:ARTHROSCOPY KNEE WITH MEDIAL MENISCECTOMY; choice anes; Surgeon:MANUEL FLEMING; Location:[facility]     COLONOSCOPY N/A 9/4/2018    Procedure: COLONOSCOPY;  colonoscopy;  Surgeon: Carlin Rodrigues MD;  Location: [facility]     HEMORRHOIDECTOMY  2006     HERNIORRHAPHY INGUINAL Left 8/17/2017    Procedure: HERNIORRHAPHY INGUINAL;  Left Inguinal Hernia Repair;  Surgeon: Mehdi Sorensen MD;  Location: [facility]     LAPAROSCOPIC HERNIORRHAPHY INGUINAL Left 1/11/2018    Procedure: LAPAROSCOPIC HERNIORRHAPHY INGUINAL;  Laparoscopic Left Inguinal Hernia Repair converted to open left femoral  hernia repair;  Surgeon: Mehdi Sorensen MD;  Location: [facility]     NEPHRECTOMY RT/LT  2001    left partial nephrectomy- kidney ca     SALPINGO OOPHORECTOMY,R/L/KRISHNA  2010    Rt salpingo oophorectomy      SURGICAL HISTORY OF -   8/1/1995    Vein stripping     SURGICAL HISTORY OF -   1975    wisdom teeth extraction      TONSILLECTOMY & ADENOIDECTOMY  1962     TUBAL LIGATION  1994       Family History:    Family History   Problem Relation Age of Onset     C.A.D. Father         bypass     Diabetes Father       "Lipids Father      Allergies Sister         allergic rhinitis     Neurologic Disorder Sister         seizure disorder     Respiratory Sister         asthma     Allergies Son      Breast Cancer No family hx of      Cancer - colorectal No family hx of        Social History:  Marital Status:   [2]  Social History     Tobacco Use     Smoking status: Never     Smokeless tobacco: Never   Vaping Use     Vaping Use: Never used   Substance Use Topics     Alcohol use: No     Drug use: No        Medications:    acetaminophen (TYLENOL) 325 MG tablet  calcium carbonate-vitamin D (OS-HOLLY) 500-400 MG-UNIT tablet  Multiple Vitamins-Minerals (MULTIVITAMIN WOMEN PO)  Probiotic Product (PROBIOTIC PO)  sulfamethoxazole-trimethoprim (BACTRIM DS) 800-160 MG tablet  UNABLE TO FIND  VITAMIN D, CHOLECALCIFEROL, PO          Review of Systems   Constitutional: Negative.  Negative for activity change, appetite change, fatigue and fever.   HENT: Negative.  Negative for facial swelling.    Eyes: Negative.  Negative for photophobia and visual disturbance.   Respiratory: Negative.    Cardiovascular: Negative.    Gastrointestinal: Negative.    Genitourinary: Negative.    Musculoskeletal: Negative.  Negative for back pain, neck pain and neck stiffness.   Skin:        Laceration to the posterior scalp   Neurological: Negative.  Negative for dizziness, tremors, seizures, syncope, facial asymmetry, speech difficulty, weakness, light-headedness, numbness and headaches.   Psychiatric/Behavioral: Negative.  Negative for agitation and confusion.   All other systems reviewed and are negative.      Physical Exam   BP: (!) 166/86  Pulse: 77  Temp: 98.7  F (37.1  C)  Resp: 20  Height: 162.6 cm (5' 4\")  Weight: 72.6 kg (160 lb)  SpO2: 96 %      Physical Exam  Vitals and nursing note reviewed.   Constitutional:       General: She is awake. She is not in acute distress.     Appearance: Normal appearance. She is well-developed, well-groomed and normal " weight. She is not ill-appearing, toxic-appearing or diaphoretic.   HENT:      Head: Normocephalic. Laceration (1cm to posterior scalp) present. No raccoon eyes, Lutz's sign, right periorbital erythema or left periorbital erythema.      Jaw: There is normal jaw occlusion.        Right Ear: Tympanic membrane and ear canal normal. No hemotympanum.      Left Ear: Tympanic membrane and ear canal normal. No hemotympanum.      Mouth/Throat:      Mouth: Mucous membranes are moist.      Pharynx: Oropharynx is clear. No oropharyngeal exudate or posterior oropharyngeal erythema.   Eyes:      General: No scleral icterus.        Right eye: No discharge.         Left eye: No discharge.      Extraocular Movements: Extraocular movements intact.      Conjunctiva/sclera: Conjunctivae normal.      Pupils: Pupils are equal, round, and reactive to light.   Cardiovascular:      Rate and Rhythm: Normal rate and regular rhythm.      Heart sounds: Normal heart sounds.   Pulmonary:      Effort: Pulmonary effort is normal.      Breath sounds: Normal breath sounds.   Musculoskeletal:         General: Normal range of motion.      Cervical back: Normal range of motion and neck supple. No rigidity or tenderness.   Lymphadenopathy:      Cervical: No cervical adenopathy.   Skin:     General: Skin is warm.      Capillary Refill: Capillary refill takes less than 2 seconds.   Neurological:      General: No focal deficit present.      Mental Status: She is alert and oriented to person, place, and time.      GCS: GCS eye subscore is 4. GCS verbal subscore is 5. GCS motor subscore is 6.      Cranial Nerves: Cranial nerves 2-12 are intact. No cranial nerve deficit.      Sensory: Sensation is intact. No sensory deficit.      Motor: Motor function is intact. No weakness or pronator drift.      Coordination: Coordination is intact. Romberg sign negative. Coordination normal. Finger-Nose-Finger Test and Heel to Shin Test normal.      Gait: Gait is intact.  Gait normal.      Deep Tendon Reflexes: Reflexes normal.      Reflex Scores:       Bicep reflexes are 2+ on the right side and 2+ on the left side.       Brachioradialis reflexes are 2+ on the right side and 2+ on the left side.       Patellar reflexes are 2+ on the right side and 2+ on the left side.     Comments: Muscle strength 5 out of 5 bilaterally throughout upper and lower extremities.  Patient neurovascularly intact to bilateral upper and lower extremities   Psychiatric:         Mood and Affect: Mood normal.         Behavior: Behavior normal. Behavior is cooperative.         Thought Content: Thought content normal.         Judgment: Judgment normal.         ED [facility]    -Laceration Repair    Date/Time: 11/30/2022 12:36 PM  Performed by: Mildred Leahy PA-C  Authorized by: Mildred Leahy PA-C     Risks, benefits and alternatives discussed.      ANESTHESIA (see MAR for exact dosages):     Anesthesia method:  Local infiltration    Local anesthetic:  Lidocaine 1% w/o epi  LACERATION DETAILS     Location:  Scalp    Scalp location:  Crown    Length (cm):  1    REPAIR TYPE:     Repair type:  Simple      EXPLORATION:     Wound exploration: wound explored through full range of motion and entire depth of wound probed and visualized      Wound extent: areolar tissue not violated, fascia not violated, no foreign body, no signs of injury, no nerve damage, no tendon damage, no underlying fracture and no vascular damage      Contaminated: no      TREATMENT:     Area cleansed with:  Hibiclens and saline    Amount of cleaning:  Standard    SKIN REPAIR     Repair method:  Staples    Number of staples:  3    APPROXIMATION     Approximation:  Close    POST-PROCEDURE DETAILS     Dressing:  Antibiotic ointment        PROCEDURE    Patient Tolerance:  Patient tolerated the procedure well with no immediate complications               Critical Care time:  none                No results found for this or any previous visit (from the past 24 hour(s)).    Medications - No data to display    Assessments & Plan (with Medical Decision Making)     I have reviewed the nursing notes.    I have reviewed the findings, diagnosis, plan and need for follow up with the patient.  Lydia Pierson is a 62 year old female who slipped and fell this morning on the driveway while taking the garbage out. Patient denies loss of consciousness, confusion, nausea, vomiting, headache, not currently on blood thinners, no rib/chest tenderness, abdominal pain, hip or back pain.  Patient states she did hit the back of her head and has a laceration to the posterior scalp that is still bleeding.  She states that her neck was slightly sore initially after fall but currently is has no pain.  She has not taken anything for symptoms. Her last Tdap was in 2013.   Significant as above.  Patient neurologically with no concerning findings for intracranial bleed, fracture, or concussion at this time however discussed CT imaging of head and neck today.  Patient states she is trying.  I feel that this is appropriate since patient does not meet criteria for head imaging with the [place] head CT rule.  Signs and symptoms discussed and given on discharge paperwork for patient to return the emergency department if symptoms worsen or change.  Patient had 3 staples placed to the scalp laceration without complication and informed to have these removed in the next 5 to 7 days.  Symptomatic treatments discussed and patient discharged in stable condition       [place] Head CT Rule  (calculator)  Background  Assesses need for head imaging in acute trauma  Only validated in adults with GCS 13-15 with witnessed LOC, amnesia to head injury or confusion  Data  62 year old  High Risk Criteria (major criteria)   Of 5 possible items  NEGATIVE    Moderate Risk Criteria (minor criteria)   Of 3 possible  items  NEGATIVE    Interpretation  No indications for head imaging        Discharge Medication List as of 11/30/2022 11:58 AM          Final diagnoses:   Fall, initial encounter - slipped on ice   Laceration of scalp, initial encounter       11/30/2022   [facility] EMERGENCY DEPT

## 2022-11-30 NOTE — ED NOTES
Pt has approx 0.5cm laceration present on back of head, bleeding controlled.  MARBIN.  Pt is A&Ox4.  Pt was thinking she wanted to leave because she felt fine overall and bleeding stopped.  Pt will wait for provider to assess laceration.

## 2022-11-30 NOTE — DISCHARGE INSTRUCTIONS
After care instructions:  Keep wound clean and dry for the next 24-48 hours  Staples out in 5-7 days  Signs of infection discussed today (fevers, chills, warmth to the touch, purulent drainage, red streaking, change or worsening symptom)  Apply anti-bacterial ointment for 1-2 days then leave open   Discussed the probability of scarring    Go to the emergency department if signs or symptoms of head injury occur this includes headache, confusion, visual changes, neck pain, painful range of motion in the neck, nausea, vomiting, weakness in extremities  Tylenol over the counter as needed for pain.     Return to clinic sooner or go to Emergency Room if symptoms worsen or change or signs of infection occur (redness, red streaking, warmth, increased pain, increased swelling, purulent drainage, or fevers occur.)    Patient voiced understanding of instructions given.

## 2022-12-02 ENCOUNTER — PATIENT OUTREACH (OUTPATIENT)
Dept: FAMILY MEDICINE | Facility: CLINIC | Age: 62
End: 2022-12-02

## 2022-12-02 NOTE — TELEPHONE ENCOUNTER
What type of discharge? Emergency Department  Risk of Hospital admission or ED visit: 5%  Is a TCM episode required? No  When should the patient follow up with PCP? within 30 days of discharge.    Solitario Chance RN  [facility]

## 2022-12-02 NOTE — TELEPHONE ENCOUNTER
Writer left a message requesting call back to clinic.    First attempt.    Solitario VITALE [facility]

## 2023-03-24 ENCOUNTER — OFFICE VISIT (OUTPATIENT)
Dept: FAMILY MEDICINE | Facility: CLINIC | Age: 63
End: 2023-03-24
Payer: COMMERCIAL

## 2023-03-24 VITALS
RESPIRATION RATE: 16 BRPM | DIASTOLIC BLOOD PRESSURE: 78 MMHG | HEART RATE: 68 BPM | HEIGHT: 64 IN | BODY MASS INDEX: 27.31 KG/M2 | SYSTOLIC BLOOD PRESSURE: 108 MMHG | WEIGHT: 160 LBS | OXYGEN SATURATION: 99 %

## 2023-03-24 DIAGNOSIS — Z13.220 LIPID SCREENING: ICD-10-CM

## 2023-03-24 DIAGNOSIS — Z78.0 ASYMPTOMATIC MENOPAUSAL STATE: ICD-10-CM

## 2023-03-24 DIAGNOSIS — Z13.0 SCREENING FOR ENDOCRINE, METABOLIC AND IMMUNITY DISORDER: ICD-10-CM

## 2023-03-24 DIAGNOSIS — C91.01 ACUTE LYMPHOCYTIC LEUKEMIA IN REMISSION (H): ICD-10-CM

## 2023-03-24 DIAGNOSIS — Z13.228 SCREENING FOR ENDOCRINE, METABOLIC AND IMMUNITY DISORDER: ICD-10-CM

## 2023-03-24 DIAGNOSIS — N39.41 URGE INCONTINENCE OF URINE: ICD-10-CM

## 2023-03-24 DIAGNOSIS — Z12.31 VISIT FOR SCREENING MAMMOGRAM: ICD-10-CM

## 2023-03-24 DIAGNOSIS — Z13.29 SCREENING FOR ENDOCRINE, METABOLIC AND IMMUNITY DISORDER: ICD-10-CM

## 2023-03-24 DIAGNOSIS — Z00.01 ENCOUNTER FOR GENERAL ADULT MEDICAL EXAMINATION WITH ABNORMAL FINDINGS: Primary | ICD-10-CM

## 2023-03-24 PROBLEM — F32.0 MAJOR DEPRESSIVE DISORDER, SINGLE EPISODE, MILD (H): Status: RESOLVED | Noted: 2022-07-07 | Resolved: 2023-03-24

## 2023-03-24 LAB
ALBUMIN UR-MCNC: ABNORMAL MG/DL
ANION GAP SERPL CALCULATED.3IONS-SCNC: 11 MMOL/L (ref 7–15)
APPEARANCE UR: CLEAR
BASOPHILS # BLD AUTO: 0 10E3/UL (ref 0–0.2)
BASOPHILS NFR BLD AUTO: 1 %
BILIRUB UR QL STRIP: ABNORMAL
BUN SERPL-MCNC: 16.6 MG/DL (ref 8–23)
CALCIUM SERPL-MCNC: 9.6 MG/DL (ref 8.8–10.2)
CHLORIDE SERPL-SCNC: 106 MMOL/L (ref 98–107)
CHOLEST SERPL-MCNC: 303 MG/DL
COLOR UR AUTO: YELLOW
CREAT SERPL-MCNC: 0.74 MG/DL (ref 0.51–0.95)
DEPRECATED HCO3 PLAS-SCNC: 25 MMOL/L (ref 22–29)
EOSINOPHIL # BLD AUTO: 0.2 10E3/UL (ref 0–0.7)
EOSINOPHIL NFR BLD AUTO: 5 %
ERYTHROCYTE [DISTWIDTH] IN BLOOD BY AUTOMATED COUNT: 11.5 % (ref 10–15)
GFR SERPL CREATININE-BSD FRML MDRD: 89 ML/MIN/1.73M2
GLUCOSE SERPL-MCNC: 90 MG/DL (ref 70–99)
GLUCOSE UR STRIP-MCNC: NEGATIVE MG/DL
HCT VFR BLD AUTO: 42.8 % (ref 35–47)
HDLC SERPL-MCNC: 61 MG/DL
HGB BLD-MCNC: 14.3 G/DL (ref 11.7–15.7)
HGB UR QL STRIP: ABNORMAL
IMM GRANULOCYTES # BLD: 0 10E3/UL
IMM GRANULOCYTES NFR BLD: 0 %
KETONES UR STRIP-MCNC: 15 MG/DL
LDLC SERPL CALC-MCNC: 223 MG/DL
LEUKOCYTE ESTERASE UR QL STRIP: NEGATIVE
LYMPHOCYTES # BLD AUTO: 1.6 10E3/UL (ref 0.8–5.3)
LYMPHOCYTES NFR BLD AUTO: 37 %
MCH RBC QN AUTO: 29.4 PG (ref 26.5–33)
MCHC RBC AUTO-ENTMCNC: 33.4 G/DL (ref 31.5–36.5)
MCV RBC AUTO: 88 FL (ref 78–100)
MONOCYTES # BLD AUTO: 0.5 10E3/UL (ref 0–1.3)
MONOCYTES NFR BLD AUTO: 10 %
MUCOUS THREADS #/AREA URNS LPF: PRESENT /LPF
NEUTROPHILS # BLD AUTO: 2 10E3/UL (ref 1.6–8.3)
NEUTROPHILS NFR BLD AUTO: 47 %
NITRATE UR QL: NEGATIVE
NONHDLC SERPL-MCNC: 242 MG/DL
PH UR STRIP: 5.5 [PH] (ref 5–7)
PLATELET # BLD AUTO: 218 10E3/UL (ref 150–450)
POTASSIUM SERPL-SCNC: 4 MMOL/L (ref 3.4–5.3)
RBC # BLD AUTO: 4.87 10E6/UL (ref 3.8–5.2)
RBC #/AREA URNS AUTO: ABNORMAL /HPF
SODIUM SERPL-SCNC: 142 MMOL/L (ref 136–145)
SP GR UR STRIP: 1.02 (ref 1–1.03)
SQUAMOUS #/AREA URNS AUTO: ABNORMAL /LPF
TRIGL SERPL-MCNC: 94 MG/DL
UROBILINOGEN UR STRIP-ACNC: 1 E.U./DL
WBC # BLD AUTO: 4.4 10E3/UL (ref 4–11)
WBC #/AREA URNS AUTO: ABNORMAL /HPF

## 2023-03-24 PROCEDURE — 80048 BASIC METABOLIC PNL TOTAL CA: CPT | Performed by: FAMILY MEDICINE

## 2023-03-24 PROCEDURE — 99397 PER PM REEVAL EST PAT 65+ YR: CPT | Performed by: FAMILY MEDICINE

## 2023-03-24 PROCEDURE — 85025 COMPLETE CBC W/AUTO DIFF WBC: CPT | Performed by: FAMILY MEDICINE

## 2023-03-24 PROCEDURE — 36415 COLL VENOUS BLD VENIPUNCTURE: CPT | Performed by: FAMILY MEDICINE

## 2023-03-24 PROCEDURE — 81001 URINALYSIS AUTO W/SCOPE: CPT | Performed by: FAMILY MEDICINE

## 2023-03-24 PROCEDURE — 80061 LIPID PANEL: CPT | Performed by: FAMILY MEDICINE

## 2023-03-24 PROCEDURE — 99213 OFFICE O/P EST LOW 20 MIN: CPT | Mod: 25 | Performed by: FAMILY MEDICINE

## 2023-03-24 RX ORDER — OXYBUTYNIN CHLORIDE 5 MG/1
5 TABLET, EXTENDED RELEASE ORAL DAILY
Qty: 30 TABLET | Refills: 11 | Status: SHIPPED | OUTPATIENT
Start: 2023-03-24

## 2023-03-24 ASSESSMENT — ENCOUNTER SYMPTOMS
NERVOUS/ANXIOUS: 0
EYE PAIN: 0
DYSURIA: 0
WEAKNESS: 0
BREAST MASS: 0
MYALGIAS: 0
FEVER: 0
DIZZINESS: 0
JOINT SWELLING: 1
ABDOMINAL PAIN: 0
HEMATOCHEZIA: 0
PARESTHESIAS: 0
CONSTIPATION: 0
SORE THROAT: 0
HEARTBURN: 0
ARTHRALGIAS: 1
SHORTNESS OF BREATH: 0
HEADACHES: 0
DIARRHEA: 0
CHILLS: 0
PALPITATIONS: 0
HEMATURIA: 0
NAUSEA: 0
FREQUENCY: 1
COUGH: 0

## 2023-03-24 ASSESSMENT — PATIENT HEALTH QUESTIONNAIRE - PHQ9
10. IF YOU CHECKED OFF ANY PROBLEMS, HOW DIFFICULT HAVE THESE PROBLEMS MADE IT FOR YOU TO DO YOUR WORK, TAKE CARE OF THINGS AT HOME, OR GET ALONG WITH OTHER PEOPLE: NOT DIFFICULT AT ALL
SUM OF ALL RESPONSES TO PHQ QUESTIONS 1-9: 2
SUM OF ALL RESPONSES TO PHQ QUESTIONS 1-9: 2

## 2023-03-24 ASSESSMENT — ACTIVITIES OF DAILY LIVING (ADL): CURRENT_FUNCTION: NO ASSISTANCE NEEDED

## 2023-03-24 ASSESSMENT — PAIN SCALES - GENERAL: PAINLEVEL: NO PAIN (0)

## 2023-03-24 NOTE — PROGRESS NOTES
"   SUBJECTIVE:   CC: Lydia is an 63 year old who presents for preventive health visit.   No flowsheet data found.Patient has been advised of split billing requirements and indicates understanding: Yes  Healthy Habits:     In general, how would you rate your overall health?  Good    Frequency of exercise:  2-3 days/week    Duration of exercise:  15-30 minutes    Do you usually eat at least 4 servings of fruit and vegetables a day, include whole grains    & fiber and avoid regularly eating high fat or \"junk\" foods?  Yes    Taking medications regularly:  Not Applicable    Medication side effects:  Not applicable    Ability to successfully perform activities of daily living:  No assistance needed    Home Safety:  Lack of grab bars in the bathroom    Hearing Impairment:  Difficulty following a conversation in a noisy restaurant or crowded room and difficulty understanding soft or whispered speech    In the past 6 months, have you been bothered by leaking of urine? Yes    In general, how would you rate your overall mental or emotional health?  Good      PHQ-2 Total Score: 0    Additional concerns today:  No              Today's PHQ-2 Score:   PHQ-2 ( 1999 [employer]) 3/24/2023   Q1: Little interest or pleasure in doing things 0   Q2: Feeling down, depressed or hopeless 0   PHQ-2 Score 0   PHQ-2 Total Score (12-17 Years)- Positive if 3 or more points; Administer PHQ-A if positive -   Q1: Little interest or pleasure in doing things Not at all   Q2: Feeling down, depressed or hopeless Not at all   PHQ-2 Score 0           Social History     Tobacco Use     Smoking status: Never     Smokeless tobacco: Never   Substance Use Topics     Alcohol use: No         Alcohol Use 3/24/2023   Prescreen: >3 drinks/day or >7 drinks/week? Not Applicable     Reviewed orders with patient.  Reviewed health maintenance and updated orders accordingly - Yes  Labs reviewed in EPIC  Patient Active Problem List   Diagnosis     Allergic rhinitis     Acute " lymphocytic leukemia in remission (H)     Malignant neoplasm of kidney excluding renal pelvis (H)     External hemorrhoids with other complication     Sensorineural hearing loss, asymmetrical     Ovarian cyst     Health Care Home     Hyperlipidemia LDL goal <160     Advanced directives, counseling/discussion     Closed nondisplaced fracture of neck of fifth metacarpal bone of left hand     Plantar warts     Small bowel obstruction (H)     Past Surgical History:   Procedure Laterality Date     ARTHROSCOPY KNEE WITH MEDIAL MENISCECTOMY  7/7/2011    Procedure:ARTHROSCOPY KNEE WITH MEDIAL MENISCECTOMY; choice anes; Surgeon:MANUEL FLEMING; Location:[facility]     COLONOSCOPY N/A 9/4/2018    Procedure: COLONOSCOPY;  colonoscopy;  Surgeon: Carlin Rodrigues MD;  Location: [facility]     HEMORRHOIDECTOMY  2006     HERNIORRHAPHY INGUINAL Left 8/17/2017    Procedure: HERNIORRHAPHY INGUINAL;  Left Inguinal Hernia Repair;  Surgeon: Mehdi Sorensen MD;  Location: [facility]     LAPAROSCOPIC HERNIORRHAPHY INGUINAL Left 1/11/2018    Procedure: LAPAROSCOPIC HERNIORRHAPHY INGUINAL;  Laparoscopic Left Inguinal Hernia Repair converted to open left femoral  hernia repair;  Surgeon: Mehdi Sorensen MD;  Location: [facility]     NEPHRECTOMY RT/LT  2001    left partial nephrectomy- kidney ca     SALPINGO OOPHORECTOMY,R/L/KRISHNA  2010    Rt salpingo oophorectomy      SURGICAL HISTORY OF -   8/1/1995    Vein stripping     SURGICAL HISTORY OF -   1975    wisdom teeth extraction      TONSILLECTOMY & ADENOIDECTOMY  1962     TUBAL LIGATION  1994       Social History     Tobacco Use     Smoking status: Never     Smokeless tobacco: Never   Substance Use Topics     Alcohol use: No     Family History   Problem Relation Age of Onset     C.A.D. Father         bypass     Diabetes Father      Lipids Father      Allergies Sister         allergic rhinitis     Neurologic Disorder Sister         seizure disorder     Respiratory Sister         asthma      Allergies Son      Breast Cancer No family hx of      Cancer - colorectal No family hx of          Current Outpatient Medications   Medication Sig Dispense Refill     acetaminophen (TYLENOL) 325 MG tablet Take 1 tablet by mouth every 6 hours as needed.       calcium carbonate-vitamin D (OS-HOLLY) 500-400 MG-UNIT tablet Take 1 tablet by mouth daily       Multiple Vitamins-Minerals (MULTIVITAMIN WOMEN PO) Take 1 tablet by mouth daily       oxybutynin ER (DITROPAN XL) 5 MG 24 hr tablet Take 1 tablet (5 mg) by mouth daily 30 tablet 11     Probiotic Product (PROBIOTIC PO) Take 1 tablet by mouth daily       UNABLE TO FIND MEDICATION NAME: Advanced Digestive Enzyme; take 1 tab once daily in the am.       VITAMIN D, CHOLECALCIFEROL, PO Take 3 tablets by mouth daily Unknown OTC dose       Allergies   Allergen Reactions     Seasonal Allergies        Breast Cancer Screening:    Breast CA Risk Assessment (FHS-7) 10/18/2021   Do you have a family history of breast, colon, or ovarian cancer? No / Unknown         Mammogram Screening: Recommended mammography every 1-2 years with patient discussion and risk factor consideration  Pertinent mammograms are reviewed under the imaging tab.    History of abnormal Pap smear: Status post benign hysterectomy. Health Maintenance and Surgical History updated.  PAP / HPV Latest Ref Rng & Units 10/18/2021 2/12/2016 4/29/2011   PAP   Negative for Intraepithelial Lesion or Malignancy (NILM) - -   PAP (Historical) - - NIL NIL   HPV16 Negative Negative Negative -   HPV18 Negative Negative Negative -   HRHPV Negative Negative Negative -     Reviewed and updated as needed this visit by clinical staff   Tobacco  Allergies  Meds  Problems  Med Hx  Surg Hx  Fam Hx          Reviewed and updated as needed this visit by Provider   Tobacco  Allergies  Meds  Problems  Med Hx  Surg Hx  Fam Hx         Past Medical History:   Diagnosis Date     Acute lymphoid leukemia, without mention of having  achieved remission(204.00) 1976    ALL at age 16,  no evid of recurrence     Allergic rhinitis, cause unspecified     Rhinitis     Malignant neoplasm of kidney, except pelvis 2001    left kidney removed 1/01     Motion sickness       Past Surgical History:   Procedure Laterality Date     ARTHROSCOPY KNEE WITH MEDIAL MENISCECTOMY  7/7/2011    Procedure:ARTHROSCOPY KNEE WITH MEDIAL MENISCECTOMY; choice anes; Surgeon:MANUEL FLEMING; Location:[facility]     COLONOSCOPY N/A 9/4/2018    Procedure: COLONOSCOPY;  colonoscopy;  Surgeon: Carlin Rodrigues MD;  Location: [facility]     HEMORRHOIDECTOMY  2006     HERNIORRHAPHY INGUINAL Left 8/17/2017    Procedure: HERNIORRHAPHY INGUINAL;  Left Inguinal Hernia Repair;  Surgeon: Mehdi Sorensen MD;  Location: [facility]     LAPAROSCOPIC HERNIORRHAPHY INGUINAL Left 1/11/2018    Procedure: LAPAROSCOPIC HERNIORRHAPHY INGUINAL;  Laparoscopic Left Inguinal Hernia Repair converted to open left femoral  hernia repair;  Surgeon: Mehdi Sorensen MD;  Location: [facility]     NEPHRECTOMY RT/LT  2001    left partial nephrectomy- kidney ca     SALPINGO OOPHORECTOMY,R/L/KRISHNA  2010    Rt salpingo oophorectomy      SURGICAL HISTORY OF -   8/1/1995    Vein stripping     SURGICAL HISTORY OF -   1975    wisdom teeth extraction      TONSILLECTOMY & ADENOIDECTOMY  1962     TUBAL LIGATION  1994       Review of Systems   Constitutional: Negative for chills and fever.   HENT: Positive for hearing loss. Negative for congestion, ear pain and sore throat.    Eyes: Negative for pain and visual disturbance.   Respiratory: Negative for cough and shortness of breath.    Cardiovascular: Negative for chest pain, palpitations and peripheral edema.   Gastrointestinal: Negative for abdominal pain, constipation, diarrhea, heartburn, hematochezia and nausea.   Breasts:  Negative for tenderness, breast mass and discharge.   Genitourinary: Positive for frequency and urgency. Negative for dysuria, genital sores, hematuria,  "pelvic pain, vaginal bleeding and vaginal discharge.   Musculoskeletal: Positive for arthralgias and joint swelling. Negative for myalgias.   Skin: Negative for rash.   Neurological: Negative for dizziness, weakness, headaches and paresthesias.   Psychiatric/Behavioral: Negative for mood changes. The patient is not nervous/anxious.           OBJECTIVE:   /78   Pulse 68   Resp 16   Ht 1.626 m (5' 4\")   Wt 72.6 kg (160 lb)   LMP 12/05/2010   SpO2 99%   BMI 27.46 kg/m    Physical Exam  GENERAL APPEARANCE: healthy, alert and no distress  EYES: Eyes grossly normal to inspection, PERRL and conjunctivae and sclerae normal  HENT: ear canals and TM's normal  NECK: no adenopathy, no asymmetry, masses, or scars and thyroid normal to palpation  RESP: lungs clear to auscultation - no rales, rhonchi or wheezes  BREAST: normal without masses, tenderness or nipple discharge and no palpable axillary masses or adenopathy  CV: regular rate and rhythm, normal S1 S2, no S3 or S4, no murmur, click or rub, no peripheral edema and peripheral pulses strong  ABDOMEN: soft, nontender, no hepatosplenomegaly, no masses and bowel sounds normal  MS: no musculoskeletal defects are noted and gait is age appropriate without ataxia  SKIN: no suspicious lesions or rashes  NEURO: Normal strength and tone, sensory exam grossly normal, mentation intact and speech normal  PSYCH: mentation appears normal and affect normal/bright    Diagnostic Test Results:  Labs reviewed in Epic    ASSESSMENT/PLAN:   Encounter for general adult medical examination with abnormal findings  - PRIMARY CARE FOLLOW-UP SCHEDULING; Future    Acute lymphocytic leukemia in remission (H)  In remission.  Continuing to monitor labs annually.  - CBC with Platelets & Differential; Future    Urge incontinence of urine  Check UA. Start Ditropan.  Discussed use and side effects. Follow-up if not improving or if worsening.   - oxybutynin ER (DITROPAN XL) 5 MG 24 hr tablet; Take " 1 tablet (5 mg) by mouth daily  - UA with Microscopic reflex to Culture    Asymptomatic menopausal state  - DEXA HIP/PELVIS/SPINE - Future; Future    Lipid screening  - Lipid panel reflex to direct LDL Fasting; Future    Screening for endocrine, metabolic and immunity disorder  - Basic metabolic panel; Future    Visit for screening mammogram  - MA SCREENING DIGITAL BILAT - Future  (s+30); Future          Patient has been advised of split billing requirements and indicates understanding: Yes      COUNSELING:  Reviewed preventive health counseling, as reflected in patient instructions        She reports that she has never smoked. She has never used smokeless tobacco.      Benja Cordova MD  [facility]    The patient was provided with written information regarding signs of hearing loss.  Information on urinary incontinence and treatment options given to patient.  She is at risk for falling and has been provided with information to reduce the risk of falling at home.  Answers for HPI/ROS submitted by the patient on 3/24/2023  If you checked off any problems, how difficult have these problems made it for you to do your work, take care of things at home, or get along with other people?: Not difficult at all  PHQ9 TOTAL SCORE: 2

## 2023-03-24 NOTE — PATIENT INSTRUCTIONS
Patient Education   Personalized Prevention Plan  You are due for the preventive services outlined below.  Your care team is available to assist you in scheduling these services.  If you have already completed any of these items, please share that information with your care team to update in your medical record.  Health Maintenance Due   Topic Date Due     Osteoporosis Screening  Never done     Pneumococcal Vaccine (1 - PCV) Never done     COVID-19 Vaccine (2 - Booster for Cheryl series) 08/20/2021     Flu Vaccine (1) 09/01/2022     Mammogram  12/31/2022       Signs of Hearing Loss  Hearing loss is a problem shared by many people. In fact, it's one of the most common health problems, particularly as people age. Most people aged 65 and older have some hearing loss. By age 80, almost everyone does. Hearing loss often occurs slowly over the years. So, you may not realize your hearing has gotten worse.   When sudden hearing loss occurs, it's important to contact your healthcare provider right away. Your provider will do a medical exam and a hearing exam as soon as possible. This is to help find the cause and type of your sudden hearing loss. Based on your diagnosis, your healthcare provider will discuss possible treatments.      Hearing much better with one ear can be a sign of hearing loss.     Have your hearing checked  Call your healthcare provider if you:     Have to strain to hear normal conversation    Have to watch other people s faces very carefully to follow what they re saying    Need to ask people to repeat what they ve said    Often misunderstand what people are saying    Turn the volume of the television or radio up so high that others complain    Feel that people are mumbling when they re talking to you    Find that the effort to hear leaves you feeling tired and irritated    Notice, when using the phone, that you hear better with one ear than the other  Sara last reviewed this educational content  on 6/1/2022 2000-2022 [employer]. All rights reserved. This information is not intended as a substitute for professional medical care. Always follow your healthcare professional's instructions.          Urinary Incontinence, Female (Adult)   Urinary incontinence means loss of bladder control. This problem affects many women, especially as they get older. If you have incontinence, you may be embarrassed to ask for help. But know that this problem can be treated.   Types of Incontinence  There are different types of incontinence. Two of the main types are described here. You can have more than one type.     Stress incontinence. With this type, urine leaks when pressure (stress) is put on the bladder. This may happen when you cough, sneeze, or laugh. Stress incontinence most often occurs because the pelvic floor muscles that support the bladder and urethra are weak. This can happen after pregnancy and vaginal childbirth or a hysterectomy. It can also be due to excess body weight or hormone changes.    Urge incontinence (also called overactive bladder). With this type, a sudden urge to urinate is felt often. This may happen even though there may not be much urine in the bladder. The need to urinate often during the night is common. Urge incontinence most often occurs because of bladder spasms. This may be due to bladder irritation or infection. Damage to bladder nerves or pelvic muscles, constipation, and certain medicines can also lead to urge incontinence.  Treatment depends on the cause. Further evaluation is needed to find the type you have. This will likely include an exam and certain tests. Based on the results, you and your healthcare provider can then plan treatment. Until a diagnosis is made, the home care tips below can help ease symptoms.   Home care    Do pelvic floor muscle exercises, if they are prescribed. The pelvic floor muscles help support the bladder and urethra. Many women find  that their symptoms improve when doing special exercises that strengthen these muscles. To do the exercises, contract the muscles you would use to stop your stream of urine. But do this when you re not urinating. Hold for 10 seconds, then relax. Repeat 10 to 20 times in a row, at least 3 times a day. Your healthcare provider may give you other instructions for how to do the exercises and how often.    Keep a bladder diary. This helps track how often and how much you urinate over a set period of time. Bring this diary with you to your next visit with the provider. The information can help your provider learn more about your bladder problem.    Lose weight, if advised to by your provider. Extra weight puts pressure on the bladder. Your provider can help you create a weight-loss plan that s right for you. This may include exercising more and making certain diet changes.    Don't have foods and drinks that may irritate the bladder. These can include alcohol and caffeinated drinks.    Quit smoking. Smoking and other tobacco use can lead to a long-term (chronic) cough that strains the pelvic floor muscles. Smoking may also damage the bladder and urethra. Talk with your provider about treatments or methods you can use to quit smoking.    If drinking large amounts of fluid makes you have symptoms, you may be advised to limit your fluid intake. You may also be advised to drink most of your fluids during the day and to limit fluids at night.    If you re worried about urine leakage or accidents, you may wear absorbent pads to catch urine. Change the pads often. This helps reduce discomfort. It may also reduce the risk of skin or bladder infections.    Follow-up care  Follow up with your healthcare provider, or as directed. It may take some to find the right treatment for your problem. But healthy lifestyle changes can be made right away. These include such things as exercising on a regular basis, eating a healthy diet, losing  weight (if needed), and quitting smoking. Your treatment plan may include special therapies or medicines. Certain procedures or surgery may also be options. Talk about any questions you have with your provider.   When to seek medical advice  Call the healthcare provider right away if any of these occur:    Fever of 100.4 F (38 C) or higher, or as directed by your provider    Bladder pain or fullness    Belly swelling    Nausea or vomiting    Back pain    Weakness, dizziness, or fainting  [employer] last reviewed this educational content on 1/1/2020 2000-2022 [employer]. All rights reserved. This information is not intended as a substitute for professional medical care. Always follow your healthcare professional's instructions.          Preventing Falls at Home  A person can fall for many reasons. Older adults may fall because reaction time slows down as we age. Your muscles and joints may get stiff, weak, or less flexible because of illness, medicines, or a physical condition.   Other health problems that make falls more likely include:     Arthritis    Dizziness or lightheadedness when you stand up (orthostatic hypotension)    History of a stroke    Dizziness    Anemia    Certain medicines taken for mental illness or to control blood pressure.    Problems with balance or gait    Bladder or urinary problems    History of falling    Changes in vision (vision impairment)    Changes in thinking skills and memory (cognitive impairment)  Injuries from a fall can include serious injuries such as broken bones, dislocated joints, internal bleeding and cuts. Injuries like these can limit your independence.   Prevention tips  To help prevent falls and fall-related injuries, follow the tips below.    Floors  To make floors safer:     Put nonskid pads under area rugs.    Remove small rugs.    Replace worn floor coverings.    Tack carpets firmly to each step on carpeted stairs. Put nonskid strips on the edges of  uncarpeted stairs.    Keep floors and stairs free of clutter and cords.    Arrange furniture so there are clear pathways.    Clean up any spills right away.  Bathrooms    To make bathrooms safer:     Install grab bars in the tub or shower.    Apply nonskid strips or put a nonskid rubber mat in the tub or shower.    Sit on a bath chair to bathe.    Use bathmats with nonskid backing.  Lighting  To improve visibility in your home:      Keep a flashlight in each room. Or put a lamp next to the bed within easy reach.    Put nightlights in the bedrooms, hallways, kitchen, and bathrooms.    Make sure all stairways have good lighting.    Take your time when going up and down stairs.    Put handrails on both sides of stairs and in walkways for more support. To prevent injury to your wrist or arm, don t use handrails to pull yourself up.    Install grab bars to pull yourself up.    Move or rearrange items that you use often. This will make them easier to find or reach.    Look at your home to find any safety hazards. Especially look at doorways, walkways, and the driveway. Remove or repair any safety problems that you find.  Other changes to make    Look around to find any safety hazards. Look closely at doorways, walkways, and the driveway. Remove or repair any safety problems that you find.    Wear shoes that fit well.    Take your time when going up and down stairs.    Put handrails on both sides of stairs and in walkways for more support. To prevent injury to your wrist or arm, don t use handrails to pull yourself up.    Install grab bars wherever needed to pull yourself up.    Arrange items that you use often. This will make them easier to find or reach.    [employer] last reviewed this educational content on 3/1/2020    6190-4359 [employer]. All rights reserved. This information is not intended as a substitute for professional medical care. Always follow your healthcare professional's instructions.

## 2023-04-15 ENCOUNTER — HEALTH MAINTENANCE LETTER (OUTPATIENT)
Age: 63
End: 2023-04-15

## 2023-04-15 DIAGNOSIS — N39.41 URGE INCONTINENCE OF URINE: ICD-10-CM

## 2023-04-17 RX ORDER — OXYBUTYNIN CHLORIDE 5 MG/1
TABLET, EXTENDED RELEASE ORAL
Qty: 30 TABLET | Refills: 11 | OUTPATIENT
Start: 2023-04-17

## 2023-09-29 ENCOUNTER — OFFICE VISIT (OUTPATIENT)
Dept: FAMILY MEDICINE | Facility: CLINIC | Age: 63
End: 2023-09-29
Payer: MEDICARE

## 2023-09-29 VITALS
SYSTOLIC BLOOD PRESSURE: 114 MMHG | HEART RATE: 62 BPM | RESPIRATION RATE: 16 BRPM | HEIGHT: 64 IN | WEIGHT: 151 LBS | BODY MASS INDEX: 25.78 KG/M2 | TEMPERATURE: 97.4 F | OXYGEN SATURATION: 98 % | DIASTOLIC BLOOD PRESSURE: 62 MMHG

## 2023-09-29 DIAGNOSIS — M79.671 RIGHT FOOT PAIN: Primary | ICD-10-CM

## 2023-09-29 PROCEDURE — 99213 OFFICE O/P EST LOW 20 MIN: CPT | Performed by: FAMILY MEDICINE

## 2023-09-29 ASSESSMENT — PAIN SCALES - GENERAL: PAINLEVEL: MILD PAIN (2)

## 2023-09-29 ASSESSMENT — PATIENT HEALTH QUESTIONNAIRE - PHQ9
SUM OF ALL RESPONSES TO PHQ QUESTIONS 1-9: 4
10. IF YOU CHECKED OFF ANY PROBLEMS, HOW DIFFICULT HAVE THESE PROBLEMS MADE IT FOR YOU TO DO YOUR WORK, TAKE CARE OF THINGS AT HOME, OR GET ALONG WITH OTHER PEOPLE: NOT DIFFICULT AT ALL
SUM OF ALL RESPONSES TO PHQ QUESTIONS 1-9: 4

## 2023-09-29 NOTE — NURSING NOTE
"Initial /64   Pulse 62   Temp 97.4  F (36.3  C) (Tympanic)   Resp 16   Ht 1.626 m (5' 4\")   Wt 68.5 kg (151 lb)   LMP 12/05/2010   SpO2 98%   BMI 25.92 kg/m   Estimated body mass index is 25.92 kg/m  as calculated from the following:    Height as of this encounter: 1.626 m (5' 4\").    Weight as of this encounter: 68.5 kg (151 lb). .    "

## 2023-09-29 NOTE — PROGRESS NOTES
"  Assessment & Plan     Right foot pain  Suspect posterior tibialis tendinopathy.  Recommended ice, relative rest, elevation, arch support.  Physical therapy/orthotics if not improving..             BMI:   Estimated body mass index is 25.92 kg/m  as calculated from the following:    Height as of this encounter: 1.626 m (5' 4\").    Weight as of this encounter: 68.5 kg (151 lb).           Benja Cordova MD  [facility]    Tony Freeman is a 63 year old, presenting for the following health issues:  Musculoskeletal Problem        3/24/2023     7:06 AM   Additional Questions   Roomed by MB       History of Present Illness       Reason for visit:  Foot pain  Symptom onset:  3-4 weeks ago    She eats 2-3 servings of fruits and vegetables daily.She consumes 0 sweetened beverage(s) daily.She exercises with enough effort to increase her heart rate 10 to 19 minutes per day.  She exercises with enough effort to increase her heart rate 4 days per week.   She is taking medications regularly.                 Review of Systems   Constitutional, neuro, ENT, endocrine, pulmonary, cardiac, gastrointestinal, genitourinary, musculoskeletal, integument and psychiatric systems are negative, except as otherwise noted.       Objective    /62   Pulse 62   Temp 97.4  F (36.3  C) (Tympanic)   Resp 16   Ht 1.626 m (5' 4\")   Wt 68.5 kg (151 lb)   LMP 12/05/2010   SpO2 98%   BMI 25.92 kg/m    Body mass index is 25.92 kg/m .  Physical Exam   GENERAL: Pleasant, well appearing female.  MUSCULOSKELETAL: Pain along the medial and plantar arch along the course of the posterior tibialis tendon.                      "

## 2023-10-20 ENCOUNTER — E-VISIT (OUTPATIENT)
Dept: URGENT CARE | Facility: CLINIC | Age: 63
End: 2023-10-20
Payer: MEDICARE

## 2023-10-20 ENCOUNTER — LAB (OUTPATIENT)
Dept: LAB | Facility: CLINIC | Age: 63
End: 2023-10-20
Payer: MEDICARE

## 2023-10-20 ENCOUNTER — NURSE TRIAGE (OUTPATIENT)
Dept: NURSING | Facility: CLINIC | Age: 63
End: 2023-10-20

## 2023-10-20 DIAGNOSIS — R30.0 DYSURIA: Primary | ICD-10-CM

## 2023-10-20 DIAGNOSIS — R30.0 DYSURIA: ICD-10-CM

## 2023-10-20 DIAGNOSIS — N30.01 ACUTE CYSTITIS WITH HEMATURIA: Primary | ICD-10-CM

## 2023-10-20 LAB
ALBUMIN UR-MCNC: NEGATIVE MG/DL
APPEARANCE UR: CLEAR
BACTERIA #/AREA URNS HPF: ABNORMAL /HPF
BILIRUB UR QL STRIP: NEGATIVE
COLOR UR AUTO: YELLOW
GLUCOSE UR STRIP-MCNC: NEGATIVE MG/DL
HGB UR QL STRIP: ABNORMAL
KETONES UR STRIP-MCNC: NEGATIVE MG/DL
LEUKOCYTE ESTERASE UR QL STRIP: ABNORMAL
MUCOUS THREADS #/AREA URNS LPF: PRESENT /LPF
NITRATE UR QL: NEGATIVE
PH UR STRIP: 6 [PH] (ref 5–7)
RBC #/AREA URNS AUTO: ABNORMAL /HPF
SP GR UR STRIP: 1.02 (ref 1–1.03)
SQUAMOUS #/AREA URNS AUTO: ABNORMAL /LPF
UROBILINOGEN UR STRIP-ACNC: 1 E.U./DL
WBC #/AREA URNS AUTO: ABNORMAL /HPF
WBC CLUMPS #/AREA URNS HPF: PRESENT /HPF

## 2023-10-20 PROCEDURE — 87086 URINE CULTURE/COLONY COUNT: CPT

## 2023-10-20 PROCEDURE — 81001 URINALYSIS AUTO W/SCOPE: CPT

## 2023-10-20 PROCEDURE — 99421 OL DIG E/M SVC 5-10 MIN: CPT | Performed by: PHYSICIAN ASSISTANT

## 2023-10-20 RX ORDER — SULFAMETHOXAZOLE/TRIMETHOPRIM 800-160 MG
1 TABLET ORAL 2 TIMES DAILY
Qty: 6 TABLET | Refills: 0 | Status: SHIPPED | OUTPATIENT
Start: 2023-10-20 | End: 2023-10-23

## 2023-10-20 NOTE — TELEPHONE ENCOUNTER
Caller:   patient    Situation:   Had evisit and labs are abnormal.    She is wondering what the next step was        Background:        Assessment  Note in lab - antibiotic sent to [facility] for possible UTI        Recommendation:  Disposition: information given to patient.  Reviewed care advise with patient.   Informed to call back w/ any questions or new concerns.    Caller verbalized understanding of care advice.  Caller agrees with advise/plan.        Prashant Anderson RN, BSN  Triage Nurse Advisor            Reason for Disposition   [1] Follow-up call to recent contact AND [2] information only call, no triage required    Protocols used: Information Only Call - No Triage-A-

## 2023-10-20 NOTE — PATIENT INSTRUCTIONS
Dear Lydia Pierson,     After reviewing your responses, I would like you to come in for a urine test to make sure we treat you correctly. This urine test is to evaluate you for a possible urinary tract infection, and should be scheduled for today or tomorrow. Schedule a Lab Only appointment here.     Lab appointments are not available at most locations on the weekends. If no Lab Only appointment is available, you should be seen in any of our convenient Walk-in or Urgent Care Centers, which can be found on our website here.     You will receive instructions with your results in [employer] once they are available.     If your symptoms worsen, you develop pain in your back or stomach, develop fevers, or are not improving in 5 days, please contact your primary care provider for an appointment or visit a Walk-in or Urgent Care Center to be seen.     Thanks again for choosing us as your health care partner,     Alessia Ku PA-C

## 2023-10-22 LAB — BACTERIA UR CULT: NORMAL

## 2024-06-16 ENCOUNTER — HEALTH MAINTENANCE LETTER (OUTPATIENT)
Age: 64
End: 2024-06-16

## 2024-07-12 ENCOUNTER — OFFICE VISIT (OUTPATIENT)
Dept: FAMILY MEDICINE | Facility: CLINIC | Age: 64
End: 2024-07-12
Payer: COMMERCIAL

## 2024-07-12 ENCOUNTER — ANCILLARY PROCEDURE (OUTPATIENT)
Dept: GENERAL RADIOLOGY | Facility: CLINIC | Age: 64
End: 2024-07-12
Attending: NURSE PRACTITIONER
Payer: COMMERCIAL

## 2024-07-12 VITALS
HEIGHT: 64 IN | SYSTOLIC BLOOD PRESSURE: 118 MMHG | BODY MASS INDEX: 27.42 KG/M2 | HEART RATE: 72 BPM | RESPIRATION RATE: 20 BRPM | DIASTOLIC BLOOD PRESSURE: 78 MMHG | TEMPERATURE: 97.2 F | WEIGHT: 160.6 LBS

## 2024-07-12 DIAGNOSIS — M25.561 LATERAL KNEE PAIN, RIGHT: Primary | ICD-10-CM

## 2024-07-12 DIAGNOSIS — Z12.31 VISIT FOR SCREENING MAMMOGRAM: ICD-10-CM

## 2024-07-12 DIAGNOSIS — Z78.0 POST-MENOPAUSAL: ICD-10-CM

## 2024-07-12 DIAGNOSIS — M25.561 LATERAL KNEE PAIN, RIGHT: ICD-10-CM

## 2024-07-12 PROCEDURE — 99213 OFFICE O/P EST LOW 20 MIN: CPT | Performed by: NURSE PRACTITIONER

## 2024-07-12 PROCEDURE — 73562 X-RAY EXAM OF KNEE 3: CPT | Mod: TC | Performed by: RADIOLOGY

## 2024-07-12 RX ORDER — RESPIRATORY SYNCYTIAL VIRUS VACCINE 120MCG/0.5
0.5 KIT INTRAMUSCULAR ONCE
Qty: 1 EACH | Refills: 0 | Status: CANCELLED | OUTPATIENT
Start: 2024-07-12 | End: 2024-07-12

## 2024-07-12 ASSESSMENT — PATIENT HEALTH QUESTIONNAIRE - PHQ9
SUM OF ALL RESPONSES TO PHQ QUESTIONS 1-9: 4
SUM OF ALL RESPONSES TO PHQ QUESTIONS 1-9: 4
10. IF YOU CHECKED OFF ANY PROBLEMS, HOW DIFFICULT HAVE THESE PROBLEMS MADE IT FOR YOU TO DO YOUR WORK, TAKE CARE OF THINGS AT HOME, OR GET ALONG WITH OTHER PEOPLE: NOT DIFFICULT AT ALL

## 2024-07-12 ASSESSMENT — PAIN SCALES - GENERAL: PAINLEVEL: MILD PAIN (3)

## 2024-07-12 NOTE — PROGRESS NOTES
"  Assessment & Plan     Lateral knee pain, right  Xray completed today on my review does not appear to have significant arthritis present. Suspect compensation from her left knee due to pain that is chronic on left side. Recommend symptomatic management of rest, ice, and stability exercises. Waiting on official radiology read. Follow up with a message if she desires to start physical therapy or see someone in sports orthopedics.   - XR Knee Right 3 Views; Future    Visit for screening mammogram  - MA Screen Bilateral w/Nick; Future    Post-menopausal  - DEXA HIP/PELVIS/SPINE - Future; Future          BMI  Estimated body mass index is 27.35 kg/m  as calculated from the following:    Height as of this encounter: 1.632 m (5' 4.25\").    Weight as of this encounter: 72.8 kg (160 lb 9.6 oz).       Tony Freeman is a 64 year old, presenting for the following health issues:  Knee Injury        7/12/2024     7:15 AM   Additional Questions   Roomed by Mindi CONLEY   Accompanied by self         7/12/2024     7:15 AM   Patient Reported Additional Medications   Patient reports taking the following new medications none     History of Present Illness       Reason for visit:  Knee pain  Symptom onset:  1-2 weeks ago  Symptoms include:  Knee pain  Symptom intensity:  Moderate  Symptom progression:  Staying the same  Had these symptoms before:  No  What makes it worse:  Walking and standing  What makes it better:  Pain reliever and ice    She eats 4 or more servings of fruits and vegetables daily.She consumes 1 sweetened beverage(s) daily.She exercises with enough effort to increase her heart rate 10 to 19 minutes per day.  She exercises with enough effort to increase her heart rate 3 or less days per week.   She is taking medications regularly.     Musculoskeletal problem/pain    Duration: 1 week  Description  Location: right knee  Intensity:  3/10  Accompanying signs and symptoms: pain on the outside of the right knee by the " "patella. Pain will also be present below the patella. Clicking loud enough for others to notice. Entire knee seems to swell  History  Previous similar problem: torn meniscus left knee 20 years ago  Previous evaluation:  none  Precipitating or alleviating factors:  Trauma or overuse: no   Aggravating factors include: standing, walking  Therapies tried and outcome: ice, elevation, brace, Tylenol, Ibuprofen    Does not feel unstable/ unsteady when taking the stairs. Pain is more prevalent with descending the stairs than climbing.     Due for Mammogram and Dexa. She accepts these orders today.     Review of Systems  Constitutional, HEENT, cardiovascular, pulmonary, gi and gu systems are negative, except as otherwise noted.      Objective    /78 (BP Location: Right arm, Patient Position: Sitting, Cuff Size: Adult Regular)   Pulse 72   Temp 97.2  F (36.2  C) (Tympanic)   Resp 20   Ht 1.632 m (5' 4.25\")   Wt 72.8 kg (160 lb 9.6 oz)   LMP 12/05/2010   BMI 27.35 kg/m    Body mass index is 27.35 kg/m .  Physical Exam   GENERAL: alert and no distress  MS: extremities normal- no gross deformities noted. Tender to palpation lateral aspect of the right knee over the lateral collateral ligament.   PSYCH: mentation appears normal, affect normal/bright    Xray - Reviewed and interpreted by me.  Right knee does not show significant arthritis changes. Some possible bone spurring of the patella. Sunrise view of bilateral knees shows significant medial arthritis of the left knee.     Waiting on official radiology read.         Signed Electronically by: JOAQUIN Singh CNP    "

## 2024-07-16 ENCOUNTER — TELEPHONE (OUTPATIENT)
Dept: FAMILY MEDICINE | Facility: CLINIC | Age: 64
End: 2024-07-16
Payer: COMMERCIAL

## 2024-07-16 DIAGNOSIS — U07.1 INFECTION DUE TO 2019 NOVEL CORONAVIRUS: Primary | ICD-10-CM

## 2024-07-16 NOTE — TELEPHONE ENCOUNTER
RN COVID TREATMENT VISIT  07/16/24      The patient has been triaged and does not require a higher level of care.    Lydia Pierson  64 year old  Current weight? 160    Has the patient been seen by a primary care provider at an [facility] or [facility] within the past two years? Yes.   Have you been in close proximity to/do you have a known exposure to a person with a confirmed case of influenza? No.     General treatment eligibility:  Date of positive COVID test (PCR or at home)?  07/15/24    Are you or have you been hospitalized for this COVID-19 infection? No.   Have you received monoclonal antibodies or antiviral treatment for COVID-19 since this positive test? No.   Do you have any of the following conditions that place you at risk of being very sick from COVID-19?   - Age 50 years or older  - Solid organ or blood cell transplant   Yes, patient has at least one high risk condition as noted above.     Current COVID symptoms:   - fever or chills  - cough  - fatigue  - headache  - sore throat  - congestion or runny nose  Yes. Patient has at least one symptom as selected.     How many days since symptoms started? 5 days or less. Established patient, 12 years or older weighing at least 88.2 lbs, who has symptoms that started in the past 5 days, has not been hospitalized nor received treatment already, and is at risk for being very sick from COVID-19.     Treatment eligibility by RN:  Are you currently pregnant or nursing? No  Do you have a clinically significant hypersensitivity to nirmatrelvir or ritonavir, or toxic epidermal necrolysis (TEN) or Peters-Martin Syndrome? No  Do you have a history of hepatitis, any hepatic impairment on the Problem List (such as Child-Morrison Class C, cirrhosis, fatty liver disease, alcoholic liver disease), or was the last liver lab (hepatic panel, ALT, AST, ALK Phos, bilirubin) elevated in the past 6 months? No  Do you have any history of severe  renal impairment (eGFR < 30mL/min)? No    Is patient eligible to continue? Yes, patient meets all eligibility requirements for the RN COVID treatment (as denoted by all no responses above).     Current Outpatient Medications   Medication Sig Dispense Refill    acetaminophen (TYLENOL) 325 MG tablet Take 1 tablet by mouth every 6 hours as needed.      calcium carbonate-vitamin D (OS-HOLLY) 500-400 MG-UNIT tablet Take 1 tablet by mouth daily      Multiple Vitamins-Minerals (MULTIVITAMIN WOMEN PO) Take 1 tablet by mouth daily      oxybutynin ER (DITROPAN XL) 5 MG 24 hr tablet Take 1 tablet (5 mg) by mouth daily (Patient not taking: Reported on 9/29/2023) 30 tablet 11    Probiotic Product (PROBIOTIC PO) Take 1 tablet by mouth daily      UNABLE TO FIND MEDICATION NAME: Advanced Digestive Enzyme; take 1 tab once daily in the am.      VITAMIN D, CHOLECALCIFEROL, PO Take 3 tablets by mouth daily Unknown OTC dose         Medications from List 1 of the standing order (on medications that exclude the use of Paxlovid) that patient is taking: NONE. Is patient taking Comobabi's Wort? No  Is patient taking Comobabi's Wort or any meds from List 1? No.   Medications from List 2 of the standing order (on meds that provider needs to adjust) that patient is taking: NONE. Is patient on any of the meds from List 2? No.   Medications from List 3 of standing order (on meds that a RN needs to adjust) that patient is taking: NONE. Is patient on any meds from List 3? No.     Paxlovid has an approximate 90% reduction in hospitalization. Paxlovid can possibly cause altered sense of taste, diarrhea (loose, watery stools), high blood pressure, muscle aches.     Would patient like a Paxlovid prescription?   Yes.   Lab Results   Component Value Date    GFRESTIMATED 89 03/24/2023       Was last eGFR reduced? No, eGFR 60 or greater/ No Result on record. Patient can receive the normal renal function dose. Paxlovid Rx sent to [facility] [phone] [address], [place] [address]    Hours:   Mon-Fri: 9:00a - 5:00p    Drive Thru available    Temporary change to home medications: None    All medication adjustments (holds, etc) were discussed with the patient and patient was asked to repeat back (teachback) their med adjustment.  Did patient understand med adjustment? No medication adjustments needed.         Reviewed the following instructions with the patient:    Paxlovid (nimatrelvir and ritonavir)    How it works  Two medicines (nirmatrelvir and ritonavir) are taken together. They stop the virus from growing. Less amount of virus is easier for your body to fight.    How to take  Medicine comes in a daily container with both medicine tablets. Take by mouth twice daily (once in the morning, once at night) for 5 days.  The number of tablets to take varies by patient.  Don't chew or break capsules. Swallow whole.    When to take  Take as soon as possible after positive COVID-19 test result, and within 5 days of your first symptoms.    Possible side effects  Can cause altered sense of taste, diarrhea (loose, watery stools), high blood pressure, muscle aches.    Genie Haney RN

## 2024-07-29 ENCOUNTER — OFFICE VISIT (OUTPATIENT)
Dept: FAMILY MEDICINE | Facility: CLINIC | Age: 64
End: 2024-07-29
Payer: COMMERCIAL

## 2024-07-29 ENCOUNTER — ANCILLARY PROCEDURE (OUTPATIENT)
Dept: GENERAL RADIOLOGY | Facility: CLINIC | Age: 64
End: 2024-07-29
Attending: NURSE PRACTITIONER
Payer: COMMERCIAL

## 2024-07-29 ENCOUNTER — NURSE TRIAGE (OUTPATIENT)
Dept: NURSING | Facility: CLINIC | Age: 64
End: 2024-07-29

## 2024-07-29 VITALS
WEIGHT: 162.25 LBS | HEART RATE: 77 BPM | RESPIRATION RATE: 18 BRPM | SYSTOLIC BLOOD PRESSURE: 114 MMHG | HEIGHT: 64 IN | DIASTOLIC BLOOD PRESSURE: 70 MMHG | TEMPERATURE: 98.8 F | OXYGEN SATURATION: 95 % | BODY MASS INDEX: 27.7 KG/M2

## 2024-07-29 DIAGNOSIS — S49.91XA SHOULDER INJURY, RIGHT, INITIAL ENCOUNTER: ICD-10-CM

## 2024-07-29 DIAGNOSIS — S51.011A SKIN TEAR OF RIGHT ELBOW WITHOUT COMPLICATION, INITIAL ENCOUNTER: ICD-10-CM
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
## 2024-07-29 DIAGNOSIS — S59.901A ELBOW INJURY, RIGHT, INITIAL ENCOUNTER: ICD-10-CM

## 2024-07-29 PROCEDURE — 73080 X-RAY EXAM OF ELBOW: CPT | Mod: TC | Performed by: RADIOLOGY

## 2024-07-29 PROCEDURE — 73030 X-RAY EXAM OF SHOULDER: CPT | Mod: TC | Performed by: RADIOLOGY

## 2024-07-29 PROCEDURE — G2211 COMPLEX E/M VISIT ADD ON: HCPCS | Performed by: NURSE PRACTITIONER

## 2024-07-29 PROCEDURE — 99213 OFFICE O/P EST LOW 20 MIN: CPT | Performed by: NURSE PRACTITIONER

## 2024-07-29 ASSESSMENT — PAIN SCALES - GENERAL: PAINLEVEL: MILD PAIN (3)

## 2024-07-29 NOTE — PROGRESS NOTES
"  Assessment & Plan       ICD-10-CM    1. Fall, initial encounter  W19.XXXA XR Shoulder Right 2 Views     XR Elbow Right G/E 3 Views      2. Shoulder injury, right, initial encounter  S49.91XA XR Shoulder Right 2 Views      3. Elbow injury, right, initial encounter  S59.901A XR Elbow Right G/E 3 Views      4. Skin tear of right elbow without complication, initial encounter  S51.011A         I personally reviewed imaging completed in clinic today.  X-ray does not show acute fracture on my read waiting on official radiology read.  Recommend continued symptomatic management with rest, ice, and covering of the skin tear.  Okay to apply some petroleum jelly to the skin tear and covering to allow healing if radiology does see bony abnormalities will let patient know and proceed with referral to orthopedics if needed.  She is in agreement this plan.  Patient to follow-up if symptoms are not improving in the next couple weeks.  She is in agreement this plan.          BMI  Estimated body mass index is 27.63 kg/m  as calculated from the following:    Height as of this encounter: 1.632 m (5' 4.25\").    Weight as of this encounter: 73.6 kg (162 lb 4 oz).         Tony Freeman is a 64 year old, presenting for the following health issues:  Fall    History of Present Illness       Reason for visit:  Trip and fall accident  Symptom onset:  1-3 days ago  Symptoms include:  Pain in my shoulder elbow and wrist  Symptom intensity:  Moderate  Symptom progression:  Staying the same  Had these symptoms before:  No  What makes it worse:  Touching or moving  What makes it better:  Advil and ice    She eats 2-3 servings of fruits and vegetables daily.She consumes 0 sweetened beverage(s) daily.She exercises with enough effort to increase her heart rate 20 to 29 minutes per day.  She exercises with enough effort to increase her heart rate 4 days per week.   She is taking medications regularly.       - Fall 7/27/2024. She fell into a cement " "wall near side walk. Today she notes having right elbow and shoulder pain. There is bruising on the shoulder and a scratch on elbow.    Tripped on uneven concrete. Hit the ground but elbow hit a brick retaining wall.     Concern about possible fracture. She would like imaging.         Review of Systems  Constitutional, HEENT, cardiovascular, pulmonary, gi and gu systems are negative, except as otherwise noted.      Objective    /70   Pulse 77   Temp 98.8  F (37.1  C) (Tympanic)   Resp 18   Ht 1.632 m (5' 4.25\")   Wt 73.6 kg (162 lb 4 oz)   LMP 12/05/2010   SpO2 95%   BMI 27.63 kg/m    Body mass index is 27.63 kg/m .  Physical Exam   GENERAL: alert and no distress  MS: right anterior shoulder pain tender to palpation and bruising. Right Elbow bruising and erythremia present with skin tear at the olecranon process.             Signed Electronically by: JOAQUIN Singh CNP    "

## 2024-07-29 NOTE — TELEPHONE ENCOUNTER
Triage call  Patient calling to report that she fell on 7/27/2024 banged up elbow wrist, shoulder on the rt side she rate 4/10 with pain meds but it is a 6/10 with out any medication.  She states she scraped her elbow really well it bled .  She has a bruise on her shoulder and her elbow is swollen. She can lift her arm above her head but it is painful.    Per protocol see PCP within 24 hours.  Care advice given.  Verbalizes understanding and agrees with plan.transferred to scheduling.    Stephany Sousa RN   [facility] Nurse Advisor  6:16 AM 7/29/2024    Reason for Disposition   Large swelling or bruise (> 2 inches or 5 cm)    Additional Information   Negative: Serious injury with multiple fractures (broken bones)   Negative: [1] Major bleeding (e.g., actively dripping or spurting) AND [2] can't be stopped   Negative: Bullet wound, stabbed by knife, or other serious penetrating wound   Negative: Sounds like a life-threatening emergency to the triager   Negative: Wound looks infected   Negative: Elbow pain from overuse (work, exercise, gardening) OR from self-induced lifting injury   Negative: Elbow pain not from an injury   Negative: Looks like a broken bone or dislocated joint (e.g., crooked or deformed)   Negative: Skin is split open or gaping (or length > 1/2 inch or 12 mm)   Negative: [1] Bleeding AND [2] won't stop after 10 minutes of direct pressure (using correct technique)   Negative: [1] Dirt in the wound AND [2] not removed with 15 minutes of scrubbing   Negative: Can't bend injured elbow at all   Negative: [1] Numbness (i.e., loss of sensation) in fingers AND [2] present now   Negative: Sounds like a serious injury to the triager   Negative: [1] SEVERE pain AND [2] not improved 2 hours after pain medicine/ice packs   Negative: Can't move injured elbow normally (i.e., bend or straighten completely)   Negative: Suspicious history for the injury    Protocols used: Elbow Injury-A-

## 2024-09-24 ENCOUNTER — OFFICE VISIT (OUTPATIENT)
Dept: FAMILY MEDICINE | Facility: CLINIC | Age: 64
End: 2024-09-24
Payer: COMMERCIAL

## 2024-09-24 VITALS
RESPIRATION RATE: 16 BRPM | OXYGEN SATURATION: 98 % | BODY MASS INDEX: 26.66 KG/M2 | SYSTOLIC BLOOD PRESSURE: 120 MMHG | TEMPERATURE: 97.7 F | HEIGHT: 65 IN | WEIGHT: 160 LBS | HEART RATE: 71 BPM | DIASTOLIC BLOOD PRESSURE: 68 MMHG

## 2024-09-24 DIAGNOSIS — C91.01 ACUTE LYMPHOCYTIC LEUKEMIA IN REMISSION (H): ICD-10-CM

## 2024-09-24 DIAGNOSIS — J32.9 SINUSITIS, UNSPECIFIED CHRONICITY, UNSPECIFIED LOCATION: Primary | ICD-10-CM

## 2024-09-24 PROCEDURE — 99214 OFFICE O/P EST MOD 30 MIN: CPT | Performed by: NURSE PRACTITIONER

## 2024-09-24 ASSESSMENT — PAIN SCALES - GENERAL: PAINLEVEL: NO PAIN (0)

## 2024-09-24 NOTE — PATIENT INSTRUCTIONS
"Sinus pressure is primarily a problem of drainage.  You can best help your body clear the sinus secretions and pressure by opening up the natural passageways which are often blocked by viral colds and allergies.    Short courses of a nasal decongestant spray (Afrin or Neosinephrine) are one of the most effective tools in opening sinus drainage passageways.  Their use should be restricted to 3 days though due to the high risk of nasal addiction.  Pseudoephedrine (Sudafed) is often helpful but it can cause elevations in blood pressure and insomnia.  Dextromethorphan/guaifenesin combinations help loosen secretions and often help make the mucous more liquid and easier to clear.    For pain and fevers, acetaminophen (Tylenol) is most appropriate.  Ibuprofen (Advil) or naproxen (Aleve) are useful too and last longer but they can cause elevation of blood pressure or stomach problems.    Antihistamines (Benadryl, Dimetapp, etc.) cause sedation, confusion, bowel and urinary abnormalities and are of little use for infectious causes of cough and nasal congestion.  Their use should be reserved for allergic symptoms.    The body needs to be treated well in order to help heal itself.  Rest as needed.  It is ok to reduce food intake if appetite is poor but it is quite important to maintain/increase fluid intake.  Sinus pressure and infections usually go away on their own with appropriate care.  If symptoms worsen or persist beyond 10 days, an antibiotic might be worth trying to treat a possible bacterial component.      When you are out of refills or the refills say \"zero\", it is time to schedule your next appointment in clinic!    Labs are released to you almost immediately and sometimes before I have had a chance to review them.  I review labs regularly and once they are all in, you will either be sent a letter with your results or if you are signed up for on-line services, you will be notified that results are available to you " "on [email or web address]. If there are serious findings, you typically will be called.    If you have any questions about your visit, your symptoms, your medication, your test results or it is not clear what your diagnosis or treatment plan is please contact me (via [employer]) or call the care team at [phone] and say \"Care Team\"          "

## 2024-09-24 NOTE — PROGRESS NOTES
"  Assessment & Plan     Sinusitis, unspecified chronicity, unspecified location    - amoxicillin-clavulanate (AUGMENTIN) 875-125 MG tablet; Take 1 tablet by mouth 2 times daily.  Discussed how to take the medication(s), expected outcomes, potential side effects.    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  No current concerns. Continue to monitor.          BMI  Estimated body mass index is 26.83 kg/m  as calculated from the following:    Height as of this encounter: 1.645 m (5' 4.75\").    Weight as of this encounter: 72.6 kg (160 lb).         See Patient Instructions  Patient Instructions   Sinus pressure is primarily a problem of drainage.  You can best help your body clear the sinus secretions and pressure by opening up the natural passageways which are often blocked by viral colds and allergies.    Short courses of a nasal decongestant spray (Afrin or Neosinephrine) are one of the most effective tools in opening sinus drainage passageways.  Their use should be restricted to 3 days though due to the high risk of nasal addiction.  Pseudoephedrine (Sudafed) is often helpful but it can cause elevations in blood pressure and insomnia.  Dextromethorphan/guaifenesin combinations help loosen secretions and often help make the mucous more liquid and easier to clear.    For pain and fevers, acetaminophen (Tylenol) is most appropriate.  Ibuprofen (Advil) or naproxen (Aleve) are useful too and last longer but they can cause elevation of blood pressure or stomach problems.    Antihistamines (Benadryl, Dimetapp, etc.) cause sedation, confusion, bowel and urinary abnormalities and are of little use for infectious causes of cough and nasal congestion.  Their use should be reserved for allergic symptoms.    The body needs to be treated well in order to help heal itself.  Rest as needed.  It is ok to reduce food intake if appetite is poor " "but it is quite important to maintain/increase fluid intake.  Sinus pressure and infections usually go away on their own with appropriate care.  If symptoms worsen or persist beyond 10 days, an antibiotic might be worth trying to treat a possible bacterial component.      When you are out of refills or the refills say \"zero\", it is time to schedule your next appointment in clinic!    Labs are released to you almost immediately and sometimes before I have had a chance to review them.  I review labs regularly and once they are all in, you will either be sent a letter with your results or if you are signed up for on-line services, you will be notified that results are available to you on [employer]. If there are serious findings, you typically will be called.    If you have any questions about your visit, your symptoms, your medication, your test results or it is not clear what your diagnosis or treatment plan is please contact me (via [employer]) or call the care team at [phone] and say \"Care Team\"            Tony Freeman is a 64 year old, presenting for the following health issues:  URI      9/24/2024    12:49 PM   Additional Questions   Roomed by      KERON    History of Present Illness       Headaches:   Since the patient's last clinic visit, headaches are: no change  The patient is getting headaches:  All  She is able to do normal daily activities when she has a migraine.  The patient is taking the following rescue/relief medications:  Ibuprofen (Advil, Motrin)   Patient states \"I get some relief\" from the rescue/relief medications.   The patient is taking the following medications to prevent migraines:  No medications to prevent migraines  In the past 4 weeks, the patient has gone to an Urgent Care or Emergency Room 0 times times due to headaches.   She is taking medications regularly.       She has slowly worsening sinus pain, congestion and pressure over the past week or so.  Has tried OTC " "remedies.  History of sinus infections but it's been about 3 years since the last.  Denies fever, chills or cough.            Review of Systems  Constitutional, HEENT, cardiovascular, pulmonary, gi and gu systems are negative, except as otherwise noted.      Objective    /68   Pulse 71   Temp 97.7  F (36.5  C) (Tympanic)   Resp 16   Ht 1.645 m (5' 4.75\")   Wt 72.6 kg (160 lb)   LMP 12/05/2010   SpO2 98%   BMI 26.83 kg/m    Body mass index is 26.83 kg/m .  Physical Exam   GENERAL: healthy, alert and no distress  HENT: ear canals and TM's normal, pharynx without erythema, positive sinus tenderness maxillary and frontal bilaterally  NECK: no adenopathy, no asymmetry  RESP: lungs clear to auscultation - no rales, rhonchi or wheezes  CV: regular rate and rhythm, normal S1 S2, no S3 or S4, no murmur  ABDOMEN: soft, nontender  MS: no gross musculoskeletal defects noted            Signed Electronically by: JOAQUIN Newberry CNP    "

## 2024-10-31 ENCOUNTER — HOSPITAL ENCOUNTER (OUTPATIENT)
Dept: BONE DENSITY | Facility: CLINIC | Age: 64
Discharge: HOME | End: 2024-10-31
Attending: NURSE PRACTITIONER
Payer: COMMERCIAL

## 2024-10-31 ENCOUNTER — HOSPITAL ENCOUNTER (OUTPATIENT)
Dept: MAMMOGRAPHY | Facility: CLINIC | Age: 64
Discharge: HOME | End: 2024-10-31
Attending: NURSE PRACTITIONER
Payer: COMMERCIAL

## 2024-10-31 DIAGNOSIS — Z12.31 VISIT FOR SCREENING MAMMOGRAM: ICD-10-CM

## 2024-10-31 DIAGNOSIS — Z78.0 POST-MENOPAUSAL: ICD-10-CM

## 2024-10-31 PROCEDURE — 77063 BREAST TOMOSYNTHESIS BI: CPT

## 2024-10-31 PROCEDURE — 77080 DXA BONE DENSITY AXIAL: CPT

## 2024-11-01 ENCOUNTER — OFFICE VISIT (OUTPATIENT)
Dept: FAMILY MEDICINE | Facility: CLINIC | Age: 64
End: 2024-11-01
Payer: COMMERCIAL

## 2024-11-01 VITALS
BODY MASS INDEX: 26.69 KG/M2 | TEMPERATURE: 98.5 F | HEIGHT: 65 IN | DIASTOLIC BLOOD PRESSURE: 81 MMHG | RESPIRATION RATE: 16 BRPM | HEART RATE: 77 BPM | SYSTOLIC BLOOD PRESSURE: 128 MMHG | WEIGHT: 160.2 LBS | OXYGEN SATURATION: 97 %

## 2024-11-01 DIAGNOSIS — J01.90 ACUTE SINUSITIS WITH SYMPTOMS > 10 DAYS: Primary | ICD-10-CM

## 2024-11-01 PROCEDURE — 99213 OFFICE O/P EST LOW 20 MIN: CPT | Performed by: NURSE PRACTITIONER

## 2024-11-01 RX ORDER — DOXYCYCLINE 100 MG/1
100 CAPSULE ORAL 2 TIMES DAILY
Qty: 28 CAPSULE | Refills: 0 | Status: SHIPPED | OUTPATIENT
Start: 2024-11-01 | End: 2024-11-15

## 2024-11-01 ASSESSMENT — PAIN SCALES - GENERAL: PAINLEVEL_OUTOF10: MILD PAIN (3)

## 2024-11-01 NOTE — PROGRESS NOTES
"  Assessment & Plan     Acute sinusitis with symptoms > 10 days  Persistent right frontal facial pressure, right nasal congestion for > 1 month. Was treated with Augmentin on 9/24, notes felt somewhat better temporarily, but symptoms returned. No fevers. Cannot breath through right nare. Offered sinus CT, would like to try additional course abx first. Advised if not improved in a few weeks, she should call the clinic and I will order a sinus CT.  - doxycycline hyclate (VIBRAMYCIN) 100 MG capsule; Take 1 capsule (100 mg) by mouth 2 times daily for 14 days.          BMI  Estimated body mass index is 26.86 kg/m  as calculated from the following:    Height as of this encounter: 1.645 m (5' 4.75\").    Weight as of this encounter: 72.7 kg (160 lb 3.2 oz).         Patient Instructions   You have been prescribed an antibiotic, doxycycline.  This medication is known to increase sensitivity to the sun and risk for sunburn.  Please limit sun and ultraviolet light exposure while taking this medication, and use meticulous sun protection including high SPF sunscreens, hats, protective clothing, etc.    If not better after the antibiotics, please call me and we'll get a CT of your sinuses. Call [phone]. Choose the option for primary care, then the option for the care team. They will get a message to me.      Tony Freeman is a 64 year old, presenting for the following health issues:  Sinus Problem      11/1/2024     1:00 PM   Additional Questions   Roomed by LAVON Latif CMA     History of Present Illness       Reason for visit:  Sinus problem  Symptom onset:  More than a month  Symptoms include:  Sinus pressure in forehead, post nasal drip, Congestion  Symptom intensity:  Moderate  Symptom progression:  Staying the same  Had these symptoms before:  Yes  Has tried/received treatment for these symptoms:  No  What makes it worse:  Nothing  What makes it better:  Swimming at [employer], Tylenol helps, hot shower   She is taking " "medications regularly.           Review of Systems  Constitutional, HEENT, cardiovascular, pulmonary, gi and gu systems are negative, except as otherwise noted.      Objective    /81   Pulse 77   Temp 98.5  F (36.9  C) (Oral)   Resp 16   Ht 1.645 m (5' 4.75\")   Wt 72.7 kg (160 lb 3.2 oz)   LMP 12/05/2010   SpO2 97%   BMI 26.86 kg/m    Body mass index is 26.86 kg/m .  Physical Exam  Vitals and nursing note reviewed.   Constitutional:       General: She is not in acute distress.     Appearance: Normal appearance.   HENT:      Head: Normocephalic and atraumatic.      Nose: Mucosal edema and congestion (right sided) present.      Right Sinus: Frontal sinus tenderness present.      Mouth/Throat:      Mouth: Mucous membranes are moist.   Cardiovascular:      Rate and Rhythm: Normal rate.   Pulmonary:      Effort: Pulmonary effort is normal.   Musculoskeletal:      Cervical back: Neck supple.   Skin:     General: Skin is warm and dry.   Neurological:      General: No focal deficit present.      Mental Status: She is alert.   Psychiatric:         Mood and Affect: Mood normal.         Behavior: Behavior normal.                    Signed Electronically by: JOAQUIN Shelton CNP    "

## 2024-11-01 NOTE — PATIENT INSTRUCTIONS
You have been prescribed an antibiotic, doxycycline.  This medication is known to increase sensitivity to the sun and risk for sunburn.  Please limit sun and ultraviolet light exposure while taking this medication, and use meticulous sun protection including high SPF sunscreens, hats, protective clothing, etc.    If not better after the antibiotics, please call me and we'll get a CT of your sinuses. Call [phone]. Choose the option for primary care, then the option for the care team. They will get a message to me.

## 2024-11-02 NOTE — PHARMACY-ADMISSION MEDICATION HISTORY
DATE: 2024                    PATIENT NAME: Ronit Rodrigues   : 1974   [facility] MR# [if applicable]: [id number]     DIAGNOSIS: Kidney Transplant (ICD-10  Z94.0)  Long term use of medications (ICD-10  Z79.899)    CSA/Cyclosporine/Neoral drug level  (mail to [facility] mailers and instructions provided by the patient)    Please add to specimen from 24    Magnesium  Lipid panel    If unable to add on, please add to next blood draw.    .       Please fax these results to [phone].    Admission medication history interview status for this patient is complete. See [facility] admission navigator for allergy information, prior to admission medications and immunization status.     Medication history interview source(s):Patient  Medication history resources (including written lists, pill bottles, clinic record):None      Changes made to PTA medication list:  Added: calcium, probiotic  Deleted: tessalon  Changed: none    Actions taken by pharmacist (provider contacted, etc):None     Additional medication history information:None    Medication reconciliation/reorder completed by provider prior to medication history? No    Do you take OTC medications (eg tylenol, ibuprofen, fish oil, eye/ear drops, etc)? Y(Y/N)    For patients on insulin therapy: N (Y/N)    Time spent in this activity: 5 min    Prior to Admission medications    Medication Sig Last Dose Taking? Auth Provider   acetaminophen (TYLENOL) 325 MG tablet Take 1 tablet by mouth every 6 hours as needed.  at prn Yes Reported, Patient   CALCIUM PO Take 1 tablet by mouth daily 12/28/2019 at Unknown time Yes Unknown, Entered By History   cyclobenzaprine (FLEXERIL) 10 MG tablet Take 1 tablet (10 mg) by mouth 3 times daily as needed for muscle spasms  at prn Yes Cassy Alejandre, APRN CNP   Multiple Vitamins-Minerals (MULTIVITAMIN WOMEN PO) Take 1 tablet by mouth daily 12/28/2019 at Unknown time Yes Reported, Patient   Probiotic Product (PROBIOTIC PO) Take 1 tablet by mouth daily 12/28/2019 at Unknown time Yes Unknown, Entered By History   VITAMIN D, CHOLECALCIFEROL, PO Take 1 tablet by mouth daily  12/28/2019 at Unknown time Yes Reported, Patient

## 2024-11-04 ENCOUNTER — PATIENT OUTREACH (OUTPATIENT)
Dept: FAMILY MEDICINE | Facility: CLINIC | Age: 64
End: 2024-11-04
Payer: COMMERCIAL

## 2024-11-04 NOTE — TELEPHONE ENCOUNTER
Patient Quality Outreach    Patient is due for the following:   Physical Annual Wellness Visit    Next Steps:   Schedule a Annual Wellness Visit    Type of outreach:    Sent [employer] message.    Next Steps:  Reach out within 90 days via [employer].    Max number of attempts reached: Yes. Will try again in 90 days if patient still on fail list.    Questions for provider review:    None           Nadya Corona MA

## 2024-11-20 ENCOUNTER — VIRTUAL VISIT (OUTPATIENT)
Dept: FAMILY MEDICINE | Facility: CLINIC | Age: 64
End: 2024-11-20
Payer: COMMERCIAL

## 2024-11-20 DIAGNOSIS — J32.9 SINUSITIS, UNSPECIFIED CHRONICITY, UNSPECIFIED LOCATION: Primary | ICD-10-CM

## 2024-11-20 PROCEDURE — 99441 PR PHYSICIAN TELEPHONE EVALUATION 5-10 MIN: CPT | Mod: 93 | Performed by: NURSE PRACTITIONER

## 2024-11-20 NOTE — PROGRESS NOTES
"Lydia is a 64 year old who is being evaluated via a billable telephone visit.    What phone number would you like to be contacted at? [phone]  How would you like to obtain your AVS? Elenitahart  Originating Location (pt. Location): Home    Distant Location (provider location):  On-site    Assessment & Plan     Sinusitis, unspecified chronicity, unspecified location  Symptoms ongoing since at least September, unresponsive to 2 rounds of abx. Persistent facial pressure, nasal congestion, HA. Will further evaluate with CT sinus.   - CT Sinus w/o Contrast; Future          BMI  Estimated body mass index is 26.86 kg/m  as calculated from the following:    Height as of 11/1/24: 1.645 m (5' 4.75\").    Weight as of 11/1/24: 72.7 kg (160 lb 3.2 oz).         See Patient Instructions    Subjective   Lydia is a 64 year old, presenting for the following health issues:  Sinus Problem        11/20/2024    11:51 AM   Additional Questions   Roomed by LAVON miles CMA     Sinus Problem     History of Present Illness       Reason for visit:  Sinus  Symptom onset:  More than a month  Symptoms include:  Sinus pressure, drainage, headache  Symptom intensity:  Moderate  Symptom progression:  Staying the same  Had these symptoms before:  Yes  Has tried/received treatment for these symptoms:  No  What makes it worse:  Sneezing, bending over  What makes it better:  Advil and ice,  was seen for this and finished all antibiotics   She is taking medications regularly.           Review of Systems  Constitutional, neuro, ENT, endocrine, pulmonary, cardiac, gastrointestinal, genitourinary, musculoskeletal, integument and psychiatric systems are negative, except as otherwise noted.      Objective    Vitals - Patient Reported  Pain Score: Mild Pain (2)        Physical Exam   General: Alert and no distress //Respiratory: No audible wheeze, cough, or shortness of breath // Psychiatric:  Appropriate affect, tone, and pace of words            Phone call " duration: 5 minutes  Signed Electronically by: JOAQUIN Shelton CNP

## 2024-12-11 ENCOUNTER — HOSPITAL ENCOUNTER (OUTPATIENT)
Dept: CT IMAGING | Facility: CLINIC | Age: 64
Discharge: HOME | End: 2024-12-11
Attending: NURSE PRACTITIONER
Payer: COMMERCIAL

## 2024-12-11 DIAGNOSIS — J32.9 SINUSITIS, UNSPECIFIED CHRONICITY, UNSPECIFIED LOCATION: ICD-10-CM

## 2024-12-11 PROCEDURE — 70486 CT MAXILLOFACIAL W/O DYE: CPT

## 2024-12-12 ENCOUNTER — TELEPHONE (OUTPATIENT)
Dept: SCHEDULING | Facility: CLINIC | Age: 64
End: 2024-12-12
Payer: COMMERCIAL

## 2024-12-12 NOTE — TELEPHONE ENCOUNTER
Test Results    Contacts       Contact Date/Time Type Contact Phone/Fax    12/12/2024 11:03 AM CST Phone (Incoming) Lydia Pierson (Self) [phone] (M)            Who ordered the test:  Cassy Alejandre    Type of test: CT    Date of test:  12/11/24    Where was the test performed:  [place]    What are your questions/concerns?:  Pt would like return call to discuss results.    Could we send this information to you in [place] or would you prefer to receive a phone call?:   Patient would prefer a phone call   Okay to leave a detailed message?: Yes at Cell number on file:    Telephone Information:   Mobile [phone]

## 2024-12-12 NOTE — TELEPHONE ENCOUNTER
Pt given recommendation from provider regarding CT results.  Pt voiced thoughts about seeing someone in ENT.  Pt will call insurance to see if she can self refer, if not will set up visit to discuss next steps and possible referral to ENT.  Closing  encounter.    Frances OCAMPO RN  [facility]

## 2024-12-16 ENCOUNTER — TELEPHONE (OUTPATIENT)
Dept: OTOLARYNGOLOGY | Facility: CLINIC | Age: 64
End: 2024-12-16

## 2024-12-16 ENCOUNTER — OFFICE VISIT (OUTPATIENT)
Dept: OTOLARYNGOLOGY | Facility: CLINIC | Age: 64
End: 2024-12-16
Attending: FAMILY MEDICINE
Payer: COMMERCIAL

## 2024-12-16 VITALS
TEMPERATURE: 97.2 F | OXYGEN SATURATION: 97 % | DIASTOLIC BLOOD PRESSURE: 82 MMHG | BODY MASS INDEX: 26.83 KG/M2 | SYSTOLIC BLOOD PRESSURE: 132 MMHG | HEART RATE: 81 BPM | WEIGHT: 160 LBS

## 2024-12-16 DIAGNOSIS — J34.89 SINUS MUCOSAL THICKENING: ICD-10-CM

## 2024-12-16 DIAGNOSIS — J32.0 CHRONIC MAXILLARY SINUSITIS: ICD-10-CM

## 2024-12-16 DIAGNOSIS — J34.2 DEVIATED NASAL SEPTUM: Primary | ICD-10-CM

## 2024-12-16 PROCEDURE — 99203 OFFICE O/P NEW LOW 30 MIN: CPT

## 2024-12-16 RX ORDER — BUDESONIDE 0.5 MG/2ML
INHALANT ORAL
Qty: 280 ML | Refills: 0 | Status: SHIPPED | OUTPATIENT
Start: 2024-12-16

## 2024-12-16 RX ORDER — FLUTICASONE PROPIONATE 50 MCG
2 SPRAY, SUSPENSION (ML) NASAL DAILY
COMMUNITY

## 2024-12-16 NOTE — TELEPHONE ENCOUNTER
Spoke with patient and scheduled appointment.    Louise Coleman RN Care Coordinator, [facility] 12/16/24 3:10 PM

## 2024-12-16 NOTE — LETTER
12/16/2024      Lydia Pierson  [address] [place] [address]      Dear Colleague,    Thank you for referring your patient, Lydia Pierson, to the [facility]. Please see a copy of my visit note below.    Chief Complaint   Patient presents with     Consult     Chronic sinusitis      History of Present Illness   Lydia Pierson is a 64 year old female who presents for evaluation. The patient was last seen on 12/13/24. The visit documentation mentioned that she has had 2 round of antibiotics without any relief.     The patient describes symptoms of nasal congestion for the past 3 years. The patient notes history of environmental allergies. She recently started Flonase and has not noticed a difference.  She does have history of nasal trauma. The patient reports nasal obstruction left greater than right. No history of smoking.     Narrative & Impression   CT SCAN OF THE PARANASAL SINUSES AND FACE  12/11/2024 2:36 PM      HISTORY: Ongoing sinus symptoms not responsive to antibiotics;  Sinusitis.     TECHNIQUE: Noncontrast axial scans of the sinuses with coronal and  sagittal reformations were completed. Radiation dose for this scan was  reduced using automated exposure control, adjustment of the mA and/or  kV according to patient size, or iterative reconstruction technique.       COMPARISON: None.     FINDINGS:   Frontal sinuses: No mucosal thickening. The frontal sinus drainage  pathways are clear.      Ethmoid sinuses: Clear.      Right maxillary sinus: Mild polypoid mucosal thickening along the  inferior right maxillary sinus. The ostiomeatal unit is normal with a  patent infundibulum.      Left maxillary sinus: Mild polypoid mucosal thickening along the  inferior left maxillary sinus. The ostiomeatal unit is normal with a  patent infundibulum.      Sphenoid sinus: No mucosal thickening. The sphenoid sinus ostia and  sphenoethmoidal recesses are clear.      Nasal septum:  Leftward deviation of nasal septum.      Turbinates and nasal cavity: No nasal mucosal thickening. Mary  bullosa of the right middle turbinate.      Laminae papyracea and cribriform plate: Normal.     Other findings: Images of the brain are unremarkable. No suspicious  lucencies around the visualized teeth.                                                                       IMPRESSION:   1. Mild mucosal thickening along the inferior maxillary sinuses.  Remaining paranasal sinuses are clear.  2. Leftward deviation of nasal septum.     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)     Malignant neoplasm of kidney excluding renal pelvis (H)     External hemorrhoids with other complication     Sensorineural hearing loss, asymmetrical     Ovarian cyst     Hyperlipidemia LDL goal <160     Closed nondisplaced fracture of neck of fifth metacarpal bone of left hand     Plantar warts     Small bowel obstruction (H)     Urge incontinence of urine     Current Medications     Current Outpatient Medications:      acetaminophen (TYLENOL) 325 MG tablet, Take 1 tablet by mouth every 6 hours as needed., Disp: , Rfl:      calcium carbonate-vitamin D (OS-HOLLY) 500-400 MG-UNIT tablet, Take 1 tablet by mouth daily, Disp: , Rfl:      Multiple Vitamins-Minerals (MULTIVITAMIN WOMEN PO), Take 1 tablet by mouth daily, Disp: , Rfl:      Probiotic Product (PROBIOTIC PO), Take 1 tablet by mouth daily, Disp: , Rfl:      UNABLE TO FIND, MEDICATION NAME: Advanced Digestive Enzyme; take 1 tab once daily in the am., Disp: , Rfl:      VITAMIN D, CHOLECALCIFEROL, PO, Take 3 tablets by mouth daily Unknown OTC dose, Disp: , Rfl:     Allergies  Allergies   Allergen Reactions     Seasonal Allergies        Social History   Social History     Socioeconomic History     Marital status:    Tobacco Use     Smoking  status: Never     Smokeless tobacco: Never   Vaping Use     Vaping status: Never Used   Substance and Sexual Activity     Alcohol use: No     Drug use: No     Sexual activity: Yes     Partners: Male     Birth control/protection: Surgical     Comment: tubal   Other Topics Concern     Parent/sibling w/ CABG, MI or angioplasty before 65F 55M? No     Social Drivers of Health     Financial Resource Strain: Low Risk  (9/29/2023)    Financial Resource Strain      Within the past 12 months, have you or your family members you live with been unable to get utilities (heat, electricity) when it was really needed?: No   Food Insecurity: Low Risk  (9/29/2023)    Food Insecurity      Within the past 12 months, did you worry that your food would run out before you got money to buy more?: No      Within the past 12 months, did the food you bought just not last and you didn t have money to get more?: No   Transportation Needs: Low Risk  (9/29/2023)    Transportation Needs      Within the past 12 months, has lack of transportation kept you from medical appointments, getting your medicines, non-medical meetings or appointments, work, or from getting things that you need?: No   Interpersonal Safety: Low Risk  (7/12/2024)    Interpersonal Safety      Do you feel physically and emotionally safe where you currently live?: Yes      Within the past 12 months, have you been hit, slapped, kicked or otherwise physically hurt by someone?: No      Within the past 12 months, have you been humiliated or emotionally abused in other ways by your partner or ex-partner?: No   Housing Stability: Low Risk  (9/29/2023)    Housing Stability      Do you have housing? : Yes      Are you worried about losing your housing?: No       Family History  Family History   Problem Relation Age of Onset     C.A.D. Father         bypass     Diabetes Father      Lipids Father      Allergies Sister         allergic rhinitis     Neurologic Disorder Sister         seizure  disorder     Respiratory Sister         asthma     Allergies Son      Breast Cancer No family hx of      Cancer - colorectal No family hx of        Review of Systems  As per HPI and PMHx, otherwise 10+ comprehensive system review is negative.    Physical Exam  /82 (BP Location: Left arm, Patient Position: Sitting, Cuff Size: Adult Regular)   Pulse 81   Temp 97.2  F (36.2  C) (Tympanic)   Wt 72.6 kg (160 lb)   LMP 12/05/2010   SpO2 97%   BMI 26.83 kg/m    GENERAL: The patient is a pleasant, cooperative 64 year old female in no acute distress.  HEAD: Normocephalic, atraumatic. Hair and scalp are normal.  EYES: Pupils are equal, round, reactive to light and accommodation. Extraocular movements are intact. The sclera nonicteric without injection. The extraocular structures are normal.  EARS: Normal shape and symmetry. No tenderness when palpating the mastoid or tragal areas bilaterally. No mastoid erythema or fluctuance. Otoscopic exam on the right reveals no amount of cerumen. The right tympanic membrane is round, intact without evidence of effusion, good landmarks.  No retraction, granulation, or drainage. Otoscopic exam on the left reveals no amount of cerumen. The left tympanic membrane is round, intact without evidence of effusion, good landmarks.  No retraction, granulation, or drainage.  NOSE: Nares are patent.  Nasal mucosa is pink. Septal deviation.  ORAL CAVITY: Lips are normal. Dentition is in good repair. Mucous membranes are moist. Tongue is mobile, protrudes to the midline.  Palate elevates symmetrically. Tonsils were removed. No erythema or exudate. No oral cavity or oropharyngeal masses, lesions, ulcerations, or leukoplakia.  NECK: Supple, trachea is midline. There is no palpable cervical lymphadenopathy or masses bilaterally. Palpation of the bilateral parotid and submandibular areas reveal no masses. No thyromegaly.    NEUROLOGIC: Cranial nerves II through XII are grossly intact. Voice is  strong. Patient is House-Brackmann I/VI bilaterally.  CARDIOVASCULAR: Extremities are warm and well-perfused. No significant peripheral edema.  RESPIRATORY: Patient has nonlabored breathing without cough, wheeze, stridor.  PSYCHIATRIC: Patient is alert and oriented. Mood and affect appear normal.  SKIN: Warm and dry. No scalp, face, or neck lesions noted.    Assessment and Plan     ICD-10-CM    1. Deviated nasal septum  J34.2       2. Sinus mucosal thickening  J34.89 budesonide (PULMICORT) 0.5 MG/2ML neb solution      3. Chronic maxillary sinusitis  J32.0 Adult ENT  Referral        It was my pleasure seeing Lydia Pierson today in clinic. Based on patient history, CT scan results, and examination the patient would benefit from possible surgical intervention. We discussed adding in a saline nasal rinse with budesonide and continuing to use the Flonase. I will reach out to 's team to get her scheduled. They will call to schedule.     JOAQUIN Conde CNP  Otolaryngology  [facility] [place]      Again, thank you for allowing me to participate in the care of your patient.        Sincerely,        JOAQUIN Conde CNP

## 2024-12-16 NOTE — NURSING NOTE
"Initial /82 (BP Location: Left arm, Patient Position: Sitting, Cuff Size: Adult Regular)   Pulse 81   Temp 97.2  F (36.2  C) (Tympanic)   Wt 72.6 kg (160 lb)   LMP 12/05/2010   SpO2 97%   BMI 26.83 kg/m   Estimated body mass index is 26.83 kg/m  as calculated from the following:    Height as of 11/1/24: 1.645 m (5' 4.75\").    Weight as of this encounter: 72.6 kg (160 lb). .  Renee Lane MA on 12/16/2024 at 12:34 PM    "

## 2024-12-16 NOTE — PROGRESS NOTES
Chief Complaint   Patient presents with    Consult     Chronic sinusitis      History of Present Illness   Lydia Pierson is a 64 year old female who presents for evaluation. The patient was last seen on 12/13/24. The visit documentation mentioned that she has had 2 round of antibiotics without any relief.     The patient describes symptoms of nasal congestion for the past 3 years. The patient notes history of environmental allergies. She recently started Flonase and has not noticed a difference.  She does have history of nasal trauma. The patient reports nasal obstruction left greater than right. No history of smoking.     Narrative & Impression   CT SCAN OF THE PARANASAL SINUSES AND FACE  12/11/2024 2:36 PM      HISTORY: Ongoing sinus symptoms not responsive to antibiotics;  Sinusitis.     TECHNIQUE: Noncontrast axial scans of the sinuses with coronal and  sagittal reformations were completed. Radiation dose for this scan was  reduced using automated exposure control, adjustment of the mA and/or  kV according to patient size, or iterative reconstruction technique.       COMPARISON: None.     FINDINGS:   Frontal sinuses: No mucosal thickening. The frontal sinus drainage  pathways are clear.      Ethmoid sinuses: Clear.      Right maxillary sinus: Mild polypoid mucosal thickening along the  inferior right maxillary sinus. The ostiomeatal unit is normal with a  patent infundibulum.      Left maxillary sinus: Mild polypoid mucosal thickening along the  inferior left maxillary sinus. The ostiomeatal unit is normal with a  patent infundibulum.      Sphenoid sinus: No mucosal thickening. The sphenoid sinus ostia and  sphenoethmoidal recesses are clear.      Nasal septum: Leftward deviation of nasal septum.      Turbinates and nasal cavity: No nasal mucosal thickening. Mary  bullosa of the right middle turbinate.      Laminae papyracea and cribriform plate: Normal.     Other findings: Images of the brain are  unremarkable. No suspicious  lucencies around the visualized teeth.                                                                       IMPRESSION:   1. Mild mucosal thickening along the inferior maxillary sinuses.  Remaining paranasal sinuses are clear.  2. Leftward deviation of nasal septum.     BHUMI PATTERSON MD         SYSTEM ID:  [id number]     Past Medical History  Patient Active Problem List   Diagnosis    Allergic rhinitis    Acute lymphocytic leukemia in remission (H)    Malignant neoplasm of kidney excluding renal pelvis (H)    External hemorrhoids with other complication    Sensorineural hearing loss, asymmetrical    Ovarian cyst    Hyperlipidemia LDL goal <160    Closed nondisplaced fracture of neck of fifth metacarpal bone of left hand    Plantar warts    Small bowel obstruction (H)    Urge incontinence of urine     Current Medications     Current Outpatient Medications:     acetaminophen (TYLENOL) 325 MG tablet, Take 1 tablet by mouth every 6 hours as needed., Disp: , Rfl:     calcium carbonate-vitamin D (OS-HOLLY) 500-400 MG-UNIT tablet, Take 1 tablet by mouth daily, Disp: , Rfl:     Multiple Vitamins-Minerals (MULTIVITAMIN WOMEN PO), Take 1 tablet by mouth daily, Disp: , Rfl:     Probiotic Product (PROBIOTIC PO), Take 1 tablet by mouth daily, Disp: , Rfl:     UNABLE TO FIND, MEDICATION NAME: Advanced Digestive Enzyme; take 1 tab once daily in the am., Disp: , Rfl:     VITAMIN D, CHOLECALCIFEROL, PO, Take 3 tablets by mouth daily Unknown OTC dose, Disp: , Rfl:     Allergies  Allergies   Allergen Reactions    Seasonal Allergies        Social History   Social History     Socioeconomic History    Marital status:    Tobacco Use    Smoking status: Never    Smokeless tobacco: Never   Vaping Use    Vaping status: Never Used   Substance and Sexual Activity    Alcohol use: No    Drug use: No    Sexual activity: Yes     Partners: Male     Birth control/protection: Surgical     Comment: tubal   Other Topics  Concern    Parent/sibling w/ CABG, MI or angioplasty before 65F 55M? No     Social Drivers of Health     Financial Resource Strain: Low Risk  (9/29/2023)    Financial Resource Strain     Within the past 12 months, have you or your family members you live with been unable to get utilities (heat, electricity) when it was really needed?: No   Food Insecurity: Low Risk  (9/29/2023)    Food Insecurity     Within the past 12 months, did you worry that your food would run out before you got money to buy more?: No     Within the past 12 months, did the food you bought just not last and you didn t have money to get more?: No   Transportation Needs: Low Risk  (9/29/2023)    Transportation Needs     Within the past 12 months, has lack of transportation kept you from medical appointments, getting your medicines, non-medical meetings or appointments, work, or from getting things that you need?: No   Interpersonal Safety: Low Risk  (7/12/2024)    Interpersonal Safety     Do you feel physically and emotionally safe where you currently live?: Yes     Within the past 12 months, have you been hit, slapped, kicked or otherwise physically hurt by someone?: No     Within the past 12 months, have you been humiliated or emotionally abused in other ways by your partner or ex-partner?: No   Housing Stability: Low Risk  (9/29/2023)    Housing Stability     Do you have housing? : Yes     Are you worried about losing your housing?: No       Family History  Family History   Problem Relation Age of Onset    C.A.D. Father         bypass    Diabetes Father     Lipids Father     Allergies Sister         allergic rhinitis    Neurologic Disorder Sister         seizure disorder    Respiratory Sister         asthma    Allergies Son     Breast Cancer No family hx of     Cancer - colorectal No family hx of        Review of Systems  As per HPI and PMHx, otherwise 10+ comprehensive system review is negative.    Physical Exam  /82 (BP Location: Left  arm, Patient Position: Sitting, Cuff Size: Adult Regular)   Pulse 81   Temp 97.2  F (36.2  C) (Tympanic)   Wt 72.6 kg (160 lb)   LMP 12/05/2010   SpO2 97%   BMI 26.83 kg/m    GENERAL: The patient is a pleasant, cooperative 64 year old female in no acute distress.  HEAD: Normocephalic, atraumatic. Hair and scalp are normal.  EYES: Pupils are equal, round, reactive to light and accommodation. Extraocular movements are intact. The sclera nonicteric without injection. The extraocular structures are normal.  EARS: Normal shape and symmetry. No tenderness when palpating the mastoid or tragal areas bilaterally. No mastoid erythema or fluctuance. Otoscopic exam on the right reveals no amount of cerumen. The right tympanic membrane is round, intact without evidence of effusion, good landmarks.  No retraction, granulation, or drainage. Otoscopic exam on the left reveals no amount of cerumen. The left tympanic membrane is round, intact without evidence of effusion, good landmarks.  No retraction, granulation, or drainage.  NOSE: Nares are patent.  Nasal mucosa is pink. Septal deviation.  ORAL CAVITY: Lips are normal. Dentition is in good repair. Mucous membranes are moist. Tongue is mobile, protrudes to the midline.  Palate elevates symmetrically. Tonsils were removed. No erythema or exudate. No oral cavity or oropharyngeal masses, lesions, ulcerations, or leukoplakia.  NECK: Supple, trachea is midline. There is no palpable cervical lymphadenopathy or masses bilaterally. Palpation of the bilateral parotid and submandibular areas reveal no masses. No thyromegaly.    NEUROLOGIC: Cranial nerves II through XII are grossly intact. Voice is strong. Patient is House-Brackmann I/VI bilaterally.  CARDIOVASCULAR: Extremities are warm and well-perfused. No significant peripheral edema.  RESPIRATORY: Patient has nonlabored breathing without cough, wheeze, stridor.  PSYCHIATRIC: Patient is alert and oriented. Mood and affect appear  normal.  SKIN: Warm and dry. No scalp, face, or neck lesions noted.    Assessment and Plan     ICD-10-CM    1. Deviated nasal septum  J34.2       2. Sinus mucosal thickening  J34.89 budesonide (PULMICORT) 0.5 MG/2ML neb solution      3. Chronic maxillary sinusitis  J32.0 Adult ENT  Referral        It was my pleasure seeing Lydia Pierson today in clinic. Based on patient history, CT scan results, and examination the patient would benefit from possible surgical intervention. We discussed adding in a saline nasal rinse with budesonide and continuing to use the Flonase. I will reach out to 's team to get her scheduled. They will call to schedule.     JOAQUIN Conde [facility]  Otolaryngology  [facility]

## 2024-12-16 NOTE — TELEPHONE ENCOUNTER
Please reach out to the patient to schedule with  for a deviated septum and nasal tip repair.    She had a recent CT completed and was a deviated septum.   Started her on a saline nasal rinse twice daily and budesonide once daily for mild mucosal thickening.   Next available is fine.     JOAQUIN Conde [facility]  Otolaryngology  [facility]

## 2024-12-17 ENCOUNTER — OFFICE VISIT (OUTPATIENT)
Dept: OTOLARYNGOLOGY | Facility: CLINIC | Age: 64
End: 2024-12-17
Payer: COMMERCIAL

## 2024-12-17 VITALS
SYSTOLIC BLOOD PRESSURE: 155 MMHG | RESPIRATION RATE: 18 BRPM | DIASTOLIC BLOOD PRESSURE: 88 MMHG | OXYGEN SATURATION: 97 % | HEART RATE: 69 BPM

## 2024-12-17 DIAGNOSIS — J34.3 NASAL TURBINATE HYPERTROPHY: ICD-10-CM

## 2024-12-17 DIAGNOSIS — G44.019 EPISODIC CLUSTER HEADACHE, NOT INTRACTABLE: ICD-10-CM

## 2024-12-17 DIAGNOSIS — J34.2 DEVIATED NASAL SEPTUM: Primary | ICD-10-CM

## 2024-12-17 DIAGNOSIS — J34.89 NASAL OBSTRUCTION: ICD-10-CM

## 2024-12-17 PROCEDURE — 99214 OFFICE O/P EST MOD 30 MIN: CPT | Performed by: OTOLARYNGOLOGY

## 2024-12-17 NOTE — PROGRESS NOTES
Chief Complaint - headache    History of Present Illness - Lydia Pierson is a 64 year old female who presents for evaluation of headache. The patient describes symptoms of right upper forehead and top of head pain for the past 3-4 months. She tried antibiotics twice hasn't helped. Sneezing, bending over, and coughing makes the pain much worse like her head might explode. Presently, the patient is symptomatic. She has chronic nasal obstruction from a deviated septum, but that doesn't bother her. She has some allergies. Has tried flonase, nasal saline, and allergy medication.     Tests personally reviewed today for this visit:   1.) CT sinus 12/11/24 images personally reviewed and this showed very deviated septum to the left. Sinuses clear. Right middle turbinate nelly bullosa  2.) 3/24/23 CBC normal  3.) 3/24/23 BMP normal    Past Medical History -   Patient Active Problem List   Diagnosis    Allergic rhinitis    Acute lymphocytic leukemia in remission (H)    Malignant neoplasm of kidney excluding renal pelvis (H)    External hemorrhoids with other complication    Sensorineural hearing loss, asymmetrical    Ovarian cyst    Hyperlipidemia LDL goal <160    Closed nondisplaced fracture of neck of fifth metacarpal bone of left hand    Plantar warts    Small bowel obstruction (H)    Urge incontinence of urine       Current Medications -   Current Outpatient Medications:     acetaminophen (TYLENOL) 325 MG tablet, Take 1 tablet by mouth every 6 hours as needed., Disp: , Rfl:     budesonide (PULMICORT) 0.5 MG/2ML neb solution, Use one vial once daily in the [employer] Saline Rinse bottle., Disp: 280 mL, Rfl: 0    calcium carbonate-vitamin D (OS-HOLLY) 500-400 MG-UNIT tablet, Take 1 tablet by mouth daily, Disp: , Rfl:     fluticasone (FLONASE) 50 MCG/ACT nasal spray, Spray 2 sprays into both nostrils daily., Disp: , Rfl:     Multiple Vitamins-Minerals (MULTIVITAMIN WOMEN PO), Take 1 tablet by mouth daily, Disp: , Rfl:      Probiotic Product (PROBIOTIC PO), Take 1 tablet by mouth daily, Disp: , Rfl:     UNABLE TO FIND, MEDICATION NAME: Advanced Digestive Enzyme; take 1 tab once daily in the am., Disp: , Rfl:     VITAMIN D, CHOLECALCIFEROL, PO, Take 3 tablets by mouth daily Unknown OTC dose, Disp: , Rfl:     Allergies -   Allergies   Allergen Reactions    Seasonal Allergies        Social History -   Social History     Socioeconomic History    Marital status:    Tobacco Use    Smoking status: Never    Smokeless tobacco: Never   Vaping Use    Vaping status: Never Used   Substance and Sexual Activity    Alcohol use: No    Drug use: No    Sexual activity: Yes     Partners: Male     Birth control/protection: Surgical     Comment: tubal   Other Topics Concern    Parent/sibling w/ CABG, MI or angioplasty before 65F 55M? No     Social Drivers of Health     Financial Resource Strain: Low Risk  (9/29/2023)    Financial Resource Strain     Within the past 12 months, have you or your family members you live with been unable to get utilities (heat, electricity) when it was really needed?: No   Food Insecurity: Low Risk  (9/29/2023)    Food Insecurity     Within the past 12 months, did you worry that your food would run out before you got money to buy more?: No     Within the past 12 months, did the food you bought just not last and you didn t have money to get more?: No   Transportation Needs: Low Risk  (9/29/2023)    Transportation Needs     Within the past 12 months, has lack of transportation kept you from medical appointments, getting your medicines, non-medical meetings or appointments, work, or from getting things that you need?: No   Interpersonal Safety: Low Risk  (7/12/2024)    Interpersonal Safety     Do you feel physically and emotionally safe where you currently live?: Yes     Within the past 12 months, have you been hit, slapped, kicked or otherwise physically hurt by someone?: No     Within the past 12 months, have you been  humiliated or emotionally abused in other ways by your partner or ex-partner?: No   Housing Stability: Low Risk  (9/29/2023)    Housing Stability     Do you have housing? : Yes     Are you worried about losing your housing?: No       Family History -   Family History   Problem Relation Age of Onset    C.A.D. Father         bypass    Diabetes Father     Lipids Father     Allergies Sister         allergic rhinitis    Neurologic Disorder Sister         seizure disorder    Respiratory Sister         asthma    Allergies Son     Breast Cancer No family hx of     Cancer - colorectal No family hx of          Physical Exam  General - The patient is nontoxic, in no distress. Alert and oriented to person and place, answers questions and cooperates with examination appropriately.   Neurologic - CN II-XII are intact. No focal neurologic deficits.   Voice and Breathing - The patient was breathing comfortably without the use of accessory muscles. There was no wheezing, stridor, or stertor.  The patients voice was clear and strong.  Eyes - Extraocular movements intact. Sclera were not icteric or injected, conjunctiva were pink and moist.  Mouth - Examination of the oral cavity showed pink, healthy oral mucosa. No lesions or ulcerations noted.  The tongue was mobile and midline.  Throat - The walls of the oropharynx were smooth, symmetric, and had no lesions or ulcerations.  No postnasal drainage.  The uvula was midline on elevation.  Nose - External contour is symmetric, no gross deflection or scars.  Nasal mucosa is pink and moist with no abnormal mucus.  The septum was deviated to the left-obstructive, turbinates of normal size and position.  No polyps, masses, or purulence noted on examination.  Neck - Soft, non-tender. Palpation of the occipital, submental, submandibular, internal jugular chain, and supraclavicular nodes did not demonstrate any abnormal lymph nodes or masses. No parotid masses. Palpation of the thyroid was soft  and smooth, with no nodules or goiter appreciated.  The trachea was mobile and midline.      A/P -     ICD-10-CM    1. Deviated nasal septum  J34.2       2. Nasal obstruction  J34.89       3. Nasal turbinate hypertrophy  J34.3       4. Episodic cluster headache, not intractable  G44.019 MR Brain w/o & w Contrast          Lydia Pierson is a 64 year old female with right parietal headache. It is very sharp, worse with sneezing, bending over, or coughing. No evidence of sinusitis. CT sinus was clear. I worry about tumor or aneurysm, but could also be cluster headache. I recommend MRI brain.      Sha Chávez MD  Otolaryngology  [facility]

## 2024-12-17 NOTE — LETTER
12/17/2024      Lydia Pierson  [address] [place] [address]      Dear Colleague,    Thank you for referring your patient, Lydia Pierson, to the [facility]. Please see a copy of my visit note below.    Chief Complaint - headache    History of Present Illness - Lydia Pierson is a 64 year old female who presents for evaluation of headache. The patient describes symptoms of right upper forehead and top of head pain for the past 3-4 months. She tried antibiotics twice hasn't helped. Sneezing, bending over, and coughing makes the pain much worse like her head might explode. Presently, the patient is symptomatic. She has chronic nasal obstruction from a deviated septum, but that doesn't bother her. She has some allergies. Has tried flonase, nasal saline, and allergy medication.     Tests personally reviewed today for this visit:   1.) CT sinus 12/11/24 images personally reviewed and this showed very deviated septum to the left. Sinuses clear. Right middle turbinate nelly bullosa  2.) 3/24/23 CBC normal  3.) 3/24/23 BMP normal    Past Medical History -   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)     Malignant neoplasm of kidney excluding renal pelvis (H)     External hemorrhoids with other complication     Sensorineural hearing loss, asymmetrical     Ovarian cyst     Hyperlipidemia LDL goal <160     Closed nondisplaced fracture of neck of fifth metacarpal bone of left hand     Plantar warts     Small bowel obstruction (H)     Urge incontinence of urine       Current Medications -   Current Outpatient Medications:      acetaminophen (TYLENOL) 325 MG tablet, Take 1 tablet by mouth every 6 hours as needed., Disp: , Rfl:      budesonide (PULMICORT) 0.5 MG/2ML neb solution, Use one vial once daily in the [employer] Saline Rinse bottle., Disp: 280 mL, Rfl: 0     calcium carbonate-vitamin D (OS-HOLLY) 500-400 MG-UNIT  tablet, Take 1 tablet by mouth daily, Disp: , Rfl:      fluticasone (FLONASE) 50 MCG/ACT nasal spray, Spray 2 sprays into both nostrils daily., Disp: , Rfl:      Multiple Vitamins-Minerals (MULTIVITAMIN WOMEN PO), Take 1 tablet by mouth daily, Disp: , Rfl:      Probiotic Product (PROBIOTIC PO), Take 1 tablet by mouth daily, Disp: , Rfl:      UNABLE TO FIND, MEDICATION NAME: Advanced Digestive Enzyme; take 1 tab once daily in the am., Disp: , Rfl:      VITAMIN D, CHOLECALCIFEROL, PO, Take 3 tablets by mouth daily Unknown OTC dose, Disp: , Rfl:     Allergies -   Allergies   Allergen Reactions     Seasonal Allergies        Social History -   Social History     Socioeconomic History     Marital status:    Tobacco Use     Smoking status: Never     Smokeless tobacco: Never   Vaping Use     Vaping status: Never Used   Substance and Sexual Activity     Alcohol use: No     Drug use: No     Sexual activity: Yes     Partners: Male     Birth control/protection: Surgical     Comment: tubal   Other Topics Concern     Parent/sibling w/ CABG, MI or angioplasty before 65F 55M? No     Social Drivers of Health     Financial Resource Strain: Low Risk  (9/29/2023)    Financial Resource Strain      Within the past 12 months, have you or your family members you live with been unable to get utilities (heat, electricity) when it was really needed?: No   Food Insecurity: Low Risk  (9/29/2023)    Food Insecurity      Within the past 12 months, did you worry that your food would run out before you got money to buy more?: No      Within the past 12 months, did the food you bought just not last and you didn t have money to get more?: No   Transportation Needs: Low Risk  (9/29/2023)    Transportation Needs      Within the past 12 months, has lack of transportation kept you from medical appointments, getting your medicines, non-medical meetings or appointments, work, or from getting things that you need?: No   Interpersonal Safety: Low Risk   (7/12/2024)    Interpersonal Safety      Do you feel physically and emotionally safe where you currently live?: Yes      Within the past 12 months, have you been hit, slapped, kicked or otherwise physically hurt by someone?: No      Within the past 12 months, have you been humiliated or emotionally abused in other ways by your partner or ex-partner?: No   Housing Stability: Low Risk  (9/29/2023)    Housing Stability      Do you have housing? : Yes      Are you worried about losing your housing?: No       Family History -   Family History   Problem Relation Age of Onset     C.A.D. Father         bypass     Diabetes Father      Lipids Father      Allergies Sister         allergic rhinitis     Neurologic Disorder Sister         seizure disorder     Respiratory Sister         asthma     Allergies Son      Breast Cancer No family hx of      Cancer - colorectal No family hx of          Physical Exam  General - The patient is nontoxic, in no distress. Alert and oriented to person and place, answers questions and cooperates with examination appropriately.   Neurologic - CN II-XII are intact. No focal neurologic deficits.   Voice and Breathing - The patient was breathing comfortably without the use of accessory muscles. There was no wheezing, stridor, or stertor.  The patients voice was clear and strong.  Eyes - Extraocular movements intact. Sclera were not icteric or injected, conjunctiva were pink and moist.  Mouth - Examination of the oral cavity showed pink, healthy oral mucosa. No lesions or ulcerations noted.  The tongue was mobile and midline.  Throat - The walls of the oropharynx were smooth, symmetric, and had no lesions or ulcerations.  No postnasal drainage.  The uvula was midline on elevation.  Nose - External contour is symmetric, no gross deflection or scars.  Nasal mucosa is pink and moist with no abnormal mucus.  The septum was deviated to the left-obstructive, turbinates of normal size and position.  No  polyps, masses, or purulence noted on examination.  Neck - Soft, non-tender. Palpation of the occipital, submental, submandibular, internal jugular chain, and supraclavicular nodes did not demonstrate any abnormal lymph nodes or masses. No parotid masses. Palpation of the thyroid was soft and smooth, with no nodules or goiter appreciated.  The trachea was mobile and midline.      A/P -     ICD-10-CM    1. Deviated nasal septum  J34.2       2. Nasal obstruction  J34.89       3. Nasal turbinate hypertrophy  J34.3       4. Episodic cluster headache, not intractable  G44.019 MR Brain w/o & w Contrast          Lydia Pierson is a 64 year old female with right parietal headache. It is very sharp, worse with sneezing, bending over, or coughing. No evidence of sinusitis. CT sinus was clear. I worry about tumor or aneurysm, but could also be cluster headache. I recommend MRI brain.      Sha Chávez MD  Otolaryngology  [facility]      Again, thank you for allowing me to participate in the care of your patient.        Sincerely,        Sha Chávez MD

## 2024-12-20 ENCOUNTER — HOSPITAL ENCOUNTER (OUTPATIENT)
Dept: MRI IMAGING | Facility: CLINIC | Age: 64
Discharge: HOME | End: 2024-12-20
Attending: OTOLARYNGOLOGY | Admitting: OTOLARYNGOLOGY
Payer: COMMERCIAL

## 2024-12-20 DIAGNOSIS — G44.019 EPISODIC CLUSTER HEADACHE, NOT INTRACTABLE: ICD-10-CM

## 2024-12-20 PROCEDURE — 70553 MRI BRAIN STEM W/O & W/DYE: CPT

## 2024-12-20 PROCEDURE — 255N000002 HC RX 255 OP 636: Performed by: OTOLARYNGOLOGY

## 2024-12-20 PROCEDURE — A9585 GADOBUTROL INJECTION: HCPCS | Performed by: OTOLARYNGOLOGY

## 2024-12-20 RX ORDER — GADOBUTROL 604.72 MG/ML
7 INJECTION INTRAVENOUS ONCE
Status: COMPLETED | OUTPATIENT
Start: 2024-12-20 | End: 2024-12-20

## 2024-12-20 RX ADMIN — GADOBUTROL 7 ML: 604.72 INJECTION INTRAVENOUS at 16:35

## 2024-12-23 DIAGNOSIS — G44.019 EPISODIC CLUSTER HEADACHE, NOT INTRACTABLE: Primary | ICD-10-CM

## 2024-12-23 RX ORDER — VERAPAMIL HYDROCHLORIDE 120 MG/1
120 TABLET, FILM COATED, EXTENDED RELEASE ORAL AT BEDTIME
Qty: 30 TABLET | Refills: 3 | Status: SHIPPED | OUTPATIENT
Start: 2024-12-23

## 2024-12-23 NOTE — PROGRESS NOTES
MRI brain shows no cause of headache. This seems like some form of migraine or cluster headache. I recommend a trial of verapamil and to see neurology.

## 2025-01-09 ENCOUNTER — MYC MEDICAL ADVICE (OUTPATIENT)
Dept: FAMILY MEDICINE | Facility: CLINIC | Age: 65
End: 2025-01-09
Payer: COMMERCIAL

## 2025-02-06 NOTE — TELEPHONE ENCOUNTER
Patient Quality Outreach    Patient is due for the following:   Physical Annual Wellness Visit    Action(s) Taken:   Schedule a Annual Wellness Visit    Type of outreach:    Sent [employer] message.    Questions for provider review:    None           Carolina Ibrahim

## 2025-02-17 ENCOUNTER — PATIENT OUTREACH (OUTPATIENT)
Dept: CARE COORDINATION | Facility: CLINIC | Age: 65
End: 2025-02-17
Payer: COMMERCIAL

## 2025-05-09 NOTE — PROGRESS NOTES
__________________________________  ESTABLISHED PATIENT NEUROLOGY NOTE    DATE OF VISIT: 5/12/2025  MRN: [id number]  PATIENT NAME: Lydia Pierson  YOB: 1960    Chief Complaint   Patient presents with    Headache     Patient states she is having headaches daily. When she bends over or sneezes her head feels like it gong to explode. New patient.      SUBJECTIVE:                                                      HISTORY OF PRESENT ILLNESS:  Lydia is here for follow up regarding Headache    Lydia Pierson is a 65 year old female presented 12/17/2024 to her primary care provider with main complaint of headache.  Per chart review, The patient describes symptoms of right upper forehead and top of head pain for the past 3-4 months. She tried antibiotics twice hasn't helped. Sneezing, bending over, and coughing makes the pain much worse like her head might explode. Presently, the patient is symptomatic. She has chronic nasal obstruction from a deviated septum, but that doesn't bother her. She has some allergies. Has tried flonase, nasal saline, and allergy medication.  Her eye was completed which was unremarkable.  Patient was started on verapamil and referred to neurology    05/12/25: Lydia presents to the clinic today to establish care for headache.  Headache started September 2024.  Patient has remote history of migraine headaches that occur on rare occasion.  At first Lydia assumed these headaches were sinus related since they are provoked by sneezing, coughing or bending over.  Antibiotics were ineffective in treating her pain.  ENT prescribed sinus rinse which did not help.  Patient was referred to neurology.  She states that she has 1-10 out of 10 intensity pressure-like pain on the right side of her forehead after bending, sneezing or coughing.  Duration is typically 1 to 3 hours, 5 times per week.  When pain is intense she may have associated symptoms of nausea.  She denies photophobia,  phonophobia, vomiting, vision changes.  No loss of balance or falls.  No temporal pain.  No ipsilateral tearing or congestion.  No numbness or weakness.  She tried verapamil for 3 months.  She felt this caused her to feel drowsy and did not help reduce her headaches.  She had an eye exam at the end of March which was unremarkable.,  Advil, ice pack and rest to help alleviate pain.       Current Medications:   Current Outpatient Medications   Medication Sig Dispense Refill    acetaminophen (TYLENOL) 325 MG tablet Take 1 tablet by mouth every 6 hours as needed.      calcium carbonate-vitamin D (OS-HOLLY) 500-400 MG-UNIT tablet Take 1 tablet by mouth daily      fluticasone (FLONASE) 50 MCG/ACT nasal spray Spray 2 sprays into both nostrils daily.      ibuprofen (ADVIL/MOTRIN) 200 MG tablet Take 200 mg by mouth every 4 hours as needed for pain.      Multiple Vitamins-Minerals (MULTIVITAMIN WOMEN PO) Take 1 tablet by mouth daily      Probiotic Product (PROBIOTIC PO) Take 1 tablet by mouth daily      UNABLE TO FIND MEDICATION NAME: Advanced Digestive Enzyme; take 1 tab once daily in the am.      verapamil ER (CALAN-SR) 120 MG CR tablet Take 1 tablet (120 mg) by mouth at bedtime. 30 tablet 3    VITAMIN D, CHOLECALCIFEROL, PO Take 3 tablets by mouth daily Unknown OTC dose      budesonide (PULMICORT) 0.5 MG/2ML neb solution Use one vial once daily in the [employer] Saline Rinse bottle. (Patient not taking: Reported on 5/12/2025) 280 mL 0     No current facility-administered medications for this visit.     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  Surgical History:  She  has a past surgical history that includes surgical history of -  (8/1/1995); tubal ligation (1994); hemorrhoidectomy (2006); tonsillectomy &  "adenoidectomy (1962); surgical history of -  (1975); nephrectomy rt/lt (2001); salpingo oophorectomy,r/l/jus (2010); Arthroscopy knee with medial meniscectomy (7/7/2011); Herniorrhaphy inguinal (Left, 8/17/2017); Laparoscopic herniorrhaphy inguinal (Left, 1/11/2018); and Colonoscopy (N/A, 9/4/2018).  Family and Social History:  Reviewed, and she  reports that she has never smoked. She has never used smokeless tobacco. She reports that she does not drink alcohol and does not use drugs.  Reviewed, and family history includes Allergies in her sister and son; C.A.D. in her father; Diabetes in her father; Lipids in her father; Neurologic Disorder in her sister; Respiratory in her sister.    RECENT DIAGNOSTIC STUDIES:     Imaging:   EXAM: MR BRAIN W/O and W CONTRAST  LOCATION: [facility]  DATE: 12/20/2024     INDICATION: Sharp shooting pain in head with coughing, sneezing, and bending over, new headache top right of head.  IMPRESSION:  1.  No acute intracranial process.  2.  Generalized brain atrophy and presumed microvascular ischemic changes as detailed above.    CT SCAN OF THE PARANASAL SINUSES AND FACE 12/11/2024 2:36 PM   IMPRESSION:   1. Mild mucosal thickening along the inferior maxillary sinuses.  Remaining paranasal sinuses are clear.  2. Leftward deviation of nasal septum.         No data to display                  REVIEW OF SYSTEMS:                                                      10-point review of systems is negative except as mentioned above in HPI.    EXAM:                                                      Physical Exam:   Vitals: /83   Pulse 75   Ht 1.626 m (5' 4\")   Wt 70.3 kg (155 lb)   LMP 12/05/2010   BMI 26.61 kg/m    BMI= Body mass index is 26.61 kg/m .  GENERAL: NAD.  HEENT: NC/AT.  PULM: Non-labored breathing.   Neurologic:  MENTAL STATUS: Alert, attentive. Speech is fluent. Normal comprehension. Normal concentration. Adequate fund of knowledge. "   CRANIAL NERVES: Visual fields intact to confrontation. Pupils equally, round and reactive to light. Facial sensation and movement normal. EOM full. Hearing intact to conversation. Trapezius strength intact. Palate moves symmetrically. Tongue midline.  MOTOR: 5/5 in proximal and distal muscle groups of upper and lower extremities. Tone and bulk normal.   SENSATION: Normal light touch throughout.   COORDINATION: Normal finger nose finger. Finger tapping normal. Knee heel shin normal.  STATION AND GAIT: Romberg Negative. Good postural reflexes. Casual gait and tandem Normal  right hand-dominant.      ASSESSMENT and PLAN:                                                      Assessment:    ICD-10-CM    1. Episodic cluster headache, not intractable  G44.019 Adult Neurology  Referral        Lydia Pierson is a 65 year old female who presents today to establish care for headache.  She was seen by her primary care provider in December when an MRI was completed which was unremarkable.  She was placed on verapamil and referred to neurology.  MRI showed chronic microvascular changes.  Symptoms with neurologist in clinic decided to order CTA/CTV.  Will follow-up with results once they are in.  We discussed interventions outlined below and a plan to see the patient back in 3 months.  Lydia understands and agrees with this plan.    Plan:  --- CTA/CTV-new daily persistent headache  --- Try Nonpharmacological interventions like heat or cold, massage, or rest  --- Practice good headache hygiene: Avoid known triggers, get adequate sleep, manage stress levels, stay hydrated and eat nutritious meals  --- Plan on follow up in the Neurology Clinic in 3 months.  --- Please feel free to reach out if you have any further questions or concerns.  --- Seek immediate medical attention if an emergency arises or if your health becomes progressively worse.     It was a pleasure meeting you today!     Total Time: Total time spent for  face to face visit, reviewing labs/imaging studies, counseling and coordination of care was: 40 Minutes spent on the date of the encounter doing chart review, review of outside records, review of test results, patient visit, documentation, and discussion with other provider(s) [facility]     This note was dictated using voice recognition software.  Any grammatical or context distortions are unintentional and inherent to the software.    Katina Rios, DNP, APRN, CNP  [facility] Neurology [facility]

## 2025-05-12 ENCOUNTER — OFFICE VISIT (OUTPATIENT)
Dept: NEUROLOGY | Facility: CLINIC | Age: 65
End: 2025-05-12
Attending: OTOLARYNGOLOGY
Payer: COMMERCIAL

## 2025-05-12 VITALS
SYSTOLIC BLOOD PRESSURE: 134 MMHG | HEART RATE: 75 BPM | WEIGHT: 155 LBS | HEIGHT: 64 IN | BODY MASS INDEX: 26.46 KG/M2 | DIASTOLIC BLOOD PRESSURE: 83 MMHG

## 2025-05-12 DIAGNOSIS — G44.52 NDPH (NEW DAILY PERSISTENT HEADACHE): Primary | ICD-10-CM

## 2025-05-12 DIAGNOSIS — G44.019 EPISODIC CLUSTER HEADACHE, NOT INTRACTABLE: ICD-10-CM

## 2025-05-12 PROCEDURE — 3079F DIAST BP 80-89 MM HG: CPT

## 2025-05-12 PROCEDURE — 99203 OFFICE O/P NEW LOW 30 MIN: CPT

## 2025-05-12 PROCEDURE — 3075F SYST BP GE 130 - 139MM HG: CPT

## 2025-05-12 RX ORDER — IBUPROFEN 200 MG
200 TABLET ORAL EVERY 4 HOURS PRN
COMMUNITY

## 2025-05-12 NOTE — NURSING NOTE
Chief Complaint   Patient presents with    Headache     Patient states she is having headaches daily. When she bends over or sneezes her head feels like it gong to explode. New patient.      Gracie Ortiz on 5/12/2025 at 8:44 AM

## 2025-05-12 NOTE — LETTER
5/12/2025      Lydia Pierson  [address] [place] [address]      Dear Colleague,    Thank you for referring your patient, Lydia Pierson, to the [facility] [place]. Please see a copy of my visit note below.      __________________________________  ESTABLISHED PATIENT NEUROLOGY NOTE    DATE OF VISIT: 5/12/2025  MRN: [id number]  PATIENT NAME: Lydia Pierson  YOB: 1960    Chief Complaint   Patient presents with     Headache     Patient states she is having headaches daily. When she bends over or sneezes her head feels like it gong to explode. New patient.      SUBJECTIVE:                                                      HISTORY OF PRESENT ILLNESS:  Lydia is here for follow up regarding Headache    Lydia Pierson is a 65 year old female presented 12/17/2024 to her primary care provider with main complaint of headache.  Per chart review, The patient describes symptoms of right upper forehead and top of head pain for the past 3-4 months. She tried antibiotics twice hasn't helped. Sneezing, bending over, and coughing makes the pain much worse like her head might explode. Presently, the patient is symptomatic. She has chronic nasal obstruction from a deviated septum, but that doesn't bother her. She has some allergies. Has tried flonase, nasal saline, and allergy medication.  Her eye was completed which was unremarkable.  Patient was started on verapamil and referred to neurology    05/12/25: Lydia presents to the clinic today to establish care for headache.  Headache started September 2024.  Patient has remote history of migraine headaches that occur on rare occasion.  At first Lydia assumed these headaches were sinus related since they are provoked by sneezing, coughing or bending over.  Antibiotics were ineffective in treating her pain.  ENT prescribed sinus rinse which did not help.  Patient was referred to neurology.  She states that she has 1-10  out of 10 intensity pressure-like pain on the right side of her forehead after bending, sneezing or coughing.  Duration is typically 1 to 3 hours, 5 times per week.  When pain is intense she may have associated symptoms of nausea.  She denies photophobia, phonophobia, vomiting, vision changes.  No loss of balance or falls.  No temporal pain.  No ipsilateral tearing or congestion.  No numbness or weakness.  She tried verapamil for 3 months.  She felt this caused her to feel drowsy and did not help reduce her headaches.  She had an eye exam at the end of March which was unremarkable.,  Advil, ice pack and rest to help alleviate pain.       Current Medications:   Current Outpatient Medications   Medication Sig Dispense Refill     acetaminophen (TYLENOL) 325 MG tablet Take 1 tablet by mouth every 6 hours as needed.       calcium carbonate-vitamin D (OS-HOLLY) 500-400 MG-UNIT tablet Take 1 tablet by mouth daily       fluticasone (FLONASE) 50 MCG/ACT nasal spray Spray 2 sprays into both nostrils daily.       ibuprofen (ADVIL/MOTRIN) 200 MG tablet Take 200 mg by mouth every 4 hours as needed for pain.       Multiple Vitamins-Minerals (MULTIVITAMIN WOMEN PO) Take 1 tablet by mouth daily       Probiotic Product (PROBIOTIC PO) Take 1 tablet by mouth daily       UNABLE TO FIND MEDICATION NAME: Advanced Digestive Enzyme; take 1 tab once daily in the am.       verapamil ER (CALAN-SR) 120 MG CR tablet Take 1 tablet (120 mg) by mouth at bedtime. 30 tablet 3     VITAMIN D, CHOLECALCIFEROL, PO Take 3 tablets by mouth daily Unknown OTC dose       budesonide (PULMICORT) 0.5 MG/2ML neb solution Use one vial once daily in the [employer] Saline Rinse bottle. (Patient not taking: Reported on 5/12/2025) 280 mL 0     No current facility-administered medications for this visit.     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  Surgical History:  She  has a past surgical history that includes surgical history of -  (8/1/1995); tubal ligation (1994); hemorrhoidectomy (2006); tonsillectomy & adenoidectomy (1962); surgical history of -  (1975); nephrectomy rt/lt (2001); salpingo oophorectomy,r/l/jus (2010); Arthroscopy knee with medial meniscectomy (7/7/2011); Herniorrhaphy inguinal (Left, 8/17/2017); Laparoscopic herniorrhaphy inguinal (Left, 1/11/2018); and Colonoscopy (N/A, 9/4/2018).  Family and Social History:  Reviewed, and she  reports that she has never smoked. She has never used smokeless tobacco. She reports that she does not drink alcohol and does not use drugs.  Reviewed, and family history includes Allergies in her sister and son; C.A.D. in her father; Diabetes in her father; Lipids in her father; Neurologic Disorder in her sister; Respiratory in her sister.    RECENT DIAGNOSTIC STUDIES:     Imaging:   EXAM: MR BRAIN W/O and W CONTRAST  LOCATION: [facility]  DATE: 12/20/2024     INDICATION: Sharp shooting pain in head with coughing, sneezing, and bending over, new headache top right of head.  IMPRESSION:  1.  No acute intracranial process.  2.  Generalized brain atrophy and presumed microvascular ischemic changes as detailed above.    CT SCAN OF THE PARANASAL SINUSES AND FACE 12/11/2024 2:36 PM   IMPRESSION:   1. Mild mucosal thickening along the inferior maxillary sinuses.  Remaining paranasal sinuses are clear.  2. Leftward deviation of nasal septum.         No data to display                  REVIEW OF SYSTEMS:                                                      10-point review of systems is negative except as mentioned above in HPI.    EXAM:                                                      Physical Exam:   Vitals: /83   Pulse 75   Ht 1.626 m (5' 4\")   Wt 70.3 kg (155 lb)   LMP 12/05/2010   " BMI 26.61 kg/m    BMI= Body mass index is 26.61 kg/m .  GENERAL: NAD.  HEENT: NC/AT.  PULM: Non-labored breathing.   Neurologic:  MENTAL STATUS: Alert, attentive. Speech is fluent. Normal comprehension. Normal concentration. Adequate fund of knowledge.   CRANIAL NERVES: Visual fields intact to confrontation. Pupils equally, round and reactive to light. Facial sensation and movement normal. EOM full. Hearing intact to conversation. Trapezius strength intact. Palate moves symmetrically. Tongue midline.  MOTOR: 5/5 in proximal and distal muscle groups of upper and lower extremities. Tone and bulk normal.   SENSATION: Normal light touch throughout.   COORDINATION: Normal finger nose finger. Finger tapping normal. Knee heel shin normal.  STATION AND GAIT: Romberg Negative. Good postural reflexes. Casual gait and tandem Normal  right hand-dominant.      ASSESSMENT and PLAN:                                                      Assessment:    ICD-10-CM    1. Episodic cluster headache, not intractable  G44.019 Adult Neurology [facility] Referral        Lydia Pierson is a 65 year old female who presents today to establish care for headache.  She was seen by her primary care provider in December when an MRI was completed which was unremarkable.  She was placed on verapamil and referred to neurology.  MRI showed chronic microvascular changes.  Symptoms with neurologist in clinic decided to order CTA/CTV.  Will follow-up with results once they are in.  We discussed interventions outlined below and a plan to see the patient back in 3 months.  Lydia understands and agrees with this plan.    Plan:  --- CTA/CTV-new daily persistent headache  --- Try Nonpharmacological interventions like heat or cold, massage, or rest  --- Practice good headache hygiene: Avoid known triggers, get adequate sleep, manage stress levels, stay hydrated and eat nutritious meals  --- Plan on follow up in the Neurology Clinic in 3 months.  --- Please  feel free to reach out if you have any further questions or concerns.  --- Seek immediate medical attention if an emergency arises or if your health becomes progressively worse.     It was a pleasure meeting you today!     Total Time: Total time spent for face to face visit, reviewing labs/imaging studies, counseling and coordination of care was: 40 Minutes spent on the date of the encounter doing chart review, review of outside records, review of test results, patient visit, documentation, and discussion with other provider(s) [facility]     This note was dictated using voice recognition software.  Any grammatical or context distortions are unintentional and inherent to the software.    Katina Rios, AYLA, APRN, CNP  [facility]         Again, thank you for allowing me to participate in the care of your patient.        Sincerely,        JOAQUIN Velazquez CNP    Electronically signed

## 2025-05-12 NOTE — PATIENT INSTRUCTIONS
Plan:  --- CTA/CTV-new daily persistent headache  --- Try Nonpharmacological interventions like heat or cold, massage, or rest  --- Practice good headache hygiene: Avoid known triggers, get adequate sleep, manage stress levels, stay hydrated and eat nutritious meals  --- Plan on follow up in the Neurology Clinic in 3 months.  --- Please feel free to reach out if you have any further questions or concerns.  --- Seek immediate medical attention if an emergency arises or if your health becomes progressively worse.     It was a pleasure meeting you today!

## 2025-06-21 ENCOUNTER — HEALTH MAINTENANCE LETTER (OUTPATIENT)
Age: 65
End: 2025-06-21

## 2025-07-01 ENCOUNTER — HOSPITAL ENCOUNTER (OUTPATIENT)
Dept: CT IMAGING | Facility: CLINIC | Age: 65
Discharge: HOME | End: 2025-07-01
Payer: COMMERCIAL

## 2025-07-01 DIAGNOSIS — G44.52 NDPH (NEW DAILY PERSISTENT HEADACHE): ICD-10-CM

## 2025-07-01 LAB
CREAT BLD-MCNC: 0.7 MG/DL (ref 0.5–1)
EGFRCR SERPLBLD CKD-EPI 2021: >60 ML/MIN/1.73M2

## 2025-07-01 PROCEDURE — 250N000011 HC RX IP 250 OP 636

## 2025-07-01 PROCEDURE — 70496 CT ANGIOGRAPHY HEAD: CPT

## 2025-07-01 PROCEDURE — 70498 CT ANGIOGRAPHY NECK: CPT | Mod: XU

## 2025-07-01 PROCEDURE — 250N000009 HC RX 250

## 2025-07-01 PROCEDURE — 82565 ASSAY OF CREATININE: CPT

## 2025-07-01 RX ORDER — IOPAMIDOL 755 MG/ML
67 INJECTION, SOLUTION INTRAVASCULAR ONCE
Status: COMPLETED | OUTPATIENT
Start: 2025-07-01 | End: 2025-07-01

## 2025-07-01 RX ADMIN — IOPAMIDOL 67 ML: 755 INJECTION, SOLUTION INTRAVENOUS at 09:58

## 2025-07-01 RX ADMIN — SODIUM CHLORIDE 100 ML: 9 INJECTION, SOLUTION INTRAVENOUS at 09:58

## 2025-08-12 ENCOUNTER — OFFICE VISIT (OUTPATIENT)
Dept: NEUROLOGY | Facility: CLINIC | Age: 65
End: 2025-08-12
Payer: COMMERCIAL

## 2025-08-12 VITALS
DIASTOLIC BLOOD PRESSURE: 78 MMHG | HEART RATE: 78 BPM | WEIGHT: 157.8 LBS | SYSTOLIC BLOOD PRESSURE: 123 MMHG | BODY MASS INDEX: 27.09 KG/M2

## 2025-08-12 DIAGNOSIS — G44.52 NDPH (NEW DAILY PERSISTENT HEADACHE): Primary | ICD-10-CM

## 2025-08-12 PROCEDURE — 99212 OFFICE O/P EST SF 10 MIN: CPT

## 2025-08-12 PROCEDURE — 3074F SYST BP LT 130 MM HG: CPT

## 2025-08-12 PROCEDURE — 3078F DIAST BP <80 MM HG: CPT

## 2025-08-18 ENCOUNTER — TELEPHONE (OUTPATIENT)
Dept: FAMILY MEDICINE | Facility: CLINIC | Age: 65
End: 2025-08-18
Payer: COMMERCIAL

## (undated) DEVICE — GLOVE PROTEXIS BLUE W/NEU-THERA 6.5  2D73EB65

## (undated) DEVICE — PREP CHLORAPREP 26ML TINTED ORANGE  260815

## (undated) DEVICE — STOCKING SLEEVE COMPRESSION CALF MED

## (undated) DEVICE — SU MONOCRYL 4-0 PS-2 18" UND Y496G

## (undated) DEVICE — SU ETHIBOND 0 MO-7 CR 8X18" CX41D

## (undated) DEVICE — ADHESIVE SWIFTSET 0.8ML OCTYL SS6

## (undated) DEVICE — ESU PENCIL W/COATED BLADE E2450H

## (undated) DEVICE — GLOVE PROTEXIS W/NEU-THERA 6.5  2D73TE65

## (undated) DEVICE — SOL NACL 0.9% IRRIG 1000ML BOTTLE 07138-09

## (undated) DEVICE — GOWN LG DISP 9515

## (undated) DEVICE — SU VICRYL 0 CT-2 27" J334H

## (undated) DEVICE — Device

## (undated) DEVICE — BLADE CLIPPER 4406

## (undated) DEVICE — SOL WATER IRRIG 1000ML BOTTLE 07139-09

## (undated) DEVICE — ENDO TROCAR OPTICAL 05MM VERSAPORT PLUS W/FIX CAN ONB5STF

## (undated) DEVICE — ESU HOLDER LAP INST DISP PURPLE LONG 330MM H-PRO-330

## (undated) DEVICE — PACK LAPAROTOMY CUSTOM LAKES

## (undated) DEVICE — ENDO TROCAR BALLOON TIP 10MM OMS-T10BT

## (undated) DEVICE — ENDO TROCAR DISSECTING BALLOON OMS-PDBS2

## (undated) DEVICE — SU VICRYL 2-0 SH 27" J317H

## (undated) DEVICE — DECANTER VIAL 2006S

## (undated) DEVICE — SU VICRYL 0 UR-6 27" J603H

## (undated) DEVICE — BLANKET BAIR HUGGER UPPER BODY 42268

## (undated) DEVICE — SU VICRYL 3-0 SH 27" UND J416H

## (undated) DEVICE — ENDO CANNULA 05MM VERSAONE UNIVERSAL UNVCA5STF

## (undated) RX ORDER — LIDOCAINE HYDROCHLORIDE 10 MG/ML
INJECTION, SOLUTION EPIDURAL; INFILTRATION; INTRACAUDAL; PERINEURAL
Status: DISPENSED
Start: 2018-09-04

## (undated) RX ORDER — FENTANYL CITRATE 50 UG/ML
INJECTION, SOLUTION INTRAMUSCULAR; INTRAVENOUS
Status: DISPENSED
Start: 2018-01-11

## (undated) RX ORDER — PROPOFOL 10 MG/ML
INJECTION, EMULSION INTRAVENOUS
Status: DISPENSED
Start: 2018-09-04

## (undated) RX ORDER — DEXAMETHASONE SODIUM PHOSPHATE 4 MG/ML
INJECTION, SOLUTION INTRA-ARTICULAR; INTRALESIONAL; INTRAMUSCULAR; INTRAVENOUS; SOFT TISSUE
Status: DISPENSED
Start: 2017-08-17

## (undated) RX ORDER — LIDOCAINE HYDROCHLORIDE 10 MG/ML
INJECTION, SOLUTION EPIDURAL; INFILTRATION; INTRACAUDAL; PERINEURAL
Status: DISPENSED
Start: 2017-08-17

## (undated) RX ORDER — DEXAMETHASONE SODIUM PHOSPHATE 4 MG/ML
INJECTION, SOLUTION INTRA-ARTICULAR; INTRALESIONAL; INTRAMUSCULAR; INTRAVENOUS; SOFT TISSUE
Status: DISPENSED
Start: 2018-01-11

## (undated) RX ORDER — KETOROLAC TROMETHAMINE 15 MG/ML
INJECTION, SOLUTION INTRAMUSCULAR; INTRAVENOUS
Status: DISPENSED
Start: 2017-08-17

## (undated) RX ORDER — BUPIVACAINE HYDROCHLORIDE AND EPINEPHRINE 2.5; 5 MG/ML; UG/ML
INJECTION, SOLUTION INFILTRATION; PERINEURAL
Status: DISPENSED
Start: 2017-08-17

## (undated) RX ORDER — HYDROCODONE BITARTRATE AND ACETAMINOPHEN 5; 325 MG/1; MG/1
TABLET ORAL
Status: DISPENSED
Start: 2017-08-17

## (undated) RX ORDER — PROPOFOL 10 MG/ML
INJECTION, EMULSION INTRAVENOUS
Status: DISPENSED
Start: 2017-08-17

## (undated) RX ORDER — ONDANSETRON 2 MG/ML
INJECTION INTRAMUSCULAR; INTRAVENOUS
Status: DISPENSED
Start: 2018-01-11

## (undated) RX ORDER — ONDANSETRON 2 MG/ML
INJECTION INTRAMUSCULAR; INTRAVENOUS
Status: DISPENSED
Start: 2017-08-17

## (undated) RX ORDER — BUPIVACAINE HYDROCHLORIDE 2.5 MG/ML
INJECTION, SOLUTION INFILTRATION; PERINEURAL
Status: DISPENSED
Start: 2018-01-11

## (undated) RX ORDER — GLYCOPYRROLATE 0.2 MG/ML
INJECTION, SOLUTION INTRAMUSCULAR; INTRAVENOUS
Status: DISPENSED
Start: 2018-01-11

## (undated) RX ORDER — NEOSTIGMINE METHYLSULFATE 1 MG/ML
VIAL (ML) INJECTION
Status: DISPENSED
Start: 2018-01-11

## (undated) RX ORDER — KETOROLAC TROMETHAMINE 30 MG/ML
INJECTION, SOLUTION INTRAMUSCULAR; INTRAVENOUS
Status: DISPENSED
Start: 2017-08-17

## (undated) RX ORDER — HYDROCODONE BITARTRATE AND ACETAMINOPHEN 5; 325 MG/1; MG/1
TABLET ORAL
Status: DISPENSED
Start: 2018-01-11

## (undated) RX ORDER — FENTANYL CITRATE 50 UG/ML
INJECTION, SOLUTION INTRAMUSCULAR; INTRAVENOUS
Status: DISPENSED
Start: 2017-08-17

## (undated) RX ORDER — HYDROMORPHONE HYDROCHLORIDE 1 MG/ML
INJECTION, SOLUTION INTRAMUSCULAR; INTRAVENOUS; SUBCUTANEOUS
Status: DISPENSED
Start: 2018-01-11

## (undated) RX ORDER — LIDOCAINE HYDROCHLORIDE 10 MG/ML
INJECTION, SOLUTION EPIDURAL; INFILTRATION; INTRACAUDAL; PERINEURAL
Status: DISPENSED
Start: 2018-01-11

## (undated) RX ORDER — PROPOFOL 10 MG/ML
INJECTION, EMULSION INTRAVENOUS
Status: DISPENSED
Start: 2018-01-11